# Patient Record
Sex: MALE | Race: WHITE | NOT HISPANIC OR LATINO | Employment: OTHER | URBAN - METROPOLITAN AREA
[De-identification: names, ages, dates, MRNs, and addresses within clinical notes are randomized per-mention and may not be internally consistent; named-entity substitution may affect disease eponyms.]

---

## 2017-05-05 ENCOUNTER — HOSPITAL ENCOUNTER (OUTPATIENT)
Dept: LAB | Facility: HOSPITAL | Age: 77
Discharge: HOME | End: 2017-05-05
Attending: INTERNAL MEDICINE | Admitting: INTERNAL MEDICINE

## 2017-05-05 LAB
ALBUMIN SERPL-MCNC: 3.9 G/DL (ref 3.5–4.8)
ALP SERPL-CCNC: 176 IU/L (ref 32–91)
ALT SERPL-CCNC: 26 IU/L (ref 17–63)
ANION GAP SERPL CALC-SCNC: 14.4 MMOL/L (ref 10–20)
AST SERPL-CCNC: 20 IU/L (ref 15–41)
BILIRUB DIRECT SERPL-MCNC: 0.2 MG/DL (ref 0.1–0.5)
BILIRUB SERPL-MCNC: 1.4 MG/DL (ref 0.3–1.2)
BUN SERPL-MCNC: 19 MG/DL (ref 8–20)
BUN/CREAT SERPL: 19 (ref 6.2–20.3)
CALCIUM SERPL-MCNC: 9.6 MG/DL (ref 8.9–10.3)
CHLORIDE SERPL-SCNC: 103 MMOL/L (ref 101–111)
CHOLEST SERPL-MCNC: 133 MG/DL
CHOLEST/HDLC SERPL: 4 {RATIO}
CK SERPL-CCNC: 124 IU/L (ref 49–397)
CONV CO2: 28 MMOL/L (ref 22–32)
CONV LDL CHOLESTEROL DIRECT: 58 MG/DL (ref 0–100)
CONV TOTAL PROTEIN: 6.8 G/DL (ref 6.1–7.9)
CREAT UR-MCNC: 1 MG/DL (ref 0.7–1.2)
GLUCOSE SERPL-MCNC: 233 MG/DL (ref 65–99)
HDLC SERPL-MCNC: 34 MG/DL
LDLC/HDLC SERPL: 1.7 {RATIO}
LIPID INTERPRETATION: ABNORMAL
POTASSIUM SERPL-SCNC: 4.4 MMOL/L (ref 3.6–5.1)
SODIUM SERPL-SCNC: 141 MMOL/L (ref 136–144)
TRIGL SERPL-MCNC: 245 MG/DL
VLDLC SERPL CALC-MCNC: 41.2 MG/DL

## 2017-05-12 ENCOUNTER — CONVERSION ENCOUNTER (OUTPATIENT)
Dept: CARDIOLOGY | Facility: CLINIC | Age: 77
End: 2017-05-12

## 2017-11-03 ENCOUNTER — HOSPITAL ENCOUNTER (OUTPATIENT)
Dept: LAB | Facility: HOSPITAL | Age: 77
Discharge: HOME | End: 2017-11-03
Attending: INTERNAL MEDICINE | Admitting: INTERNAL MEDICINE

## 2017-11-03 LAB
ALBUMIN SERPL-MCNC: 3.6 G/DL (ref 3.5–4.8)
ALP SERPL-CCNC: 176 IU/L (ref 32–91)
ALT SERPL-CCNC: 23 IU/L (ref 17–63)
ANION GAP SERPL CALC-SCNC: 12.3 MMOL/L (ref 10–20)
AST SERPL-CCNC: 22 IU/L (ref 15–41)
BILIRUB DIRECT SERPL-MCNC: 0.2 MG/DL (ref 0.1–0.5)
BILIRUB SERPL-MCNC: 1.1 MG/DL (ref 0.3–1.2)
BUN SERPL-MCNC: 16 MG/DL (ref 8–20)
BUN/CREAT SERPL: 16 (ref 6.2–20.3)
CALCIUM SERPL-MCNC: 9.1 MG/DL (ref 8.9–10.3)
CHLORIDE SERPL-SCNC: 100 MMOL/L (ref 101–111)
CHOLEST SERPL-MCNC: 109 MG/DL
CHOLEST/HDLC SERPL: 3.1 {RATIO}
CK SERPL-CCNC: 81 IU/L (ref 49–397)
CONV CO2: 28 MMOL/L (ref 22–32)
CONV LDL CHOLESTEROL DIRECT: 46 MG/DL (ref 0–100)
CONV TOTAL PROTEIN: 6.2 G/DL (ref 6.1–7.9)
CREAT UR-MCNC: 1 MG/DL (ref 0.7–1.2)
GLUCOSE SERPL-MCNC: 270 MG/DL (ref 65–99)
HDLC SERPL-MCNC: 35 MG/DL
LDLC/HDLC SERPL: 1.3 {RATIO}
LIPID INTERPRETATION: ABNORMAL
POTASSIUM SERPL-SCNC: 4.3 MMOL/L (ref 3.6–5.1)
SODIUM SERPL-SCNC: 136 MMOL/L (ref 136–144)
TRIGL SERPL-MCNC: 224 MG/DL
VLDLC SERPL CALC-MCNC: 28.6 MG/DL

## 2017-11-10 ENCOUNTER — CONVERSION ENCOUNTER (OUTPATIENT)
Dept: CARDIOLOGY | Facility: CLINIC | Age: 77
End: 2017-11-10

## 2017-12-01 ENCOUNTER — OFFICE (AMBULATORY)
Dept: URBAN - METROPOLITAN AREA CLINIC 64 | Facility: CLINIC | Age: 77
End: 2017-12-01

## 2017-12-01 VITALS
HEART RATE: 103 BPM | HEIGHT: 70 IN | SYSTOLIC BLOOD PRESSURE: 134 MMHG | WEIGHT: 188 LBS | DIASTOLIC BLOOD PRESSURE: 87 MMHG

## 2017-12-01 DIAGNOSIS — K59.1 FUNCTIONAL DIARRHEA: ICD-10-CM

## 2017-12-01 DIAGNOSIS — Z86.010 PERSONAL HISTORY OF COLONIC POLYPS: ICD-10-CM

## 2017-12-01 DIAGNOSIS — K22.70 BARRETT'S ESOPHAGUS WITHOUT DYSPLASIA: ICD-10-CM

## 2017-12-01 DIAGNOSIS — K57.30 DIVERTICULOSIS OF LARGE INTESTINE WITHOUT PERFORATION OR ABS: ICD-10-CM

## 2017-12-01 PROCEDURE — 99213 OFFICE O/P EST LOW 20 MIN: CPT | Performed by: NURSE PRACTITIONER

## 2017-12-01 RX ORDER — OMEPRAZOLE, SODIUM BICARBONATE 40; 1100 MG/1; MG/1
CAPSULE ORAL
Qty: 90 | Refills: 5 | Status: ACTIVE

## 2018-01-19 ENCOUNTER — ON CAMPUS - OUTPATIENT (AMBULATORY)
Dept: URBAN - METROPOLITAN AREA HOSPITAL 2 | Facility: HOSPITAL | Age: 78
End: 2018-01-19

## 2018-01-19 ENCOUNTER — OFFICE (AMBULATORY)
Dept: URBAN - METROPOLITAN AREA PATHOLOGY 4 | Facility: PATHOLOGY | Age: 78
End: 2018-01-19

## 2018-01-19 ENCOUNTER — HOSPITAL ENCOUNTER (OUTPATIENT)
Dept: OTHER | Facility: HOSPITAL | Age: 78
Setting detail: SPECIMEN
Discharge: HOME | End: 2018-01-19
Attending: INTERNAL MEDICINE | Admitting: INTERNAL MEDICINE

## 2018-01-19 VITALS
OXYGEN SATURATION: 97 % | DIASTOLIC BLOOD PRESSURE: 82 MMHG | SYSTOLIC BLOOD PRESSURE: 137 MMHG | DIASTOLIC BLOOD PRESSURE: 66 MMHG | HEIGHT: 70 IN | OXYGEN SATURATION: 96 % | HEART RATE: 112 BPM | SYSTOLIC BLOOD PRESSURE: 102 MMHG | SYSTOLIC BLOOD PRESSURE: 90 MMHG | RESPIRATION RATE: 14 BRPM | RESPIRATION RATE: 21 BRPM | SYSTOLIC BLOOD PRESSURE: 156 MMHG | DIASTOLIC BLOOD PRESSURE: 71 MMHG | SYSTOLIC BLOOD PRESSURE: 91 MMHG | RESPIRATION RATE: 16 BRPM | HEART RATE: 113 BPM | HEART RATE: 122 BPM | DIASTOLIC BLOOD PRESSURE: 75 MMHG | OXYGEN SATURATION: 99 % | DIASTOLIC BLOOD PRESSURE: 65 MMHG | RESPIRATION RATE: 18 BRPM | OXYGEN SATURATION: 93 % | HEART RATE: 121 BPM | SYSTOLIC BLOOD PRESSURE: 120 MMHG | DIASTOLIC BLOOD PRESSURE: 101 MMHG | HEART RATE: 110 BPM | SYSTOLIC BLOOD PRESSURE: 92 MMHG | HEART RATE: 123 BPM | DIASTOLIC BLOOD PRESSURE: 61 MMHG | OXYGEN SATURATION: 95 % | RESPIRATION RATE: 20 BRPM | DIASTOLIC BLOOD PRESSURE: 76 MMHG | TEMPERATURE: 97.1 F | SYSTOLIC BLOOD PRESSURE: 96 MMHG | WEIGHT: 179 LBS | OXYGEN SATURATION: 98 % | OXYGEN SATURATION: 94 % | SYSTOLIC BLOOD PRESSURE: 122 MMHG

## 2018-01-19 DIAGNOSIS — R19.4 CHANGE IN BOWEL HABIT: ICD-10-CM

## 2018-01-19 DIAGNOSIS — K64.1 SECOND DEGREE HEMORRHOIDS: ICD-10-CM

## 2018-01-19 DIAGNOSIS — K59.1 FUNCTIONAL DIARRHEA: ICD-10-CM

## 2018-01-19 DIAGNOSIS — K22.70 BARRETT'S ESOPHAGUS WITHOUT DYSPLASIA: ICD-10-CM

## 2018-01-19 DIAGNOSIS — K21.9 GASTRO-ESOPHAGEAL REFLUX DISEASE WITHOUT ESOPHAGITIS: ICD-10-CM

## 2018-01-19 LAB
GI HISTOLOGY: A. SELECT: (no result)
GI HISTOLOGY: B. SELECT: (no result)
GI HISTOLOGY: PDF REPORT: (no result)

## 2018-01-19 PROCEDURE — 43239 EGD BIOPSY SINGLE/MULTIPLE: CPT | Performed by: INTERNAL MEDICINE

## 2018-01-19 PROCEDURE — 88305 TISSUE EXAM BY PATHOLOGIST: CPT | Mod: 26 | Performed by: INTERNAL MEDICINE

## 2018-01-19 PROCEDURE — 45380 COLONOSCOPY AND BIOPSY: CPT | Performed by: INTERNAL MEDICINE

## 2018-01-19 RX ORDER — COLESTIPOL HYDROCHLORIDE 1 G/1
TABLET, FILM COATED ORAL
Qty: 180 | Refills: 6 | Status: ACTIVE

## 2018-01-19 RX ADMIN — PROPOFOL: 10 INJECTION, EMULSION INTRAVENOUS at 07:33

## 2018-03-06 ENCOUNTER — OFFICE (AMBULATORY)
Dept: URBAN - METROPOLITAN AREA CLINIC 64 | Facility: CLINIC | Age: 78
End: 2018-03-06

## 2018-03-06 VITALS
WEIGHT: 190 LBS | SYSTOLIC BLOOD PRESSURE: 138 MMHG | DIASTOLIC BLOOD PRESSURE: 82 MMHG | HEIGHT: 70 IN | HEART RATE: 75 BPM

## 2018-03-06 DIAGNOSIS — K22.70 BARRETT'S ESOPHAGUS WITHOUT DYSPLASIA: ICD-10-CM

## 2018-03-06 DIAGNOSIS — K59.1 FUNCTIONAL DIARRHEA: ICD-10-CM

## 2018-03-06 PROCEDURE — 99213 OFFICE O/P EST LOW 20 MIN: CPT | Performed by: INTERNAL MEDICINE

## 2018-03-06 RX ORDER — OMEPRAZOLE, SODIUM BICARBONATE 40; 1100 MG/1; MG/1
CAPSULE ORAL
Qty: 90 | Refills: 5 | Status: ACTIVE

## 2018-03-06 RX ORDER — COLESTIPOL HYDROCHLORIDE 1 G/1
TABLET, FILM COATED ORAL
Qty: 180 | Refills: 6 | Status: ACTIVE

## 2018-05-04 ENCOUNTER — HOSPITAL ENCOUNTER (OUTPATIENT)
Dept: LAB | Facility: HOSPITAL | Age: 78
Discharge: HOME | End: 2018-05-04
Attending: INTERNAL MEDICINE | Admitting: INTERNAL MEDICINE

## 2018-05-04 LAB
ALBUMIN SERPL-MCNC: 3.7 G/DL (ref 3.5–4.8)
ALP SERPL-CCNC: 140 IU/L (ref 32–91)
ALT SERPL-CCNC: 23 IU/L (ref 17–63)
ANION GAP SERPL CALC-SCNC: 12.6 MMOL/L (ref 10–20)
AST SERPL-CCNC: 22 IU/L (ref 15–41)
BILIRUB DIRECT SERPL-MCNC: 0.2 MG/DL (ref 0.1–0.5)
BILIRUB SERPL-MCNC: 1 MG/DL (ref 0.3–1.2)
BUN SERPL-MCNC: 18 MG/DL (ref 8–20)
BUN/CREAT SERPL: 16.4 (ref 6.2–20.3)
CALCIUM SERPL-MCNC: 9.2 MG/DL (ref 8.9–10.3)
CHLORIDE SERPL-SCNC: 103 MMOL/L (ref 101–111)
CHOLEST SERPL-MCNC: 104 MG/DL
CHOLEST/HDLC SERPL: 3.6 {RATIO}
CK SERPL-CCNC: 84 IU/L (ref 49–397)
CONV CO2: 28 MMOL/L (ref 22–32)
CONV LDL CHOLESTEROL DIRECT: 44 MG/DL (ref 0–100)
CONV TOTAL PROTEIN: 6.6 G/DL (ref 6.1–7.9)
CREAT UR-MCNC: 1.1 MG/DL (ref 0.7–1.2)
GLUCOSE SERPL-MCNC: 217 MG/DL (ref 65–99)
HDLC SERPL-MCNC: 29 MG/DL
LDLC/HDLC SERPL: 1.5 {RATIO}
LIPID INTERPRETATION: ABNORMAL
POTASSIUM SERPL-SCNC: 4.6 MMOL/L (ref 3.6–5.1)
SODIUM SERPL-SCNC: 139 MMOL/L (ref 136–144)
TRIGL SERPL-MCNC: 241 MG/DL
VLDLC SERPL CALC-MCNC: 31.3 MG/DL

## 2018-05-11 ENCOUNTER — CONVERSION ENCOUNTER (OUTPATIENT)
Dept: CARDIOLOGY | Facility: CLINIC | Age: 78
End: 2018-05-11

## 2018-10-26 ENCOUNTER — OFFICE (AMBULATORY)
Dept: URBAN - METROPOLITAN AREA CLINIC 64 | Facility: CLINIC | Age: 78
End: 2018-10-26

## 2018-10-26 VITALS
DIASTOLIC BLOOD PRESSURE: 62 MMHG | SYSTOLIC BLOOD PRESSURE: 109 MMHG | WEIGHT: 192 LBS | HEIGHT: 70 IN | HEART RATE: 85 BPM

## 2018-10-26 DIAGNOSIS — K22.70 BARRETT'S ESOPHAGUS WITHOUT DYSPLASIA: ICD-10-CM

## 2018-10-26 DIAGNOSIS — R05 COUGH: ICD-10-CM

## 2018-10-26 DIAGNOSIS — J40 BRONCHITIS, NOT SPECIFIED AS ACUTE OR CHRONIC: ICD-10-CM

## 2018-10-26 DIAGNOSIS — K59.1 FUNCTIONAL DIARRHEA: ICD-10-CM

## 2018-10-26 PROCEDURE — 99213 OFFICE O/P EST LOW 20 MIN: CPT | Performed by: INTERNAL MEDICINE

## 2018-10-26 RX ORDER — COLESTIPOL HYDROCHLORIDE 1 G/1
TABLET, FILM COATED ORAL
Qty: 180 | Refills: 6 | Status: ACTIVE

## 2018-10-26 RX ORDER — LEVOFLOXACIN 500 MG/1
500 TABLET, FILM COATED ORAL
Qty: 7 | Refills: 0 | Status: COMPLETED
Start: 2018-10-26 | End: 2019-04-23

## 2018-10-26 RX ORDER — OMEPRAZOLE, SODIUM BICARBONATE 40; 1100 MG/1; MG/1
CAPSULE ORAL
Qty: 90 | Refills: 5 | Status: ACTIVE

## 2018-11-02 ENCOUNTER — HOSPITAL ENCOUNTER (OUTPATIENT)
Dept: LAB | Facility: HOSPITAL | Age: 78
Discharge: HOME | End: 2018-11-02
Attending: INTERNAL MEDICINE | Admitting: INTERNAL MEDICINE

## 2018-11-02 LAB
ALBUMIN SERPL-MCNC: 3.3 G/DL (ref 3.5–4.8)
ALP SERPL-CCNC: 179 IU/L (ref 32–91)
ALT SERPL-CCNC: 20 IU/L (ref 17–63)
ANION GAP SERPL CALC-SCNC: 15 MMOL/L (ref 10–20)
AST SERPL-CCNC: 18 IU/L (ref 15–41)
BILIRUB DIRECT SERPL-MCNC: 0.1 MG/DL (ref 0.1–0.5)
BILIRUB SERPL-MCNC: 0.6 MG/DL (ref 0.3–1.2)
BUN SERPL-MCNC: 16 MG/DL (ref 8–20)
BUN/CREAT SERPL: 16 (ref 6.2–20.3)
CALCIUM SERPL-MCNC: 9.3 MG/DL (ref 8.9–10.3)
CHLORIDE SERPL-SCNC: 96 MMOL/L (ref 101–111)
CHOLEST SERPL-MCNC: 90 MG/DL
CHOLEST/HDLC SERPL: 2.9 {RATIO}
CK SERPL-CCNC: 72 IU/L (ref 49–397)
CONV CO2: 30 MMOL/L (ref 22–32)
CONV LDL CHOLESTEROL DIRECT: 36 MG/DL (ref 0–100)
CONV TOTAL PROTEIN: 6.5 G/DL (ref 6.1–7.9)
CREAT UR-MCNC: 1 MG/DL (ref 0.7–1.2)
GLUCOSE SERPL-MCNC: 257 MG/DL (ref 65–99)
HDLC SERPL-MCNC: 32 MG/DL
LDLC/HDLC SERPL: 1.2 {RATIO}
LIPID INTERPRETATION: ABNORMAL
POTASSIUM SERPL-SCNC: 5 MMOL/L (ref 3.6–5.1)
SODIUM SERPL-SCNC: 136 MMOL/L (ref 136–144)
TRIGL SERPL-MCNC: 179 MG/DL
VLDLC SERPL CALC-MCNC: 22.1 MG/DL

## 2018-11-09 ENCOUNTER — CONVERSION ENCOUNTER (OUTPATIENT)
Dept: CARDIOLOGY | Facility: CLINIC | Age: 78
End: 2018-11-09

## 2019-04-23 ENCOUNTER — OFFICE (AMBULATORY)
Dept: URBAN - METROPOLITAN AREA CLINIC 64 | Facility: CLINIC | Age: 79
End: 2019-04-23

## 2019-04-23 VITALS
HEART RATE: 70 BPM | WEIGHT: 195 LBS | DIASTOLIC BLOOD PRESSURE: 73 MMHG | SYSTOLIC BLOOD PRESSURE: 122 MMHG | HEIGHT: 70 IN

## 2019-04-23 DIAGNOSIS — K59.1 FUNCTIONAL DIARRHEA: ICD-10-CM

## 2019-04-23 DIAGNOSIS — K21.9 GASTRO-ESOPHAGEAL REFLUX DISEASE WITHOUT ESOPHAGITIS: ICD-10-CM

## 2019-04-23 DIAGNOSIS — K22.70 BARRETT'S ESOPHAGUS WITHOUT DYSPLASIA: ICD-10-CM

## 2019-04-23 PROCEDURE — 99212 OFFICE O/P EST SF 10 MIN: CPT | Performed by: INTERNAL MEDICINE

## 2019-04-23 RX ORDER — OMEPRAZOLE, SODIUM BICARBONATE 40; 1100 MG/1; MG/1
CAPSULE ORAL
Qty: 90 | Refills: 5 | Status: ACTIVE

## 2019-04-23 RX ORDER — COLESTIPOL HYDROCHLORIDE 1 G/1
TABLET, FILM COATED ORAL
Qty: 180 | Refills: 6 | Status: ACTIVE

## 2019-05-17 ENCOUNTER — HOSPITAL ENCOUNTER (OUTPATIENT)
Dept: LAB | Facility: HOSPITAL | Age: 79
Discharge: HOME | End: 2019-05-17
Attending: INTERNAL MEDICINE | Admitting: INTERNAL MEDICINE

## 2019-05-17 LAB
ALBUMIN SERPL-MCNC: 3.6 G/DL (ref 3.5–4.8)
ALP SERPL-CCNC: 125 IU/L (ref 32–91)
ALT SERPL-CCNC: 18 IU/L (ref 17–63)
ANION GAP SERPL CALC-SCNC: 14 MMOL/L (ref 10–20)
AST SERPL-CCNC: 18 IU/L (ref 15–41)
BILIRUB DIRECT SERPL-MCNC: 0.3 MG/DL (ref 0.1–0.5)
BILIRUB SERPL-MCNC: 1.3 MG/DL (ref 0.3–1.2)
BUN SERPL-MCNC: 19 MG/DL (ref 8–20)
BUN/CREAT SERPL: 19 (ref 6.2–20.3)
CALCIUM SERPL-MCNC: 9 MG/DL (ref 8.9–10.3)
CHLORIDE SERPL-SCNC: 102 MMOL/L (ref 101–111)
CHOLEST SERPL-MCNC: 115 MG/DL
CHOLEST/HDLC SERPL: 3.9 {RATIO}
CK SERPL-CCNC: 92 IU/L (ref 49–397)
CONV CO2: 26 MMOL/L (ref 22–32)
CONV LDL CHOLESTEROL DIRECT: 53 MG/DL (ref 0–100)
CONV TOTAL PROTEIN: 6.2 G/DL (ref 6.1–7.9)
CREAT UR-MCNC: 1 MG/DL (ref 0.7–1.2)
GLUCOSE SERPL-MCNC: 192 MG/DL (ref 65–99)
HDLC SERPL-MCNC: 29 MG/DL
LDLC/HDLC SERPL: 1.8 {RATIO}
LIPID INTERPRETATION: ABNORMAL
POTASSIUM SERPL-SCNC: 4 MMOL/L (ref 3.6–5.1)
SODIUM SERPL-SCNC: 138 MMOL/L (ref 136–144)
TRIGL SERPL-MCNC: 257 MG/DL
VLDLC SERPL CALC-MCNC: 32.7 MG/DL

## 2019-05-24 ENCOUNTER — CONVERSION ENCOUNTER (OUTPATIENT)
Dept: CARDIOLOGY | Facility: CLINIC | Age: 79
End: 2019-05-24

## 2019-06-04 VITALS
DIASTOLIC BLOOD PRESSURE: 76 MMHG | SYSTOLIC BLOOD PRESSURE: 120 MMHG | DIASTOLIC BLOOD PRESSURE: 76 MMHG | HEART RATE: 86 BPM | OXYGEN SATURATION: 97 % | HEART RATE: 70 BPM | HEART RATE: 75 BPM | WEIGHT: 192.75 LBS | HEART RATE: 77 BPM | WEIGHT: 195 LBS | SYSTOLIC BLOOD PRESSURE: 131 MMHG | OXYGEN SATURATION: 98 % | DIASTOLIC BLOOD PRESSURE: 79 MMHG | WEIGHT: 198.5 LBS | SYSTOLIC BLOOD PRESSURE: 117 MMHG | WEIGHT: 189.4 LBS | SYSTOLIC BLOOD PRESSURE: 118 MMHG | OXYGEN SATURATION: 96 % | DIASTOLIC BLOOD PRESSURE: 79 MMHG | OXYGEN SATURATION: 96 %

## 2019-06-04 VITALS
HEART RATE: 67 BPM | OXYGEN SATURATION: 97 % | SYSTOLIC BLOOD PRESSURE: 117 MMHG | WEIGHT: 192.5 LBS | DIASTOLIC BLOOD PRESSURE: 77 MMHG

## 2019-06-06 NOTE — PROGRESS NOTES
Visit Type:  Follow-up Visit  Primary Care Provider:  Donna MERRILL MD    Chief Complaint:  Follow-Up for Coronary Artery Disease & Hyperlipidemia.    History of Present Illness:  2019  78 -year-old white male patient with a known history of coronary artery disease catheterization done  showed no obstructive CAD previously placed RCA stent was open,  now comes back for follow up. patient denies any active symptoms     echocardiogram  in May 2016    Normal LV systolic function EF is 55%  Left atrium is enlarged  Mild pulmonary hypertension noted  Borderline evidence of prolapse of the posterior leaflet of the mitral valve  noted without any significant mitral insufficiency   Myoview  May 2016  1. Myocardial perfusion imaging is borderline. No ischemia noted.  2. Overall left ventricular systolic function was normal without regional      wall motion abnormalities (as noted above).  3. Inferolateral reverse redistribution noted.    Reason labs showed lipids well-controlled triglycerides 257   patient was advised to increase the fish oil,  aggressive control of the  diabetes to help with elevated triglyceride   regular exercise     follow-up 6 months with labs and EKG                Vital Signs:    Patient Profile:    78 Years Old Male  Height:     70 inches  Weight:     192.50 pounds  (Measured Weight:  192.50 lbs.  oz.)  BMI:        27.62  Pulse rate: 67 / minute  O2 Sat:     97 %  Room Air:   room air without exertion    BP sittin / 77  (left arm)  Cuff size:  regular   Vitals Entered By: Marie Bernard CMA (May 24, 2019 11:27 AM)    Medications:  Medications were reviewed with the patient during this visit.    Allergies:   No Known Allergies  Allergies were reviewed with the patient during this visit.    Current Allergies (reviewed today):  No known allergies    Current Medications (including medications started today):   COLESTIPOL HCL 1 GM ORAL TABLET (COLESTIPOL HCL) Take one (1) tablet by  mouth twice a day  GLIMEPIRIDE 2 MG TABLET (GLIMEPIRIDE) TAKE 1 TABLET EVERY DAY  ATORVASTATIN CALCIUM 40 MG ORAL TABLET (ATORVASTATIN CALCIUM) TAKE 1 TABLET EVERY OTHER NIGHT  ATENOLOL 25 MG ORAL TABLET (ATENOLOL) take 1 tablet by mouth once a day  OMEPRAZOLE-SODIUM BICARBONATE  MG ORAL CAPSULE (OMEPRAZOLE-SODIUM BICARBONATE) take 1 capsule by mouth once a day  FISH OIL 1200 MG ORAL CAPSULE (OMEGA-3 FATTY ACIDS) take 1 capsule by mouth once a day  SLO-NIACIN 500 MG ORAL TABLET EXTENDED RELEASE (NIACIN) take 1 tablet by mouth once a day  ECOTRIN LOW STRENGTH 81 MG ORAL TABLET DELAYED RELEASE (ASPIRIN) take 1 tablet by mouth once a day  FREESTYLE LITE TEST IN VITRO STRIP (GLUCOSE BLOOD) use as directed daily      Past Medical History:     Reviewed history from 11/09/2016 and no changes required:        Esophagitis        Colonoscopy last in 2008        Colon polyps        Diabetes        GERD        Hypertension        Hyperlipidemia        Myocardial infarction        Prostate cancer s/p radiation treatments        Renal Cell Carcinoma        Atrial Fibrillation        No Drug Allergies?  T    Past Surgical History:     Reviewed history from 05/11/2018 and no changes required:        Cholecystectomy        PTCA with stent:        Heart Catherization        Removal of tumors from bladder (3/7/2016)        Removable of tecticles (3/7/2016)        EGD & Colonoscopy (01/19/2018)    Family History Summary:      Reviewed history Last on 11/09/2018 and no changes required:05/25/2019  Brother - Has Family History of Heart Disease - MI & COPD passed 2019 - Entered On: 5/24/2019  Mother - Has Family History of Heart Disease - MI in her 60's - Entered On: 5/12/2017  Father - Has Family History of Heart Disease - MI in his 60's - Entered On: 5/12/2017    General Comments - FH:  FH Heart Disease  FH Hypertension  FH High Cholesterol  FH Breast Cancer      Social History:     Reviewed history from 05/11/2018 and no changes  required:        Patient is currently                 Old cars was a hobby        Passive smoke exposure - no        Alcohol Use - no        Smoking History:        Patient is a former smoker.        Risk Factors:     Smoked Tobacco Use:  Former smoker     Cigarettes:  Yes        Year quit:  1980        Years Since Last Quit:  39  Smokeless Tobacco Use:  Never  Passive smoke exposure:  no  Drug use:  no  Caffeine use:  2 drinks per day  Alcohol use:  no  Exercise:  yes     Times per week:  4     Type of Exercise:  Walks  Seatbelt use:  100 %  Sun Exposure:  remote    Family History Risk Factors:     Family History of MI in females < 65 years old:  yes     Family History of MI in males < 55 years old:  no        Review of Systems   General: No fatigue or tiredness  Eyes: No redness  Ear/Nose/Throat: No discharge  Cardiovascular: No chest pain  Respiratory: No shortness of breath  Gastrointestinal: No nausea or vomiting  Genitourinary: No Bleeding  Musculoskeletal: No arthralgia or myalgia  Skin: No rash  Neurologic: No numbness or tingling  Psychiatric: No anxiety or depression  Hematologic/Lymphatic: No abnormal bleeding      Physical Exam    General:      well developed, well nourished, in no acute distress.    Head:      normocephalic and atraumatic.    Eyes:      conjunctiva and sclera clear with out nystagmus.    Mouth:      Oropharynx moist  Neck:      no bruit and no JVD.    Lungs:      Clear to auscultation  Heart:      Regular rate and rhythm no murmurs, no rubs and no gallops.    Abdomen:      non-tender.    Msk:       within normal  Pulses:       bilateral pulses present  Extremities:      no pedal edema.    Neurologic:       non focal  Skin:       within normal  Psych:      alert and cooperative; normal mood and affect; normal attention span and concentration.      Diabetes Management Exam:      Foot Exam (with socks and/or shoes not present):        Pulses:            bilateral  pulses present      Blood Pressure:  Today's BP: 117/77 mm Hg    Labwork:   Most Recent Lab Results:   LDL: 53 mg/dL 05/17/2019  HbA1c: : 8.8 % OF TOTAL HGB % 12/23/2014  Microalbumin: <30 normal mg/L 09/12/2013        Impression & Recommendations:    Problem # 1:  HYPERTENSION (ICD-401.9) (TEZ05-A34)   continue aggressive blood pressure control  His updated medication list for this problem includes:     Atenolol 25 Mg Oral Tablet (Atenolol) ..... Take 1 tablet by mouth once a day    Orders:  86440-Rfc Vst-Est Level III (CPT-92059)      Problem # 2:  CAD (ICD-414.00) (SEI53-J19.10)   continue to modify cardiac risk factors aggressively  His updated medication list for this problem includes:     Atenolol 25 Mg Oral Tablet (Atenolol) ..... Take 1 tablet by mouth once a day     Ecotrin Low Strength 81 Mg Oral Tablet Delayed Release (Aspirin) ..... Take 1 tablet by mouth once a day    Orders:  62946-Mkd Vst-Est Level III (CPT-60300)      Problem # 3:  HYPERLIPIDEMIA (ICD-272.4) (KIK12-K74.5)   advised to increase the fish oil  His updated medication list for this problem includes:     Colestipol Hcl 1 Gm Oral Tablet (Colestipol hcl) ..... Take one (1) tablet by mouth twice a day     Atorvastatin Calcium 40 Mg Oral Tablet (Atorvastatin calcium) ..... Take 1 tablet every other night     Slo-niacin 500 Mg Oral Tablet Extended Release (Niacin) ..... Take 1 tablet by mouth once a day    Orders:  28615-Wsm Vst-Est Level III (CPT-82815)      Medications Added to Medication List This Visit:  1)  Atorvastatin Calcium 40 Mg Oral Tablet (Atorvastatin calcium) .... Take 1 tablet every other night                  Medication Administration    Orders Added:  1)  46165-Eeg Vst-Est Level III [CPT-08615]  ]      Electronically signed by Hudson Smith MD on 05/25/2019 at 2:38 PM  ________________________________________________________________________       Disclaimer: Converted Note message may not contain all data elements that  existed in the legacy source system. Please see [facility] for the original note details.

## 2019-08-22 RX ORDER — ATENOLOL 25 MG/1
TABLET ORAL
Qty: 90 TABLET | Refills: 3 | Status: SHIPPED | OUTPATIENT
Start: 2019-08-22 | End: 2020-06-12 | Stop reason: SDUPTHER

## 2019-10-16 ENCOUNTER — OFFICE (AMBULATORY)
Dept: URBAN - METROPOLITAN AREA CLINIC 64 | Facility: CLINIC | Age: 79
End: 2019-10-16

## 2019-10-16 VITALS
WEIGHT: 191 LBS | HEART RATE: 86 BPM | DIASTOLIC BLOOD PRESSURE: 82 MMHG | HEIGHT: 70 IN | SYSTOLIC BLOOD PRESSURE: 152 MMHG

## 2019-10-16 DIAGNOSIS — K59.1 FUNCTIONAL DIARRHEA: ICD-10-CM

## 2019-10-16 DIAGNOSIS — K21.9 GASTRO-ESOPHAGEAL REFLUX DISEASE WITHOUT ESOPHAGITIS: ICD-10-CM

## 2019-10-16 DIAGNOSIS — I10 ESSENTIAL (PRIMARY) HYPERTENSION: ICD-10-CM

## 2019-10-16 DIAGNOSIS — K22.70 BARRETT'S ESOPHAGUS WITHOUT DYSPLASIA: ICD-10-CM

## 2019-10-16 DIAGNOSIS — Z90.49 ACQUIRED ABSENCE OF OTHER SPECIFIED PARTS OF DIGESTIVE TRACT: ICD-10-CM

## 2019-10-16 PROCEDURE — 99212 OFFICE O/P EST SF 10 MIN: CPT | Performed by: INTERNAL MEDICINE

## 2019-10-16 RX ORDER — OMEPRAZOLE, SODIUM BICARBONATE 40; 1100 MG/1; MG/1
CAPSULE ORAL
Qty: 90 | Refills: 5 | Status: ACTIVE

## 2019-10-25 ENCOUNTER — HOSPITAL ENCOUNTER (OUTPATIENT)
Dept: CARDIOLOGY | Facility: HOSPITAL | Age: 79
Discharge: HOME | End: 2019-10-25
Admitting: UROLOGY

## 2019-10-25 ENCOUNTER — LAB (OUTPATIENT)
Dept: LAB | Facility: HOSPITAL | Age: 79
End: 2019-10-25

## 2019-10-25 ENCOUNTER — TRANSCRIBE ORDERS (OUTPATIENT)
Dept: ADMINISTRATIVE | Facility: HOSPITAL | Age: 79
End: 2019-10-25

## 2019-10-25 DIAGNOSIS — C67.9 MALIGNANT NEOPLASM OF URINARY BLADDER, UNSPECIFIED SITE (HCC): ICD-10-CM

## 2019-10-25 DIAGNOSIS — C67.9 MALIGNANT NEOPLASM OF URINARY BLADDER, UNSPECIFIED SITE (HCC): Primary | ICD-10-CM

## 2019-10-25 LAB
ANION GAP SERPL CALCULATED.3IONS-SCNC: 9 MMOL/L (ref 5–15)
BASOPHILS # BLD AUTO: 0 10*3/MM3 (ref 0–0.2)
BASOPHILS NFR BLD AUTO: 0.4 % (ref 0–1.5)
BUN BLD-MCNC: 21 MG/DL (ref 8–23)
BUN/CREAT SERPL: 19.6 (ref 7–25)
CALCIUM SPEC-SCNC: 9.2 MG/DL (ref 8.6–10.5)
CHLORIDE SERPL-SCNC: 97 MMOL/L (ref 98–107)
CO2 SERPL-SCNC: 30 MMOL/L (ref 22–29)
CREAT BLD-MCNC: 1.07 MG/DL (ref 0.76–1.27)
DEPRECATED RDW RBC AUTO: 43.8 FL (ref 37–54)
EOSINOPHIL # BLD AUTO: 0.2 10*3/MM3 (ref 0–0.4)
EOSINOPHIL NFR BLD AUTO: 2 % (ref 0.3–6.2)
ERYTHROCYTE [DISTWIDTH] IN BLOOD BY AUTOMATED COUNT: 13.7 % (ref 12.3–15.4)
GFR SERPL CREATININE-BSD FRML MDRD: 67 ML/MIN/1.73
GLUCOSE BLD-MCNC: 260 MG/DL (ref 65–99)
HCT VFR BLD AUTO: 42.4 % (ref 37.5–51)
HGB BLD-MCNC: 14.8 G/DL (ref 13–17.7)
LYMPHOCYTES # BLD AUTO: 2 10*3/MM3 (ref 0.7–3.1)
LYMPHOCYTES NFR BLD AUTO: 22.2 % (ref 19.6–45.3)
MCH RBC QN AUTO: 31.4 PG (ref 26.6–33)
MCHC RBC AUTO-ENTMCNC: 34.9 G/DL (ref 31.5–35.7)
MCV RBC AUTO: 90 FL (ref 79–97)
MONOCYTES # BLD AUTO: 0.8 10*3/MM3 (ref 0.1–0.9)
MONOCYTES NFR BLD AUTO: 8.3 % (ref 5–12)
NEUTROPHILS # BLD AUTO: 6.1 10*3/MM3 (ref 1.7–7)
NEUTROPHILS NFR BLD AUTO: 67.1 % (ref 42.7–76)
NRBC BLD AUTO-RTO: 0 /100 WBC (ref 0–0.2)
PLATELET # BLD AUTO: 175 10*3/MM3 (ref 140–450)
PMV BLD AUTO: 7.4 FL (ref 6–12)
POTASSIUM BLD-SCNC: 4.5 MMOL/L (ref 3.5–5.2)
RBC # BLD AUTO: 4.71 10*6/MM3 (ref 4.14–5.8)
SODIUM BLD-SCNC: 136 MMOL/L (ref 136–145)
WBC NRBC COR # BLD: 9.1 10*3/MM3 (ref 3.4–10.8)

## 2019-10-25 PROCEDURE — 36415 COLL VENOUS BLD VENIPUNCTURE: CPT

## 2019-10-25 PROCEDURE — 85025 COMPLETE CBC W/AUTO DIFF WBC: CPT

## 2019-10-25 PROCEDURE — 80048 BASIC METABOLIC PNL TOTAL CA: CPT

## 2019-10-25 PROCEDURE — 93005 ELECTROCARDIOGRAM TRACING: CPT | Performed by: UROLOGY

## 2019-10-28 ENCOUNTER — LAB REQUISITION (OUTPATIENT)
Dept: LAB | Facility: HOSPITAL | Age: 79
End: 2019-10-28

## 2019-10-28 DIAGNOSIS — Z00.00 ENCOUNTER FOR GENERAL ADULT MEDICAL EXAMINATION WITHOUT ABNORMAL FINDINGS: ICD-10-CM

## 2019-10-28 PROCEDURE — 93010 ELECTROCARDIOGRAM REPORT: CPT | Performed by: INTERNAL MEDICINE

## 2019-10-28 PROCEDURE — 88307 TISSUE EXAM BY PATHOLOGIST: CPT | Performed by: UROLOGY

## 2019-10-29 LAB
LAB AP CASE REPORT: NORMAL
LAB AP SYNOPTIC CHECKLIST: NORMAL
PATH REPORT.FINAL DX SPEC: NORMAL
PATH REPORT.GROSS SPEC: NORMAL

## 2019-12-05 RX ORDER — ATORVASTATIN CALCIUM 40 MG/1
1 TABLET, FILM COATED ORAL
COMMUNITY
Start: 2017-08-28 | End: 2020-06-12 | Stop reason: SDUPTHER

## 2019-12-05 RX ORDER — LANOLIN ALCOHOL/MO/W.PET/CERES
1 CREAM (GRAM) TOPICAL DAILY
COMMUNITY
Start: 2013-09-12 | End: 2021-10-08

## 2019-12-05 RX ORDER — AMOXICILLIN 500 MG
1 CAPSULE ORAL EVERY 24 HOURS
Status: ON HOLD | COMMUNITY
Start: 2013-09-12 | End: 2022-07-01

## 2019-12-05 RX ORDER — ASPIRIN 81 MG/1
1 TABLET ORAL EVERY 24 HOURS
COMMUNITY
Start: 2013-09-12 | End: 2022-06-08

## 2019-12-05 RX ORDER — GLIMEPIRIDE 2 MG/1
1 TABLET ORAL EVERY 24 HOURS
COMMUNITY
Start: 2018-01-10 | End: 2020-03-20 | Stop reason: SDUPTHER

## 2019-12-05 RX ORDER — OMEPRAZOLE AND SODIUM BICARBONATE 40; 1100 MG/1; MG/1
1 CAPSULE ORAL DAILY
COMMUNITY
Start: 2019-10-16

## 2019-12-05 NOTE — PROGRESS NOTES
"    Subjective:     Encounter Date:12/06/2019      Patient ID: Cisco Frank is a 79 y.o. male.    Chief Complaint: Follow- up HTN  History of Present Illness  79 -year-old white male patient with a known history of coronary artery disease catheterization done 2014 showed no obstructive CAD previously placed RCA stent was open,  now comes back for follow up. patient denies any active symptoms      echocardiogram  in May 2016     Normal LV systolic function EF is 55%  Left atrium is enlarged  Mild pulmonary hypertension noted  Borderline evidence of prolapse of the posterior leaflet of the mitral valve  noted without any significant mitral insufficiency   Myoview  May 2016  1. Myocardial perfusion imaging is borderline. No ischemia noted.  2. Overall left ventricular systolic function was normal without regional      wall motion abnormalities (as noted above).  3. Inferolateral reverse redistribution noted.       Patient had urological surgery recently without any cardiac complications      follow-up 6 months with labs and EKG    Past Medical History:   Diagnosis Date   • Coronary artery disease    • H/O prostate cancer    • History of bladder cancer    • Hyperlipidemia    • Hypertension      Past Surgical History:   Procedure Laterality Date   • BLADDER SURGERY  10/26/2019    [facility] Dr. [facility]   • CARDIAC CATHETERIZATION       /80 (BP Location: Left arm, Patient Position: Sitting, Cuff Size: Adult)   Pulse 71   Ht 177.8 cm (70\")   Wt 87.9 kg (193 lb 12.8 oz)   SpO2 98%   BMI 27.81 kg/m²   Family History   Problem Relation Age of Onset   • Heart disease Mother    • Heart attack Father    • No Known Problems Sister    • No Known Problems Brother    • No Known Problems Maternal Aunt    • No Known Problems Maternal Uncle    • No Known Problems Paternal Aunt    • No Known Problems Paternal Uncle    • No Known Problems Maternal Grandmother    • No Known Problems Maternal Grandfather    • No Known Problems Paternal " Grandmother    • No Known Problems Paternal Grandfather    • No Known Problems Other    • Anemia Neg Hx    • Arrhythmia Neg Hx    • Asthma Neg Hx    • Clotting disorder Neg Hx    • Fainting Neg Hx    • Heart failure Neg Hx    • Hyperlipidemia Neg Hx    • Hypertension Neg Hx        Current Outpatient Medications:   •  aspirin (ECOTRIN LOW STRENGTH) 81 MG EC tablet, Take 1 tablet by mouth Daily., Disp: , Rfl:   •  atenolol (TENORMIN) 25 MG tablet, TAKE 1 TABLET EVERY DAY, Disp: 90 tablet, Rfl: 3  •  atorvastatin (LIPITOR) 40 MG tablet, Take 1 tablet by mouth every night at bedtime., Disp: , Rfl:   •  glimepiride (AMARYL) 2 MG tablet, Take 1 tablet by mouth Daily., Disp: , Rfl:   •  glucose blood (FREESTYLE LITE) test strip, FREESTYLE LITE TEST STRP, Disp: , Rfl:   •  niacin (SLO-NIACIN) 500 MG CR tablet, Take 1 tablet by mouth Daily., Disp: , Rfl:   •  Omega-3 Fatty Acids (FISH OIL) 1200 MG capsule capsule, Take 1 capsule by mouth Daily., Disp: , Rfl:   •  omeprazole-sodium bicarbonate (ZEGERID)  MG per capsule, Take 1 capsule by mouth Daily., Disp: , Rfl:   Social History     Socioeconomic History   • Marital status:      Spouse name: Not on file   • Number of children: Not on file   • Years of education: Not on file   • Highest education level: Not on file   Tobacco Use   • Smoking status: Former Smoker     Last attempt to quit: 1980     Years since quittin.9   • Smokeless tobacco: Never Used   Substance and Sexual Activity   • Alcohol use: No     Frequency: Never   • Drug use: No   • Sexual activity: Defer     No Known Allergies  Review of Systems   Constitution: Negative for fever and malaise/fatigue.   HENT: Negative for congestion and hearing loss.    Eyes: Negative for double vision and visual disturbance.   Cardiovascular: Negative for chest pain, claudication, dyspnea on exertion, leg swelling and syncope.   Respiratory: Negative for cough and shortness of breath.    Endocrine: Negative  for cold intolerance.   Skin: Negative for color change and rash.   Musculoskeletal: Negative for arthritis and joint pain.   Gastrointestinal: Negative for abdominal pain and heartburn.   Genitourinary: Negative for hematuria.   Neurological: Negative for excessive daytime sleepiness and dizziness.   Psychiatric/Behavioral: Negative for depression. The patient is not nervous/anxious.    All other systems reviewed and are negative.             Objective:     Physical Exam   Constitutional: He is oriented to person, place, and time. He appears well-developed and well-nourished. He is cooperative.   HENT:   Head: Normocephalic and atraumatic.   Mouth/Throat: Uvula is midline and oropharynx is clear and moist. No oral lesions.   Eyes: Conjunctivae are normal. No scleral icterus.   Neck: Trachea normal. Neck supple. No JVD present. Carotid bruit is not present. No thyromegaly present.   Cardiovascular: Normal rate, regular rhythm, S1 normal, S2 normal, normal heart sounds, intact distal pulses and normal pulses. PMI is not displaced. Exam reveals no gallop and no friction rub.   No murmur heard.  Pulmonary/Chest: Effort normal and breath sounds normal.   Abdominal: Soft. Bowel sounds are normal.   Musculoskeletal: Normal range of motion.   Neurological: He is alert and oriented to person, place, and time. He has normal strength.   No focal deficits   Skin: Skin is warm. No cyanosis.   Psychiatric: He has a normal mood and affect.       Procedures    Lab Review:       Assessment:          Diagnosis Plan   1. Mixed hyperlipidemia  CK    Comprehensive Metabolic Panel    Lipid Panel   2. Essential hypertension  CK    Comprehensive Metabolic Panel    Lipid Panel   3. Type 2 diabetes mellitus without complication, without long-term current use of insulin (CMS/[facility])  CK    Comprehensive Metabolic Panel    Lipid Panel          Plan:       Continue aggressive control of hypertension dyslipidemia  Advised aggressive control of  diabetes follow-up in 6 months

## 2019-12-06 ENCOUNTER — OFFICE VISIT (OUTPATIENT)
Dept: CARDIOLOGY | Facility: CLINIC | Age: 79
End: 2019-12-06

## 2019-12-06 VITALS
HEIGHT: 70 IN | DIASTOLIC BLOOD PRESSURE: 80 MMHG | HEART RATE: 71 BPM | WEIGHT: 193.8 LBS | OXYGEN SATURATION: 98 % | SYSTOLIC BLOOD PRESSURE: 116 MMHG | BODY MASS INDEX: 27.75 KG/M2

## 2019-12-06 DIAGNOSIS — E11.9 TYPE 2 DIABETES MELLITUS WITHOUT COMPLICATION, WITHOUT LONG-TERM CURRENT USE OF INSULIN (HCC): ICD-10-CM

## 2019-12-06 DIAGNOSIS — I10 ESSENTIAL HYPERTENSION: ICD-10-CM

## 2019-12-06 DIAGNOSIS — E78.2 MIXED HYPERLIPIDEMIA: Primary | ICD-10-CM

## 2019-12-06 PROCEDURE — 99213 OFFICE O/P EST LOW 20 MIN: CPT | Performed by: INTERNAL MEDICINE

## 2020-03-19 ENCOUNTER — TRANSCRIBE ORDERS (OUTPATIENT)
Dept: ADMINISTRATIVE | Facility: HOSPITAL | Age: 80
End: 2020-03-19

## 2020-03-19 ENCOUNTER — LAB (OUTPATIENT)
Dept: LAB | Facility: HOSPITAL | Age: 80
End: 2020-03-19

## 2020-03-19 ENCOUNTER — HOSPITAL ENCOUNTER (OUTPATIENT)
Dept: CARDIOLOGY | Facility: HOSPITAL | Age: 80
Discharge: HOME | End: 2020-03-19
Admitting: UROLOGY

## 2020-03-19 ENCOUNTER — OFFICE VISIT (OUTPATIENT)
Dept: FAMILY MEDICINE CLINIC | Facility: CLINIC | Age: 80
End: 2020-03-19

## 2020-03-19 VITALS
HEIGHT: 70 IN | WEIGHT: 196 LBS | TEMPERATURE: 98.8 F | RESPIRATION RATE: 8 BRPM | BODY MASS INDEX: 28.06 KG/M2 | HEART RATE: 77 BPM | DIASTOLIC BLOOD PRESSURE: 70 MMHG | SYSTOLIC BLOOD PRESSURE: 120 MMHG | OXYGEN SATURATION: 97 %

## 2020-03-19 DIAGNOSIS — E11.9 TYPE 2 DIABETES MELLITUS WITHOUT COMPLICATION, WITHOUT LONG-TERM CURRENT USE OF INSULIN (HCC): Primary | ICD-10-CM

## 2020-03-19 DIAGNOSIS — Z01.818 PREOP TESTING: Primary | ICD-10-CM

## 2020-03-19 DIAGNOSIS — Z01.818 PREOP TESTING: ICD-10-CM

## 2020-03-19 DIAGNOSIS — Z85.51 HISTORY OF BLADDER CANCER: ICD-10-CM

## 2020-03-19 PROBLEM — K21.9 GASTROESOPHAGEAL REFLUX DISEASE: Status: ACTIVE | Noted: 2020-03-19

## 2020-03-19 PROBLEM — E78.5 HYPERLIPIDEMIA: Status: ACTIVE | Noted: 2020-03-19

## 2020-03-19 PROBLEM — C61 PROSTATE CANCER: Status: ACTIVE | Noted: 2020-03-19

## 2020-03-19 PROBLEM — I21.9 MYOCARDIAL INFARCTION: Status: ACTIVE | Noted: 2020-03-19

## 2020-03-19 PROBLEM — Z85.46 H/O PROSTATE CANCER: Status: ACTIVE | Noted: 2020-03-19

## 2020-03-19 PROBLEM — K63.5 COLON POLYPS: Status: ACTIVE | Noted: 2020-03-19

## 2020-03-19 PROBLEM — I25.10 CORONARY ARTERY DISEASE: Status: ACTIVE | Noted: 2020-03-19

## 2020-03-19 LAB
ANION GAP SERPL CALCULATED.3IONS-SCNC: 9.8 MMOL/L (ref 5–15)
BASOPHILS # BLD AUTO: 0.04 10*3/MM3 (ref 0–0.2)
BASOPHILS NFR BLD AUTO: 0.7 % (ref 0–1.5)
BUN BLD-MCNC: 20 MG/DL (ref 8–23)
BUN/CREAT SERPL: 17.2 (ref 7–25)
CALCIUM SPEC-SCNC: 8.9 MG/DL (ref 8.6–10.5)
CHLORIDE SERPL-SCNC: 93 MMOL/L (ref 98–107)
CHOLEST SERPL-MCNC: 116 MG/DL (ref 0–200)
CO2 SERPL-SCNC: 28.2 MMOL/L (ref 22–29)
CREAT BLD-MCNC: 1.16 MG/DL (ref 0.76–1.27)
DEPRECATED RDW RBC AUTO: 43.3 FL (ref 37–54)
EOSINOPHIL # BLD AUTO: 0.06 10*3/MM3 (ref 0–0.4)
EOSINOPHIL NFR BLD AUTO: 1 % (ref 0.3–6.2)
ERYTHROCYTE [DISTWIDTH] IN BLOOD BY AUTOMATED COUNT: 13.1 % (ref 12.3–15.4)
GFR SERPL CREATININE-BSD FRML MDRD: 61 ML/MIN/1.73
GLUCOSE BLD-MCNC: 458 MG/DL (ref 65–99)
HBA1C MFR BLD: 9.2 % (ref 3.5–5.6)
HCT VFR BLD AUTO: 42.5 % (ref 37.5–51)
HDLC SERPL-MCNC: 36 MG/DL (ref 40–60)
HGB BLD-MCNC: 14.5 G/DL (ref 13–17.7)
IMM GRANULOCYTES # BLD AUTO: 0.03 10*3/MM3 (ref 0–0.05)
IMM GRANULOCYTES NFR BLD AUTO: 0.5 % (ref 0–0.5)
LDLC SERPL CALC-MCNC: 7 MG/DL (ref 0–100)
LDLC/HDLC SERPL: 0.2 {RATIO}
LYMPHOCYTES # BLD AUTO: 2.11 10*3/MM3 (ref 0.7–3.1)
LYMPHOCYTES NFR BLD AUTO: 34.4 % (ref 19.6–45.3)
MCH RBC QN AUTO: 30.9 PG (ref 26.6–33)
MCHC RBC AUTO-ENTMCNC: 34.1 G/DL (ref 31.5–35.7)
MCV RBC AUTO: 90.6 FL (ref 79–97)
MONOCYTES # BLD AUTO: 0.43 10*3/MM3 (ref 0.1–0.9)
MONOCYTES NFR BLD AUTO: 7 % (ref 5–12)
NEUTROPHILS # BLD AUTO: 3.47 10*3/MM3 (ref 1.7–7)
NEUTROPHILS NFR BLD AUTO: 56.4 % (ref 42.7–76)
NRBC BLD AUTO-RTO: 0 /100 WBC (ref 0–0.2)
PLATELET # BLD AUTO: 156 10*3/MM3 (ref 140–450)
PMV BLD AUTO: 10.1 FL (ref 6–12)
POTASSIUM BLD-SCNC: 4.8 MMOL/L (ref 3.5–5.2)
RBC # BLD AUTO: 4.69 10*6/MM3 (ref 4.14–5.8)
SODIUM BLD-SCNC: 131 MMOL/L (ref 136–145)
TRIGL SERPL-MCNC: 364 MG/DL (ref 0–150)
VLDLC SERPL-MCNC: 72.8 MG/DL (ref 5–40)
WBC NRBC COR # BLD: 6.14 10*3/MM3 (ref 3.4–10.8)

## 2020-03-19 PROCEDURE — 83036 HEMOGLOBIN GLYCOSYLATED A1C: CPT | Performed by: FAMILY MEDICINE

## 2020-03-19 PROCEDURE — 80061 LIPID PANEL: CPT | Performed by: FAMILY MEDICINE

## 2020-03-19 PROCEDURE — 99213 OFFICE O/P EST LOW 20 MIN: CPT | Performed by: FAMILY MEDICINE

## 2020-03-19 PROCEDURE — 36415 COLL VENOUS BLD VENIPUNCTURE: CPT

## 2020-03-19 PROCEDURE — 85025 COMPLETE CBC W/AUTO DIFF WBC: CPT

## 2020-03-19 PROCEDURE — 80048 BASIC METABOLIC PNL TOTAL CA: CPT

## 2020-03-19 PROCEDURE — 93005 ELECTROCARDIOGRAM TRACING: CPT | Performed by: UROLOGY

## 2020-03-19 NOTE — PATIENT INSTRUCTIONS
"Carbohydrate Counting for Diabetes Mellitus, Adult    Carbohydrate counting is a method of keeping track of how many carbohydrates you eat. Eating carbohydrates naturally increases the amount of sugar (glucose) in the blood. Counting how many carbohydrates you eat helps keep your blood glucose within normal limits, which helps you manage your diabetes (diabetes mellitus).  It is important to know how many carbohydrates you can safely have in each meal. This is different for every person. A diet and nutrition specialist (registered dietitian) can help you make a meal plan and calculate how many carbohydrates you should have at each meal and snack.  Carbohydrates are found in the following foods:  · Grains, such as breads and cereals.  · Dried beans and soy products.  · Starchy vegetables, such as potatoes, peas, and corn.  · Fruit and fruit juices.  · Milk and yogurt.  · Sweets and snack foods, such as cake, cookies, candy, chips, and soft drinks.  How do I count carbohydrates?  There are two ways to count carbohydrates in food. You can use either of the methods or a combination of both.  Reading \"Nutrition Facts\" on packaged food  The \"Nutrition Facts\" list is included on the labels of almost all packaged foods and beverages in the [place] It includes:  · The serving size.  · Information about nutrients in each serving, including the grams (g) of carbohydrate per serving.  To use the “Nutrition Facts\":  · Decide how many servings you will have.  · Multiply the number of servings by the number of carbohydrates per serving.  · The resulting number is the total amount of carbohydrates that you will be having.  Learning standard serving sizes of other foods  When you eat carbohydrate foods that are not packaged or do not include \"Nutrition Facts\" on the label, you need to measure the servings in order to count the amount of carbohydrates:  · Measure the foods that you will eat with a food scale or measuring cup, if " needed.  · Decide how many standard-size servings you will eat.  · Multiply the number of servings by 15. Most carbohydrate-rich foods have about 15 g of carbohydrates per serving.  ? For example, if you eat 8 oz (170 g) of strawberries, you will have eaten 2 servings and 30 g of carbohydrates (2 servings x 15 g = 30 g).  · For foods that have more than one food mixed, such as soups and casseroles, you must count the carbohydrates in each food that is included.  The following list contains standard serving sizes of common carbohydrate-rich foods. Each of these servings has about 15 g of carbohydrates:  · ½ hamburger bun or ½ English muffin.  · ½ oz (15 mL) syrup.  · ½ oz (14 g) jelly.  · 1 slice of bread.  · 1 six-inch tortilla.  · 3 oz (85 g) cooked rice or pasta.  · 4 oz (113 g) cooked dried beans.  · 4 oz (113 g) starchy vegetable, such as peas, corn, or potatoes.  · 4 oz (113 g) hot cereal.  · 4 oz (113 g) mashed potatoes or ¼ of a large baked potato.  · 4 oz (113 g) canned or frozen fruit.  · 4 oz (120 mL) fruit juice.  · 4-6 crackers.  · 6 chicken nuggets.  · 6 oz (170 g) unsweetened dry cereal.  · 6 oz (170 g) plain fat-free yogurt or yogurt sweetened with artificial sweeteners.  · 8 oz (240 mL) milk.  · 8 oz (170 g) fresh fruit or one small piece of fruit.  · 24 oz (680 g) popped popcorn.  Example of carbohydrate counting  Sample meal  · 3 oz (85 g) chicken breast.  · 6 oz (170 g) brown rice.  · 4 oz (113 g) corn.  · 8 oz (240 mL) milk.  · 8 oz (170 g) strawberries with sugar-free whipped topping.  Carbohydrate calculation  1. Identify the foods that contain carbohydrates:  ? Rice.  ? Corn.  ? Milk.  ? Strawberries.  2. Calculate how many servings you have of each food:  ? 2 servings rice.  ? 1 serving corn.  ? 1 serving milk.  ? 1 serving strawberries.  3. Multiply each number of servings by 15 g:  ? 2 servings rice x 15 g = 30 g.  ? 1 serving corn x 15 g = 15 g.  ? 1 serving milk x 15 g = 15 g.  ? 1  serving strawberries x 15 g = 15 g.  4. Add together all of the amounts to find the total grams of carbohydrates eaten:  ? 30 g + 15 g + 15 g + 15 g = 75 g of carbohydrates total.  Summary  · Carbohydrate counting is a method of keeping track of how many carbohydrates you eat.  · Eating carbohydrates naturally increases the amount of sugar (glucose) in the blood.  · Counting how many carbohydrates you eat helps keep your blood glucose within normal limits, which helps you manage your diabetes.  · A diet and nutrition specialist (registered dietitian) can help you make a meal plan and calculate how many carbohydrates you should have at each meal and snack.  This information is not intended to replace advice given to you by your health care provider. Make sure you discuss any questions you have with your health care provider.  Document Released: 12/18/2006 Document Revised: 01/14/2020 Document Reviewed: 05/31/2017  [employer] Interactive Patient Education © 2020 [employer]

## 2020-03-19 NOTE — PROGRESS NOTES
Rooming Tab(CC,VS,Pt Hx,Fall Screen)  Chief Complaint   Patient presents with   • Diabetes       Subjective    Patient is here to followup on a elevated BS.   He is scheduled for cystiscope and bladder cancer resection.  They did a preop BMP and he had eaten a meal and drank some OJ and the BS was 425.    He has never been that high.  He made this appointment then to come in to follow-up for that blood sugar.  He has not been feeling ill and he has never had blood sugars this high.  He feels almost certain that it was due to what he ate and drank right before that blood test was done.    I have reviewed and updated his medications, medical history and problem list during today's office visit.     Patient Care Team:  Nuno Patton MD as PCP - General (Family Medicine)    Problem List Tab  Medications Tab  Synopsis Tab  Chart Review Tab  Care Everywhere Tab  Immunizations Tab  Patient History Tab    Social History     Tobacco Use   • Smoking status: Former Smoker     Last attempt to quit: 1980     Years since quittin.2   • Smokeless tobacco: Never Used   Substance Use Topics   • Alcohol use: No     Frequency: Never       Review of Systems   Constitutional: Negative for activity change, appetite change, fatigue, fever and unexpected weight loss.   HENT: Negative for congestion, ear pain, hearing loss, postnasal drip, rhinorrhea, sinus pressure, sneezing, sore throat and swollen glands.    Eyes: Negative for blurred vision.   Respiratory: Negative for cough, shortness of breath and wheezing.    Cardiovascular: Negative for chest pain.   Gastrointestinal: Negative for abdominal pain, nausea and indigestion.   Genitourinary: Negative for dysuria, urgency and urinary incontinence.   Musculoskeletal: Negative for arthralgias, back pain, joint swelling and myalgias.   Skin: Negative for dry skin and skin lesions.   Allergic/Immunologic: Negative for environmental allergies.   Neurological: Negative for  "dizziness, headache, memory problem and confusion.   Hematological: Negative for adenopathy.   Psychiatric/Behavioral: Negative for agitation, depressed mood and stress. The patient is not nervous/anxious.    All other systems reviewed and are negative.      Objective     Rooming Tab(CC,VS,Pt Hx,Fall Screen)  /70 (BP Location: Left arm, Patient Position: Sitting, Cuff Size: Large Adult)   Pulse 77   Temp 98.8 °F (37.1 °C) (Oral)   Resp 8   Ht 177.8 cm (70\")   Wt 88.9 kg (196 lb)   SpO2 97%   BMI 28.12 kg/m²     Body mass index is 28.12 kg/m².    Physical Exam   Constitutional: He is oriented to person, place, and time. He appears well-developed and well-nourished.   HENT:   Head: Normocephalic and atraumatic.   Right Ear: External ear normal.   Left Ear: External ear normal.   Nose: Nose normal.   Mouth/Throat: Oropharynx is clear and moist. No oropharyngeal exudate.   Eyes: Pupils are equal, round, and reactive to light. Conjunctivae and EOM are normal. Right eye exhibits no discharge. Left eye exhibits no discharge. No scleral icterus.   Neck: Normal range of motion. Neck supple. No JVD present. No thyromegaly present.   Cardiovascular: Normal rate, regular rhythm, normal heart sounds and intact distal pulses.   No murmur heard.  Pulmonary/Chest: Effort normal and breath sounds normal. No respiratory distress. He has no wheezes. He has no rales. He exhibits no tenderness.   Abdominal: Soft. Bowel sounds are normal. He exhibits no distension. There is no tenderness.   Genitourinary: Rectal exam shows guaiac negative stool.   Musculoskeletal: Normal range of motion. He exhibits no edema, tenderness or deformity.   Lymphadenopathy:     He has no cervical adenopathy.   Neurological: He is alert and oriented to person, place, and time.   Skin: Skin is warm and dry.   Psychiatric: He has a normal mood and affect. His behavior is normal. Judgment and thought content normal.   Vitals reviewed.       Statin " Choice Calculator  Data Reviewed:               Lab Results   Component Value Date    BUN 20 03/19/2020    CREATININE 1.16 03/19/2020    EGFRIFNONA 61 03/19/2020     (L) 03/19/2020    K 4.8 03/19/2020    CL 93 (L) 03/19/2020    CALCIUM 8.9 03/19/2020    WBC 6.14 03/19/2020    RBC 4.69 03/19/2020    HCT 42.5 03/19/2020    MCV 90.6 03/19/2020    MCH 30.9 03/19/2020      Assessment/Plan   Order Review Tab  Health Maintenance Tab  Patient Plan/Order Tab  Diagnoses and all orders for this visit:    1. Type 2 diabetes mellitus without complication, without long-term current use of insulin (CMS/[facility]) (Primary)  Assessment & Plan:  Diabetes is improving with treatment.   Continue current treatment regimen.  Reminded to bring in blood sugar diary at next visit.  Dietary recommendations for ADA diet.  Regular aerobic exercise.  Diabetes will be reassessed in 6 months.    We will check his A1c today.  I agree with him I think it was just the orange juice that he drank caused his fasting blood sugar to be so high this morning.  Over the past year or so it looks like most of his fastings ran in the 182- 210 range.  I am going to go ahead and do his A1c today and as long as it is reasonable we will go ahead with his surgery as planned on the 30th.  If his A1c is elevated a little too high then we may repeat his fasting in the morning.  He is taking glimepiride 2 mg so we have plenty of wiggle room as far as going up on medicine if we need to.      2. History of bladder cancer  Assessment & Plan:  Patient scheduled for cystoscopy and surgical removal of a early bladder cancer on 330 by Dr. Cope.  I see no reason why the patient cannot go ahead with this procedure at this time.  Like I said the above we will check his A1c today and make adjustments if we need to in his glimepiride treatment.  Right now though I think we will just continue on as is.        Wrapup Tab  Return in about 2 months (around 5/19/2020).

## 2020-03-19 NOTE — ASSESSMENT & PLAN NOTE
Patient scheduled for cystoscopy and surgical removal of a early bladder cancer on 330 by Dr. Cope.  I see no reason why the patient cannot go ahead with this procedure at this time.  Like I said the above we will check his A1c today and make adjustments if we need to in his glimepiride treatment.  Right now though I think we will just continue on as is.

## 2020-03-19 NOTE — ASSESSMENT & PLAN NOTE
Diabetes is improving with treatment.   Continue current treatment regimen.  Reminded to bring in blood sugar diary at next visit.  Dietary recommendations for ADA diet.  Regular aerobic exercise.  Diabetes will be reassessed in 6 months.    We will check his A1c today.  I agree with him I think it was just the orange juice that he drank caused his fasting blood sugar to be so high this morning.  Over the past year or so it looks like most of his fastings ran in the 182- 210 range.  I am going to go ahead and do his A1c today and as long as it is reasonable we will go ahead with his surgery as planned on the 30th.  If his A1c is elevated a little too high then we may repeat his fasting in the morning.  He is taking glimepiride 2 mg so we have plenty of wiggle room as far as going up on medicine if we need to.

## 2020-03-20 ENCOUNTER — TELEPHONE (OUTPATIENT)
Dept: FAMILY MEDICINE CLINIC | Facility: CLINIC | Age: 80
End: 2020-03-20

## 2020-03-20 RX ORDER — GLIMEPIRIDE 2 MG/1
6 TABLET ORAL
Qty: 90 TABLET | Refills: 3 | Status: SHIPPED | OUTPATIENT
Start: 2020-03-20 | End: 2020-04-19

## 2020-03-20 NOTE — TELEPHONE ENCOUNTER
When you do finally get a hold of Cisco tell him he can certainly stay off of the fish oil and the aspirin and the niacin until he is completely recovered from the procedure.  I only want him back on these medicines at this higher dose after the surgeons have completely released him.

## 2020-03-20 NOTE — TELEPHONE ENCOUNTER
Patient states he takes 3 tablets po tid. He states Dr. Patton told him to do this until his surgery on 3/30/20 to get his a1c down. I told him his RX would be sent in [facility] so he can p/u tomorrow morning.Thank you.

## 2020-03-20 NOTE — PROGRESS NOTES
Valerie call Cisco and tell him he needs to take glimipiride 2mg tabs and start taking 6mg a day (3 tabs a day).   Take all three po at once q  Morning.     Also increase the Fish Oil Cap to bid qd  Also increase the Niacin CR 500mg to one bid  Cholesterol is great.  Trigs are high.   Sugars are high.  Rechek levels again in 8 weeks.

## 2020-03-20 NOTE — TELEPHONE ENCOUNTER
PT IS REQUESTING TO GET A 90 DAY SUPPLY OF THE MEDICINE glimepiride (AMARYL) 2 MG tablet. PT STATED THAT HE HAS TO TAKE 2 TABLETS 3X A DAY.  PT STATED HE ONLY HAS A FEW PILLS LEFT    PLEASE ADVISE [phone]

## 2020-03-20 NOTE — TELEPHONE ENCOUNTER
Spoke with patient and attempted to give him instructions below. His phone kept cutting out.     He did want me to tell you he bought a new testing kit yesterday and his BS this am was 160.    When I tried to give him instructions on Fish Oil, Niacin, he stated TONY wants him off of both of those, as well as Vit. C and ASA for his bladder surgery Monday, 3/23/20. Then his phone kept cutting out. I tried to call back at both numbers in his profile and could not get thru on either. Will ask Valerie to try later today to reach patient.      Result Notes for Lipid Panel     Notes recorded by Nuno Patton MD on 3/20/2020 at 8:47 AM EDT  Valerie call Cisco and tell him he needs to take glimipiride 2mg tabs and start taking 6mg a day (3 tabs a day).   Take all three po at once q  Morning.     Also increase the Fish Oil Cap to bid qd  Also increase the Niacin CR 500mg to one bid  Cholesterol is great.  Trigs are high.   Sugars are high.  Rechek levels again in 8 weeks.

## 2020-03-21 NOTE — TELEPHONE ENCOUNTER
I spoke with patient tonight to make sure he understood to take 6 mg once a day.  I called in a refill for him to [facility]

## 2020-03-30 ENCOUNTER — LAB REQUISITION (OUTPATIENT)
Dept: LAB | Facility: HOSPITAL | Age: 80
End: 2020-03-30

## 2020-03-30 DIAGNOSIS — C67.1 MALIGNANT NEOPLASM OF DOME OF BLADDER (HCC): ICD-10-CM

## 2020-03-30 PROCEDURE — 88307 TISSUE EXAM BY PATHOLOGIST: CPT | Performed by: UROLOGY

## 2020-04-09 PROCEDURE — 93010 ELECTROCARDIOGRAM REPORT: CPT | Performed by: INTERNAL MEDICINE

## 2020-06-02 ENCOUNTER — LAB (OUTPATIENT)
Dept: LAB | Facility: HOSPITAL | Age: 80
End: 2020-06-02

## 2020-06-02 DIAGNOSIS — E11.9 TYPE 2 DIABETES MELLITUS WITHOUT COMPLICATION, WITHOUT LONG-TERM CURRENT USE OF INSULIN (HCC): ICD-10-CM

## 2020-06-02 DIAGNOSIS — I10 ESSENTIAL HYPERTENSION: ICD-10-CM

## 2020-06-02 DIAGNOSIS — E78.2 MIXED HYPERLIPIDEMIA: ICD-10-CM

## 2020-06-02 LAB
ALBUMIN SERPL-MCNC: 3.8 G/DL (ref 3.5–5.2)
ALBUMIN/GLOB SERPL: 1.3 G/DL
ALP SERPL-CCNC: 134 U/L (ref 39–117)
ALT SERPL W P-5'-P-CCNC: 14 U/L (ref 1–41)
ANION GAP SERPL CALCULATED.3IONS-SCNC: 7.3 MMOL/L (ref 5–15)
AST SERPL-CCNC: 16 U/L (ref 1–40)
BILIRUB SERPL-MCNC: 0.8 MG/DL (ref 0.2–1.2)
BUN BLD-MCNC: 23 MG/DL (ref 8–23)
BUN/CREAT SERPL: 19 (ref 7–25)
CALCIUM SPEC-SCNC: 9.1 MG/DL (ref 8.6–10.5)
CHLORIDE SERPL-SCNC: 96 MMOL/L (ref 98–107)
CHOLEST SERPL-MCNC: 110 MG/DL (ref 0–200)
CK SERPL-CCNC: 114 U/L (ref 20–200)
CO2 SERPL-SCNC: 28.7 MMOL/L (ref 22–29)
CREAT BLD-MCNC: 1.21 MG/DL (ref 0.76–1.27)
GFR SERPL CREATININE-BSD FRML MDRD: 58 ML/MIN/1.73
GLOBULIN UR ELPH-MCNC: 3 GM/DL
GLUCOSE BLD-MCNC: 193 MG/DL (ref 65–99)
HDLC SERPL-MCNC: 34 MG/DL (ref 40–60)
LDLC SERPL CALC-MCNC: 23 MG/DL (ref 0–100)
LDLC/HDLC SERPL: 0.66 {RATIO}
POTASSIUM BLD-SCNC: 4.6 MMOL/L (ref 3.5–5.2)
PROT SERPL-MCNC: 6.8 G/DL (ref 6–8.5)
SODIUM BLD-SCNC: 132 MMOL/L (ref 136–145)
TRIGL SERPL-MCNC: 267 MG/DL (ref 0–150)
VLDLC SERPL-MCNC: 53.4 MG/DL (ref 5–40)

## 2020-06-02 PROCEDURE — 80061 LIPID PANEL: CPT

## 2020-06-02 PROCEDURE — 80053 COMPREHEN METABOLIC PANEL: CPT

## 2020-06-02 PROCEDURE — 82550 ASSAY OF CK (CPK): CPT

## 2020-06-02 PROCEDURE — 36415 COLL VENOUS BLD VENIPUNCTURE: CPT

## 2020-06-12 ENCOUNTER — OFFICE VISIT (OUTPATIENT)
Dept: CARDIOLOGY | Facility: CLINIC | Age: 80
End: 2020-06-12

## 2020-06-12 VITALS
SYSTOLIC BLOOD PRESSURE: 135 MMHG | DIASTOLIC BLOOD PRESSURE: 85 MMHG | OXYGEN SATURATION: 97 % | BODY MASS INDEX: 27.2 KG/M2 | WEIGHT: 190 LBS | HEART RATE: 68 BPM | HEIGHT: 70 IN

## 2020-06-12 DIAGNOSIS — E78.2 MIXED HYPERLIPIDEMIA: ICD-10-CM

## 2020-06-12 DIAGNOSIS — I10 ESSENTIAL HYPERTENSION: Primary | ICD-10-CM

## 2020-06-12 DIAGNOSIS — N18.9 CHRONIC RENAL IMPAIRMENT, UNSPECIFIED CKD STAGE: ICD-10-CM

## 2020-06-12 DIAGNOSIS — I25.10 CORONARY ARTERY DISEASE INVOLVING NATIVE CORONARY ARTERY OF NATIVE HEART WITHOUT ANGINA PECTORIS: ICD-10-CM

## 2020-06-12 DIAGNOSIS — E11.9 TYPE 2 DIABETES MELLITUS WITHOUT COMPLICATION, WITHOUT LONG-TERM CURRENT USE OF INSULIN (HCC): ICD-10-CM

## 2020-06-12 PROCEDURE — 99214 OFFICE O/P EST MOD 30 MIN: CPT | Performed by: INTERNAL MEDICINE

## 2020-06-12 RX ORDER — ATORVASTATIN CALCIUM 40 MG/1
40 TABLET, FILM COATED ORAL
Qty: 90 TABLET | Refills: 3 | Status: SHIPPED | OUTPATIENT
Start: 2020-06-12 | End: 2020-11-04 | Stop reason: SDUPTHER

## 2020-06-12 RX ORDER — ATENOLOL 25 MG/1
25 TABLET ORAL DAILY
Qty: 90 TABLET | Refills: 3 | Status: SHIPPED | OUTPATIENT
Start: 2020-06-12 | End: 2020-11-04 | Stop reason: SDUPTHER

## 2020-06-12 NOTE — PROGRESS NOTES
"    Subjective:     Encounter Date:06/12/2020      Patient ID: Cisco Frank is a 80 y.o. male.    Chief Complaint: Hypertension & Hyperlipidemia  History of Present Illness     79 -year-old white male patient with a known history of coronary artery disease catheterization done 2014 showed no obstructive CAD previously placed RCA stent was open,  now comes back for follow up. patient denies any active symptoms      echocardiogram  in May 2016     Normal LV systolic function EF is 55%  Left atrium is enlarged  Mild pulmonary hypertension noted  Borderline evidence of prolapse of the posterior leaflet of the mitral valve  noted without any significant mitral insufficiency   Myoview  May 2016  1. Myocardial perfusion imaging is borderline. No ischemia noted.  2. Overall left ventricular systolic function was normal without regional      wall motion abnormalities (as noted above).  3. Inferolateral reverse redistribution noted.     Recent labs showed elevated blood sugar and triglycerides LDL is only 23 so advised him to take the Lipitor half tablet every other day and follow-up in 6 months with labs    The following portions of the patient's history were reviewed and updated as appropriate: Allergies current medications past family history past medical history past social history past surgical history problem list and review of systems  Past Medical History:   Diagnosis Date   • Coronary artery disease    • History of bladder cancer    • Hyperlipidemia      Past Surgical History:   Procedure Laterality Date   • BLADDER SURGERY  10/26/2019    [facility] Dr. Cope   • CARDIAC CATHETERIZATION       /85 (BP Location: Left arm, Patient Position: Sitting, Cuff Size: Adult)   Pulse 68   Ht 177.8 cm (70\")   Wt 86.2 kg (190 lb)   SpO2 97%   BMI 27.26 kg/m²   Family History   Problem Relation Age of Onset   • Heart disease Mother    • Heart attack Father    • No Known Problems Sister    • No Known Problems Brother    • No " Known Problems Maternal Aunt    • No Known Problems Maternal Uncle    • No Known Problems Paternal Aunt    • No Known Problems Paternal Uncle    • No Known Problems Maternal Grandmother    • No Known Problems Maternal Grandfather    • No Known Problems Paternal Grandmother    • No Known Problems Paternal Grandfather    • No Known Problems Other    • Anemia Neg Hx    • Arrhythmia Neg Hx    • Asthma Neg Hx    • Clotting disorder Neg Hx    • Fainting Neg Hx    • Heart failure Neg Hx    • Hyperlipidemia Neg Hx    • Hypertension Neg Hx        Current Outpatient Medications:   •  aspirin (ECOTRIN LOW STRENGTH) 81 MG EC tablet, Take 1 tablet by mouth Daily., Disp: , Rfl:   •  atenolol (TENORMIN) 25 MG tablet, Take 1 tablet by mouth Daily., Disp: 90 tablet, Rfl: 3  •  atorvastatin (LIPITOR) 40 MG tablet, Take 1 tablet by mouth every night at bedtime., Disp: 90 tablet, Rfl: 3  •  glucose blood (FREESTYLE LITE) test strip, FREESTYLE LITE TEST STRP, Disp: , Rfl:   •  niacin (SLO-NIACIN) 500 MG CR tablet, Take 1 tablet by mouth Daily., Disp: , Rfl:   •  Omega-3 Fatty Acids (FISH OIL) 1200 MG capsule capsule, Take 1 capsule by mouth Daily., Disp: , Rfl:   •  omeprazole-sodium bicarbonate (ZEGERID)  MG per capsule, Take 1 capsule by mouth Daily., Disp: , Rfl:   Social History     Socioeconomic History   • Marital status:      Spouse name: Not on file   • Number of children: Not on file   • Years of education: Not on file   • Highest education level: Not on file   Tobacco Use   • Smoking status: Former Smoker     Last attempt to quit: 1980     Years since quittin.4   • Smokeless tobacco: Never Used   Substance and Sexual Activity   • Alcohol use: No     Frequency: Never   • Drug use: No   • Sexual activity: Defer     No Known Allergies  Review of Systems   Constitution: Negative for fever and malaise/fatigue.   HENT: Negative for congestion and hearing loss.    Eyes: Negative for double vision and visual  disturbance.   Cardiovascular: Negative for chest pain, claudication, dyspnea on exertion, leg swelling and syncope.   Respiratory: Negative for cough and shortness of breath.    Endocrine: Negative for cold intolerance.   Skin: Negative for color change and rash.   Musculoskeletal: Negative for arthritis and joint pain.   Gastrointestinal: Negative for abdominal pain and heartburn.   Genitourinary: Negative for hematuria.   Neurological: Negative for excessive daytime sleepiness and dizziness.   Psychiatric/Behavioral: Negative for depression. The patient is not nervous/anxious.    All other systems reviewed and are negative.             Objective:     Physical Exam   Constitutional: He is oriented to person, place, and time. He appears well-developed and well-nourished. He is cooperative.   HENT:   Head: Normocephalic and atraumatic.   Mouth/Throat: Uvula is midline and oropharynx is clear and moist. No oral lesions.   Eyes: Conjunctivae are normal. No scleral icterus.   Neck: Trachea normal. Neck supple. No JVD present. Carotid bruit is not present. No thyromegaly present.   Cardiovascular: Normal rate, regular rhythm, S1 normal, S2 normal, normal heart sounds, intact distal pulses and normal pulses. PMI is not displaced. Exam reveals no gallop and no friction rub.   No murmur heard.  Pulmonary/Chest: Effort normal and breath sounds normal.   Abdominal: Soft. Bowel sounds are normal.   Musculoskeletal: Normal range of motion.   Neurological: He is alert and oriented to person, place, and time. He has normal strength.   No focal deficits   Skin: Skin is warm. No cyanosis.   Psychiatric: He has a normal mood and affect.       Procedures    Lab Review:       Assessment:          Diagnosis Plan   1. Essential hypertension     2. Coronary artery disease involving native coronary artery of native heart without angina pectoris     3. Mixed hyperlipidemia     4. Type 2 diabetes mellitus without complication, without  long-term current use of insulin (CMS/[facility])     5. Chronic renal impairment, unspecified CKD stage            Plan:       History of CAD on medical treatment stable  Diabetes mellitus patient needs aggressive blood sugar control  Dyslipidemia we will decrease the Lipitor  Needs aggressive control of hypertension monitor renal insufficiency

## 2020-07-13 ENCOUNTER — TRANSCRIBE ORDERS (OUTPATIENT)
Dept: ADMINISTRATIVE | Facility: HOSPITAL | Age: 80
End: 2020-07-13

## 2020-07-13 ENCOUNTER — LAB (OUTPATIENT)
Dept: LAB | Facility: HOSPITAL | Age: 80
End: 2020-07-13

## 2020-07-13 ENCOUNTER — HOSPITAL ENCOUNTER (OUTPATIENT)
Dept: CARDIOLOGY | Facility: HOSPITAL | Age: 80
Discharge: HOME | End: 2020-07-13
Admitting: UROLOGY

## 2020-07-13 DIAGNOSIS — Z01.818 PRE-OP TESTING: ICD-10-CM

## 2020-07-13 DIAGNOSIS — Z01.818 PRE-OP TESTING: Primary | ICD-10-CM

## 2020-07-13 LAB
ANION GAP SERPL CALCULATED.3IONS-SCNC: 9.3 MMOL/L (ref 5–15)
BASOPHILS # BLD AUTO: 0.03 10*3/MM3 (ref 0–0.2)
BASOPHILS NFR BLD AUTO: 0.5 % (ref 0–1.5)
BUN SERPL-MCNC: 19 MG/DL (ref 8–23)
BUN/CREAT SERPL: 15.3 (ref 7–25)
CALCIUM SPEC-SCNC: 9.6 MG/DL (ref 8.6–10.5)
CHLORIDE SERPL-SCNC: 96 MMOL/L (ref 98–107)
CO2 SERPL-SCNC: 29.7 MMOL/L (ref 22–29)
CREAT SERPL-MCNC: 1.24 MG/DL (ref 0.76–1.27)
DEPRECATED RDW RBC AUTO: 43 FL (ref 37–54)
EOSINOPHIL # BLD AUTO: 0.08 10*3/MM3 (ref 0–0.4)
EOSINOPHIL NFR BLD AUTO: 1.4 % (ref 0.3–6.2)
ERYTHROCYTE [DISTWIDTH] IN BLOOD BY AUTOMATED COUNT: 13.1 % (ref 12.3–15.4)
GFR SERPL CREATININE-BSD FRML MDRD: 56 ML/MIN/1.73
GLUCOSE SERPL-MCNC: 264 MG/DL (ref 65–99)
HCT VFR BLD AUTO: 40.8 % (ref 37.5–51)
HGB BLD-MCNC: 14.2 G/DL (ref 13–17.7)
IMM GRANULOCYTES # BLD AUTO: 0.02 10*3/MM3 (ref 0–0.05)
IMM GRANULOCYTES NFR BLD AUTO: 0.4 % (ref 0–0.5)
LYMPHOCYTES # BLD AUTO: 1.78 10*3/MM3 (ref 0.7–3.1)
LYMPHOCYTES NFR BLD AUTO: 31.3 % (ref 19.6–45.3)
MCH RBC QN AUTO: 31.5 PG (ref 26.6–33)
MCHC RBC AUTO-ENTMCNC: 34.8 G/DL (ref 31.5–35.7)
MCV RBC AUTO: 90.5 FL (ref 79–97)
MONOCYTES # BLD AUTO: 0.52 10*3/MM3 (ref 0.1–0.9)
MONOCYTES NFR BLD AUTO: 9.2 % (ref 5–12)
NEUTROPHILS NFR BLD AUTO: 3.25 10*3/MM3 (ref 1.7–7)
NEUTROPHILS NFR BLD AUTO: 57.2 % (ref 42.7–76)
NRBC BLD AUTO-RTO: 0 /100 WBC (ref 0–0.2)
PLATELET # BLD AUTO: 161 10*3/MM3 (ref 140–450)
PMV BLD AUTO: 10.2 FL (ref 6–12)
POTASSIUM SERPL-SCNC: 5 MMOL/L (ref 3.5–5.2)
RBC # BLD AUTO: 4.51 10*6/MM3 (ref 4.14–5.8)
SODIUM SERPL-SCNC: 135 MMOL/L (ref 136–145)
WBC # BLD AUTO: 5.68 10*3/MM3 (ref 3.4–10.8)

## 2020-07-13 PROCEDURE — 80048 BASIC METABOLIC PNL TOTAL CA: CPT

## 2020-07-13 PROCEDURE — 93005 ELECTROCARDIOGRAM TRACING: CPT | Performed by: UROLOGY

## 2020-07-13 PROCEDURE — 36415 COLL VENOUS BLD VENIPUNCTURE: CPT

## 2020-07-13 PROCEDURE — 85025 COMPLETE CBC W/AUTO DIFF WBC: CPT

## 2020-07-15 PROCEDURE — 88307 TISSUE EXAM BY PATHOLOGIST: CPT | Performed by: UROLOGY

## 2020-07-16 ENCOUNTER — LAB REQUISITION (OUTPATIENT)
Dept: LAB | Facility: HOSPITAL | Age: 80
End: 2020-07-16

## 2020-07-16 DIAGNOSIS — C67.9 MALIGNANT NEOPLASM OF BLADDER, UNSPECIFIED (HCC): ICD-10-CM

## 2020-07-16 DIAGNOSIS — C67.2 MALIGNANT NEOPLASM OF LATERAL WALL OF BLADDER (HCC): ICD-10-CM

## 2020-07-17 LAB
LAB AP CASE REPORT: NORMAL
PATH REPORT.FINAL DX SPEC: NORMAL
PATH REPORT.GROSS SPEC: NORMAL

## 2020-08-10 PROCEDURE — 93010 ELECTROCARDIOGRAM REPORT: CPT | Performed by: INTERNAL MEDICINE

## 2020-08-13 ENCOUNTER — TELEPHONE (OUTPATIENT)
Dept: FAMILY MEDICINE CLINIC | Facility: CLINIC | Age: 80
End: 2020-08-13

## 2020-08-13 ENCOUNTER — LAB (OUTPATIENT)
Dept: FAMILY MEDICINE CLINIC | Facility: CLINIC | Age: 80
End: 2020-08-13

## 2020-08-13 ENCOUNTER — OFFICE VISIT (OUTPATIENT)
Dept: FAMILY MEDICINE CLINIC | Facility: CLINIC | Age: 80
End: 2020-08-13

## 2020-08-13 VITALS
WEIGHT: 188 LBS | TEMPERATURE: 96.8 F | OXYGEN SATURATION: 97 % | BODY MASS INDEX: 26.92 KG/M2 | SYSTOLIC BLOOD PRESSURE: 120 MMHG | HEART RATE: 81 BPM | DIASTOLIC BLOOD PRESSURE: 80 MMHG | HEIGHT: 70 IN | RESPIRATION RATE: 16 BRPM

## 2020-08-13 DIAGNOSIS — Z85.51 HISTORY OF BLADDER CANCER: ICD-10-CM

## 2020-08-13 DIAGNOSIS — G93.31 POSTVIRAL FATIGUE SYNDROME: ICD-10-CM

## 2020-08-13 DIAGNOSIS — E11.9 TYPE 2 DIABETES MELLITUS WITHOUT COMPLICATION, WITHOUT LONG-TERM CURRENT USE OF INSULIN (HCC): Primary | ICD-10-CM

## 2020-08-13 LAB — HBA1C MFR BLD: 9.4 % (ref 3.5–5.6)

## 2020-08-13 PROCEDURE — 85025 COMPLETE CBC W/AUTO DIFF WBC: CPT | Performed by: FAMILY MEDICINE

## 2020-08-13 PROCEDURE — 84443 ASSAY THYROID STIM HORMONE: CPT | Performed by: FAMILY MEDICINE

## 2020-08-13 PROCEDURE — 80053 COMPREHEN METABOLIC PANEL: CPT | Performed by: FAMILY MEDICINE

## 2020-08-13 PROCEDURE — 85652 RBC SED RATE AUTOMATED: CPT | Performed by: FAMILY MEDICINE

## 2020-08-13 PROCEDURE — 99213 OFFICE O/P EST LOW 20 MIN: CPT | Performed by: FAMILY MEDICINE

## 2020-08-13 PROCEDURE — 83036 HEMOGLOBIN GLYCOSYLATED A1C: CPT | Performed by: FAMILY MEDICINE

## 2020-08-13 RX ORDER — MONTELUKAST SODIUM 4 MG/1
2 TABLET, CHEWABLE ORAL DAILY
COMMUNITY
Start: 2020-06-16 | End: 2021-10-08

## 2020-08-13 RX ORDER — GLIMEPIRIDE 2 MG/1
2 TABLET ORAL
COMMUNITY
End: 2020-08-14

## 2020-08-13 NOTE — ASSESSMENT & PLAN NOTE
Diabetes is improving with treatment.   Continue current treatment regimen.  Reminded to bring in blood sugar diary at next visit.  Dietary recommendations for ADA diet.  Regular aerobic exercise.  Diabetes will be reassessed in 3 months.    His fasting blood sugar this morning was about 140.  He was check in at the urology office about an hour half to 2 hours after his meal and it was 240.  We will ask to have an A1c done today and I do not think were going to change much right now.  I want him to drink a lot of fluids and we will see what the A1c results come back as.  If it is elevated compared to the last one we talked about adjusting his medicines.  
I am not sure why he is tired.  We will check some other work today besides the blood sugar to see if the reason for it.  May just be that he has had a viral infection the last week or 10 days and that is hanging on a little bit.  As we find something on physical exam to explain it I think we will assume it is a viral syndrome.  
Recent check on the bladder was entirely normal.  His scope was done today and no biopsies apparently taken.  
normal...

## 2020-08-13 NOTE — PATIENT INSTRUCTIONS
Viral Illness, Adult  Viruses are tiny germs that can get into a person's body and cause illness. There are many different types of viruses, and they cause many types of illness. Viral illnesses can range from mild to severe. They can affect various parts of the body.  Common illnesses that are caused by a virus include colds and the flu. Viral illnesses also include serious conditions such as HIV/AIDS (human immunodeficiency virus/acquired immunodeficiency syndrome). A few viruses have been linked to certain cancers.  What are the causes?  Many types of viruses can cause illness. Viruses invade cells in your body, multiply, and cause the infected cells to malfunction or die. When the cell dies, it releases more of the virus. When this happens, you develop symptoms of the illness, and the virus continues to spread to other cells. If the virus takes over the function of the cell, it can cause the cell to divide and grow out of control, as is the case when a virus causes cancer.  Different viruses get into the body in different ways. You can get a virus by:  · Swallowing food or water that is contaminated with the virus.  · Breathing in droplets that have been coughed or sneezed into the air by an infected person.  · Touching a surface that has been contaminated with the virus and then touching your eyes, nose, or mouth.  · Being bitten by an insect or animal that carries the virus.  · Having sexual contact with a person who is infected with the virus.  · Being exposed to blood or fluids that contain the virus, either through an open cut or during a transfusion.  If a virus enters your body, your body's defense system (immune system) will try to fight the virus. You may be at higher risk for a viral illness if your immune system is weak.  What are the signs or symptoms?  Symptoms vary depending on the type of virus and the location of the cells that it invades. Common symptoms of the main types of viral illnesses  include:  Cold and flu viruses  · Fever.  · Headache.  · Sore throat.  · Muscle aches.  · Nasal congestion.  · Cough.  Digestive system (gastrointestinal) viruses  · Fever.  · Abdominal pain.  · Nausea.  · Diarrhea.  Liver viruses (hepatitis)  · Loss of appetite.  · Tiredness.  · Yellowing of the skin (jaundice).  Brain and spinal cord viruses  · Fever.  · Headache.  · Stiff neck.  · Nausea and vomiting.  · Confusion or sleepiness.  Skin viruses  · Warts.  · Itching.  · Rash.  Sexually transmitted viruses  · Discharge.  · Swelling.  · Redness.  · Rash.  How is this treated?  Viruses can be difficult to treat because they live within cells. Antibiotic medicines do not treat viruses because these drugs do not get inside cells. Treatment for a viral illness may include:  · Resting and drinking plenty of fluids.  · Medicines to relieve symptoms. These can include over-the-counter medicine for pain and fever, medicines for cough or congestion, and medicines to relieve diarrhea.  · Antiviral medicines. These drugs are available only for certain types of viruses. They may help reduce flu symptoms if taken early. There are also many antiviral medicines for hepatitis and HIV/AIDS.  Some viral illnesses can be prevented with vaccinations. A common example is the flu shot.  Follow these instructions at home:  Medicines    · Take over-the-counter and prescription medicines only as told by your health care provider.  · If you were prescribed an antiviral medicine, take it as told by your health care provider. Do not stop taking the medicine even if you start to feel better.  · Be aware of when antibiotics are needed and when they are not needed. Antibiotics do not treat viruses. If your health care provider thinks that you may have a bacterial infection as well as a viral infection, you may get an antibiotic.  ? Do not ask for an antibiotic prescription if you have been diagnosed with a viral illness. That will not make your  illness go away faster.  ? Frequently taking antibiotics when they are not needed can lead to antibiotic resistance. When this develops, the medicine no longer works against the bacteria that it normally fights.  General instructions  · Drink enough fluids to keep your urine clear or pale yellow.  · Rest as much as possible.  · Return to your normal activities as told by your health care provider. Ask your health care provider what activities are safe for you.  · Keep all follow-up visits as told by your health care provider. This is important.  How is this prevented?  Take these actions to reduce your risk of viral infection:  · Eat a healthy diet and get enough rest.  · Wash your hands often with soap and water. This is especially important when you are in public places. If soap and water are not available, use hand .  · Avoid close contact with friends and family who have a viral illness.  · If you travel to areas where viral gastrointestinal infection is common, avoid drinking water or eating raw food.  · Keep your immunizations up to date. Get a flu shot every year as told by your health care provider.  · Do not share toothbrushes, nail clippers, razors, or needles with other people.  · Always practice safe sex.    Contact a health care provider if:  · You have symptoms of a viral illness that do not go away.  · Your symptoms come back after going away.  · Your symptoms get worse.  Get help right away if:  · You have trouble breathing.  · You have a severe headache or a stiff neck.  · You have severe vomiting or abdominal pain.  This information is not intended to replace advice given to you by your health care provider. Make sure you discuss any questions you have with your health care provider.  Document Released: 04/28/2017 Document Revised: 11/30/2018 Document Reviewed: 04/28/2017  Elsevier Patient Ed  Carbohydrate Counting for Diabetes Mellitus, Adult    Carbohydrate counting is a method of  "keeping track of how many carbohydrates you eat. Eating carbohydrates naturally increases the amount of sugar (glucose) in the blood. Counting how many carbohydrates you eat helps keep your blood glucose within normal limits, which helps you manage your diabetes (diabetes mellitus).  It is important to know how many carbohydrates you can safely have in each meal. This is different for every person. A diet and nutrition specialist (registered dietitian) can help you make a meal plan and calculate how many carbohydrates you should have at each meal and snack.  Carbohydrates are found in the following foods:  · Grains, such as breads and cereals.  · Dried beans and soy products.  · Starchy vegetables, such as potatoes, peas, and corn.  · Fruit and fruit juices.  · Milk and yogurt.  · Sweets and snack foods, such as cake, cookies, candy, chips, and soft drinks.  How do I count carbohydrates?  There are two ways to count carbohydrates in food. You can use either of the methods or a combination of both.  Reading \"Nutrition Facts\" on packaged food  The \"Nutrition Facts\" list is included on the labels of almost all packaged foods and beverages in the [place] It includes:  · The serving size.  · Information about nutrients in each serving, including the grams (g) of carbohydrate per serving.  To use the “Nutrition Facts\":  · Decide how many servings you will have.  · Multiply the number of servings by the number of carbohydrates per serving.  · The resulting number is the total amount of carbohydrates that you will be having.  Learning standard serving sizes of other foods  When you eat carbohydrate foods that are not packaged or do not include \"Nutrition Facts\" on the label, you need to measure the servings in order to count the amount of carbohydrates:  · Measure the foods that you will eat with a food scale or measuring cup, if needed.  · Decide how many standard-size servings you will eat.  · Multiply the number of servings " by 15. Most carbohydrate-rich foods have about 15 g of carbohydrates per serving.  ? For example, if you eat 8 oz (170 g) of strawberries, you will have eaten 2 servings and 30 g of carbohydrates (2 servings x 15 g = 30 g).  · For foods that have more than one food mixed, such as soups and casseroles, you must count the carbohydrates in each food that is included.  The following list contains standard serving sizes of common carbohydrate-rich foods. Each of these servings has about 15 g of carbohydrates:  · ½ hamburger bun or ½ English muffin.  · ½ oz (15 mL) syrup.  · ½ oz (14 g) jelly.  · 1 slice of bread.  · 1 six-inch tortilla.  · 3 oz (85 g) cooked rice or pasta.  · 4 oz (113 g) cooked dried beans.  · 4 oz (113 g) starchy vegetable, such as peas, corn, or potatoes.  · 4 oz (113 g) hot cereal.  · 4 oz (113 g) mashed potatoes or ¼ of a large baked potato.  · 4 oz (113 g) canned or frozen fruit.  · 4 oz (120 mL) fruit juice.  · 4-6 crackers.  · 6 chicken nuggets.  · 6 oz (170 g) unsweetened dry cereal.  · 6 oz (170 g) plain fat-free yogurt or yogurt sweetened with artificial sweeteners.  · 8 oz (240 mL) milk.  · 8 oz (170 g) fresh fruit or one small piece of fruit.  · 24 oz (680 g) popped popcorn.  Example of carbohydrate counting  Sample meal  · 3 oz (85 g) chicken breast.  · 6 oz (170 g) brown rice.  · 4 oz (113 g) corn.  · 8 oz (240 mL) milk.  · 8 oz (170 g) strawberries with sugar-free whipped topping.  Carbohydrate calculation  1. Identify the foods that contain carbohydrates:  ? Rice.  ? Corn.  ? Milk.  ? Strawberries.  2. Calculate how many servings you have of each food:  ? 2 servings rice.  ? 1 serving corn.  ? 1 serving milk.  ? 1 serving strawberries.  3. Multiply each number of servings by 15 g:  ? 2 servings rice x 15 g = 30 g.  ? 1 serving corn x 15 g = 15 g.  ? 1 serving milk x 15 g = 15 g.  ? 1 serving strawberries x 15 g = 15 g.  4. Add together all of the amounts to find the total grams of  carbohydrates eaten:  ? 30 g + 15 g + 15 g + 15 g = 75 g of carbohydrates total.  Summary  · Carbohydrate counting is a method of keeping track of how many carbohydrates you eat.  · Eating carbohydrates naturally increases the amount of sugar (glucose) in the blood.  · Counting how many carbohydrates you eat helps keep your blood glucose within normal limits, which helps you manage your diabetes.  · A diet and nutrition specialist (registered dietitian) can help you make a meal plan and calculate how many carbohydrates you should have at each meal and snack.  This information is not intended to replace advice given to you by your health care provider. Make sure you discuss any questions you have with your health care provider.  Document Released: 12/18/2006 Document Revised: 07/12/2018 Document Reviewed: 05/31/2017  [employer] Patient Education © 2020 Elsevier [employer].  ucation © 2020 [employer]vier Inc.

## 2020-08-13 NOTE — PROGRESS NOTES
Rooming Tab(CC,VS,Pt Hx,Fall Screen)  Chief Complaint   Patient presents with   • Fatigue     x2weeks       Subjective    Cisco is a 80-year-old white male with non-insulin-dependent diabetes who was being seen by his urologist today and they decided to check a blood sugar.  Cisco had told him that he had had some weakness for about the last week to 10 days and apparently that is why they decided to check it.  What they did not understand was that he had eaten about an hour and a half for they checked that sugar and his reading came back at 240.  Because he had told them that he had not been feeling well and they had that high reading on his sugar they became concerned and called me asking what I wanted to do.  I thought it would be best if he come to the office like to take a look at them but I really think most of the concern was about the blood sugar and that is very explained by his recent meal.  The fatigue he has been experiencing the last few weeks is probably viral but we will check a mallet today to see if there is something else going on.   His bladder cancer checkup today apparently were normal.  He had a cystoscopy done in no new lesions were found.    I have reviewed and updated his medications, medical history and problem list during today's office visit.     Patient Care Team:  Nuno Patton MD as PCP - General (Family Medicine)  Hudson Smith MD as Consulting Physician (Cardiology)    Problem List Tab  Medications Tab  Synopsis Tab  Chart Review Tab  Care Everywhere Tab  Immunizations Tab  Patient History Tab    Social History     Tobacco Use   • Smoking status: Former Smoker     Last attempt to quit: 1980     Years since quittin.6   • Smokeless tobacco: Never Used   Substance Use Topics   • Alcohol use: No     Frequency: Never       Review of Systems   Constitutional: Negative for activity change, appetite change, fatigue, fever and unexpected weight loss.   HENT:  "Negative for congestion, ear pain, hearing loss, postnasal drip, rhinorrhea, sinus pressure, sneezing, sore throat and swollen glands.    Eyes: Negative.  Negative for blurred vision.   Respiratory: Negative for cough, choking, chest tightness, shortness of breath and wheezing.    Cardiovascular: Negative for chest pain.   Gastrointestinal: Negative.  Negative for abdominal distention, abdominal pain, nausea, GERD and indigestion.   Endocrine: Negative.    Genitourinary: Negative for decreased urine volume, dysuria, frequency, urgency and urinary incontinence.   Musculoskeletal: Negative for arthralgias, back pain, joint swelling and myalgias.   Skin: Negative for dry skin and skin lesions.   Allergic/Immunologic: Negative.  Negative for environmental allergies.   Neurological: Negative for dizziness, headache, memory problem and confusion.   Hematological: Negative.  Negative for adenopathy.   Psychiatric/Behavioral: Negative for agitation, dysphoric mood, depressed mood and stress. The patient is not nervous/anxious.    All other systems reviewed and are negative.      Objective     Rooming Tab(CC,VS,Pt Hx,Fall Screen)  /80 (BP Location: Right arm)   Pulse 81   Temp 96.8 °F (36 °C)   Resp 16   Ht 177.8 cm (70\")   Wt 85.3 kg (188 lb)   SpO2 97%   BMI 26.98 kg/m²     Body mass index is 26.98 kg/m².    Physical Exam   Constitutional: He is oriented to person, place, and time. He appears well-developed and well-nourished.   HENT:   Head: Normocephalic and atraumatic.   Right Ear: External ear normal.   Left Ear: External ear normal.   Nose: Nose normal.   Mouth/Throat: Oropharynx is clear and moist. No oropharyngeal exudate.   Eyes: Pupils are equal, round, and reactive to light. Conjunctivae and EOM are normal. Right eye exhibits no discharge. Left eye exhibits no discharge. No scleral icterus.   Neck: Normal range of motion. Neck supple. No JVD present. No thyromegaly present.   Cardiovascular: Normal " rate, regular rhythm, normal heart sounds and intact distal pulses.   No murmur heard.  Pulmonary/Chest: Effort normal and breath sounds normal. No respiratory distress. He has no wheezes. He has no rales. He exhibits no tenderness.   Abdominal: Soft. Bowel sounds are normal. He exhibits no distension. There is no tenderness.   Genitourinary: Rectal exam shows guaiac negative stool.   Musculoskeletal: Normal range of motion. He exhibits no edema, tenderness or deformity.   Lymphadenopathy:     He has no cervical adenopathy.   Neurological: He is alert and oriented to person, place, and time.   Skin: Skin is warm and dry.   Psychiatric: He has a normal mood and affect. His behavior is normal. Judgment and thought content normal.   Vitals reviewed.       Statin Choice Calculator  Data Reviewed:               Lab Results   Component Value Date    BUN 19 07/13/2020    CREATININE 1.24 07/13/2020    EGFRIFNONA 56 (L) 07/13/2020     (L) 07/13/2020    K 5.0 07/13/2020    CL 96 (L) 07/13/2020    CALCIUM 9.6 07/13/2020    ALBUMIN 3.80 06/02/2020    BILITOT 0.8 06/02/2020    ALKPHOS 134 (H) 06/02/2020    AST 16 06/02/2020    ALT 14 06/02/2020    TRIG 267 (H) 06/02/2020    HDL 34 (L) 06/02/2020    VLDL 53.4 (H) 06/02/2020    LDL 23 06/02/2020    LDLHDL 0.66 06/02/2020    CKTOTAL 114 06/02/2020    WBC 5.68 07/13/2020    RBC 4.51 07/13/2020    HCT 40.8 07/13/2020    MCV 90.5 07/13/2020    MCH 31.5 07/13/2020      Assessment/Plan   Order Review Tab  Health Maintenance Tab  Patient Plan/Order Tab  Diagnoses and all orders for this visit:    1. Type 2 diabetes mellitus without complication, without long-term current use of insulin (CMS/[facility]) (Primary)  Assessment & Plan:  Diabetes is improving with treatment.   Continue current treatment regimen.  Reminded to bring in blood sugar diary at next visit.  Dietary recommendations for ADA diet.  Regular aerobic exercise.  Diabetes will be reassessed in 3 months.    His fasting blood  sugar this morning was about 140.  He was check in at the urology office about an hour half to 2 hours after his meal and it was 240.  We will ask to have an A1c done today and I do not think were going to change much right now.  I want him to drink a lot of fluids and we will see what the A1c results come back as.  If it is elevated compared to the last one we talked about adjusting his medicines.      2. History of bladder cancer  Assessment & Plan:  Recent check on the bladder was entirely normal.  His scope was done today and no biopsies apparently taken.      3. Postviral fatigue syndrome  Assessment & Plan:  I am not sure why he is tired.  We will check some other work today besides the blood sugar to see if the reason for it.  May just be that he has had a viral infection the last week or 10 days and that is hanging on a little bit.  As we find something on physical exam to explain it I think we will assume it is a viral syndrome.        Wrapup Tab  Return in about 3 weeks (around 9/3/2020) for Medicare Wellness.

## 2020-08-14 ENCOUNTER — TELEPHONE (OUTPATIENT)
Dept: FAMILY MEDICINE CLINIC | Facility: CLINIC | Age: 80
End: 2020-08-14

## 2020-08-14 LAB
ALBUMIN SERPL-MCNC: 3.4 G/DL (ref 3.5–5.2)
ALBUMIN/GLOB SERPL: 1 G/DL
ALP SERPL-CCNC: 154 U/L (ref 39–117)
ALT SERPL W P-5'-P-CCNC: 15 U/L (ref 1–41)
ANION GAP SERPL CALCULATED.3IONS-SCNC: 10.2 MMOL/L (ref 5–15)
AST SERPL-CCNC: 10 U/L (ref 1–40)
BASOPHILS # BLD AUTO: 0.05 10*3/MM3 (ref 0–0.2)
BASOPHILS NFR BLD AUTO: 0.7 % (ref 0–1.5)
BILIRUB SERPL-MCNC: 0.5 MG/DL (ref 0–1.2)
BUN SERPL-MCNC: 25 MG/DL (ref 8–23)
BUN/CREAT SERPL: 19.2 (ref 7–25)
CALCIUM SPEC-SCNC: 9.4 MG/DL (ref 8.6–10.5)
CHLORIDE SERPL-SCNC: 96 MMOL/L (ref 98–107)
CO2 SERPL-SCNC: 27.8 MMOL/L (ref 22–29)
CREAT SERPL-MCNC: 1.3 MG/DL (ref 0.76–1.27)
DEPRECATED RDW RBC AUTO: 42.4 FL (ref 37–54)
EOSINOPHIL # BLD AUTO: 0.21 10*3/MM3 (ref 0–0.4)
EOSINOPHIL NFR BLD AUTO: 2.8 % (ref 0.3–6.2)
ERYTHROCYTE [DISTWIDTH] IN BLOOD BY AUTOMATED COUNT: 12.4 % (ref 12.3–15.4)
ERYTHROCYTE [SEDIMENTATION RATE] IN BLOOD: 18 MM/HR (ref 0–20)
GFR SERPL CREATININE-BSD FRML MDRD: 53 ML/MIN/1.73
GLOBULIN UR ELPH-MCNC: 3.4 GM/DL
GLUCOSE SERPL-MCNC: 358 MG/DL (ref 65–99)
HCT VFR BLD AUTO: 41.6 % (ref 37.5–51)
HGB BLD-MCNC: 13.7 G/DL (ref 13–17.7)
IMM GRANULOCYTES # BLD AUTO: 0.03 10*3/MM3 (ref 0–0.05)
IMM GRANULOCYTES NFR BLD AUTO: 0.4 % (ref 0–0.5)
LYMPHOCYTES # BLD AUTO: 2.1 10*3/MM3 (ref 0.7–3.1)
LYMPHOCYTES NFR BLD AUTO: 27.7 % (ref 19.6–45.3)
MCH RBC QN AUTO: 30.6 PG (ref 26.6–33)
MCHC RBC AUTO-ENTMCNC: 32.9 G/DL (ref 31.5–35.7)
MCV RBC AUTO: 92.9 FL (ref 79–97)
MONOCYTES # BLD AUTO: 0.67 10*3/MM3 (ref 0.1–0.9)
MONOCYTES NFR BLD AUTO: 8.8 % (ref 5–12)
NEUTROPHILS NFR BLD AUTO: 4.53 10*3/MM3 (ref 1.7–7)
NEUTROPHILS NFR BLD AUTO: 59.6 % (ref 42.7–76)
NRBC BLD AUTO-RTO: 0 /100 WBC (ref 0–0.2)
PLATELET # BLD AUTO: 152 10*3/MM3 (ref 140–450)
PMV BLD AUTO: 10.1 FL (ref 6–12)
POTASSIUM SERPL-SCNC: 4.8 MMOL/L (ref 3.5–5.2)
PROT SERPL-MCNC: 6.8 G/DL (ref 6–8.5)
RBC # BLD AUTO: 4.48 10*6/MM3 (ref 4.14–5.8)
SODIUM SERPL-SCNC: 134 MMOL/L (ref 136–145)
TSH SERPL DL<=0.05 MIU/L-ACNC: 2.18 UIU/ML (ref 0.27–4.2)
WBC # BLD AUTO: 7.59 10*3/MM3 (ref 3.4–10.8)

## 2020-08-14 RX ORDER — GLIMEPIRIDE 2 MG/1
6 TABLET ORAL
Qty: 270 TABLET | Refills: 3
Start: 2020-08-14 | End: 2020-09-02 | Stop reason: SDUPTHER

## 2020-08-14 NOTE — PROGRESS NOTES
Tell Cisco to increase his Amaryl which is glimepiride from 2 mg every morning 6 mg every morning  Check blood sugars at least every other day if not daily fasting and right before the evening meal.

## 2020-08-14 NOTE — TELEPHONE ENCOUNTER
----- Message from Nuno Patton MD sent at 8/13/2020  8:37 PM EDT -----  Tell Cisco to increase his Amaryl which is glimepiride from 2 mg every morning 6 mg every morning  Check blood sugars at least every other day if not daily fasting and right before the evening meal.

## 2020-08-14 NOTE — TELEPHONE ENCOUNTER
Spoke to pt he will increase dose. He just got 2 bottles so when he is close he will call in so we can send in new dose.    He will bring sugar reading to next OV

## 2020-08-15 NOTE — PROGRESS NOTES
I called the patient and he is going to increase his Amaryl to 6 mg a day.  After about 4 to 6 weeks that dose will be maxed out.  If his sugars are not back to normal we will increase him to 8 mg.  I am going to call in new test strips for him and he is going to check twice a day while were doing this regulation.  I am also going to call in some Metformin 250 mg twice a day for him.  He should tolerate this low dose just fine even though he has chronic kidney disease stage III.

## 2020-08-17 ENCOUNTER — LAB REQUISITION (OUTPATIENT)
Dept: LAB | Facility: HOSPITAL | Age: 80
End: 2020-08-17

## 2020-08-17 DIAGNOSIS — Z00.00 ENCOUNTER FOR GENERAL ADULT MEDICAL EXAMINATION WITHOUT ABNORMAL FINDINGS: ICD-10-CM

## 2020-08-17 PROCEDURE — U0003 INFECTIOUS AGENT DETECTION BY NUCLEIC ACID (DNA OR RNA); SEVERE ACUTE RESPIRATORY SYNDROME CORONAVIRUS 2 (SARS-COV-2) (CORONAVIRUS DISEASE [COVID-19]), AMPLIFIED PROBE TECHNIQUE, MAKING USE OF HIGH THROUGHPUT TECHNOLOGIES AS DESCRIBED BY CMS-2020-01-R: HCPCS | Performed by: EMERGENCY MEDICINE

## 2020-08-18 LAB
COVID LABCORP PRIORITY: NORMAL
SARS-COV-2 RNA RESP QL NAA+PROBE: NOT DETECTED

## 2020-09-02 ENCOUNTER — LAB (OUTPATIENT)
Dept: FAMILY MEDICINE CLINIC | Facility: CLINIC | Age: 80
End: 2020-09-02

## 2020-09-02 ENCOUNTER — TELEPHONE (OUTPATIENT)
Dept: FAMILY MEDICINE CLINIC | Facility: CLINIC | Age: 80
End: 2020-09-02

## 2020-09-02 ENCOUNTER — OFFICE VISIT (OUTPATIENT)
Dept: FAMILY MEDICINE CLINIC | Facility: CLINIC | Age: 80
End: 2020-09-02

## 2020-09-02 VITALS
HEART RATE: 91 BPM | WEIGHT: 188 LBS | TEMPERATURE: 96.8 F | BODY MASS INDEX: 26.92 KG/M2 | DIASTOLIC BLOOD PRESSURE: 88 MMHG | SYSTOLIC BLOOD PRESSURE: 122 MMHG | OXYGEN SATURATION: 97 % | RESPIRATION RATE: 16 BRPM | HEIGHT: 70 IN

## 2020-09-02 DIAGNOSIS — Z00.00 MEDICARE ANNUAL WELLNESS VISIT, SUBSEQUENT: Primary | ICD-10-CM

## 2020-09-02 DIAGNOSIS — I10 ESSENTIAL HYPERTENSION: ICD-10-CM

## 2020-09-02 DIAGNOSIS — Z85.51 HISTORY OF BLADDER CANCER: ICD-10-CM

## 2020-09-02 DIAGNOSIS — I25.10 CORONARY ARTERY DISEASE INVOLVING NATIVE CORONARY ARTERY OF NATIVE HEART WITHOUT ANGINA PECTORIS: ICD-10-CM

## 2020-09-02 DIAGNOSIS — D12.5 ADENOMATOUS POLYP OF SIGMOID COLON: ICD-10-CM

## 2020-09-02 DIAGNOSIS — E78.2 MIXED HYPERLIPIDEMIA: ICD-10-CM

## 2020-09-02 DIAGNOSIS — K21.9 GASTROESOPHAGEAL REFLUX DISEASE WITHOUT ESOPHAGITIS: ICD-10-CM

## 2020-09-02 DIAGNOSIS — E11.9 TYPE 2 DIABETES MELLITUS WITHOUT COMPLICATION, WITHOUT LONG-TERM CURRENT USE OF INSULIN (HCC): ICD-10-CM

## 2020-09-02 DIAGNOSIS — N18.9 CHRONIC RENAL IMPAIRMENT, UNSPECIFIED CKD STAGE: ICD-10-CM

## 2020-09-02 DIAGNOSIS — C61 PROSTATE CANCER (HCC): ICD-10-CM

## 2020-09-02 DIAGNOSIS — E11.9 TYPE 2 DIABETES MELLITUS WITHOUT COMPLICATION, WITHOUT LONG-TERM CURRENT USE OF INSULIN (HCC): Primary | ICD-10-CM

## 2020-09-02 PROCEDURE — G0439 PPPS, SUBSEQ VISIT: HCPCS | Performed by: FAMILY MEDICINE

## 2020-09-02 PROCEDURE — 96160 PT-FOCUSED HLTH RISK ASSMT: CPT | Performed by: FAMILY MEDICINE

## 2020-09-02 PROCEDURE — 99214 OFFICE O/P EST MOD 30 MIN: CPT | Performed by: FAMILY MEDICINE

## 2020-09-02 RX ORDER — GLIMEPIRIDE 2 MG/1
6 TABLET ORAL
Qty: 270 TABLET | Refills: 3 | Status: SHIPPED | OUTPATIENT
Start: 2020-09-02 | End: 2020-10-26

## 2020-09-02 NOTE — PATIENT INSTRUCTIONS
Medicare Wellness  Personal Prevention Plan of Service     Date of Office Visit:  2020  Encounter Provider:  Nuno Patton MD  Place of Service:  [facility]  Patient Name: Cisco Frank  :  1940    As part of the Medicare Wellness portion of your visit today, we are providing you with this personalized preventive plan of services (PPPS). This plan is based upon recommendations of the [place] Preventive Services Task Force (USPSTF) and the Advisory Committee on Immunization Practices (ACIP).    This lists the preventive care services that should be considered, and provides dates of when you are due. Items listed as completed are up-to-date and do not require any further intervention.    Health Maintenance   Topic Date Due   • URINE MICROALBUMIN  1940   • TDAP/TD VACCINES (1 - Tdap) 1953   • ZOSTER VACCINE (1 of 2) 1992   • Pneumococcal Vaccine Once at 65 Years Old  2007   • HEPATITIS C SCREENING  2019   • MEDICARE ANNUAL WELLNESS  2019   • INFLUENZA VACCINE  2020   • HEMOGLOBIN A1C  2021   • DIABETIC EYE EXAM  2021   • LIPID PANEL  2021       No orders of the defined types were placed in this encounter.      No follow-ups on file.

## 2020-09-02 NOTE — PROGRESS NOTES
The ABCs of the Annual Wellness Visit  Subsequent Medicare Wellness Visit    Chief Complaint   Patient presents with   • Medicare Wellness-subsequent       Subjective   History of Present Illness:  Cisco Frank is a 80 y.o. male who presents for a Subsequent Medicare Wellness Visit.  Patient underwent the health risk assessment this morning and no questions prompted any concern on his part on the part of the nursing staff.  Screening tests recommended:    Colonoscopy -he is scheduled to have this done soon  Diabetic eye exam--normal  Diabetic foot exam--normal  PSA-normal      Immunization:  Influenza--yearly  Prevnar-up-to-date  Pneumovax-up-to-date  Tetanus-he will get that at the pharmacy  Shingles vaccine-he will get this at the pharmacy.                         Patient is also here today to get a complete physical.  He is followed in the office coronary artery disease, hypertension, hyperlipidemia, gastroesophageal reflux, type 2 diabetes, chronic renal insufficiency, previous prostate cancer, previous bladder cancer,.  Dr. Cope is following him for the urologic cancers and he has no evidence of disease other than a few bladder lesions.  We will review these problems with him today do an exam and order laboratory tests that have not been done over the past 6 months.            HEALTH RISK ASSESSMENT    Recent Hospitalizations:  Recently treated at the following:  Other: [facility]    Current Medical Providers:  Patient Care Team:  Nuno Patton MD as PCP - General (Family Medicine)  Hudson Smith MD as Consulting Physician (Cardiology)    Smoking Status:  Social History     Tobacco Use   Smoking Status Former Smoker   • Last attempt to quit: 1980   • Years since quittin.6   Smokeless Tobacco Never Used       Alcohol Consumption:  Social History     Substance and Sexual Activity   Alcohol Use No   • Frequency: Never       Depression Screen:   PHQ-2/PHQ-9 Depression Screening 2020    Little interest or pleasure in doing things 0   Feeling down, depressed, or hopeless 0   Total Score 0       Fall Risk Screen:  WILFRED Fall Risk Assessment was completed, and patient is at LOW risk for falls.Assessment completed on:9/2/2020    Health Habits and Functional and Cognitive Screening:  Functional & Cognitive Status 9/2/2020   Do you have difficulty preparing food and eating? No   Do you have difficulty bathing yourself, getting dressed or grooming yourself? No   Do you have difficulty using the toilet? No   Do you have difficulty moving around from place to place? No   Do you have trouble with steps or getting out of a bed or a chair? No   Current Diet Well Balanced Diet   Dental Exam Up to date   Eye Exam Up to date   Exercise (times per week) 7 times per week   Current Exercise Activities Include Walking   Do you need help using the phone?  No   Are you deaf or do you have serious difficulty hearing?  No   Do you need help with transportation? No   Do you need help shopping? No   Do you need help preparing meals?  No   Do you need help with housework?  No   Do you need help with laundry? No   Do you need help taking your medications? No   Do you need help managing money? No   Do you ever drive or ride in a car without wearing a seat belt? No   Have you felt unusual stress, anger or loneliness in the last month? No   Who do you live with? Spouse   If you need help, do you have trouble finding someone available to you? No   Do you have difficulty concentrating, remembering or making decisions? No         Does the patient have evidence of cognitive impairment? No    Asprin use counseling:Does not need ASA (and currently is not on it)    Age-appropriate Screening Schedule:  Refer to the list below for future screening recommendations based on patient's age, sex and/or medical conditions. Orders for these recommended tests are listed in the plan section. The patient has been provided with a written  plan.    Health Maintenance   Topic Date Due   • URINE MICROALBUMIN  1940   • TDAP/TD VACCINES (1 - Tdap) 06/14/1953   • ZOSTER VACCINE (1 of 2) 06/14/1992   • INFLUENZA VACCINE  08/01/2020   • HEMOGLOBIN A1C  02/13/2021   • DIABETIC EYE EXAM  05/28/2021   • LIPID PANEL  06/02/2021          The following portions of the patient's history were reviewed and updated as appropriate: allergies, current medications, past family history, past medical history, past social history, past surgical history and problem list.    Outpatient Medications Prior to Visit   Medication Sig Dispense Refill   • atenolol (TENORMIN) 25 MG tablet Take 1 tablet by mouth Daily. 90 tablet 3   • atorvastatin (LIPITOR) 40 MG tablet Take 1 tablet by mouth every night at bedtime. 90 tablet 3   • colestipol (COLESTID) 1 g tablet      • glimepiride (AMARYL) 2 MG tablet Take 3 tablets by mouth Every Morning Before Breakfast. 270 tablet 3   • glucose blood (FREESTYLE LITE) test strip 1 each by Other route 2 (two) times a day for 90 days. Use as instructed 180 each 3   • metFORMIN (GLUCOPHAGE) 500 MG tablet Take 0.5 tablets by mouth 2 (Two) Times a Day With Meals for 90 days. 90 tablet 2   • omeprazole-sodium bicarbonate (ZEGERID)  MG per capsule Take 1 capsule by mouth Daily.     • aspirin (ECOTRIN LOW STRENGTH) 81 MG EC tablet Take 1 tablet by mouth Daily.     • niacin (SLO-NIACIN) 500 MG CR tablet Take 1 tablet by mouth Daily.     • Omega-3 Fatty Acids (FISH OIL) 1200 MG capsule capsule Take 1 capsule by mouth Daily.       No facility-administered medications prior to visit.        Patient Active Problem List   Diagnosis   • Essential hypertension   • Type 2 diabetes mellitus without complication, without long-term current use of insulin (CMS/[facility])   • Coronary artery disease   • Hyperlipidemia   • History of bladder cancer   • Chronic renal insufficiency   • Colon polyps   • Gastroesophageal reflux disease   • Myocardial infarction  (CMS/[facility])   • Prostate cancer (CMS/[facility])   • Postviral fatigue syndrome   • Medicare annual wellness visit, subsequent       Advanced Care Planning:  ACP discussion was held with the patient during this visit. Patient has an advance directive in EMR which is still valid.     Review of Systems   Constitutional: Negative.  Negative for diaphoresis, fatigue and fever.   HENT: Negative.  Negative for congestion, ear pain, hearing loss, postnasal drip, sinus pressure, sore throat, trouble swallowing and voice change.    Eyes: Negative.  Negative for pain, redness and visual disturbance.   Respiratory: Negative.  Negative for cough, chest tightness and shortness of breath.    Cardiovascular: Negative.  Negative for chest pain, palpitations and leg swelling.   Gastrointestinal: Negative.  Negative for abdominal pain, blood in stool, constipation and diarrhea.   Endocrine: Negative.  Negative for cold intolerance and heat intolerance.   Genitourinary: Negative.  Negative for difficulty urinating, enuresis, flank pain, frequency and urgency.   Musculoskeletal: Negative.  Negative for arthralgias, back pain, myalgias, neck pain and neck stiffness.   Skin: Negative.  Negative for color change and rash.   Allergic/Immunologic: Negative.  Negative for environmental allergies.   Neurological: Negative.  Negative for syncope, weakness and headaches.   Hematological: Negative.  Does not bruise/bleed easily.   Psychiatric/Behavioral: Negative.  Negative for behavioral problems, decreased concentration, dysphoric mood and suicidal ideas. The patient is not nervous/anxious.        Compared to one year ago, the patient feels his physical health is the same.  Compared to one year ago, the patient feels his mental health is the same.    Reviewed chart for potential of high risk medication in the elderly: yes  Reviewed chart for potential of harmful drug interactions in the elderly:yes    Objective         Vitals:    09/02/20 0932   BP:  "122/88   BP Location: Left arm   Pulse: 91   Resp: 16   Temp: 96.8 °F (36 °C)   SpO2: 97%   Weight: 85.3 kg (188 lb)   Height: 177.8 cm (70\")       Body mass index is 26.98 kg/m².  Discussed the patient's BMI with him. The BMI is in the acceptable range.    Physical Exam   Constitutional: He is oriented to person, place, and time. He appears well-developed and well-nourished.   HENT:   Head: Normocephalic.   Right Ear: External ear normal.   Left Ear: External ear normal.   Nose: Nose normal.   Mouth/Throat: Oropharynx is clear and moist. No oropharyngeal exudate.   Eyes: Pupils are equal, round, and reactive to light. Conjunctivae and EOM are normal.   Neck: Normal range of motion. Neck supple.   Cardiovascular: Normal rate, regular rhythm, normal heart sounds and intact distal pulses.   Pulmonary/Chest: Effort normal and breath sounds normal.   Abdominal: Soft. Bowel sounds are normal.   Musculoskeletal: Normal range of motion.   Neurological: He is alert and oriented to person, place, and time.   Skin: Skin is warm and dry.   Psychiatric: He has a normal mood and affect. His behavior is normal. Judgment and thought content normal.       Lab Results   Component Value Date    HGBA1C 9.4 (H) 08/13/2020        Assessment/Plan   Medicare Risks and Personalized Health Plan  CMS Preventative Services Quick Reference  Advance Directive Discussion  Cardiovascular risk  Colon Cancer Screening  Diabetic Lab Screening   Glaucoma Risk  Immunizations Discussed/Encouraged (specific immunizations; Td, Influenza and Shingrix )  Inactivity/Sedentary  Prostate Cancer Screening     The above risks/problems have been discussed with the patient.  Pertinent information has been shared with the patient in the After Visit Summary.  Follow up plans and orders are seen below in the Assessment/Plan Section.    Diagnoses and all orders for this visit:    1. Medicare annual wellness visit, subsequent (Primary)    2. Coronary artery disease " involving native coronary artery of native heart without angina pectoris    3. Essential hypertension    4. Mixed hyperlipidemia    5. Adenomatous polyp of sigmoid colon    6. Gastroesophageal reflux disease without esophagitis    7. Type 2 diabetes mellitus without complication, without long-term current use of insulin (CMS/[facility])    8. Chronic renal impairment, unspecified CKD stage    9. Prostate cancer (CMS/[facility])    10. History of bladder cancer      Follow Up:  Return in about 6 months (around 3/2/2021).     An After Visit Summary and PPPS were given to the patient.

## 2020-10-26 ENCOUNTER — READMISSION MANAGEMENT (OUTPATIENT)
Dept: CALL CENTER | Facility: HOSPITAL | Age: 80
End: 2020-10-26

## 2020-10-26 ENCOUNTER — HOSPITAL ENCOUNTER (OUTPATIENT)
Facility: HOSPITAL | Age: 80
Setting detail: OBSERVATION
Discharge: HOME | End: 2020-10-26
Attending: EMERGENCY MEDICINE | Admitting: INTERNAL MEDICINE

## 2020-10-26 ENCOUNTER — APPOINTMENT (OUTPATIENT)
Dept: NUCLEAR MEDICINE | Facility: HOSPITAL | Age: 80
End: 2020-10-26

## 2020-10-26 ENCOUNTER — APPOINTMENT (OUTPATIENT)
Dept: GENERAL RADIOLOGY | Facility: HOSPITAL | Age: 80
End: 2020-10-26

## 2020-10-26 VITALS
BODY MASS INDEX: 27.2 KG/M2 | SYSTOLIC BLOOD PRESSURE: 144 MMHG | OXYGEN SATURATION: 96 % | TEMPERATURE: 97.8 F | HEIGHT: 70 IN | WEIGHT: 190 LBS | HEART RATE: 66 BPM | DIASTOLIC BLOOD PRESSURE: 81 MMHG | RESPIRATION RATE: 16 BRPM

## 2020-10-26 DIAGNOSIS — R07.9 CHEST PAIN, UNSPECIFIED TYPE: Primary | ICD-10-CM

## 2020-10-26 PROBLEM — E11.65 TYPE 2 DIABETES MELLITUS WITH HYPERGLYCEMIA, WITHOUT LONG-TERM CURRENT USE OF INSULIN (HCC): Chronic | Status: ACTIVE | Noted: 2019-12-06

## 2020-10-26 PROBLEM — E11.9 TYPE 2 DIABETES MELLITUS WITHOUT COMPLICATION, WITHOUT LONG-TERM CURRENT USE OF INSULIN: Chronic | Status: ACTIVE | Noted: 2019-12-06

## 2020-10-26 PROBLEM — E78.5 HYPERLIPIDEMIA: Chronic | Status: ACTIVE | Noted: 2020-03-19

## 2020-10-26 PROBLEM — E66.3 OVERWEIGHT: Status: ACTIVE | Noted: 2020-10-26

## 2020-10-26 PROBLEM — Z85.51 HISTORY OF BLADDER CANCER: Chronic | Status: ACTIVE | Noted: 2020-03-19

## 2020-10-26 PROBLEM — I25.10 CORONARY ARTERY DISEASE: Chronic | Status: ACTIVE | Noted: 2020-03-19

## 2020-10-26 PROBLEM — K21.9 GASTROESOPHAGEAL REFLUX DISEASE: Chronic | Status: ACTIVE | Noted: 2020-03-19

## 2020-10-26 PROBLEM — I10 ESSENTIAL HYPERTENSION: Chronic | Status: ACTIVE | Noted: 2019-12-06

## 2020-10-26 LAB
ALBUMIN SERPL-MCNC: 3.9 G/DL (ref 3.5–5.2)
ALBUMIN/GLOB SERPL: 1.3 G/DL
ALP SERPL-CCNC: 154 U/L (ref 39–117)
ALT SERPL W P-5'-P-CCNC: 12 U/L (ref 1–41)
ANION GAP SERPL CALCULATED.3IONS-SCNC: 10 MMOL/L (ref 5–15)
APTT PPP: 23.6 SECONDS (ref 24–31)
AST SERPL-CCNC: 14 U/L (ref 1–40)
BASOPHILS # BLD AUTO: 0 10*3/MM3 (ref 0–0.2)
BASOPHILS NFR BLD AUTO: 0.4 % (ref 0–1.5)
BH CV STRESS BP STAGE 1: NORMAL
BH CV STRESS COMMENTS STAGE 1: NORMAL
BH CV STRESS DOSE REGADENOSON STAGE 1: 0.4
BH CV STRESS DURATION MIN STAGE 1: 0
BH CV STRESS DURATION SEC STAGE 1: 10
BH CV STRESS HR STAGE 1: 99
BH CV STRESS PROTOCOL 1: NORMAL
BH CV STRESS RECOVERY BP: NORMAL MMHG
BH CV STRESS RECOVERY HR: 97 BPM
BH CV STRESS STAGE 1: 1
BILIRUB SERPL-MCNC: 0.6 MG/DL (ref 0–1.2)
BUN SERPL-MCNC: 20 MG/DL (ref 8–23)
BUN/CREAT SERPL: 21.5 (ref 7–25)
CALCIUM SPEC-SCNC: 8.8 MG/DL (ref 8.6–10.5)
CHLORIDE SERPL-SCNC: 102 MMOL/L (ref 98–107)
CO2 SERPL-SCNC: 27 MMOL/L (ref 22–29)
CREAT SERPL-MCNC: 0.93 MG/DL (ref 0.76–1.27)
DEPRECATED RDW RBC AUTO: 45.9 FL (ref 37–54)
EOSINOPHIL # BLD AUTO: 0.1 10*3/MM3 (ref 0–0.4)
EOSINOPHIL NFR BLD AUTO: 1.5 % (ref 0.3–6.2)
ERYTHROCYTE [DISTWIDTH] IN BLOOD BY AUTOMATED COUNT: 14.6 % (ref 12.3–15.4)
GFR SERPL CREATININE-BSD FRML MDRD: 79 ML/MIN/1.73
GLOBULIN UR ELPH-MCNC: 2.9 GM/DL
GLUCOSE BLDC GLUCOMTR-MCNC: 180 MG/DL (ref 70–105)
GLUCOSE BLDC GLUCOMTR-MCNC: 220 MG/DL (ref 70–105)
GLUCOSE SERPL-MCNC: 232 MG/DL (ref 65–99)
HBA1C MFR BLD: 8 % (ref 3.5–5.6)
HCT VFR BLD AUTO: 40.7 % (ref 37.5–51)
HGB BLD-MCNC: 14.2 G/DL (ref 13–17.7)
INR PPP: 0.99 (ref 0.93–1.1)
LV EF NUC BP: 70 %
LYMPHOCYTES # BLD AUTO: 1.8 10*3/MM3 (ref 0.7–3.1)
LYMPHOCYTES NFR BLD AUTO: 29 % (ref 19.6–45.3)
MAXIMAL PREDICTED HEART RATE: 142 BPM
MCH RBC QN AUTO: 30.9 PG (ref 26.6–33)
MCHC RBC AUTO-ENTMCNC: 35 G/DL (ref 31.5–35.7)
MCV RBC AUTO: 88.3 FL (ref 79–97)
MONOCYTES # BLD AUTO: 0.5 10*3/MM3 (ref 0.1–0.9)
MONOCYTES NFR BLD AUTO: 7.5 % (ref 5–12)
NEUTROPHILS NFR BLD AUTO: 3.8 10*3/MM3 (ref 1.7–7)
NEUTROPHILS NFR BLD AUTO: 61.6 % (ref 42.7–76)
NRBC BLD AUTO-RTO: 0.1 /100 WBC (ref 0–0.2)
PERCENT MAX PREDICTED HR: 69.72 %
PLATELET # BLD AUTO: 158 10*3/MM3 (ref 140–450)
PMV BLD AUTO: 7.4 FL (ref 6–12)
POTASSIUM SERPL-SCNC: 4.4 MMOL/L (ref 3.5–5.2)
PROT SERPL-MCNC: 6.8 G/DL (ref 6–8.5)
PROTHROMBIN TIME: 10.9 SECONDS (ref 9.6–11.7)
RBC # BLD AUTO: 4.61 10*6/MM3 (ref 4.14–5.8)
SODIUM SERPL-SCNC: 139 MMOL/L (ref 136–145)
STRESS BASELINE BP: NORMAL MMHG
STRESS BASELINE HR: 68 BPM
STRESS PERCENT HR: 82 %
STRESS POST PEAK BP: NORMAL MMHG
STRESS POST PEAK HR: 99 BPM
STRESS TARGET HR: 121 BPM
TROPONIN T SERPL-MCNC: <0.01 NG/ML (ref 0–0.03)
TROPONIN T SERPL-MCNC: <0.01 NG/ML (ref 0–0.03)
WBC # BLD AUTO: 6.1 10*3/MM3 (ref 3.4–10.8)

## 2020-10-26 PROCEDURE — 99284 EMERGENCY DEPT VISIT MOD MDM: CPT

## 2020-10-26 PROCEDURE — 84484 ASSAY OF TROPONIN QUANT: CPT | Performed by: EMERGENCY MEDICINE

## 2020-10-26 PROCEDURE — G0378 HOSPITAL OBSERVATION PER HR: HCPCS

## 2020-10-26 PROCEDURE — 99235 HOSP IP/OBS SAME DATE MOD 70: CPT | Performed by: INTERNAL MEDICINE

## 2020-10-26 PROCEDURE — 83036 HEMOGLOBIN GLYCOSYLATED A1C: CPT | Performed by: NURSE PRACTITIONER

## 2020-10-26 PROCEDURE — 84484 ASSAY OF TROPONIN QUANT: CPT | Performed by: INTERNAL MEDICINE

## 2020-10-26 PROCEDURE — 93016 CV STRESS TEST SUPVJ ONLY: CPT | Performed by: INTERNAL MEDICINE

## 2020-10-26 PROCEDURE — 85610 PROTHROMBIN TIME: CPT | Performed by: EMERGENCY MEDICINE

## 2020-10-26 PROCEDURE — 78452 HT MUSCLE IMAGE SPECT MULT: CPT

## 2020-10-26 PROCEDURE — 93018 CV STRESS TEST I&R ONLY: CPT | Performed by: INTERNAL MEDICINE

## 2020-10-26 PROCEDURE — 82962 GLUCOSE BLOOD TEST: CPT

## 2020-10-26 PROCEDURE — 0 TECHNETIUM SESTAMIBI: Performed by: INTERNAL MEDICINE

## 2020-10-26 PROCEDURE — A9500 TC99M SESTAMIBI: HCPCS | Performed by: INTERNAL MEDICINE

## 2020-10-26 PROCEDURE — 85025 COMPLETE CBC W/AUTO DIFF WBC: CPT | Performed by: EMERGENCY MEDICINE

## 2020-10-26 PROCEDURE — 93017 CV STRESS TEST TRACING ONLY: CPT

## 2020-10-26 PROCEDURE — 71045 X-RAY EXAM CHEST 1 VIEW: CPT

## 2020-10-26 PROCEDURE — 78452 HT MUSCLE IMAGE SPECT MULT: CPT | Performed by: INTERNAL MEDICINE

## 2020-10-26 PROCEDURE — 80053 COMPREHEN METABOLIC PANEL: CPT | Performed by: EMERGENCY MEDICINE

## 2020-10-26 PROCEDURE — 93005 ELECTROCARDIOGRAM TRACING: CPT | Performed by: EMERGENCY MEDICINE

## 2020-10-26 PROCEDURE — 25010000002 REGADENOSON 0.4 MG/5ML SOLUTION: Performed by: INTERNAL MEDICINE

## 2020-10-26 PROCEDURE — 85730 THROMBOPLASTIN TIME PARTIAL: CPT | Performed by: EMERGENCY MEDICINE

## 2020-10-26 RX ORDER — DEXTROSE MONOHYDRATE 25 G/50ML
25 INJECTION, SOLUTION INTRAVENOUS
Status: DISCONTINUED | OUTPATIENT
Start: 2020-10-26 | End: 2020-10-26 | Stop reason: HOSPADM

## 2020-10-26 RX ORDER — GLIMEPIRIDE 2 MG/1
3 TABLET ORAL
COMMUNITY
End: 2020-11-05 | Stop reason: SDUPTHER

## 2020-10-26 RX ORDER — INSULIN LISPRO 100 [IU]/ML
0-9 INJECTION, SOLUTION INTRAVENOUS; SUBCUTANEOUS AS NEEDED
Status: DISCONTINUED | OUTPATIENT
Start: 2020-10-26 | End: 2020-10-26 | Stop reason: HOSPADM

## 2020-10-26 RX ORDER — ONDANSETRON 2 MG/ML
4 INJECTION INTRAMUSCULAR; INTRAVENOUS EVERY 6 HOURS PRN
Status: DISCONTINUED | OUTPATIENT
Start: 2020-10-26 | End: 2020-10-26 | Stop reason: HOSPADM

## 2020-10-26 RX ORDER — CHOLECALCIFEROL (VITAMIN D3) 125 MCG
5 CAPSULE ORAL NIGHTLY PRN
Status: DISCONTINUED | OUTPATIENT
Start: 2020-10-26 | End: 2020-10-26 | Stop reason: HOSPADM

## 2020-10-26 RX ORDER — ATORVASTATIN CALCIUM 40 MG/1
40 TABLET, FILM COATED ORAL NIGHTLY
Status: DISCONTINUED | OUTPATIENT
Start: 2020-10-26 | End: 2020-10-26 | Stop reason: HOSPADM

## 2020-10-26 RX ORDER — ACETAMINOPHEN 160 MG/5ML
650 SOLUTION ORAL EVERY 4 HOURS PRN
Status: DISCONTINUED | OUTPATIENT
Start: 2020-10-26 | End: 2020-10-26 | Stop reason: HOSPADM

## 2020-10-26 RX ORDER — SODIUM CHLORIDE 0.9 % (FLUSH) 0.9 %
10 SYRINGE (ML) INJECTION AS NEEDED
Status: DISCONTINUED | OUTPATIENT
Start: 2020-10-26 | End: 2020-10-26 | Stop reason: HOSPADM

## 2020-10-26 RX ORDER — ACETAMINOPHEN 650 MG/1
650 SUPPOSITORY RECTAL EVERY 4 HOURS PRN
Status: DISCONTINUED | OUTPATIENT
Start: 2020-10-26 | End: 2020-10-26 | Stop reason: HOSPADM

## 2020-10-26 RX ORDER — NICOTINE POLACRILEX 4 MG
15 LOZENGE BUCCAL
Status: DISCONTINUED | OUTPATIENT
Start: 2020-10-26 | End: 2020-10-26 | Stop reason: HOSPADM

## 2020-10-26 RX ORDER — SODIUM CHLORIDE 0.9 % (FLUSH) 0.9 %
10 SYRINGE (ML) INJECTION EVERY 12 HOURS SCHEDULED
Status: DISCONTINUED | OUTPATIENT
Start: 2020-10-26 | End: 2020-10-26 | Stop reason: HOSPADM

## 2020-10-26 RX ORDER — ASPIRIN 81 MG/1
324 TABLET, CHEWABLE ORAL ONCE
Status: COMPLETED | OUTPATIENT
Start: 2020-10-26 | End: 2020-10-26

## 2020-10-26 RX ORDER — ALUMINA, MAGNESIA, AND SIMETHICONE 2400; 2400; 240 MG/30ML; MG/30ML; MG/30ML
15 SUSPENSION ORAL EVERY 6 HOURS PRN
Status: DISCONTINUED | OUTPATIENT
Start: 2020-10-26 | End: 2020-10-26 | Stop reason: HOSPADM

## 2020-10-26 RX ORDER — BISACODYL 10 MG
10 SUPPOSITORY, RECTAL RECTAL DAILY PRN
Status: DISCONTINUED | OUTPATIENT
Start: 2020-10-26 | End: 2020-10-26 | Stop reason: HOSPADM

## 2020-10-26 RX ORDER — ASPIRIN 81 MG/1
81 TABLET ORAL DAILY
Status: DISCONTINUED | OUTPATIENT
Start: 2020-10-27 | End: 2020-10-26 | Stop reason: HOSPADM

## 2020-10-26 RX ORDER — ASCORBIC ACID 500 MG
500 TABLET ORAL DAILY
Status: DISCONTINUED | OUTPATIENT
Start: 2020-10-26 | End: 2020-10-26 | Stop reason: HOSPADM

## 2020-10-26 RX ORDER — ONDANSETRON 4 MG/1
4 TABLET, FILM COATED ORAL EVERY 6 HOURS PRN
Status: DISCONTINUED | OUTPATIENT
Start: 2020-10-26 | End: 2020-10-26 | Stop reason: HOSPADM

## 2020-10-26 RX ORDER — ACETAMINOPHEN 325 MG/1
650 TABLET ORAL EVERY 4 HOURS PRN
Status: DISCONTINUED | OUTPATIENT
Start: 2020-10-26 | End: 2020-10-26 | Stop reason: HOSPADM

## 2020-10-26 RX ORDER — INSULIN LISPRO 100 [IU]/ML
0-9 INJECTION, SOLUTION INTRAVENOUS; SUBCUTANEOUS EVERY 6 HOURS SCHEDULED
Status: DISCONTINUED | OUTPATIENT
Start: 2020-10-26 | End: 2020-10-26 | Stop reason: HOSPADM

## 2020-10-26 RX ORDER — KETOROLAC TROMETHAMINE 15 MG/ML
15 INJECTION, SOLUTION INTRAMUSCULAR; INTRAVENOUS EVERY 6 HOURS PRN
Status: DISCONTINUED | OUTPATIENT
Start: 2020-10-26 | End: 2020-10-26 | Stop reason: HOSPADM

## 2020-10-26 RX ORDER — ASCORBIC ACID 500 MG
500 TABLET ORAL DAILY
COMMUNITY

## 2020-10-26 RX ORDER — PANTOPRAZOLE SODIUM 40 MG/1
40 TABLET, DELAYED RELEASE ORAL EVERY MORNING
Status: DISCONTINUED | OUTPATIENT
Start: 2020-10-26 | End: 2020-10-26 | Stop reason: HOSPADM

## 2020-10-26 RX ORDER — NITROGLYCERIN 0.4 MG/1
0.4 TABLET SUBLINGUAL
Status: DISCONTINUED | OUTPATIENT
Start: 2020-10-26 | End: 2020-10-26 | Stop reason: HOSPADM

## 2020-10-26 RX ADMIN — ASPIRIN 81 MG 324 MG: 81 TABLET ORAL at 06:03

## 2020-10-26 RX ADMIN — REGADENOSON 0.4 MG: 0.08 INJECTION, SOLUTION INTRAVENOUS at 13:10

## 2020-10-26 RX ADMIN — TECHNETIUM TC 99M SESTAMIBI 1 DOSE: 1 INJECTION INTRAVENOUS at 11:05

## 2020-10-26 RX ADMIN — ACETAMINOPHEN 650 MG: 325 TABLET, FILM COATED ORAL at 08:05

## 2020-10-26 RX ADMIN — TECHNETIUM TC 99M SESTAMIBI 1 DOSE: 1 INJECTION INTRAVENOUS at 13:10

## 2020-10-26 NOTE — ED PROVIDER NOTES
Subjective   80-year-old male with mild to moderate substernal chest pain, worse with exertion, present intermittently for 4 days, mild at present.  No other symptoms.  Patient tells me he had an MI with cardiac stent placing approximately 15 years ago.  No history of DVT or PE any recent trips or travel or any calf pain or swelling or recent surgeries or procedures or hospitalizations.          Review of Systems   Cardiovascular: Positive for chest pain.   All other systems reviewed and are negative.      Past Medical History:   Diagnosis Date   • Coronary artery disease    • History of bladder cancer    • Hyperlipidemia        No Known Allergies    Past Surgical History:   Procedure Laterality Date   • BLADDER SURGERY  10/26/2019    [facility] Dr. Cope   • CARDIAC CATHETERIZATION         Family History   Problem Relation Age of Onset   • Heart disease Mother    • Heart attack Father    • No Known Problems Sister    • No Known Problems Brother    • No Known Problems Maternal Aunt    • No Known Problems Maternal Uncle    • No Known Problems Paternal Aunt    • No Known Problems Paternal Uncle    • No Known Problems Maternal Grandmother    • No Known Problems Maternal Grandfather    • No Known Problems Paternal Grandmother    • No Known Problems Paternal Grandfather    • No Known Problems Other    • Anemia Neg Hx    • Arrhythmia Neg Hx    • Asthma Neg Hx    • Clotting disorder Neg Hx    • Fainting Neg Hx    • Heart failure Neg Hx    • Hyperlipidemia Neg Hx    • Hypertension Neg Hx        Social History     Socioeconomic History   • Marital status:      Spouse name: Not on file   • Number of children: Not on file   • Years of education: Not on file   • Highest education level: Not on file   Tobacco Use   • Smoking status: Former Smoker     Quit date: 1980     Years since quittin.8   • Smokeless tobacco: Never Used   Substance and Sexual Activity   • Alcohol use: No     Frequency: Never   • Drug use: No   •  Sexual activity: Defer           Objective   Physical Exam  Constitutional:       Appearance: Normal appearance. He is well-developed.   HENT:      Head: Normocephalic and atraumatic.      Mouth/Throat:      Mouth: Mucous membranes are moist.      Pharynx: Oropharynx is clear.   Eyes:      Conjunctiva/sclera: Conjunctivae normal.      Pupils: Pupils are equal, round, and reactive to light.   Neck:      Musculoskeletal: Normal range of motion and neck supple.   Cardiovascular:      Rate and Rhythm: Normal rate and regular rhythm.   Pulmonary:      Effort: Pulmonary effort is normal.      Breath sounds: Normal breath sounds.   Abdominal:      General: Bowel sounds are normal. There is no distension.      Palpations: Abdomen is soft.      Tenderness: There is no abdominal tenderness.   Musculoskeletal: Normal range of motion.         General: No swelling or tenderness.   Skin:     General: Skin is warm and dry.      Capillary Refill: Capillary refill takes less than 2 seconds.   Neurological:      General: No focal deficit present.      Mental Status: He is alert and oriented to person, place, and time.   Psychiatric:         Mood and Affect: Mood normal.         Behavior: Behavior normal.         Procedures           ED Course  ED Course as of Oct 26 0700   Mon Oct 26, 2020   0548 EKG interpretation: Normal sinus rhythm with occasional PAC, no acute ST change    [JR]      ED Course User Index  [JR] Alexandru Mcclelland MD                                           MDM  Number of Diagnoses or Management Options  Chest pain, unspecified type:   Diagnosis management comments: Results for orders placed or performed during the hospital encounter of 10/26/20  -Comprehensive Metabolic Panel  Specimen: Blood       Result                      Value             Ref Range           Glucose                     232 (H)           65 - 99 mg/dL       BUN                         20                8 - 23 mg/dL        Creatinine                   0.93              0.76 - 1.27 *       Sodium                      139               136 - 145 mm*       Potassium                   4.4               3.5 - 5.2 mm*       Chloride                    102               98 - 107 mmo*       CO2                         27.0              22.0 - 29.0 *       Calcium                     8.8               8.6 - 10.5 m*       Total Protein               6.8               6.0 - 8.5 g/*       Albumin                     3.90              3.50 - 5.20 *       ALT (SGPT)                  12                1 - 41 U/L          AST (SGOT)                  14                1 - 40 U/L          Alkaline Phosphatase        154 (H)           39 - 117 U/L        Total Bilirubin             0.6               0.0 - 1.2 mg*       eGFR Non African Amer       79                >60 mL/min/1*       Globulin                    2.9               gm/dL               A/G Ratio                   1.3               g/dL                BUN/Creatinine Ratio        21.5              7.0 - 25.0          Anion Gap                   10.0              5.0 - 15.0 m*  -Troponin  Specimen: Blood       Result                      Value             Ref Range           Troponin T                  <0.010            0.000 - 0.03*  -Protime-INR  Specimen: Blood       Result                      Value             Ref Range           Protime                     10.9              9.6 - 11.7 S*       INR                         0.99              0.93 - 1.10    -aPTT  Specimen: Blood       Result                      Value             Ref Range           PTT                         23.6 (L)          24.0 - 31.0 *  -CBC Auto Differential  Specimen: Blood       Result                      Value             Ref Range           WBC                         6.10              3.40 - 10.80*       RBC                         4.61              4.14 - 5.80 *       Hemoglobin                  14.2              13.0 - 17.7 *        Hematocrit                  40.7              37.5 - 51.0 %       MCV                         88.3              79.0 - 97.0 *       MCH                         30.9              26.6 - 33.0 *       MCHC                        35.0              31.5 - 35.7 *       RDW                         14.6              12.3 - 15.4 %       RDW-SD                      45.9              37.0 - 54.0 *       MPV                         7.4               6.0 - 12.0 fL       Platelets                   158               140 - 450 10*       Neutrophil %                61.6              42.7 - 76.0 %       Lymphocyte %                29.0              19.6 - 45.3 %       Monocyte %                  7.5               5.0 - 12.0 %        Eosinophil %                1.5               0.3 - 6.2 %         Basophil %                  0.4               0.0 - 1.5 %         Neutrophils, Absolute       3.80              1.70 - 7.00 *       Lymphocytes, Absolute       1.80              0.70 - 3.10 *       Monocytes, Absolute         0.50              0.10 - 0.90 *       Eosinophils, Absolute       0.10              0.00 - 0.40 *       Basophils, Absolute         0.00              0.00 - 0.20 *       nRBC                        0.1               0.0 - 0.2 /1*    Exertional cp in a 79 yo with cad hx and ongoing risk factors, will observe.       Amount and/or Complexity of Data Reviewed  Clinical lab tests: reviewed  Tests in the radiology section of CPT®: reviewed  Independent visualization of images, tracings, or specimens: yes        Final diagnoses:   Chest pain, unspecified type            Alexandru Mcclelland MD  10/26/20 0733

## 2020-10-26 NOTE — H&P
[facility] Medicine Services      Patient Name: Cisco Frank  : 1940  MRN: [id number]  Primary Care Physician: Nuno Patton MD  Date of admission: 10/26/2020    Patient Care Team:  Nuno Patton MD as PCP - General (Family Medicine)  Hudson Smith MD as Consulting Physician (Cardiology)          Subjective   History Present Illness     Chief Complaint:   Chief Complaint   Patient presents with   • Chest Pain         Mr. Frank is a 80 y.o. male with a history of CAD, MI s/p stent placement, HTN, HLD, Type 2 DM, and bladder cancer s/p TURBT who presents to [facility] ED on 10/26/2020 complaining of chest pain. The patient states his pain feels different than when he had his heart attack approximately 15 years ago. He states the pain is located in the middle of his chest and radiates into his right chest. He states the pain is worse with palpation and intermittent. He denies any recent injury or heavy lifting. He reports associated shortness of breath. He denies any other exacerbating or alleviating factors. He is a former smoker and states he quit in . He states his last cardiac stress test was done 5-7 years ago. He is followed as outpatient by cardiology, Dr. Escalona.     The patient is also complaining of elevated blood sugars. He states his PCP recently increased his medication, but his sugars are still elevated.     Upon arrival to the ER the patient was given aspirin 324 mg PO. His troponin was normal and his EKG showed sinus rhythm with PACs. His CBC and CMP were unremarkable except glucose 232 and alk phos 154. His CXR showed no acute findings. He was admitted for observation and further evaluation.         Review of Systems   Cardiovascular: Positive for chest pain.   Respiratory: Positive for shortness of breath.    All other systems reviewed and are negative.        Personal History     Past Medical History:   Past Medical History:      Diagnosis Date   • Coronary artery disease    • Diabetes mellitus (CMS/HCC)    • History of bladder cancer    • Hyperlipidemia    • Hypertension        Surgical History:      Past Surgical History:   Procedure Laterality Date   • BLADDER SURGERY  10/26/2019    [facility] Dr. Cope   • CARDIAC CATHETERIZATION       Family History: family history includes Heart attack in his father; Heart disease in his mother; No Known Problems in his brother, maternal aunt, maternal grandfather, maternal grandmother, maternal uncle, paternal aunt, paternal grandfather, paternal grandmother, paternal uncle, sister, and another family member. Otherwise pertinent FHx was reviewed and unremarkable.     Social History:  reports that he quit smoking about 40 years ago. He has never used smokeless tobacco. He reports previous alcohol use. He reports that he does not use drugs.      Medications:  Prior to Admission medications    Medication Sig Start Date End Date Taking? Authorizing Provider   aspirin (ECOTRIN LOW STRENGTH) 81 MG EC tablet Take 1 tablet by mouth Daily. 9/12/13  Yes Serenity Hinds MD   atenolol (TENORMIN) 25 MG tablet Take 1 tablet by mouth Daily. 6/12/20  Yes Hudson Smith MD   atorvastatin (LIPITOR) 40 MG tablet Take 1 tablet by mouth every night at bedtime. 6/12/20  Yes Hudson Smith MD   colestipol (COLESTID) 1 g tablet Take 2 g by mouth Daily. 6/16/20  Yes Serenity Hinds MD   glimepiride (AMARYL) 2 MG tablet Take 3 mg by mouth Every Morning Before Breakfast.   Yes Serenity Hinds MD   metFORMIN (GLUCOPHAGE) 500 MG tablet Take 0.5 tablets by mouth 2 (Two) Times a Day With Meals for 90 days. 8/15/20 11/13/20 Yes Nuno Patton MD   niacin (SLO-NIACIN) 500 MG CR tablet Take 1 tablet by mouth Daily. 9/12/13  Yes Serenity Hinds MD   Omega-3 Fatty Acids (FISH OIL) 1200 MG capsule capsule Take 1 capsule by mouth Daily. 9/12/13  Yes Serenity Hinds MD    omeprazole-sodium bicarbonate (ZEGERID)  MG per capsule Take 1 capsule by mouth Daily. 10/16/19  Yes Provider, MD Serenity   vitamin C (ASCORBIC ACID) 500 MG tablet Take 500 mg by mouth Daily.   Yes Provider, MD Sereinty   glimepiride (AMARYL) 2 MG tablet Take 3 tablets by mouth Every Morning Before Breakfast.  Patient taking differently: Take 3 mg by mouth Every Morning Before Breakfast. 9/2/20 10/26/20  Nuno Patton MD   glucose blood (FREESTYLE LITE) test strip 1 each by Other route 2 (two) times a day for 90 days. Use as instructed 8/15/20 10/26/20  Nuno Patton MD       Allergies:  No Known Allergies      Objective   Objective     Vital Signs  Temp:  [97.5 °F (36.4 °C)-97.6 °F (36.4 °C)] 97.5 °F (36.4 °C)  Heart Rate:  [75-83] 76  Resp:  [16] 16  BP: (113-146)/(75-92) 146/92  SpO2:  [94 %-99 %] 94 %  on   ;   Device (Oxygen Therapy): room air  Body mass index is 27.26 kg/m².    Physical Exam  Vitals signs reviewed.   Constitutional:       Appearance: He is overweight.   HENT:      Head: Normocephalic and atraumatic.      Nose: Nose normal.      Mouth/Throat:      Mouth: Mucous membranes are moist.   Eyes:      Extraocular Movements: Extraocular movements intact.      Conjunctiva/sclera: Conjunctivae normal.      Pupils: Pupils are equal, round, and reactive to light.   Neck:      Musculoskeletal: Normal range of motion and neck supple.   Cardiovascular:      Rate and Rhythm: Normal rate and regular rhythm.      Pulses: Normal pulses.      Heart sounds: Normal heart sounds.   Pulmonary:      Effort: Pulmonary effort is normal.      Breath sounds: Normal breath sounds.   Abdominal:      General: Bowel sounds are normal. There is no distension.      Palpations: Abdomen is soft.      Tenderness: There is no abdominal tenderness.   Musculoskeletal: Normal range of motion.      Right lower leg: No edema.      Left lower leg: No edema.   Skin:     General: Skin is warm and dry.       Capillary Refill: Capillary refill takes less than 2 seconds.   Neurological:      General: No focal deficit present.      Mental Status: He is alert and oriented to person, place, and time.   Psychiatric:         Mood and Affect: Mood normal.         Behavior: Behavior normal.           Results Review:  I have personally reviewed most recent cardiac tracings, lab results and radiology images and interpretations and agree with findings.    Results from last 7 days   Lab Units 10/26/20  0554   WBC 10*3/mm3 6.10   HEMOGLOBIN g/dL 14.2   HEMATOCRIT % 40.7   PLATELETS 10*3/mm3 158   INR  0.99     Results from last 7 days   Lab Units 10/26/20  0554   SODIUM mmol/L 139   POTASSIUM mmol/L 4.4   CHLORIDE mmol/L 102   CO2 mmol/L 27.0   BUN mg/dL 20   CREATININE mg/dL 0.93   GLUCOSE mg/dL 232*   CALCIUM mg/dL 8.8   ALT (SGPT) U/L 12   AST (SGOT) U/L 14   TROPONIN T ng/mL <0.010     Estimated Creatinine Clearance: 79.8 mL/min (by C-G formula based on SCr of 0.93 mg/dL).  Brief Urine Lab Results     None          Microbiology Results (last 10 days)     ** No results found for the last 240 hours. **          ECG/EMG Results (most recent)     Procedure Component Value Units Date/Time    ECG 12 Lead [[id number]] Collected: 10/26/20 0542     Updated: 10/26/20 0543    Narrative:      HEART RATE= 79  bpm  RR Interval= 755  ms  NH Interval= 160  ms  P Horizontal Axis= 16  deg  P Front Axis= 25  deg  QRSD Interval= 87  ms  QT Interval= 414  ms  QRS Axis= 14  deg  T Wave Axis= 18  deg  - ABNORMAL ECG -  Sinus rhythm  Atrial premature complexes  Electronically Signed By:   Date and Time of Study: 2020-10-26 05:42:29          Xr Chest 1 View    Result Date: 10/26/2020  No acute process.  Electronically Signed By-Mynor Ravi On:10/26/2020 7:26 AM This report was finalized on [id number] by  Mynor Ravi, .      Estimated Creatinine Clearance: 79.8 mL/min (by C-G formula based on SCr of 0.93 mg/dL).      Assessment/Plan   Assessment/Plan          Active Hospital Problems    Diagnosis  POA   • **Chest pain [R07.9]  Yes   • Overweight [E66.3]  Yes   • Hyperlipidemia [E78.5]  Yes   • Coronary artery disease [I25.10]  Yes   • History of bladder cancer [Z85.51]  Not Applicable   • Gastroesophageal reflux disease [K21.9]  Yes   • Essential hypertension [I10]  Yes   • Type 2 diabetes mellitus with hyperglycemia, without long-term current use of insulin (CMS/[facility]) [E11.65]  Yes      Resolved Hospital Problems   No resolved problems to display.       Chest pain  -r/o ACS  -check serial troponin  -EKG:  SR, PACs  -stress Myoview  -continuous cardiac monitoring  -SL NTG and IV Toradol PRN pain  -given aspirin 324 mg PO in ER    Coronary artery disease, h/o MI and stents / Hyperlipidemia / Essential hypertension, chronic  -continue aspirin and statin  -also on niacin and fish oil at home (non-formulary here)  -hold BB for Myoview  -monitor BP  -followed as outpatient by cardiology, Dr. Escalona    Type 2 diabetes mellitus with hyperglycemia, without long-term current use of insulin  -check A1c  -accu checks q6h with SSI  -hold glimepiride and metformin while NPO  -consult diabetes educator    History of bladder cancer, s/p TURBT  -followed as outpatient by urology     Gastroesophageal reflux disease  -continue PPI    Overweight  -BMI 27.26  -encourage lifestyle modifications           VTE Prophylaxis -   Mechanical Order History:     None      Pharmalogical Order History:      Ordered     Dose Route Frequency Stop    10/26/20 0738  enoxaparin (LOVENOX) syringe 40 mg      40 mg SC Every 24 Hours --                CODE STATUS:    Code Status and Medical Interventions:   Ordered at: 10/26/20 0738     Code Status:    CPR     Medical Interventions (Level of Support Prior to Arrest):    Full       This patient has been examined wearing appropriate Personal Protective Equipment. 10/26/20      I discussed the patient's findings and my recommendations with patient and nursing  staff.      Signature: Electronically signed by RIAN Pat, 10/26/20, 10:27 AM EDT.    [facility] Hospitalist Team       80 y.o. male with a history of CAD, MI s/p stent placement, HTN, HLD, Type 2 DM, and bladder cancer s/p TURBT who presents to [facility] ED on 10/26/2020 complaining of chest pain. The patient states his pain feels different than when he had his heart attack approximately 15 years ago. He states the pain is located in the middle of his chest and radiates into his right chest. He states the pain is worse with palpation and intermittent. He denies any recent injury or heavy lifting. He reports associated shortness of breath. He denies any other exacerbating or alleviating factors. He is a former smoker and states he quit in 1980. He states his last cardiac stress test was done 5-7 years ago. He is followed as outpatient by cardiology, Dr. Escalona.  States he had a stress test sometime this year which was negative.  Chest pain likely atypical and more musculoskeletal rather than cardiac.    PE:  General: Alert, awake, NAD  HEENT: NC, AT, EOMI, PERRL, mucous membranes moist, no cervical lymphadenopathy  CV: RRR, no murmurs, normal S1 and S2  Lungs: CTA bilaterally, no wheezing or rales, unlabored breathing  Abdomen: Soft, nontender, positive bowel sounds, no guarding or rebound  Skin: Warm, dry, no edema  Neuro: Alert and oriented, CN grossly intact, moves all extremities spontaneously  MSK: Right chest wall tenderness on palpation, no deformities.    Assessment and plan:  Atypical chest pain  -ACS ruled out, troponins negative  -Stress test, normal according to cardiology read per nursing  -Okay to discharge home with follow-up outpatient with cardiology  -Continue home meds    CAD, hypertension, hyperlipidemia, DM2  -Stable, continue home meds    Patient is stable to discharge home today with follow-up with PCP and cardiologist outpatient.  This is a short stay summary with same-day  discharge.

## 2020-10-26 NOTE — PLAN OF CARE
Goal Outcome Evaluation:      Patient had myoview today.  Had complaints of left sided chest pain for a couple days.  Will continue to monitor.

## 2020-10-26 NOTE — OUTREACH NOTE
Prep Survey      Responses   [facility] facility patient discharged from?  [facility]   Is LACE score < 7 ?  No   Eligibility  [facility]   Hospital  [facility]   Date of Admission  10/26/20   Date of Discharge  10/26/20   Discharge Disposition  Home or Self Care   Discharge diagnosis  chest pain, CAD, T2DM   Does the patient have one of the following disease processes/diagnoses(primary or secondary)?  Other   Does the patient have Home health ordered?  No   Is there a DME ordered?  No   Prep survey completed?  Yes          Kari Boyce RN

## 2020-10-27 ENCOUNTER — TRANSITIONAL CARE MANAGEMENT TELEPHONE ENCOUNTER (OUTPATIENT)
Dept: CALL CENTER | Facility: HOSPITAL | Age: 80
End: 2020-10-27

## 2020-10-27 ENCOUNTER — TELEPHONE (OUTPATIENT)
Dept: FAMILY MEDICINE CLINIC | Facility: CLINIC | Age: 80
End: 2020-10-27

## 2020-10-27 NOTE — TELEPHONE ENCOUNTER
PATIENT'S WIFE IS CALLING BECAUSE HE WAS AT THE [facility] ON 10/26/2020. HE HAD SYMPTOMS OF CHEST PAINS. PLEASE CALL TO GET PATIENT SCHEDULED AS SOON AS AVAILABLE.    WIFE: ALBIN ELIAS  KAYCE PH#: [phone]

## 2020-10-27 NOTE — OUTREACH NOTE
Call Center TCM Note      Responses   [facility] patient discharged from?  [facility]   Does the patient have one of the following disease processes/diagnoses(primary or secondary)?  Other   TCM attempt successful?  No   Unsuccessful attempts  Attempt 1          Leigha Diana MA    10/27/2020, 16:15 EDT

## 2020-10-27 NOTE — TELEPHONE ENCOUNTER
Spoke with patient's wife and patient will be here 11/5 at 1:30pm.    Mandy - Please put on PMJ's schedule on 11/5/2020 at 1:30pm.

## 2020-10-27 NOTE — PROGRESS NOTES
Discharge Planning Assessment   [facility]     Patient Name: Cisco Frank  MRN: [id number]  Today's Date: 10/27/2020    Admit Date: 10/26/2020          Plan    Plan Comments  unable to case manage due to patient being discharged    Final Discharge Disposition Code  01 - home or self-care    Final Note  return home       Carol naegele rn  Case management  Office number [phone]  Cell phone [phone]

## 2020-10-28 ENCOUNTER — TRANSITIONAL CARE MANAGEMENT TELEPHONE ENCOUNTER (OUTPATIENT)
Dept: CALL CENTER | Facility: HOSPITAL | Age: 80
End: 2020-10-28

## 2020-10-28 LAB — QT INTERVAL: 414 MS

## 2020-10-28 NOTE — OUTREACH NOTE
Call Center TCM Note      Responses   [facility] [place] patient discharged from?  [facility]   Does the patient have one of the following disease processes/diagnoses(primary or secondary)?  Other   TCM attempt successful?  Yes   Discharge diagnosis  chest pain, CAD, T2DM   Meds reviewed with patient/caregiver?  Yes   Does the patient have all medications ordered at discharge?  N/A   Is the patient taking all medications as directed (includes completed medication regime)?  N/A   Medication comments  No med changes during hospital admit   Does the patient have a primary care provider?   Yes   Does the patient have an appointment with their PCP within 7 days of discharge?  Greater than 7 days [TCM FWP 11/05/2020 (10 days)]   Comments regarding PCP  Nuno Patton MD   Has the patient kept scheduled appointments due by today?  Yes   Has home health visited the patient within 72 hours of discharge?  N/A   Did the patient receive a copy of their discharge instructions?  Yes   Nursing interventions  Reviewed instructions with patient   What is the patient's perception of their health status since discharge?  Improving   Is the patient/caregiver able to teach back signs and symptoms related to disease process for when to call PCP?  Yes   Is the patient/caregiver able to teach back signs and symptoms related to disease process for when to call 911?  Yes   Is the patient/caregiver able to teach back the hierarchy of who to call/visit for symptoms/problems? PCP, Specialist, Home health nurse, Urgent Care, ED, 911  Yes   TCM call completed?  Yes   Wrap up additional comments  Pt doing better, states he still has intermittent mild chest discomfort, but much better. No SOB, nausea. Pt will see cardio on 11/04 and TCM FWP with PCP on 11/05/2020          Leigha Diana MA    10/28/2020, 15:03 EDT

## 2020-11-04 ENCOUNTER — READMISSION MANAGEMENT (OUTPATIENT)
Dept: CALL CENTER | Facility: HOSPITAL | Age: 80
End: 2020-11-04

## 2020-11-04 ENCOUNTER — OFFICE VISIT (OUTPATIENT)
Dept: CARDIOLOGY | Facility: CLINIC | Age: 80
End: 2020-11-04

## 2020-11-04 VITALS
HEIGHT: 70 IN | SYSTOLIC BLOOD PRESSURE: 110 MMHG | BODY MASS INDEX: 27.2 KG/M2 | TEMPERATURE: 97.4 F | DIASTOLIC BLOOD PRESSURE: 73 MMHG | HEART RATE: 72 BPM | WEIGHT: 190 LBS | OXYGEN SATURATION: 98 %

## 2020-11-04 DIAGNOSIS — E11.65 TYPE 2 DIABETES MELLITUS WITH HYPERGLYCEMIA, WITHOUT LONG-TERM CURRENT USE OF INSULIN (HCC): Chronic | ICD-10-CM

## 2020-11-04 DIAGNOSIS — I25.10 CORONARY ARTERY DISEASE INVOLVING NATIVE CORONARY ARTERY OF NATIVE HEART WITHOUT ANGINA PECTORIS: Chronic | ICD-10-CM

## 2020-11-04 DIAGNOSIS — I10 ESSENTIAL HYPERTENSION: Primary | Chronic | ICD-10-CM

## 2020-11-04 DIAGNOSIS — E78.2 MIXED HYPERLIPIDEMIA: Chronic | ICD-10-CM

## 2020-11-04 PROCEDURE — 99213 OFFICE O/P EST LOW 20 MIN: CPT | Performed by: INTERNAL MEDICINE

## 2020-11-04 RX ORDER — ATENOLOL 25 MG/1
25 TABLET ORAL DAILY
Qty: 90 TABLET | Refills: 3 | Status: SHIPPED | OUTPATIENT
Start: 2020-11-04 | End: 2021-06-18 | Stop reason: SDUPTHER

## 2020-11-04 RX ORDER — ATORVASTATIN CALCIUM 40 MG/1
40 TABLET, FILM COATED ORAL
Qty: 90 TABLET | Refills: 3 | Status: SHIPPED | OUTPATIENT
Start: 2020-11-04 | End: 2021-06-18 | Stop reason: SDUPTHER

## 2020-11-04 RX ORDER — INFLUENZA A VIRUS A/MICHIGAN/45/2015 X-275 (H1N1) ANTIGEN (FORMALDEHYDE INACTIVATED), INFLUENZA A VIRUS A/SINGAPORE/INFIMH-16-0019/2016 IVR-186 (H3N2) ANTIGEN (FORMALDEHYDE INACTIVATED), INFLUENZA B VIRUS B/PHUKET/3073/2013 ANTIGEN (FORMALDEHYDE INACTIVATED), AND INFLUENZA B VIRUS B/MARYLAND/15/2016 BX-69A ANTIGEN (FORMALDEHYDE INACTIVATED) 60; 60; 60; 60 UG/.7ML; UG/.7ML; UG/.7ML; UG/.7ML
INJECTION, SUSPENSION INTRAMUSCULAR
COMMUNITY
Start: 2020-10-04 | End: 2020-11-05

## 2020-11-04 NOTE — OUTREACH NOTE
Medical Week 2 Survey      Responses   [facility] [place] patient discharged from?  [facility]   Does the patient have one of the following disease processes/diagnoses(primary or secondary)?  Other   Week 2 attempt successful?  Yes   Call start time  1827   Discharge diagnosis  chest pain, CAD, T2DM   Call end time  1831   Is the patient taking all medications as directed (includes completed medication regime)?  Yes   Does the patient have a primary care provider?   Yes   Does the patient have an appointment with their PCP within 7 days of discharge?  Greater than 7 days   Has the patient kept scheduled appointments due by today?  Yes   Comments  Has followed up with Dr. Escalona today   Has home health visited the patient within 72 hours of discharge?  N/A   Psychosocial issues?  No   Did the patient receive a copy of their discharge instructions?  Yes   Nursing interventions  Reviewed instructions with patient   What is the patient's perception of their health status since discharge?  Improving   Is the patient/caregiver able to teach back signs and symptoms related to disease process for when to call PCP?  Yes   Is the patient/caregiver able to teach back signs and symptoms related to disease process for when to call 911?  Yes   Is the patient/caregiver able to teach back the hierarchy of who to call/visit for symptoms/problems? PCP, Specialist, Home health nurse, Urgent Care, ED, 911  Yes   Additional teach back comments  States he is doing well and Dr. Escalona told him he didn't need to be seen again til Dec 19.    Week 2 Call Completed?  Yes   Graduated  Yes   Did the patient feel the follow up calls were helpful during their recovery period?  Yes   Was the number of calls appropriate?  Yes   Graduated/Revoked comments  Denies questions or needs at this time          Celia Galdamez LPN

## 2020-11-04 NOTE — PROGRESS NOTES
"    Subjective:     Encounter Date:11/04/2020      Patient ID: Cisco Frank is a 80 y.o. male.    Chief Complaint: hosp f/u     History of Present Illness     80 -year-old white male patient with a known history of coronary artery disease catheterization done 2014 showed no obstructive CAD previously placed RCA stent was open,  now comes back for follow up.       echocardiogram  in May 2016     Normal LV systolic function EF is 55%  Left atrium is enlarged  Mild pulmonary hypertension noted  Borderline evidence of prolapse of the posterior leaflet of the mitral valve  noted without any significant mitral insufficiency            Patient was admitted to hospital in October 2020 with somewhat atypical chest pain    Stress Myoview showed inferolateral reverse redistribution without any ischemia EF 70%  Compared to stress Myoview 2016 no changes  Patient comes back for follow-up doing well without any active symptoms      Follow-up as scheduled in December        The following portions of the patient's history were reviewed and updated as appropriate: Allergies current medications past family history past medical history past social history past surgical history problem list and review of systems  Past Medical History:   Diagnosis Date   • Coronary artery disease    • Diabetes mellitus (CMS/HCC)    • History of bladder cancer    • Hyperlipidemia    • Hypertension      Past Surgical History:   Procedure Laterality Date   • BLADDER SURGERY  10/26/2019    [facility] Dr. Cope   • CARDIAC CATHETERIZATION       /73 (BP Location: Left arm, Patient Position: Sitting, Cuff Size: Adult)   Pulse 72   Temp 97.4 °F (36.3 °C) (Infrared)   Ht 177.8 cm (70\")   Wt 86.2 kg (190 lb)   SpO2 98%   BMI 27.26 kg/m²   Family History   Problem Relation Age of Onset   • Heart disease Mother    • Heart attack Father    • No Known Problems Sister    • No Known Problems Brother    • No Known Problems Maternal Aunt    • No Known Problems " Maternal Uncle    • No Known Problems Paternal Aunt    • No Known Problems Paternal Uncle    • No Known Problems Maternal Grandmother    • No Known Problems Maternal Grandfather    • No Known Problems Paternal Grandmother    • No Known Problems Paternal Grandfather    • No Known Problems Other    • Anemia Neg Hx    • Arrhythmia Neg Hx    • Asthma Neg Hx    • Clotting disorder Neg Hx    • Fainting Neg Hx    • Heart failure Neg Hx    • Hyperlipidemia Neg Hx    • Hypertension Neg Hx        Current Outpatient Medications:   •  aspirin (ECOTRIN LOW STRENGTH) 81 MG EC tablet, Take 1 tablet by mouth Daily., Disp: , Rfl:   •  atenolol (TENORMIN) 25 MG tablet, Take 1 tablet by mouth Daily., Disp: 90 tablet, Rfl: 3  •  atorvastatin (LIPITOR) 40 MG tablet, Take 1 tablet by mouth every night at bedtime., Disp: 90 tablet, Rfl: 3  •  colestipol (COLESTID) 1 g tablet, Take 2 g by mouth Daily., Disp: , Rfl:   •  Fluzone High-Dose Quadrivalent 0.7 ML suspension prefilled syringe, PHARMACY ADMINISTERED, Disp: , Rfl:   •  glimepiride (AMARYL) 2 MG tablet, Take 3 mg by mouth Every Morning Before Breakfast., Disp: , Rfl:   •  metFORMIN (GLUCOPHAGE) 500 MG tablet, Take 0.5 tablets by mouth 2 (Two) Times a Day With Meals for 90 days., Disp: 90 tablet, Rfl: 2  •  niacin (SLO-NIACIN) 500 MG CR tablet, Take 1 tablet by mouth Daily., Disp: , Rfl:   •  Omega-3 Fatty Acids (FISH OIL) 1200 MG capsule capsule, Take 1 capsule by mouth Daily., Disp: , Rfl:   •  omeprazole-sodium bicarbonate (ZEGERID)  MG per capsule, Take 1 capsule by mouth Daily., Disp: , Rfl:   •  vitamin C (ASCORBIC ACID) 500 MG tablet, Take 500 mg by mouth Daily., Disp: , Rfl:   Social History     Socioeconomic History   • Marital status:      Spouse name: Not on file   • Number of children: Not on file   • Years of education: Not on file   • Highest education level: Not on file   Tobacco Use   • Smoking status: Former Smoker     Quit date: 1/1/1980     Years  since quittin.8   • Smokeless tobacco: Never Used   Substance and Sexual Activity   • Alcohol use: Not Currently     Frequency: Never   • Drug use: Never   • Sexual activity: Defer     No Known Allergies  Review of Systems   Constitution: Negative for chills, fever and malaise/fatigue.   Cardiovascular: Negative for chest pain, dyspnea on exertion, leg swelling, palpitations and syncope.   Respiratory: Negative for shortness of breath.    Skin: Negative for rash.   Neurological: Negative for dizziness, light-headedness and numbness.              Objective:     Constitutional:       Appearance: Well-developed.   Eyes:      General: No scleral icterus.     Conjunctiva/sclera: Conjunctivae normal.   HENT:      Head: Normocephalic and atraumatic.    Mouth/Throat:      Mouth: No oral lesions.      Pharynx: Uvula midline.   Neck:      Musculoskeletal: Neck supple.      Thyroid: No thyromegaly.      Vascular: No carotid bruit or JVD.      Trachea: Trachea normal.   Pulmonary:      Effort: Pulmonary effort is normal.      Breath sounds: Normal breath sounds.   Cardiovascular:      Normal rate. Regular rhythm.      No gallop.   Pulses:     Intact distal pulses.   Abdominal:      General: Bowel sounds are normal.      Palpations: Abdomen is soft.   Musculoskeletal: Normal range of motion.   Skin:     General: Skin is warm. There is no cyanosis.   Neurological:      Mental Status: Alert and oriented to person, place, and time.      Comments: No focal deficits   Psychiatric:         Behavior: Behavior is cooperative.         Procedures    Lab Review:       Assessment:          Diagnosis Plan   1. Essential hypertension     2. Coronary artery disease involving native coronary artery of native heart without angina pectoris     3. Mixed hyperlipidemia     4. Type 2 diabetes mellitus with hyperglycemia, without long-term current use of insulin (CMS/[facility])            Plan:         Coronary artery disease previously history of PCI  recent stress test within normal limit  Advised aggressive control of diabetes hypertension dyslipidemia modify cardiac risk factors

## 2020-11-05 ENCOUNTER — OFFICE VISIT (OUTPATIENT)
Dept: FAMILY MEDICINE CLINIC | Facility: CLINIC | Age: 80
End: 2020-11-05

## 2020-11-05 VITALS
DIASTOLIC BLOOD PRESSURE: 72 MMHG | OXYGEN SATURATION: 96 % | HEART RATE: 72 BPM | BODY MASS INDEX: 27.26 KG/M2 | SYSTOLIC BLOOD PRESSURE: 120 MMHG | TEMPERATURE: 97.8 F | WEIGHT: 190 LBS

## 2020-11-05 DIAGNOSIS — R07.82 INTERCOSTAL PAIN: Primary | ICD-10-CM

## 2020-11-05 DIAGNOSIS — Z23 IMMUNIZATION DUE: ICD-10-CM

## 2020-11-05 PROCEDURE — G0009 ADMIN PNEUMOCOCCAL VACCINE: HCPCS | Performed by: FAMILY MEDICINE

## 2020-11-05 PROCEDURE — 99214 OFFICE O/P EST MOD 30 MIN: CPT | Performed by: FAMILY MEDICINE

## 2020-11-05 PROCEDURE — 90670 PCV13 VACCINE IM: CPT | Performed by: FAMILY MEDICINE

## 2020-11-05 RX ORDER — GLIMEPIRIDE 2 MG/1
6 TABLET ORAL
Qty: 270 TABLET | Refills: 2 | Status: SHIPPED | OUTPATIENT
Start: 2020-11-05 | End: 2021-03-04 | Stop reason: SDUPTHER

## 2020-11-05 RX ORDER — BLOOD-GLUCOSE METER
KIT MISCELLANEOUS
Qty: 100 EACH | Refills: 11 | Status: SHIPPED | OUTPATIENT
Start: 2020-11-05 | End: 2020-12-09 | Stop reason: ALTCHOICE

## 2020-11-05 NOTE — ASSESSMENT & PLAN NOTE
Patient's chest pain sounds very much like chest wall pain.  I am not sure why he got it in there but it was probably some activity or work activity that he did that because the muscles between his ribs and the muscles overlying his ribs to be a little sore.  In any case he is going to apply heat the next time it happens and he is going to do some stretching.  We do not anticipate any major problems.

## 2020-11-05 NOTE — PROGRESS NOTES
Transitional Care Follow Up Visit  Subjective     Cisco Frank is a 80 y.o. male who presents for a transitional care management visit.    Within 48 business hours after discharge our office contacted him via telephone to coordinate his care and needs.      I reviewed and discussed the details of that call along with the discharge summary, hospital problems, inpatient lab results, inpatient diagnostic studies, and consultation reports with Cisco.     Current outpatient and discharge medications have been reconciled for the patient.  Reviewed by: Nuno Patton MD      Date of TCM Phone Call 10/26/2020   [facility]   Date of Admission 10/26/2020   Date of Discharge 10/26/2020   Discharge Disposition Home or Self Care     Risk for Readmission (LACE) Score: 7 (10/26/2020 10:36 AM)      History of Present Illness   Course During Hospital Stay:  1day  Cisco is a 80-year-old white male who went to the emergency room on 10/26/2020 with right-sided chest pain.  Pain was onset with no precipitating event.  It was a pressure type pain underneath his right breast did not really radiate to far into his back.  It was aggravated by deep breath and coughing and he went to the ER.  In the emergency room EKGs and enzymes did not reveal any evidence of myocardial infarction.  He is admitted for observation.  He was seen by Dr. Clark.  He underwent a stress test while in the hospital and that turned back to normal.  He has since seen Dr. Escalona on an outpatient basis and no further evaluation of this chest pain is planned.  It is very likely that he was experiencing chest wall pain.  It could have been some activity that he did that he is does not remember or is just minor activity that for some reason made his chest wall sore.  I want him to be careful with lifting pushing pulling.  If he has recurrence of the pain I want him to apply heat to it and he can take some Advil or Aleve to see if he can get it out of the chest  wall.     The following portions of the patient's history were reviewed and updated as appropriate: allergies, current medications, past family history, past medical history, past social history, past surgical history and problem list.    Review of Systems   Constitutional: Negative.  Negative for diaphoresis, fatigue and fever.   HENT: Negative.  Negative for congestion, ear pain, hearing loss, postnasal drip, sinus pressure, sore throat, trouble swallowing and voice change.    Eyes: Negative.  Negative for pain, redness and visual disturbance.   Respiratory: Negative.  Negative for cough, chest tightness and shortness of breath.    Cardiovascular: Negative.  Negative for chest pain, palpitations and leg swelling.   Gastrointestinal: Negative.  Negative for abdominal pain, blood in stool, constipation and diarrhea.   Endocrine: Negative.  Negative for cold intolerance and heat intolerance.   Genitourinary: Negative.  Negative for difficulty urinating, enuresis, flank pain, frequency and urgency.   Musculoskeletal: Negative.  Negative for arthralgias, back pain, myalgias, neck pain and neck stiffness.   Skin: Negative.  Negative for color change and rash.   Allergic/Immunologic: Negative.  Negative for environmental allergies.   Neurological: Negative.  Negative for syncope, weakness and headaches.   Hematological: Negative.  Does not bruise/bleed easily.   Psychiatric/Behavioral: Negative.  Negative for behavioral problems, decreased concentration, dysphoric mood and suicidal ideas. The patient is not nervous/anxious.        Objective   Physical Exam  Constitutional:       Appearance: He is well-developed.   HENT:      Head: Normocephalic.      Right Ear: External ear normal.      Left Ear: External ear normal.      Nose: Nose normal.      Mouth/Throat:      Pharynx: No oropharyngeal exudate.   Eyes:      Conjunctiva/sclera: Conjunctivae normal.      Pupils: Pupils are equal, round, and reactive to light.   Neck:       Musculoskeletal: Normal range of motion and neck supple.   Cardiovascular:      Rate and Rhythm: Normal rate and regular rhythm.      Heart sounds: Normal heart sounds.   Pulmonary:      Effort: Pulmonary effort is normal.      Breath sounds: Normal breath sounds.   Abdominal:      General: Bowel sounds are normal.      Palpations: Abdomen is soft.   Musculoskeletal: Normal range of motion.   Skin:     General: Skin is warm and dry.   Neurological:      Mental Status: He is alert and oriented to person, place, and time.   Psychiatric:         Behavior: Behavior normal.         Thought Content: Thought content normal.         Judgment: Judgment normal.         Assessment/Plan    Problem List Items Addressed This Visit        Nervous and Auditory    Chest pain - Primary    Current Assessment & Plan     Patient's chest pain sounds very much like chest wall pain.  I am not sure why he got it in there but it was probably some activity or work activity that he did that because the muscles between his ribs and the muscles overlying his ribs to be a little sore.  In any case he is going to apply heat the next time it happens and he is going to do some stretching.  We do not anticipate any major problems.           RTO in 6 mos

## 2020-11-17 ENCOUNTER — TELEPHONE (OUTPATIENT)
Dept: FAMILY MEDICINE CLINIC | Facility: CLINIC | Age: 80
End: 2020-11-17

## 2020-11-17 NOTE — TELEPHONE ENCOUNTER
Lila with [facility] calling about metFORMIN (GLUCOPHAGE) 500 MG tablet. Said needs clarification on this as the patient is reporting a metformin allergy. Would like callback at [phone] ref# [id number]

## 2020-11-17 NOTE — TELEPHONE ENCOUNTER
Patient has no history of true allergy to Metformin.  He did have some side effects that included some GI distress when using it.  If he cannot tolerate the medication he can certainly stop it.  If he is taking a low dose to 250-500 once or twice a day and seems to be doing okay with it was just stay on that dose.

## 2020-11-24 NOTE — TELEPHONE ENCOUNTER
Caller: Cisco Frank    Relationship: Self    Best call back number: [phone]     Medication needed:   Requested Prescriptions     Pending Prescriptions Disp Refills   • metFORMIN (GLUCOPHAGE) 500 MG tablet 90 tablet 2     Sig: Take 0.5 tablets by mouth 2 (Two) Times a Day With Meals for 90 days.       When do you need the refill by: 11/24/2020    What details did the patient provide when requesting the medication: PATIENT STATES DOCTOR WAS SUPPOSED TO SEND THIS IN AND HE IS TOTALLY OUT OF THIS MEDICATION.      Does the patient have less than a 3 day supply:  [x] Yes  [] No    What is the patient's preferred pharmacy:       [facility] - [facility] [address] - [phone] [facility] [phone] FX

## 2020-12-04 DIAGNOSIS — I10 ESSENTIAL HYPERTENSION: Primary | ICD-10-CM

## 2020-12-04 DIAGNOSIS — E78.2 MIXED HYPERLIPIDEMIA: ICD-10-CM

## 2020-12-04 DIAGNOSIS — I25.10 CORONARY ARTERY DISEASE INVOLVING NATIVE CORONARY ARTERY OF NATIVE HEART WITHOUT ANGINA PECTORIS: ICD-10-CM

## 2020-12-07 ENCOUNTER — LAB (OUTPATIENT)
Dept: LAB | Facility: HOSPITAL | Age: 80
End: 2020-12-07

## 2020-12-07 DIAGNOSIS — E78.2 MIXED HYPERLIPIDEMIA: ICD-10-CM

## 2020-12-07 DIAGNOSIS — I25.10 CORONARY ARTERY DISEASE INVOLVING NATIVE CORONARY ARTERY OF NATIVE HEART WITHOUT ANGINA PECTORIS: ICD-10-CM

## 2020-12-07 DIAGNOSIS — I10 ESSENTIAL HYPERTENSION: ICD-10-CM

## 2020-12-07 LAB
ALBUMIN SERPL-MCNC: 4 G/DL (ref 3.5–5.2)
ALBUMIN/GLOB SERPL: 1.4 G/DL
ALP SERPL-CCNC: 127 U/L (ref 39–117)
ALT SERPL W P-5'-P-CCNC: 12 U/L (ref 1–41)
ANION GAP SERPL CALCULATED.3IONS-SCNC: 8.2 MMOL/L (ref 5–15)
AST SERPL-CCNC: 15 U/L (ref 1–40)
BILIRUB SERPL-MCNC: 0.8 MG/DL (ref 0–1.2)
BUN SERPL-MCNC: 19 MG/DL (ref 8–23)
BUN/CREAT SERPL: 15.8 (ref 7–25)
CALCIUM SPEC-SCNC: 8.9 MG/DL (ref 8.6–10.5)
CHLORIDE SERPL-SCNC: 102 MMOL/L (ref 98–107)
CHOLEST SERPL-MCNC: 107 MG/DL (ref 0–200)
CK SERPL-CCNC: 54 U/L (ref 20–200)
CO2 SERPL-SCNC: 30.8 MMOL/L (ref 22–29)
CREAT SERPL-MCNC: 1.2 MG/DL (ref 0.76–1.27)
GFR SERPL CREATININE-BSD FRML MDRD: 59 ML/MIN/1.73
GLOBULIN UR ELPH-MCNC: 2.8 GM/DL
GLUCOSE SERPL-MCNC: 148 MG/DL (ref 65–99)
HDLC SERPL-MCNC: 33 MG/DL (ref 40–60)
LDLC SERPL CALC-MCNC: 34 MG/DL (ref 0–100)
LDLC/HDLC SERPL: 0.65 {RATIO}
POTASSIUM SERPL-SCNC: 4.1 MMOL/L (ref 3.5–5.2)
PROT SERPL-MCNC: 6.8 G/DL (ref 6–8.5)
SODIUM SERPL-SCNC: 141 MMOL/L (ref 136–145)
TRIGL SERPL-MCNC: 263 MG/DL (ref 0–150)
VLDLC SERPL-MCNC: 40 MG/DL (ref 5–40)

## 2020-12-07 PROCEDURE — 36415 COLL VENOUS BLD VENIPUNCTURE: CPT

## 2020-12-07 PROCEDURE — 80061 LIPID PANEL: CPT

## 2020-12-07 PROCEDURE — 80053 COMPREHEN METABOLIC PANEL: CPT

## 2020-12-07 PROCEDURE — 82550 ASSAY OF CK (CPK): CPT

## 2020-12-18 ENCOUNTER — OFFICE VISIT (OUTPATIENT)
Dept: CARDIOLOGY | Facility: CLINIC | Age: 80
End: 2020-12-18

## 2020-12-18 VITALS
DIASTOLIC BLOOD PRESSURE: 74 MMHG | SYSTOLIC BLOOD PRESSURE: 116 MMHG | TEMPERATURE: 97.3 F | BODY MASS INDEX: 27.63 KG/M2 | WEIGHT: 193 LBS | HEART RATE: 70 BPM | HEIGHT: 70 IN

## 2020-12-18 DIAGNOSIS — I25.10 CORONARY ARTERY DISEASE INVOLVING NATIVE CORONARY ARTERY OF NATIVE HEART WITHOUT ANGINA PECTORIS: Chronic | ICD-10-CM

## 2020-12-18 DIAGNOSIS — I10 ESSENTIAL HYPERTENSION: Primary | Chronic | ICD-10-CM

## 2020-12-18 DIAGNOSIS — E11.65 TYPE 2 DIABETES MELLITUS WITH HYPERGLYCEMIA, WITHOUT LONG-TERM CURRENT USE OF INSULIN (HCC): Chronic | ICD-10-CM

## 2020-12-18 DIAGNOSIS — E78.2 MIXED HYPERLIPIDEMIA: Chronic | ICD-10-CM

## 2020-12-18 PROCEDURE — 99213 OFFICE O/P EST LOW 20 MIN: CPT | Performed by: INTERNAL MEDICINE

## 2020-12-18 NOTE — PROGRESS NOTES
Subjective:     Encounter Date:12/18/2020      Patient ID: Cisco Frank is a 80 y.o. male.    Chief Complaint: Follow-up CAD  History of Present Illness       80 -year-old white male patient with a known history of coronary artery disease catheterization done 2014 showed no obstructive CAD previously placed RCA stent was open,  now comes back for follow up.       echocardiogram  in May 2016     Normal LV systolic function EF is 55%  Left atrium is enlarged  Mild pulmonary hypertension noted  Borderline evidence of prolapse of the posterior leaflet of the mitral valve  noted without any significant mitral insufficiency        Patient was admitted to hospital in October 2020 with somewhat atypical chest pain    Stress Myoview showed inferolateral reverse redistribution without any ischemia EF 70%  Compared to stress Myoview 2016 no changes  Patient comes back for follow-up doing well without any active symptoms    Recent labs triglycerides elevated blood sugar is elevated so advised him to follow-up with PCP regarding aggressive control of diabetes  Cardiac wise stable recent skin cancer removed restart aspirin ASAP after the procedure  Getting a cystoscopy with urology and also Dr. Urban for colonoscopy    Follow-up 6 months with labs EKG    The following portions of the patient's history were reviewed and updated as appropriate: Allergies current medications past family history past medical history past social history past surgical history problem list and review of systems  Past Medical History:   Diagnosis Date   • Cancer (CMS/HCC)    • Coronary artery disease    • Diabetes mellitus (CMS/HCC)    • History of bladder cancer    • Hyperlipidemia    • Hypertension      Past Surgical History:   Procedure Laterality Date   • BLADDER SURGERY  10/26/2019    [facility] Dr. Cope   • CARDIAC CATHETERIZATION     • SKIN CANCER EXCISION      right temple BX     /74   Pulse 70   Temp 97.3 °F (36.3 °C)   Ht 177.8 cm  "(70\")   Wt 87.5 kg (193 lb)   BMI 27.69 kg/m²   Family History   Problem Relation Age of Onset   • Heart disease Mother    • Heart attack Father    • No Known Problems Sister    • No Known Problems Brother    • No Known Problems Maternal Aunt    • No Known Problems Maternal Uncle    • No Known Problems Paternal Aunt    • No Known Problems Paternal Uncle    • No Known Problems Maternal Grandmother    • No Known Problems Maternal Grandfather    • No Known Problems Paternal Grandmother    • No Known Problems Paternal Grandfather    • No Known Problems Other    • Anemia Neg Hx    • Arrhythmia Neg Hx    • Asthma Neg Hx    • Clotting disorder Neg Hx    • Fainting Neg Hx    • Heart failure Neg Hx    • Hyperlipidemia Neg Hx    • Hypertension Neg Hx        Current Outpatient Medications:   •  aspirin (ECOTRIN LOW STRENGTH) 81 MG EC tablet, Take 1 tablet by mouth Daily., Disp: , Rfl:   •  atenolol (TENORMIN) 25 MG tablet, Take 1 tablet by mouth Daily., Disp: 90 tablet, Rfl: 3  •  atorvastatin (LIPITOR) 40 MG tablet, Take 1 tablet by mouth every night at bedtime., Disp: 90 tablet, Rfl: 3  •  colestipol (COLESTID) 1 g tablet, Take 2 g by mouth Daily., Disp: , Rfl:   •  glimepiride (AMARYL) 2 MG tablet, Take 3 tablets by mouth Every Morning Before Breakfast for 90 days., Disp: 270 tablet, Rfl: 2  •  metFORMIN (GLUCOPHAGE) 500 MG tablet, Take 0.5 tablets by mouth 2 (Two) Times a Day With Meals for 90 days., Disp: 90 tablet, Rfl: 2  •  niacin (SLO-NIACIN) 500 MG CR tablet, Take 1 tablet by mouth Daily., Disp: , Rfl:   •  Omega-3 Fatty Acids (FISH OIL) 1200 MG capsule capsule, Take 1 capsule by mouth Daily., Disp: , Rfl:   •  omeprazole-sodium bicarbonate (ZEGERID)  MG per capsule, Take 1 capsule by mouth Daily., Disp: , Rfl:   •  vitamin C (ASCORBIC ACID) 500 MG tablet, Take 500 mg by mouth Daily., Disp: , Rfl:   Social History     Socioeconomic History   • Marital status:      Spouse name: Not on file   • Number " of children: Not on file   • Years of education: Not on file   • Highest education level: Not on file   Tobacco Use   • Smoking status: Former Smoker     Quit date: 1980     Years since quittin.9   • Smokeless tobacco: Never Used   Substance and Sexual Activity   • Alcohol use: Not Currently     Frequency: Never   • Drug use: Never   • Sexual activity: Defer     No Known Allergies  Review of Systems   Constitution: Negative for fever and malaise/fatigue.   HENT: Negative for congestion and hearing loss.    Eyes: Negative for double vision and visual disturbance.   Cardiovascular: Negative for chest pain, claudication, dyspnea on exertion, leg swelling and syncope.   Respiratory: Negative for cough and shortness of breath.    Endocrine: Negative for cold intolerance.   Skin: Negative for color change and rash.   Musculoskeletal: Negative for arthritis and joint pain.   Gastrointestinal: Negative for abdominal pain and heartburn.   Genitourinary: Negative for hematuria.   Neurological: Negative for excessive daytime sleepiness and dizziness.   Psychiatric/Behavioral: Negative for depression. The patient is not nervous/anxious.    All other systems reviewed and are negative.             Objective:     Constitutional:       Appearance: Well-developed.   Eyes:      General: No scleral icterus.     Conjunctiva/sclera: Conjunctivae normal.   HENT:      Head: Normocephalic and atraumatic.    Mouth/Throat:      Mouth: No oral lesions.      Pharynx: Uvula midline.   Neck:      Musculoskeletal: Neck supple.      Thyroid: No thyromegaly.      Vascular: No carotid bruit or JVD.      Trachea: Trachea normal.   Pulmonary:      Effort: Pulmonary effort is normal.      Breath sounds: Normal breath sounds.   Cardiovascular:      Normal rate. Regular rhythm.      No gallop.   Pulses:     Intact distal pulses.   Abdominal:      General: Bowel sounds are normal.      Palpations: Abdomen is soft.   Musculoskeletal: Normal range  of motion.   Skin:     General: Skin is warm. There is no cyanosis.   Neurological:      Mental Status: Alert and oriented to person, place, and time.      Comments: No focal deficits   Psychiatric:         Behavior: Behavior is cooperative.         Procedures    Lab Review:       Assessment:          Diagnosis Plan   1. Essential hypertension  CK    Comprehensive Metabolic Panel    Lipid Panel   2. Coronary artery disease involving native coronary artery of native heart without angina pectoris  CK    Comprehensive Metabolic Panel    Lipid Panel   3. Mixed hyperlipidemia  CK    Comprehensive Metabolic Panel    Lipid Panel   4. Type 2 diabetes mellitus with hyperglycemia, without long-term current use of insulin (CMS/[facility])  CK    Comprehensive Metabolic Panel    Lipid Panel          Plan:       CAD stable  Continue aggressive control of hypertension dyslipidemia diabetes modify cardiac risk factors aggressively

## 2021-02-23 ENCOUNTER — ON CAMPUS - OUTPATIENT (AMBULATORY)
Dept: URBAN - METROPOLITAN AREA HOSPITAL 2 | Facility: HOSPITAL | Age: 81
End: 2021-02-23

## 2021-02-23 ENCOUNTER — OFFICE (AMBULATORY)
Dept: URBAN - METROPOLITAN AREA PATHOLOGY 4 | Facility: PATHOLOGY | Age: 81
End: 2021-02-23

## 2021-02-23 ENCOUNTER — TRANSCRIBE ORDERS (OUTPATIENT)
Dept: ADMINISTRATIVE | Facility: HOSPITAL | Age: 81
End: 2021-02-23

## 2021-02-23 VITALS
HEART RATE: 83 BPM | SYSTOLIC BLOOD PRESSURE: 137 MMHG | HEART RATE: 76 BPM | HEIGHT: 70 IN | HEART RATE: 82 BPM | DIASTOLIC BLOOD PRESSURE: 70 MMHG | SYSTOLIC BLOOD PRESSURE: 117 MMHG | OXYGEN SATURATION: 97 % | HEART RATE: 81 BPM | DIASTOLIC BLOOD PRESSURE: 61 MMHG | HEART RATE: 86 BPM | DIASTOLIC BLOOD PRESSURE: 54 MMHG | OXYGEN SATURATION: 100 % | SYSTOLIC BLOOD PRESSURE: 94 MMHG | HEART RATE: 66 BPM | HEART RATE: 75 BPM | HEART RATE: 79 BPM | TEMPERATURE: 97.1 F | RESPIRATION RATE: 17 BRPM | OXYGEN SATURATION: 96 % | OXYGEN SATURATION: 99 % | SYSTOLIC BLOOD PRESSURE: 95 MMHG | DIASTOLIC BLOOD PRESSURE: 55 MMHG | WEIGHT: 182 LBS | SYSTOLIC BLOOD PRESSURE: 140 MMHG | DIASTOLIC BLOOD PRESSURE: 71 MMHG | SYSTOLIC BLOOD PRESSURE: 101 MMHG | SYSTOLIC BLOOD PRESSURE: 91 MMHG | DIASTOLIC BLOOD PRESSURE: 59 MMHG | RESPIRATION RATE: 16 BRPM | OXYGEN SATURATION: 98 %

## 2021-02-23 DIAGNOSIS — K31.7 POLYP OF STOMACH AND DUODENUM: ICD-10-CM

## 2021-02-23 DIAGNOSIS — R10.13 ABDOMINAL PAIN, EPIGASTRIC: ICD-10-CM

## 2021-02-23 DIAGNOSIS — R10.13 EPIGASTRIC PAIN: ICD-10-CM

## 2021-02-23 DIAGNOSIS — K44.9 ESOPHAGEAL HIATAL HERNIA: Primary | ICD-10-CM

## 2021-02-23 DIAGNOSIS — K22.70 BARRETT'S ESOPHAGUS WITHOUT DYSPLASIA: ICD-10-CM

## 2021-02-23 DIAGNOSIS — K44.9 PARAESOPHAGEAL HERNIA: ICD-10-CM

## 2021-02-23 DIAGNOSIS — K44.9 DIAPHRAGMATIC HERNIA WITHOUT OBSTRUCTION OR GANGRENE: ICD-10-CM

## 2021-02-23 DIAGNOSIS — K21.9 GASTRO-ESOPHAGEAL REFLUX DISEASE WITHOUT ESOPHAGITIS: ICD-10-CM

## 2021-02-23 LAB
GI HISTOLOGY: A. UNSPECIFIED: (no result)
GI HISTOLOGY: B. UNSPECIFIED: (no result)
GI HISTOLOGY: PDF REPORT: (no result)

## 2021-02-23 PROCEDURE — 43239 EGD BIOPSY SINGLE/MULTIPLE: CPT | Performed by: INTERNAL MEDICINE

## 2021-02-23 PROCEDURE — 88305 TISSUE EXAM BY PATHOLOGIST: CPT | Mod: 26 | Performed by: INTERNAL MEDICINE

## 2021-02-26 ENCOUNTER — HOSPITAL ENCOUNTER (OUTPATIENT)
Dept: CT IMAGING | Facility: HOSPITAL | Age: 81
Discharge: HOME | End: 2021-02-26
Admitting: INTERNAL MEDICINE

## 2021-02-26 DIAGNOSIS — R10.13 ABDOMINAL PAIN, EPIGASTRIC: ICD-10-CM

## 2021-02-26 DIAGNOSIS — K44.9 PARAESOPHAGEAL HERNIA: ICD-10-CM

## 2021-02-26 LAB — CREAT BLDA-MCNC: 1.2 MG/DL (ref 0.6–1.3)

## 2021-02-26 PROCEDURE — 74177 CT ABD & PELVIS W/CONTRAST: CPT

## 2021-02-26 PROCEDURE — 0 IOPAMIDOL PER 1 ML: Performed by: INTERNAL MEDICINE

## 2021-02-26 PROCEDURE — 82565 ASSAY OF CREATININE: CPT

## 2021-02-26 RX ADMIN — IOPAMIDOL 100 ML: 755 INJECTION, SOLUTION INTRAVENOUS at 10:46

## 2021-03-04 ENCOUNTER — OFFICE VISIT (OUTPATIENT)
Dept: FAMILY MEDICINE CLINIC | Facility: CLINIC | Age: 81
End: 2021-03-04

## 2021-03-04 VITALS
WEIGHT: 191 LBS | HEART RATE: 73 BPM | OXYGEN SATURATION: 93 % | BODY MASS INDEX: 27.35 KG/M2 | SYSTOLIC BLOOD PRESSURE: 96 MMHG | HEIGHT: 70 IN | DIASTOLIC BLOOD PRESSURE: 56 MMHG | TEMPERATURE: 96.9 F | RESPIRATION RATE: 16 BRPM

## 2021-03-04 DIAGNOSIS — E11.65 TYPE 2 DIABETES MELLITUS WITH HYPERGLYCEMIA, WITHOUT LONG-TERM CURRENT USE OF INSULIN (HCC): Primary | Chronic | ICD-10-CM

## 2021-03-04 DIAGNOSIS — K22.81 ESOPHAGEAL POLYP: ICD-10-CM

## 2021-03-04 DIAGNOSIS — L24.0 IRRITANT CONTACT DERMATITIS DUE TO DETERGENT: ICD-10-CM

## 2021-03-04 DIAGNOSIS — G93.31 POSTVIRAL FATIGUE SYNDROME: ICD-10-CM

## 2021-03-04 DIAGNOSIS — K22.70 BARRETT'S ESOPHAGUS WITHOUT DYSPLASIA: ICD-10-CM

## 2021-03-04 PROCEDURE — 99214 OFFICE O/P EST MOD 30 MIN: CPT | Performed by: FAMILY MEDICINE

## 2021-03-04 RX ORDER — TRIAMCINOLONE ACETONIDE 1 MG/ML
LOTION TOPICAL 3 TIMES DAILY
Qty: 60 ML | Refills: 2 | Status: SHIPPED | OUTPATIENT
Start: 2021-03-04 | End: 2021-06-18

## 2021-03-04 RX ORDER — GLIMEPIRIDE 2 MG/1
6 TABLET ORAL
Qty: 270 TABLET | Refills: 2 | Status: SHIPPED | OUTPATIENT
Start: 2021-03-04 | End: 2022-02-17

## 2021-03-04 NOTE — ASSESSMENT & PLAN NOTE
Diabetes is improving with treatment.   Continue current treatment regimen.  Reminded to bring in blood sugar diary at next visit.  Dietary recommendations for ADA diet.  Regular aerobic exercise.  Diabetes will be reassessed in 3 months.    Patient can regard his glimepiride now and he is going to hold off on Metformin until we see how his energy does.  I think his energy and fatigue loss is probably coming from the immunizations rather than Metformin but it want her to stop Metformin for a month or so to see how he does.  Once he has his OG back and he feels normal we can start Metformin and see if he tolerates it better.  His blood sugars are running distal high but he has been off glimepiride because of this EGD procedure he had so we need to restart.

## 2021-03-04 NOTE — ASSESSMENT & PLAN NOTE
Patient had an EGD that showed both a Schroeder's esophagus and esophageal polyp.  Both are normal and do not show any signs of dysplasia.  9 to stay on the Zegerid which is omeprazole with sodium bicarb and will continue treating.  He is go have another EGD done next week and polyps going to be removed.

## 2021-03-04 NOTE — PROGRESS NOTES
Is Chief Complaint  Follow-up    Subjective          Cisco Frank presents to [facility]  Cisco is a 80-year-old white male who is followed in the office for his general medical care.  He recently was seen by gastroenterology for a EGD.  The EGD did show some Schroeder's esophagus and a esophageal polyp.  These areas were biopsied and no evidence of any dysplasia was found.  He will be going back next week for a repeat EGD and this time the polyp will be removed.    Patient has the complaint of feeling somewhat weak and very tired.  He has been rather sleepy at times and he has attributed this to the start of Metformin.  He is only taking 250 mg twice a day but he feels like ever since he has been on Metformin the symptoms have occurred.  He also has had his Covid vaccination.  He had the Pfizer vaccine and he has had both doses the first dose being in December on the 29th.  My thinking on this is that probably Metformin not the culprit.  I think vaccines are the culprit behind his fatigue sleepiness.  Nonetheless we will stop the Metformin and give him time to get his energy back.  Once his full energy is back and he is no longer sleepy during the day will restart Metformin at a low dose and see if he tolerates it.    He also here today for a rash on his back that is itchy.  This started about the same time he the vaccines and the Metformin.  It looks like a contact dermatitis has been putting 10% hydrocortisone on it.  It helping but its not taking it completely out.      Review of Systems   Constitutional: Negative.  Negative for fatigue and fever.   HENT: Negative.  Negative for congestion, sinus pressure, sore throat, tinnitus and trouble swallowing.    Eyes: Negative.  Negative for redness and visual disturbance.   Respiratory: Negative for cough, chest tightness, shortness of breath and wheezing.    Cardiovascular: Negative.  Negative for chest pain and leg swelling.  "  Gastrointestinal: Negative.  Negative for abdominal distention, abdominal pain, diarrhea and nausea.   Endocrine: Negative.  Negative for cold intolerance and heat intolerance.   Genitourinary: Negative.  Negative for difficulty urinating, dysuria and urgency.   Musculoskeletal: Negative.  Negative for arthralgias, back pain, gait problem and joint swelling.   Skin: Negative.  Negative for rash.   Allergic/Immunologic: Negative.  Negative for environmental allergies and food allergies.   Neurological: Negative.  Negative for dizziness, weakness, light-headedness and confusion.   Hematological: Negative.  Negative for adenopathy.   Psychiatric/Behavioral: Negative.  Negative for agitation and dysphoric mood. The patient is not nervous/anxious.      Objective   Vital Signs:   BP 96/56   Pulse 73   Temp 96.9 °F (36.1 °C)   Resp 16   Ht 177.8 cm (70\")   Wt 86.6 kg (191 lb)   SpO2 93%   BMI 27.41 kg/m²     Physical Exam  Constitutional:       Appearance: Normal appearance. He is well-developed and normal weight.   HENT:      Head: Normocephalic and atraumatic.      Right Ear: Tympanic membrane, ear canal and external ear normal.      Left Ear: Tympanic membrane, ear canal and external ear normal.      Nose: Nose normal.      Mouth/Throat:      Mouth: Mucous membranes are moist.      Pharynx: Oropharynx is clear. No oropharyngeal exudate.   Eyes:      Extraocular Movements: Extraocular movements intact.      Conjunctiva/sclera: Conjunctivae normal.      Pupils: Pupils are equal, round, and reactive to light.   Neck:      Musculoskeletal: Normal range of motion and neck supple.   Cardiovascular:      Rate and Rhythm: Normal rate and regular rhythm.      Pulses: Normal pulses.      Heart sounds: Normal heart sounds.   Pulmonary:      Effort: Pulmonary effort is normal.      Breath sounds: Normal breath sounds.   Abdominal:      General: Bowel sounds are normal.      Palpations: Abdomen is soft.   Musculoskeletal: " Normal range of motion.   Skin:     General: Skin is warm and dry.      Findings: Rash present.      Comments: Red excoriated rash on his back that looks like contact dermatitis.   Neurological:      General: No focal deficit present.      Mental Status: He is alert and oriented to person, place, and time. Mental status is at baseline.   Psychiatric:         Mood and Affect: Mood normal.         Behavior: Behavior normal.         Thought Content: Thought content normal.         Judgment: Judgment normal.        Result Review :                 Assessment and Plan    Diagnoses and all orders for this visit:    1. Type 2 diabetes mellitus with hyperglycemia, without long-term current use of insulin (CMS/[facility]) (Primary)  Assessment & Plan:  Diabetes is improving with treatment.   Continue current treatment regimen.  Reminded to bring in blood sugar diary at next visit.  Dietary recommendations for ADA diet.  Regular aerobic exercise.  Diabetes will be reassessed in 3 months.    Patient can regard his glimepiride now and he is going to hold off on Metformin until we see how his energy does.  I think his energy and fatigue loss is probably coming from the immunizations rather than Metformin but it want her to stop Metformin for a month or so to see how he does.  Once he has his OG back and he feels normal we can start Metformin and see if he tolerates it better.  His blood sugars are running distal high but he has been off glimepiride because of this EGD procedure he had so we need to restart.      2. Irritant contact dermatitis due to detergent  Assessment & Plan:  Patient has an irritant contact dermatitis on his back.  If coming from his close and it could be from detergent or dryer softener that they use.  Triamcinolone lotion is going to be used on the rash itself and they are going to switch to a washer softener for a while.  This should clear up within a week to 10 days      3. Postviral fatigue syndrome  Assessment  & Plan:  Patient is exhibiting post viral fatigue syndrome.  It is actually not due to the virus itself but to the vaccination to virus.  He had a recent with vaccine and we are seeing patients who are unusually tired and fatigued for weeks and weeks after the vaccine.  Very much like what we see if they have the actual disease.  He should clear this in the next week or 2 and should not appearance it again.      4. Schroeder's esophagus without dysplasia  Assessment & Plan:  Patient had an EGD that showed both a Schroeder's esophagus and esophageal polyp.  Both are normal and do not show any signs of dysplasia.  9 to stay on the Zegerid which is omeprazole with sodium bicarb and will continue treating.  He is go have another EGD done next week and polyps going to be removed.      5. Esophageal polyp  Assessment & Plan:  Esophageal polyp will be removed via EGD next week      Other orders  -     triamcinolone (KENALOG) 0.1 % lotion; Apply  topically to the appropriate area as directed 3 (Three) Times a Day.  Dispense: 60 mL; Refill: 2  -     glimepiride (AMARYL) 2 MG tablet; Take 3 tablets by mouth Every Morning Before Breakfast for 90 days.  Dispense: 270 tablet; Refill: 2      Follow Up   Return in about 4 months (around 7/4/2021) for Recheck.  Patient was given instructions and counseling regarding his condition or for health maintenance advice. Please see specific information pulled into the AVS if appropriate.

## 2021-03-04 NOTE — ASSESSMENT & PLAN NOTE
Patient is exhibiting post viral fatigue syndrome.  It is actually not due to the virus itself but to the vaccination to virus.  He had a recent with vaccine and we are seeing patients who are unusually tired and fatigued for weeks and weeks after the vaccine.  Very much like what we see if they have the actual disease.  He should clear this in the next week or 2 and should not appearance it again.

## 2021-03-04 NOTE — ASSESSMENT & PLAN NOTE
Patient has an irritant contact dermatitis on his back.  If coming from his close and it could be from detergent or dryer softener that they use.  Triamcinolone lotion is going to be used on the rash itself and they are going to switch to a washer softener for a while.  This should clear up within a week to 10 days

## 2021-03-05 NOTE — PRE-PROCEDURE INSTRUCTIONS
Pt was very disgruntled from the beginning arguing about his Covid test and medications.  Unable to go over meds as pt will bring his list DOS.  Unable to go over pt history as well.  Christine wife was on the call entire time.

## 2021-03-10 ENCOUNTER — LAB (OUTPATIENT)
Dept: LAB | Facility: HOSPITAL | Age: 81
End: 2021-03-10

## 2021-03-10 PROCEDURE — U0004 COV-19 TEST NON-CDC HGH THRU: HCPCS

## 2021-03-10 PROCEDURE — C9803 HOPD COVID-19 SPEC COLLECT: HCPCS

## 2021-03-11 LAB — SARS-COV-2 ORF1AB RESP QL NAA+PROBE: NOT DETECTED

## 2021-03-12 ENCOUNTER — ON CAMPUS - OUTPATIENT (AMBULATORY)
Dept: URBAN - METROPOLITAN AREA HOSPITAL 85 | Facility: HOSPITAL | Age: 81
End: 2021-03-12

## 2021-03-12 ENCOUNTER — HOSPITAL ENCOUNTER (OUTPATIENT)
Facility: HOSPITAL | Age: 81
Setting detail: HOSPITAL OUTPATIENT SURGERY
Discharge: HOME | End: 2021-03-12
Attending: INTERNAL MEDICINE | Admitting: INTERNAL MEDICINE

## 2021-03-12 ENCOUNTER — ANESTHESIA (OUTPATIENT)
Dept: GASTROENTEROLOGY | Facility: HOSPITAL | Age: 81
End: 2021-03-12

## 2021-03-12 ENCOUNTER — ANESTHESIA EVENT (OUTPATIENT)
Dept: GASTROENTEROLOGY | Facility: HOSPITAL | Age: 81
End: 2021-03-12

## 2021-03-12 VITALS
DIASTOLIC BLOOD PRESSURE: 73 MMHG | HEIGHT: 70 IN | TEMPERATURE: 97.8 F | RESPIRATION RATE: 13 BRPM | BODY MASS INDEX: 26.92 KG/M2 | HEART RATE: 80 BPM | SYSTOLIC BLOOD PRESSURE: 99 MMHG | OXYGEN SATURATION: 94 % | WEIGHT: 188.05 LBS

## 2021-03-12 DIAGNOSIS — K31.7 POLYP OF STOMACH AND DUODENUM: ICD-10-CM

## 2021-03-12 DIAGNOSIS — K31.7 POLYP OF DUODENUM: ICD-10-CM

## 2021-03-12 DIAGNOSIS — R10.13 EPIGASTRIC PAIN: ICD-10-CM

## 2021-03-12 LAB — GLUCOSE BLDC GLUCOMTR-MCNC: 237 MG/DL (ref 70–105)

## 2021-03-12 PROCEDURE — 43237 ENDOSCOPIC US EXAM ESOPH: CPT | Mod: 59 | Performed by: INTERNAL MEDICINE

## 2021-03-12 PROCEDURE — 88305 TISSUE EXAM BY PATHOLOGIST: CPT | Performed by: INTERNAL MEDICINE

## 2021-03-12 PROCEDURE — 25010000002 PROPOFOL 10 MG/ML EMULSION: Performed by: ANESTHESIOLOGY

## 2021-03-12 PROCEDURE — 82962 GLUCOSE BLOOD TEST: CPT

## 2021-03-12 PROCEDURE — 43254 EGD ENDO MUCOSAL RESECTION: CPT | Performed by: INTERNAL MEDICINE

## 2021-03-12 RX ORDER — SODIUM CHLORIDE 0.9 % (FLUSH) 0.9 %
3 SYRINGE (ML) INJECTION EVERY 12 HOURS SCHEDULED
Status: DISCONTINUED | OUTPATIENT
Start: 2021-03-12 | End: 2021-03-12 | Stop reason: HOSPADM

## 2021-03-12 RX ORDER — ONDANSETRON 2 MG/ML
4 INJECTION INTRAMUSCULAR; INTRAVENOUS ONCE AS NEEDED
Status: DISCONTINUED | OUTPATIENT
Start: 2021-03-12 | End: 2021-03-12 | Stop reason: HOSPADM

## 2021-03-12 RX ORDER — PROPOFOL 10 MG/ML
VIAL (ML) INTRAVENOUS AS NEEDED
Status: DISCONTINUED | OUTPATIENT
Start: 2021-03-12 | End: 2021-03-12 | Stop reason: SURG

## 2021-03-12 RX ORDER — NALBUPHINE HCL 10 MG/ML
10 AMPUL (ML) INJECTION EVERY 4 HOURS PRN
Status: DISCONTINUED | OUTPATIENT
Start: 2021-03-12 | End: 2021-03-12 | Stop reason: HOSPADM

## 2021-03-12 RX ORDER — SODIUM CHLORIDE 9 MG/ML
INJECTION, SOLUTION INTRAVENOUS CONTINUOUS PRN
Status: DISCONTINUED | OUTPATIENT
Start: 2021-03-12 | End: 2021-03-12 | Stop reason: SURG

## 2021-03-12 RX ORDER — NALBUPHINE HCL 10 MG/ML
2 AMPUL (ML) INJECTION EVERY 4 HOURS PRN
Status: DISCONTINUED | OUTPATIENT
Start: 2021-03-12 | End: 2021-03-12 | Stop reason: HOSPADM

## 2021-03-12 RX ORDER — NALOXONE HCL 0.4 MG/ML
0.4 VIAL (ML) INJECTION AS NEEDED
Status: DISCONTINUED | OUTPATIENT
Start: 2021-03-12 | End: 2021-03-12 | Stop reason: HOSPADM

## 2021-03-12 RX ORDER — LEVOFLOXACIN 5 MG/ML
INJECTION, SOLUTION INTRAVENOUS
Status: DISCONTINUED
Start: 2021-03-12 | End: 2021-03-12 | Stop reason: WASHOUT

## 2021-03-12 RX ORDER — LIDOCAINE HYDROCHLORIDE 10 MG/ML
INJECTION, SOLUTION EPIDURAL; INFILTRATION; INTRACAUDAL; PERINEURAL AS NEEDED
Status: DISCONTINUED | OUTPATIENT
Start: 2021-03-12 | End: 2021-03-12 | Stop reason: SURG

## 2021-03-12 RX ORDER — HYDROMORPHONE HCL 110MG/55ML
0.5 PATIENT CONTROLLED ANALGESIA SYRINGE INTRAVENOUS
Status: DISCONTINUED | OUTPATIENT
Start: 2021-03-12 | End: 2021-03-12 | Stop reason: HOSPADM

## 2021-03-12 RX ORDER — SODIUM CHLORIDE 0.9 % (FLUSH) 0.9 %
10 SYRINGE (ML) INJECTION AS NEEDED
Status: DISCONTINUED | OUTPATIENT
Start: 2021-03-12 | End: 2021-03-12 | Stop reason: HOSPADM

## 2021-03-12 RX ORDER — LORAZEPAM 2 MG/ML
1 INJECTION INTRAMUSCULAR
Status: DISCONTINUED | OUTPATIENT
Start: 2021-03-12 | End: 2021-03-12 | Stop reason: HOSPADM

## 2021-03-12 RX ORDER — FLUMAZENIL 0.1 MG/ML
0.2 INJECTION INTRAVENOUS AS NEEDED
Status: DISCONTINUED | OUTPATIENT
Start: 2021-03-12 | End: 2021-03-12 | Stop reason: HOSPADM

## 2021-03-12 RX ORDER — MIDAZOLAM HYDROCHLORIDE 1 MG/ML
1 INJECTION INTRAMUSCULAR; INTRAVENOUS
Status: DISCONTINUED | OUTPATIENT
Start: 2021-03-12 | End: 2021-03-12 | Stop reason: HOSPADM

## 2021-03-12 RX ORDER — MEPERIDINE HYDROCHLORIDE 25 MG/ML
12.5 INJECTION INTRAMUSCULAR; INTRAVENOUS; SUBCUTANEOUS
Status: DISCONTINUED | OUTPATIENT
Start: 2021-03-12 | End: 2021-03-12 | Stop reason: HOSPADM

## 2021-03-12 RX ORDER — DIPHENHYDRAMINE HYDROCHLORIDE 50 MG/ML
12.5 INJECTION INTRAMUSCULAR; INTRAVENOUS
Status: DISCONTINUED | OUTPATIENT
Start: 2021-03-12 | End: 2021-03-12 | Stop reason: HOSPADM

## 2021-03-12 RX ORDER — ALBUTEROL SULFATE 2.5 MG/3ML
2.5 SOLUTION RESPIRATORY (INHALATION) ONCE AS NEEDED
Status: DISCONTINUED | OUTPATIENT
Start: 2021-03-12 | End: 2021-03-12 | Stop reason: HOSPADM

## 2021-03-12 RX ADMIN — PROPOFOL 750 MG: 10 INJECTION, EMULSION INTRAVENOUS at 10:21

## 2021-03-12 RX ADMIN — SODIUM CHLORIDE: 0.9 INJECTION, SOLUTION INTRAVENOUS at 07:45

## 2021-03-12 RX ADMIN — SUGAMMADEX 200 MG: 100 INJECTION, SOLUTION INTRAVENOUS at 12:08

## 2021-03-12 RX ADMIN — LIDOCAINE HYDROCHLORIDE 50 MG: 10 INJECTION, SOLUTION EPIDURAL; INFILTRATION; INTRACAUDAL; PERINEURAL at 10:21

## 2021-03-12 RX ADMIN — GLYCOPYRROLATE 0.2 MCG: 0.2 INJECTION, SOLUTION INTRAMUSCULAR; INTRAVITREAL at 10:23

## 2021-03-12 NOTE — OP NOTE
"ESOPHAGOGASTRODUODENOSCOPY WITH ULTRASOUND AND FINE NEEDLE ASPIRATION Procedure Report    Patient Name:  Cisco Frank  YOB: 1940    Date of Surgery:  3/12/2021     Pre-Op Diagnosis:  Polyp of duodenum [K31.7]       Post-Op Diagnosis Codes:     * Polyp of duodenum [K31.7]      Procedure/CPT® Codes:      Procedure(s):  ESOPHAGOGASTRODUODENOSCOPY WITH endoscopic mucosal resection, biopsy x 1 area, and endoscopic ULTRASOUND    Staff:  Surgeon(s):  Abner Infante MD      Anesthesia: Monitored Anesthesia Care    Description of Procedure:  A description of the procedure as well as risks, benefits and alternative methods were explained to the patient who voiced understanding and signed the corresponding consent form. A physical exam was performed and vital signs were monitored throughout the procedure.    An upper GI endoscope was placed into the mouth and proceeded through the esophagus, stomach and second portion of the duodenum without difficulty. The scope was then retroflexed and the fundus was visualized. The procedure was not difficult and there were no immediate complications.    A pentex Radial echoendoscope was advanced into the mouth, esophagus, and into the stomach. The celiac axis was visualized, next the scope was used to visualize the pancreatic neck, body, and tail as well as the L lobe of the liver. The scope was advanced into the duodenal bulb where the pancreatic head and ampulla were visualized as well as the biliary tree and R lobe of the liver. The scope was then advanced to the second portion of the duodenum where the uncinate was brought into view and the ampulla in the \" kissing the papilla\" view. The scope was then withdrawn back into the stomach and out of the esophagus.  The scope was then focused in the duodenal bulb on the duodenal polyp.  This was examined closely and measured as well as Dopplered. there was no blood loss.    An upper GI endoscope was placed into the mouth " and proceeded through the esophagus, stomach and second portion of the duodenum without difficulty. The scope was then retroflexed and the fundus was visualized. The procedure was not difficult and there were no immediate complications.        Impression:    EGD Findings:   1.  Blurred GE junction/Z-line, cold forcep biopsies were taken to rule out Schroeder's esophagus.  2.  A few inflammatory appearing polyps in the hiatal hernia and gastric body.  3.  A large hiatal hernia was present  4.  Large duodenal polyp in the proximal duodenal bulb measuring almost 2 cm in diameter, an Endoloop was placed around the polyp and cinched tight at the base causing the polyp to turn purple indicating adequate stasis of blood.  A 25 mm round snare using heat was used to cut the polyp off in piecemeal fashion above the Endoloop base.  Was then switched to a 13 mm round snare which was used to cut the base closer to the Endoloop.  This was done an endoscopic mucosal resection technique and it appeared to have completely removed the polyp.  5.  Normal duodenal mucosa in D2 and D3    EUS Findings:   1.  A 5.6 mm x 3.8 mm calcification in the ampulla adjacent to the pancreatic duct that may be a small opening periampullary diverticulum.  It does not appear to be in the pancreatic duct and the pancreatic duct is not dilated.  2.  Otherwise normal pancreatic EUS of the uncinate, head, neck, body, tail with normal sized pancreatic duct.  3.  Common bile duct measures 6 mm no stones or sludge  4.  Normal celiac axis  5.  Duodenal polyp measuring 1.7 cm x 1 cm in thickness that appears to be all muscularis mucosa however due to its size, it compresses the submucosa so much it is difficult to tell if it is true origin.  It was dopplered and there were some small vessels underneath but nothing of significant size.      Recommendations:  1. No NSAIDs or blood thinners for 3 days.   2. Follow up bx results   3. Follow up in clinic with   Wilmar to discuss results        Abner Infante MD     Date: 3/12/2021    Time: 11:38 EST

## 2021-03-12 NOTE — DISCHARGE INSTRUCTIONS
A responsible adult should stay with you and you should rest quietly for the rest of the day.    Do not drink alcohol, drive, operate any heavy machinery or power tools or make any legal/important decisions for the next 24 hours.     Progress your diet as tolerated.  If you begin to experience severe pain, increased shortness of breath, racing heartbeat or a fever above 101 F, seek immediate medical attention.     Follow up with MD as instructed. Call office for results in 3 to 5 days if needed. [phone]    No blood thinners (aspirin, fish oil) for 3 days  Follow up with Dr. Urban

## 2021-03-12 NOTE — ANESTHESIA POSTPROCEDURE EVALUATION
Patient: Cisco Frank    Procedure Summary     Date: 03/12/21 Room / Location: [facility] ENDOSCOPY 2 / [facility] ENDOSCOPY    Anesthesia Start: 1019 Anesthesia Stop: 1114    Procedure: ESOPHAGOGASTRODUODENOSCOPY WITH endoscopic mucosal resection, biopsy x 1 area, and endoscopic ULTRASOUND (N/A ) Diagnosis:       Polyp of duodenum      (Polyp of duodenum [K31.7])    Surgeons: Abner Infante MD Provider: Andrew Schmitt MD    Anesthesia Type: MAC ASA Status: 3          Anesthesia Type: MAC    Vitals  Vitals Value Taken Time   BP 99/73 03/12/21 1125   Temp     Pulse 73 03/12/21 1142   Resp 13 03/12/21 1125   SpO2 95 % 03/12/21 1142   Vitals shown include unvalidated device data.        Post Anesthesia Care and Evaluation    Patient location during evaluation: PACU  Patient participation: complete - patient participated  Level of consciousness: awake  Pain scale: See nurse's notes for pain score.  Pain management: adequate  Airway patency: patent  Anesthetic complications: No anesthetic complications  PONV Status: none  Cardiovascular status: acceptable  Respiratory status: acceptable  Hydration status: acceptable    Comments: Patient seen and examined postoperatively; vital signs stable; SpO2 greater than or equal to 90%; cardiopulmonary status stable; nausea/vomiting adequately controlled; pain adequately controlled; no apparent anesthesia complications; patient discharged from anesthesia care when discharge criteria were met

## 2021-03-12 NOTE — H&P
GI CONSULT  NOTE:    Referring Provider:    Nuno Patton MD  [unfilled]    Chief complaint: <principal problem not specified>    Subjective .       Pre op diagnosis  Polyp of duodenum [K31.7]      History of present illness:      Cisco Frank is a 80 y.o. male who presents today for Procedure(s):  ESOPHAGOGASTRODUODENOSCOPY WITH ULTRASOUND AND FINE NEEDLE ASPIRATION for the indications listed below.     The updated Patient Profile was reviewed prior to the procedure, in conjunction with the Physical Exam, including medical conditions, surgical procedures, medications, allergies, family history and social history.     Pre-operatively, I reviewed the indication(s) for the procedure, the risks of the procedure [including but not limited to: unexpected bleeding possibly requiring hospitalization and/or unplanned repeat procedures, perforation possibly requiring surgical treatment, missed lesions and complications of sedation/MAC (also explained by anesthesia staff)].     I have evaluated the patient for risks associated with the planned anesthesia and the procedure to be performed and find the patient an acceptable candidate for IV sedation.    Multiple opportunities were provided for any questions or concerns, and all questions were answered satisfactorily before any anesthesia was administered. We will proceed with the planned procedure.    Past Medical History:  Past Medical History:   Diagnosis Date   • Cancer (CMS/HCC)    • Coronary artery disease    • History of bladder cancer    • Hyperlipidemia        Past Surgical History:  Past Surgical History:   Procedure Laterality Date   • BLADDER SURGERY  10/26/2019    [facility] Dr. [place]   • CARDIAC CATHETERIZATION     • COLONOSCOPY     • SKIN CANCER EXCISION      right temple BX       Social History:  Social History     Tobacco Use   • Smoking status: Former Smoker     Quit date: 1980     Years since quittin.2   • Smokeless tobacco: Never Used    Vaping Use   • Vaping Use: Never used   Substance Use Topics   • Alcohol use: Not Currently   • Drug use: Never       Family History:  Family History   Problem Relation Age of Onset   • Heart disease Mother    • Heart attack Father    • No Known Problems Sister    • No Known Problems Brother    • No Known Problems Maternal Aunt    • No Known Problems Maternal Uncle    • No Known Problems Paternal Aunt    • No Known Problems Paternal Uncle    • No Known Problems Maternal Grandmother    • No Known Problems Maternal Grandfather    • No Known Problems Paternal Grandmother    • No Known Problems Paternal Grandfather    • No Known Problems Other    • Anemia Neg Hx    • Arrhythmia Neg Hx    • Asthma Neg Hx    • Clotting disorder Neg Hx    • Fainting Neg Hx    • Heart failure Neg Hx    • Hyperlipidemia Neg Hx    • Hypertension Neg Hx        Medications:  Medications Prior to Admission   Medication Sig Dispense Refill Last Dose   • atenolol (TENORMIN) 25 MG tablet Take 1 tablet by mouth Daily. 90 tablet 3 3/11/2021 at Unknown time   • atorvastatin (LIPITOR) 40 MG tablet Take 1 tablet by mouth every night at bedtime. 90 tablet 3 Past Week at Unknown time   • colestipol (COLESTID) 1 g tablet Take 2 g by mouth Daily.   3/11/2021 at Unknown time   • niacin (SLO-NIACIN) 500 MG CR tablet Take 1 tablet by mouth Daily.   Past Week at Unknown time   • Omega-3 Fatty Acids (FISH OIL) 1200 MG capsule capsule Take 1 capsule by mouth Daily.   Past Week at Unknown time   • omeprazole-sodium bicarbonate (ZEGERID)  MG per capsule Take 1 capsule by mouth Daily.   3/11/2021 at Unknown time   • triamcinolone (KENALOG) 0.1 % lotion Apply  topically to the appropriate area as directed 3 (Three) Times a Day. 60 mL 2 Past Week at Unknown time   • vitamin C (ASCORBIC ACID) 500 MG tablet Take 500 mg by mouth Daily.   Past Week at Unknown time   • aspirin (ECOTRIN LOW STRENGTH) 81 MG EC tablet Take 1 tablet by mouth Daily.   3/3/2021   •  "glimepiride (AMARYL) 2 MG tablet Take 3 tablets by mouth Every Morning Before Breakfast for 90 days. 270 tablet 2    • metFORMIN (GLUCOPHAGE) 500 MG tablet Take 0.5 tablets by mouth 2 (Two) Times a Day With Meals for 90 days. 90 tablet 2        Scheduled Meds:levoFLOXacin (LEVAQUIN) 500 mg/100 mL D5W (premix), , ,   metroNIDAZOLE, , ,       Continuous Infusions:   PRN Meds:.    ALLERGIES:  Patient has no known allergies.    ROS:  The following systems were reviewed and negative;  Constitution:  No fevers, chills, no unintentional weight loss  Skin: no rash, no jaundice  Eyes:  No blurry vision, no eye pain  HENT:  No change in hearing or smell  Resp:  No dyspnea or cough  CV:  No chest pain or palpitations  :  No dysuria, hematuria  Musculoskeletal:  No leg cramps or arthralgias  Neuro:  No tremor, no numbness  Psych:  No depression or confsuion    Objective     Vital Signs:   Vitals:    03/05/21 1459 03/12/21 0853   BP:  136/76   BP Location:  Left arm   Patient Position:  Lying   Pulse:  75   Resp:  14   Temp:  97.8 °F (36.6 °C)   TempSrc:  Oral   SpO2:  98%   Weight: 87.1 kg (192 lb) 85.3 kg (188 lb 0.8 oz)   Height: 177.8 cm (70\") 177.8 cm (70\")       Physical Exam:       General Appearance:    Awake and alert, in no acute distress   Head:    Normocephalic, without obvious abnormality, atraumatic   Throat:   No oral lesions, no thrush, oral mucosa moist   Lungs:     respirations regular, even and unlabored   Skin:   No rash, no jaundice       Results Review:  Lab Results (last 24 hours)     Procedure Component Value Units Date/Time    POC Glucose Once [[id number]]  (Abnormal) Collected: 03/12/21 0831    Specimen: Blood Updated: 03/12/21 0833     Glucose 237 mg/dL      Comment: Serial Number: [id number]:  [id number]             Imaging Results (Last 24 Hours)     ** No results found for the last 24 hours. **           I reviewed the patient's labs and imaging.    ASSESSMENT AND PLAN:      Active " Problems:    * No active hospital problems. *       Procedure(s):  ESOPHAGOGASTRODUODENOSCOPY WITH ULTRASOUND AND FINE NEEDLE ASPIRATION      I discussed the patients findings and my recommendations with the patient.    Abner Infante MD  03/12/21  10:16 EST

## 2021-03-12 NOTE — ANESTHESIA PREPROCEDURE EVALUATION
Anesthesia Evaluation     Patient summary reviewed and Nursing notes reviewed   NPO Solid Status: > 8 hours  NPO Liquid Status: > 8 hours           Airway   Mallampati: II  TM distance: >3 FB  Neck ROM: full  No difficulty expected  Dental - normal exam     Pulmonary - negative pulmonary ROS and normal exam    breath sounds clear to auscultation  Cardiovascular - normal exam  Exercise tolerance: unable to assess    ECG reviewed  Rhythm: regular  Rate: normal    (+) hypertension, past MI , CAD, hyperlipidemia,     ROS comment: Interpretation Summary    · Findings consistent with a normal ECG stress test.  · Left ventricular ejection fraction is normal. (Calculated EF = 70%).  · Impressions are consistent with a low risk study.  · Inferolateral reverse redistribution without any ischemia EF 70%.           Neuro/Psych- negative ROS  GI/Hepatic/Renal/Endo    (+)  GERD,  renal disease CRI, diabetes mellitus,     Musculoskeletal (-) negative ROS    Abdominal  - normal exam   Substance History - negative use     OB/GYN negative ob/gyn ROS         Other      history of cancer remission                    Anesthesia Plan    ASA 3     MAC     intravenous induction     Anesthetic plan, all risks, benefits, and alternatives have been provided, discussed and informed consent has been obtained with: patient.

## 2021-03-17 LAB
LAB AP CASE REPORT: NORMAL
LAB AP DIAGNOSIS COMMENT: NORMAL
PATH REPORT.FINAL DX SPEC: NORMAL
PATH REPORT.GROSS SPEC: NORMAL

## 2021-04-20 NOTE — DISCHARGE SUMMARY
[facility]  DISCHARGE SUMMARY        Prepared For PCP:  Nuno Patton MD    Patient Name: Cisco Frank  : 1940  MRN: [id number]      Date of Admission:   10/26/2020    Date of Discharge:  10/26/2020    Length of stay:  LOS: 0 days     Hospital Course     Presenting Problem:   Chest pain, unspecified type [R07.9]      Active Hospital Problems    Diagnosis  POA   • **Chest pain [R07.9]  Yes   • Overweight [E66.3]  Yes   • Hyperlipidemia [E78.5]  Yes   • Coronary artery disease [I25.10]  Yes   • History of bladder cancer [Z85.51]  Not Applicable   • Gastroesophageal reflux disease [K21.9]  Yes   • Essential hypertension [I10]  Yes   • Type 2 diabetes mellitus with hyperglycemia, without long-term current use of insulin (CMS/[facility]) [E11.65]  Yes      Resolved Hospital Problems   No resolved problems to display.           Hospital Course:  Cisco Frank is a 80 y.o. male       Patient discharged same day as admit, please see H&P done on the same day.    Recommendation for Outpatient Providers:             Reasons For Change In Medications and Indications for New Medications:        Day of Discharge     HPI:       Vital Signs:   Temp:  [97.5 °F (36.4 °C)-97.8 °F (36.6 °C)] 97.8 °F (36.6 °C)  Heart Rate:  [66-83] 66  Resp:  [16] 16  BP: (113-146)/(75-92) 144/81     Physical Exam:  Physical Exam    Pertinent  and/or Most Recent Results     Results from last 7 days   Lab Units 10/26/20  0554   WBC 10*3/mm3 6.10   HEMOGLOBIN g/dL 14.2   HEMATOCRIT % 40.7   PLATELETS 10*3/mm3 158   SODIUM mmol/L 139   POTASSIUM mmol/L 4.4   CHLORIDE mmol/L 102   CO2 mmol/L 27.0   BUN mg/dL 20   CREATININE mg/dL 0.93   GLUCOSE mg/dL 232*   CALCIUM mg/dL 8.8     Results from last 7 days   Lab Units 10/26/20  0554   BILIRUBIN mg/dL 0.6   ALK PHOS U/L 154*   ALT (SGPT) U/L 12   AST (SGOT) U/L 14   PROTIME Seconds 10.9   INR  0.99   APTT seconds 23.6*           Invalid input(s): TG, LDLCALC,  Writer called and left a message with call back number of office [phone] for patient to schedule her missed follow up appointment. LDLREALC  Results from last 7 days   Lab Units 10/26/20  1044 10/26/20  0554   HEMOGLOBIN A1C %  --  8.0*   TROPONIN T ng/mL <0.010 <0.010       Brief Urine Lab Results     None          Microbiology Results Abnormal     None          Xr Chest 1 View    Result Date: 10/26/2020  Impression: No acute process.  Electronically Signed By-Mynor Ravi On:10/26/2020 7:26 AM This report was finalized on [id number] by  Mynor Ravi, .                             Test Results Pending at Discharge  Pending Labs     Order Current Status    Extra Tubes In process    Pale Yellow Top In process            Procedures Performed           Consults:   Consults     Date and Time Order Name Status Description    10/26/2020 0640 Inpatient Hospitalist Consult Completed             Discharge Details        Discharge Medications      Continue These Medications      Instructions Start Date   atenolol 25 MG tablet  Commonly known as: TENORMIN   25 mg, Oral, Daily      atorvastatin 40 MG tablet  Commonly known as: LIPITOR   40 mg, Oral, Every Night at Bedtime      colestipol 1 g tablet  Commonly known as: COLESTID   2 g, Oral, Daily      Ecotrin Low Strength 81 MG EC tablet  Generic drug: aspirin   1 tablet, Oral, Every 24 Hours      fish oil 1200 MG capsule capsule   1 capsule, Oral, Every 24 Hours      glimepiride 2 MG tablet  Commonly known as: AMARYL   3 mg, Oral, Every Morning Before Breakfast      metFORMIN 500 MG tablet  Commonly known as: GLUCOPHAGE   250 mg, Oral, 2 Times Daily With Meals      omeprazole-sodium bicarbonate  MG per capsule  Commonly known as: ZEGERID   1 capsule, Oral, Daily      Slo-Niacin 500 MG CR tablet  Generic drug: niacin   1 tablet, Oral, Daily      vitamin C 500 MG tablet  Commonly known as: ASCORBIC ACID   500 mg, Oral, Daily             No Known Allergies      Discharge Disposition:  Home or Self Care    Diet:  Hospital:  Diet Order   Procedures   • Diet Cardiac; Healthy Heart         Discharge  Activity:         CODE STATUS:    Code Status and Medical Interventions:   Ordered at: 10/26/20 0738     Code Status:    CPR     Medical Interventions (Level of Support Prior to Arrest):    Full         Follow-up Appointments  Future Appointments   Date Time Provider Department Center   12/18/2020 11:10 AM Hudson Smith MD [facility]   3/4/2021  8:00 AM Nuno Patton MD [facility] None             Condition on Discharge:      Stable      This patient has been examined wearing appropriate Personal Protective Equipment on 10/26/20      Electronically signed by Hugo Milton DO, 10/26/20, 3:29 PM EDT.      Time: I spent  25  minutes on this discharge activity which included face-to-face encounter with the patient/reviewing the data in the system/coordination of the care with the nursing staff as well as consultants/documentation/entering orders.

## 2021-06-11 ENCOUNTER — LAB (OUTPATIENT)
Dept: LAB | Facility: HOSPITAL | Age: 81
End: 2021-06-11

## 2021-06-11 DIAGNOSIS — I10 ESSENTIAL HYPERTENSION: Chronic | ICD-10-CM

## 2021-06-11 DIAGNOSIS — I25.10 CORONARY ARTERY DISEASE INVOLVING NATIVE CORONARY ARTERY OF NATIVE HEART WITHOUT ANGINA PECTORIS: Chronic | ICD-10-CM

## 2021-06-11 DIAGNOSIS — E78.2 MIXED HYPERLIPIDEMIA: Chronic | ICD-10-CM

## 2021-06-11 DIAGNOSIS — E11.65 TYPE 2 DIABETES MELLITUS WITH HYPERGLYCEMIA, WITHOUT LONG-TERM CURRENT USE OF INSULIN (HCC): Chronic | ICD-10-CM

## 2021-06-11 LAB
ALBUMIN SERPL-MCNC: 3.6 G/DL (ref 3.5–5.2)
ALBUMIN/GLOB SERPL: 1.1 G/DL
ALP SERPL-CCNC: 144 U/L (ref 39–117)
ALT SERPL W P-5'-P-CCNC: 18 U/L (ref 1–41)
ANION GAP SERPL CALCULATED.3IONS-SCNC: 9.8 MMOL/L (ref 5–15)
AST SERPL-CCNC: 18 U/L (ref 1–40)
BILIRUB SERPL-MCNC: 0.7 MG/DL (ref 0–1.2)
BUN SERPL-MCNC: 24 MG/DL (ref 8–23)
BUN/CREAT SERPL: 20.5 (ref 7–25)
CALCIUM SPEC-SCNC: 9.2 MG/DL (ref 8.6–10.5)
CHLORIDE SERPL-SCNC: 99 MMOL/L (ref 98–107)
CHOLEST SERPL-MCNC: 109 MG/DL (ref 0–200)
CK SERPL-CCNC: 101 U/L (ref 20–200)
CO2 SERPL-SCNC: 27.2 MMOL/L (ref 22–29)
CREAT SERPL-MCNC: 1.17 MG/DL (ref 0.76–1.27)
GFR SERPL CREATININE-BSD FRML MDRD: 60 ML/MIN/1.73
GLOBULIN UR ELPH-MCNC: 3.2 GM/DL
GLUCOSE SERPL-MCNC: 236 MG/DL (ref 65–99)
HDLC SERPL-MCNC: 33 MG/DL (ref 40–60)
LDLC SERPL CALC-MCNC: 37 MG/DL (ref 0–100)
LDLC/HDLC SERPL: 0.75 {RATIO}
POTASSIUM SERPL-SCNC: 4.6 MMOL/L (ref 3.5–5.2)
PROT SERPL-MCNC: 6.8 G/DL (ref 6–8.5)
SODIUM SERPL-SCNC: 136 MMOL/L (ref 136–145)
TRIGL SERPL-MCNC: 256 MG/DL (ref 0–150)
VLDLC SERPL-MCNC: 39 MG/DL (ref 5–40)

## 2021-06-11 PROCEDURE — 36415 COLL VENOUS BLD VENIPUNCTURE: CPT

## 2021-06-11 PROCEDURE — 80053 COMPREHEN METABOLIC PANEL: CPT

## 2021-06-11 PROCEDURE — 82550 ASSAY OF CK (CPK): CPT

## 2021-06-11 PROCEDURE — 80061 LIPID PANEL: CPT

## 2021-06-18 ENCOUNTER — OFFICE VISIT (OUTPATIENT)
Dept: CARDIOLOGY | Facility: CLINIC | Age: 81
End: 2021-06-18

## 2021-06-18 VITALS
WEIGHT: 196 LBS | HEIGHT: 70 IN | OXYGEN SATURATION: 97 % | HEART RATE: 73 BPM | DIASTOLIC BLOOD PRESSURE: 87 MMHG | BODY MASS INDEX: 28.06 KG/M2 | SYSTOLIC BLOOD PRESSURE: 154 MMHG

## 2021-06-18 DIAGNOSIS — I25.10 CORONARY ARTERY DISEASE INVOLVING NATIVE CORONARY ARTERY OF NATIVE HEART WITHOUT ANGINA PECTORIS: Chronic | ICD-10-CM

## 2021-06-18 DIAGNOSIS — I10 ESSENTIAL HYPERTENSION: Primary | Chronic | ICD-10-CM

## 2021-06-18 DIAGNOSIS — E11.65 TYPE 2 DIABETES MELLITUS WITH HYPERGLYCEMIA, WITHOUT LONG-TERM CURRENT USE OF INSULIN (HCC): Chronic | ICD-10-CM

## 2021-06-18 DIAGNOSIS — E78.2 MIXED HYPERLIPIDEMIA: Chronic | ICD-10-CM

## 2021-06-18 PROCEDURE — 93000 ELECTROCARDIOGRAM COMPLETE: CPT | Performed by: INTERNAL MEDICINE

## 2021-06-18 PROCEDURE — 99214 OFFICE O/P EST MOD 30 MIN: CPT | Performed by: INTERNAL MEDICINE

## 2021-06-18 RX ORDER — ATORVASTATIN CALCIUM 40 MG/1
40 TABLET, FILM COATED ORAL
Qty: 90 TABLET | Refills: 3 | Status: SHIPPED | OUTPATIENT
Start: 2021-06-18 | End: 2022-07-05 | Stop reason: SDUPTHER

## 2021-06-18 RX ORDER — ATENOLOL 25 MG/1
25 TABLET ORAL DAILY
Qty: 90 TABLET | Refills: 3 | Status: SHIPPED | OUTPATIENT
Start: 2021-06-18 | End: 2022-08-25 | Stop reason: SDUPTHER

## 2021-06-18 NOTE — PROGRESS NOTES
Subjective:     Encounter Date:06/18/2021      Patient ID: Cisco Frank is a 81 y.o. male.    Chief Complaint: 6 mo CAD, HTN    History of Present Illness     81-year-old white male patient with a known history of coronary artery disease catheterization done 2014 showed no obstructive CAD previously placed RCA stent was open,  now comes back for follow up.       echocardiogram  in May 2016     Normal LV systolic function EF is 55%  Left atrium is enlarged  Mild pulmonary hypertension noted  Borderline evidence of prolapse of the posterior leaflet of the mitral valve  noted without any significant mitral insufficiency        Patient was admitted to hospital in October 2020 with somewhat atypical chest pain     Stress Myoview showed inferolateral reverse redistribution without any ischemia EF 70%  Compared to stress Myoview 2016 no changes  Patient comes back for follow-up doing well without any active symptoms     Recent labs triglycerides elevated blood sugar is elevated so advised him to follow-up with PCP regarding aggressive control of diabetes  Cardiac wise stable recent skin cancer removed restart aspirin ASAP after the procedure  Getting a cystoscopy with urology and also Dr. Urban for colonoscopy     Follow-up 6 months with labs EKG      Patient comes back for follow-up denies of any active symptoms  His blood pressure is mildly elevated today but usually runs very well I reviewed his labs HDL 33 triglycerides 256 blood sugar 236  Patient doing well from the cardiac standpoint but he needs aggressive blood sugar control to modify cardiac risk factors  His EKG today normal sinus rhythm normal axis nonspecific T abnormalities unchanged compared to the last EKG    The following portions of the patient's history were reviewed and updated as appropriate: Allergies current medications past family history past medical history past social history past surgical history problem list and review of systems  Past  "Medical History:   Diagnosis Date   • Cancer (CMS/HCC)    • Coronary artery disease    • History of bladder cancer    • Hyperlipidemia      Past Surgical History:   Procedure Laterality Date   • BLADDER SURGERY  10/26/2019    [facility] Dr. Cope   • CARDIAC CATHETERIZATION     • COLONOSCOPY     • COLONOSCOPY W/ POLYPECTOMY  02/23/2021   • SKIN CANCER EXCISION      right temple BX   • UPPER ENDOSCOPIC ULTRASOUND W/ FNA N/A 3/12/2021    Procedure: ESOPHAGOGASTRODUODENOSCOPY WITH endoscopic mucosal resection, biopsy x 1 area, and endoscopic ULTRASOUND;  Surgeon: Abner Infante MD;  Location: [facility] ENDOSCOPY;  Service: Gastroenterology;  Laterality: N/A;  post op: hiatal hernia, duodenal polyp     /87 (BP Location: Left arm, Patient Position: Sitting, Cuff Size: Adult)   Pulse 73   Ht 177.8 cm (70\")   Wt 88.9 kg (196 lb)   SpO2 97%   BMI 28.12 kg/m²   Family History   Problem Relation Age of Onset   • Heart disease Mother    • Heart attack Father    • No Known Problems Sister    • No Known Problems Brother    • No Known Problems Maternal Aunt    • No Known Problems Maternal Uncle    • No Known Problems Paternal Aunt    • No Known Problems Paternal Uncle    • No Known Problems Maternal Grandmother    • No Known Problems Maternal Grandfather    • No Known Problems Paternal Grandmother    • No Known Problems Paternal Grandfather    • No Known Problems Other    • Anemia Neg Hx    • Arrhythmia Neg Hx    • Asthma Neg Hx    • Clotting disorder Neg Hx    • Fainting Neg Hx    • Heart failure Neg Hx    • Hyperlipidemia Neg Hx    • Hypertension Neg Hx        Current Outpatient Medications:   •  aspirin (ECOTRIN LOW STRENGTH) 81 MG EC tablet, Take 1 tablet by mouth Daily., Disp: , Rfl:   •  atenolol (TENORMIN) 25 MG tablet, Take 1 tablet by mouth Daily., Disp: 90 tablet, Rfl: 3  •  atorvastatin (LIPITOR) 40 MG tablet, Take 1 tablet by mouth every night at bedtime., Disp: 90 tablet, Rfl: 3  •  glimepiride (AMARYL) 2 MG " tablet, Take 3 tablets by mouth Every Morning Before Breakfast for 90 days., Disp: 270 tablet, Rfl: 2  •  metFORMIN (GLUCOPHAGE) 500 MG tablet, Take 0.5 tablets by mouth 2 (Two) Times a Day With Meals for 90 days., Disp: 90 tablet, Rfl: 2  •  Omega-3 Fatty Acids (FISH OIL) 1200 MG capsule capsule, Take 1 capsule by mouth Daily., Disp: , Rfl:   •  omeprazole-sodium bicarbonate (ZEGERID)  MG per capsule, Take 1 capsule by mouth Daily., Disp: , Rfl:   •  vitamin C (ASCORBIC ACID) 500 MG tablet, Take 500 mg by mouth Daily., Disp: , Rfl:   •  colestipol (COLESTID) 1 g tablet, Take 2 g by mouth Daily., Disp: , Rfl:   •  niacin (SLO-NIACIN) 500 MG CR tablet, Take 1 tablet by mouth Daily., Disp: , Rfl:   Social History     Socioeconomic History   • Marital status:      Spouse name: Not on file   • Number of children: Not on file   • Years of education: Not on file   • Highest education level: Not on file   Tobacco Use   • Smoking status: Former Smoker     Quit date: 1980     Years since quittin.4   • Smokeless tobacco: Never Used   Vaping Use   • Vaping Use: Never used   Substance and Sexual Activity   • Alcohol use: Not Currently   • Drug use: Never   • Sexual activity: Defer     No Known Allergies  Review of Systems   Constitutional: Negative for chills, fever and malaise/fatigue.   Cardiovascular: Positive for dyspnea on exertion. Negative for chest pain, leg swelling, palpitations and syncope.   Respiratory: Negative for shortness of breath.    Skin: Negative for rash.   Neurological: Negative for dizziness, light-headedness and numbness.              Objective:     Physical Exam  Vitals reviewed neck no JVP elevation lungs but and mostly clear heart sounds S1 is regular extremities no edema bilateral pulses present equal    ECG 12 Lead    Date/Time: 2021 2:08 PM  Performed by: Hudson Smith MD  Authorized by: Hudson Smith MD   Comments: EKG today normal sinus  rhythm normal axis nonspecific T abnormalities unchanged compared to the last EKG            Lab Review:       Assessment:          Diagnosis Plan   1. Essential hypertension  CK    Comprehensive Metabolic Panel    Lipid Panel   2. Type 2 diabetes mellitus with hyperglycemia, without long-term current use of insulin (CMS/HCC)  CK    Comprehensive Metabolic Panel    Lipid Panel   3. Coronary artery disease involving native coronary artery of native heart without angina pectoris  CK    Comprehensive Metabolic Panel    Lipid Panel   4. Mixed hyperlipidemia  CK    Comprehensive Metabolic Panel    Lipid Panel          Plan:       MDM  Number of Diagnoses or Management Options  Coronary artery disease involving native coronary artery of native heart without angina pectoris: established, improving  Essential hypertension: established, improving  Mixed hyperlipidemia: established, improving  Type 2 diabetes mellitus with hyperglycemia, without long-term current use of insulin (CMS/HCC): established, improving     Amount and/or Complexity of Data Reviewed  Clinical lab tests: ordered and reviewed  Review and summarize past medical records: yes    Risk of Complications, Morbidity, and/or Mortality  Presenting problems: moderate  Management options: moderate    Patient Progress  Patient progress: stable

## 2021-10-08 ENCOUNTER — LAB (OUTPATIENT)
Dept: FAMILY MEDICINE CLINIC | Facility: CLINIC | Age: 81
End: 2021-10-08

## 2021-10-08 ENCOUNTER — DOCUMENTATION (OUTPATIENT)
Dept: FAMILY MEDICINE CLINIC | Facility: CLINIC | Age: 81
End: 2021-10-08

## 2021-10-08 ENCOUNTER — OFFICE VISIT (OUTPATIENT)
Dept: FAMILY MEDICINE CLINIC | Facility: CLINIC | Age: 81
End: 2021-10-08

## 2021-10-08 VITALS
SYSTOLIC BLOOD PRESSURE: 150 MMHG | RESPIRATION RATE: 16 BRPM | BODY MASS INDEX: 28.49 KG/M2 | TEMPERATURE: 96.6 F | WEIGHT: 199 LBS | DIASTOLIC BLOOD PRESSURE: 80 MMHG | HEART RATE: 97 BPM | HEIGHT: 70 IN | OXYGEN SATURATION: 96 %

## 2021-10-08 DIAGNOSIS — E11.65 TYPE 2 DIABETES MELLITUS WITH HYPERGLYCEMIA, WITHOUT LONG-TERM CURRENT USE OF INSULIN (HCC): ICD-10-CM

## 2021-10-08 DIAGNOSIS — Z12.5 SCREENING FOR PROSTATE CANCER: ICD-10-CM

## 2021-10-08 DIAGNOSIS — E11.65 TYPE 2 DIABETES MELLITUS WITH HYPERGLYCEMIA, WITHOUT LONG-TERM CURRENT USE OF INSULIN (HCC): Primary | ICD-10-CM

## 2021-10-08 DIAGNOSIS — R79.89 ELEVATED SERUM CREATININE: ICD-10-CM

## 2021-10-08 DIAGNOSIS — Z23 IMMUNIZATION DUE: ICD-10-CM

## 2021-10-08 LAB
ALBUMIN SERPL-MCNC: 4 G/DL (ref 3.5–5.2)
ALBUMIN UR-MCNC: 6.3 MG/DL
ALBUMIN/GLOB SERPL: 1.3 G/DL
ALP SERPL-CCNC: 159 U/L (ref 39–117)
ALT SERPL W P-5'-P-CCNC: 16 U/L (ref 1–41)
ANION GAP SERPL CALCULATED.3IONS-SCNC: 10.4 MMOL/L (ref 5–15)
AST SERPL-CCNC: 15 U/L (ref 1–40)
BASOPHILS # BLD AUTO: 0.03 10*3/MM3 (ref 0–0.2)
BASOPHILS NFR BLD AUTO: 0.5 % (ref 0–1.5)
BILIRUB SERPL-MCNC: 0.6 MG/DL (ref 0–1.2)
BILIRUB UR QL STRIP: NEGATIVE
BUN SERPL-MCNC: 21 MG/DL (ref 8–23)
BUN/CREAT SERPL: 16.2 (ref 7–25)
CALCIUM SPEC-SCNC: 9.4 MG/DL (ref 8.6–10.5)
CHLORIDE SERPL-SCNC: 97 MMOL/L (ref 98–107)
CLARITY UR: CLEAR
CO2 SERPL-SCNC: 26.6 MMOL/L (ref 22–29)
COLOR UR: YELLOW
CREAT SERPL-MCNC: 1.3 MG/DL (ref 0.76–1.27)
DEPRECATED RDW RBC AUTO: 46.2 FL (ref 37–54)
EOSINOPHIL # BLD AUTO: 0.1 10*3/MM3 (ref 0–0.4)
EOSINOPHIL NFR BLD AUTO: 1.5 % (ref 0.3–6.2)
ERYTHROCYTE [DISTWIDTH] IN BLOOD BY AUTOMATED COUNT: 13 % (ref 12.3–15.4)
GFR SERPL CREATININE-BSD FRML MDRD: 53 ML/MIN/1.73
GLOBULIN UR ELPH-MCNC: 3.1 GM/DL
GLUCOSE SERPL-MCNC: 486 MG/DL (ref 65–99)
GLUCOSE UR STRIP-MCNC: ABNORMAL MG/DL
HBA1C MFR BLD: 9.9 % (ref 3.5–5.6)
HCT VFR BLD AUTO: 44.7 % (ref 37.5–51)
HGB BLD-MCNC: 14.1 G/DL (ref 13–17.7)
HGB UR QL STRIP.AUTO: NEGATIVE
IMM GRANULOCYTES # BLD AUTO: 0.02 10*3/MM3 (ref 0–0.05)
IMM GRANULOCYTES NFR BLD AUTO: 0.3 % (ref 0–0.5)
KETONES UR QL STRIP: NEGATIVE
LEUKOCYTE ESTERASE UR QL STRIP.AUTO: NEGATIVE
LYMPHOCYTES # BLD AUTO: 1.78 10*3/MM3 (ref 0.7–3.1)
LYMPHOCYTES NFR BLD AUTO: 26.8 % (ref 19.6–45.3)
MCH RBC QN AUTO: 30.4 PG (ref 26.6–33)
MCHC RBC AUTO-ENTMCNC: 31.5 G/DL (ref 31.5–35.7)
MCV RBC AUTO: 96.3 FL (ref 79–97)
MONOCYTES # BLD AUTO: 0.48 10*3/MM3 (ref 0.1–0.9)
MONOCYTES NFR BLD AUTO: 7.2 % (ref 5–12)
NEUTROPHILS NFR BLD AUTO: 4.22 10*3/MM3 (ref 1.7–7)
NEUTROPHILS NFR BLD AUTO: 63.7 % (ref 42.7–76)
NITRITE UR QL STRIP: NEGATIVE
NRBC BLD AUTO-RTO: 0 /100 WBC (ref 0–0.2)
PH UR STRIP.AUTO: 6.5 [PH] (ref 5–8)
PLATELET # BLD AUTO: 142 10*3/MM3 (ref 140–450)
PMV BLD AUTO: 10.6 FL (ref 6–12)
POTASSIUM SERPL-SCNC: 5.4 MMOL/L (ref 3.5–5.2)
PROT SERPL-MCNC: 7.1 G/DL (ref 6–8.5)
PROT UR QL STRIP: ABNORMAL
PSA SERPL-MCNC: <0.014 NG/ML (ref 0–4)
RBC # BLD AUTO: 4.64 10*6/MM3 (ref 4.14–5.8)
SODIUM SERPL-SCNC: 134 MMOL/L (ref 136–145)
SP GR UR STRIP: 1.02 (ref 1–1.03)
TSH SERPL DL<=0.05 MIU/L-ACNC: 3.45 UIU/ML (ref 0.27–4.2)
UROBILINOGEN UR QL STRIP: ABNORMAL
WBC # BLD AUTO: 6.63 10*3/MM3 (ref 3.4–10.8)

## 2021-10-08 PROCEDURE — G0008 ADMIN INFLUENZA VIRUS VAC: HCPCS | Performed by: NURSE PRACTITIONER

## 2021-10-08 PROCEDURE — 81003 URINALYSIS AUTO W/O SCOPE: CPT | Performed by: NURSE PRACTITIONER

## 2021-10-08 PROCEDURE — 80053 COMPREHEN METABOLIC PANEL: CPT | Performed by: NURSE PRACTITIONER

## 2021-10-08 PROCEDURE — 83036 HEMOGLOBIN GLYCOSYLATED A1C: CPT | Performed by: NURSE PRACTITIONER

## 2021-10-08 PROCEDURE — G0103 PSA SCREENING: HCPCS | Performed by: NURSE PRACTITIONER

## 2021-10-08 PROCEDURE — 84443 ASSAY THYROID STIM HORMONE: CPT | Performed by: NURSE PRACTITIONER

## 2021-10-08 PROCEDURE — 90662 IIV NO PRSV INCREASED AG IM: CPT | Performed by: NURSE PRACTITIONER

## 2021-10-08 PROCEDURE — 85025 COMPLETE CBC W/AUTO DIFF WBC: CPT | Performed by: NURSE PRACTITIONER

## 2021-10-08 PROCEDURE — 99213 OFFICE O/P EST LOW 20 MIN: CPT | Performed by: NURSE PRACTITIONER

## 2021-10-08 PROCEDURE — 82043 UR ALBUMIN QUANTITATIVE: CPT | Performed by: NURSE PRACTITIONER

## 2021-10-08 PROCEDURE — 36415 COLL VENOUS BLD VENIPUNCTURE: CPT

## 2021-10-08 RX ORDER — MONTELUKAST SODIUM 4 MG/1
1 TABLET, CHEWABLE ORAL 2 TIMES DAILY
COMMUNITY

## 2021-10-08 NOTE — PROGRESS NOTES
"Chief Complaint  Diabetes and Shortness of Breath    Subjective          Cisco Frank presents to [facility]  History of Present Illness  Here today with c/o shortness of breath and sleepy since starting metformin  Spoke with Dr. Patton and was advised to stop and restart it -   He is currently taking 250mg twice daily    H/o HTN, CAD/MI, hyperlipidemia, T2DM, GERD, wheat's esophagus, CRI, bladder ca, prostate ca  Meds: atenolol, lipitor, glimiperide, metformin, zegrid, asa, niacin, omega-3, asa    Tells me that he feels short of breath all of the time  Previous smoker, quit 1980  He tells me that he does not feel short of breath, but his wife notices that he seems to breathe loudly    Checks blood sugar in the mornings before eating, today was 160 - has been running 140-230, usually around 160  Walks every day for exercise    Review of cardiology note from 7/2021 has note of mild pulmonary hypertension, borderline evidence of prolapse of posterior leaflet of there mitral valve    Wants to discontinue the metformin as he feels it is causing him side effects of fatigue, will check labs    Objective   Vital Signs:   /80 (BP Location: Right arm)   Pulse 97   Temp 96.6 °F (35.9 °C) (Infrared)   Resp 16   Ht 177.8 cm (70\")   Wt 90.3 kg (199 lb)   SpO2 96%   BMI 28.55 kg/m²     Repeat /68  Tells me that this morning at home was 125/78    Physical Exam  Constitutional:       Appearance: Normal appearance.   Neck:      Vascular: No carotid bruit.   Cardiovascular:      Rate and Rhythm: Normal rate and regular rhythm.      Pulses: Normal pulses.      Heart sounds: Normal heart sounds.   Pulmonary:      Effort: Pulmonary effort is normal.      Breath sounds: Normal breath sounds.   Musculoskeletal:      Cervical back: Neck supple.      Right lower leg: No edema.      Left lower leg: No edema.   Skin:     General: Skin is warm.   Neurological:      Mental Status: He is " alert and oriented to person, place, and time.        Result Review :     CMP    CMP 12/7/20 2/26/21 6/11/21   Glucose 148 (A)  236 (A)   BUN 19  24 (A)   Creatinine 1.20 1.20 1.17   eGFR Non African Am 59 (A)  60 (A)   Sodium 141  136   Potassium 4.1  4.6   Chloride 102  99   Calcium 8.9  9.2   Albumin 4.00  3.60   Total Bilirubin 0.8  0.7   Alkaline Phosphatase 127 (A)  144 (A)   AST (SGOT) 15  18   ALT (SGPT) 12  18   (A) Abnormal value       Comments are available for some flowsheets but are not being displayed.           CBC    CBC 10/26/20   WBC 6.10   RBC 4.61   Hemoglobin 14.2   Hematocrit 40.7   MCV 88.3   MCH 30.9   MCHC 35.0   RDW 14.6   Platelets 158           Lipid Panel    Lipid Panel 12/7/20 6/11/21   Total Cholesterol 107 109   Triglycerides 263 (A) 256 (A)   HDL Cholesterol 33 (A) 33 (A)   VLDL Cholesterol 40 39   LDL Cholesterol  34 37   LDL/HDL Ratio 0.65 0.75   (A) Abnormal value                A1C Last 3 Results    HGBA1C Last 3 Results 10/26/20   Hemoglobin A1C 8.0 (A)   (A) Abnormal value                          Assessment and Plan    Diagnoses and all orders for this visit:    1. Type 2 diabetes mellitus with hyperglycemia, without long-term current use of insulin (HCC) (Primary)  -     Urinalysis With Culture If Indicated - Urine, Clean Catch; Future  -     CBC & Differential; Future  -     Comprehensive Metabolic Panel; Future  -     Hemoglobin A1c; Future  -     MicroAlbumin, Urine, Random - Urine, Clean Catch; Future  -     TSH; Future    2. Screening for prostate cancer  -     PSA Screen; Future    3. Immunization due  -     Fluzone High-Dose 65+yrs ([id number])        Follow Up   Return in about 3 months (around 1/8/2022) for Recheck diabetes.  Patient was given instructions and counseling regarding his condition or for health maintenance advice. Please see specific information pulled into the AVS if appropriate.

## 2021-10-12 ENCOUNTER — TELEPHONE (OUTPATIENT)
Dept: FAMILY MEDICINE CLINIC | Facility: CLINIC | Age: 81
End: 2021-10-12

## 2021-10-12 NOTE — TELEPHONE ENCOUNTER
Caller: ALBIN ELIAS    Relationship: Emergency Contact    Best call back number: [phone]    What medications are you currently taking:   Current Outpatient Medications on File Prior to Visit   Medication Sig Dispense Refill   • aspirin (ECOTRIN LOW STRENGTH) 81 MG EC tablet Take 1 tablet by mouth Daily.     • atenolol (TENORMIN) 25 MG tablet Take 1 tablet by mouth Daily. 90 tablet 3   • atorvastatin (LIPITOR) 40 MG tablet Take 1 tablet by mouth every night at bedtime. 90 tablet 3   • colestipol (COLESTID) 1 g tablet Take 1 g by mouth 2 (Two) Times a Day.     • glimepiride (AMARYL) 2 MG tablet Take 3 tablets by mouth Every Morning Before Breakfast for 90 days. 270 tablet 2   • metFORMIN (GLUCOPHAGE) 500 MG tablet Take 0.5 tablets by mouth 2 (Two) Times a Day With Meals for 90 days. 90 tablet 2   • Omega-3 Fatty Acids (FISH OIL) 1200 MG capsule capsule Take 1 capsule by mouth Daily.     • omeprazole-sodium bicarbonate (ZEGERID)  MG per capsule Take 1 capsule by mouth Daily.     • vitamin C (ASCORBIC ACID) 500 MG tablet Take 500 mg by mouth Daily.       No current facility-administered medications on file prior to visit.        Which medication are you concerned about: METFORMIN, GLIMIPERIDE    What are your concerns: SHORTNESS OF BREATH ON EXERTION. WORRIED ABOUT STAYING ON MEDICATION DUE TO HEARING IT CAN CAUSE KIDNEY PROBLEMS.    PATIENT WAS SEEN ON 10/8/2021 BY SUYAPA SCHMIDT BUT WOULD LIKE TO GET DR KENNEDY OPINION

## 2021-10-14 NOTE — TELEPHONE ENCOUNTER
I told Cisco to stop the Metformin.  He is convinced it is causing side effects.  Called and tradjenta 5mg one qd.      Its a Dpp-4 inhibitor

## 2021-10-28 ENCOUNTER — HOSPITAL ENCOUNTER (EMERGENCY)
Facility: HOSPITAL | Age: 81
Discharge: HOME | End: 2021-10-28
Admitting: EMERGENCY MEDICINE

## 2021-10-28 VITALS
WEIGHT: 201.28 LBS | BODY MASS INDEX: 28.82 KG/M2 | SYSTOLIC BLOOD PRESSURE: 139 MMHG | HEIGHT: 70 IN | DIASTOLIC BLOOD PRESSURE: 76 MMHG | HEART RATE: 72 BPM | RESPIRATION RATE: 18 BRPM | TEMPERATURE: 98.3 F | OXYGEN SATURATION: 98 %

## 2021-10-28 DIAGNOSIS — S50.819A ABRASION, FOREARM W/O INFECTION: Primary | ICD-10-CM

## 2021-10-28 PROCEDURE — 99282 EMERGENCY DEPT VISIT SF MDM: CPT

## 2022-01-14 ENCOUNTER — LAB (OUTPATIENT)
Dept: FAMILY MEDICINE CLINIC | Facility: CLINIC | Age: 82
End: 2022-01-14

## 2022-01-14 ENCOUNTER — OFFICE VISIT (OUTPATIENT)
Dept: FAMILY MEDICINE CLINIC | Facility: CLINIC | Age: 82
End: 2022-01-14

## 2022-01-14 VITALS
SYSTOLIC BLOOD PRESSURE: 124 MMHG | BODY MASS INDEX: 28.06 KG/M2 | WEIGHT: 196 LBS | HEIGHT: 70 IN | TEMPERATURE: 96.6 F | HEART RATE: 78 BPM | DIASTOLIC BLOOD PRESSURE: 80 MMHG | RESPIRATION RATE: 16 BRPM | OXYGEN SATURATION: 98 %

## 2022-01-14 DIAGNOSIS — R79.89 ELEVATED SERUM CREATININE: ICD-10-CM

## 2022-01-14 DIAGNOSIS — E11.65 TYPE 2 DIABETES MELLITUS WITH HYPERGLYCEMIA, WITHOUT LONG-TERM CURRENT USE OF INSULIN: ICD-10-CM

## 2022-01-14 DIAGNOSIS — E11.65 TYPE 2 DIABETES MELLITUS WITH HYPERGLYCEMIA, WITHOUT LONG-TERM CURRENT USE OF INSULIN: Primary | ICD-10-CM

## 2022-01-14 PROBLEM — E11.3299 MILD NONPROLIFERATIVE DIABETIC RETINOPATHY ASSOCIATED WITH TYPE 2 DIABETES MELLITUS: Status: ACTIVE | Noted: 2021-05-21

## 2022-01-14 PROBLEM — Z96.1 BILATERAL PSEUDOPHAKIA: Status: ACTIVE | Noted: 2019-05-03

## 2022-01-14 PROBLEM — H43.819 POSTERIOR VITREOUS DETACHMENT: Status: ACTIVE | Noted: 2018-04-10

## 2022-01-14 LAB
ALBUMIN SERPL-MCNC: 3.8 G/DL (ref 3.5–5.2)
ALBUMIN/GLOB SERPL: 1.2 G/DL
ALP SERPL-CCNC: 147 U/L (ref 39–117)
ALT SERPL W P-5'-P-CCNC: 13 U/L (ref 1–41)
ANION GAP SERPL CALCULATED.3IONS-SCNC: 9.4 MMOL/L (ref 5–15)
AST SERPL-CCNC: 13 U/L (ref 1–40)
BASOPHILS # BLD AUTO: 0.04 10*3/MM3 (ref 0–0.2)
BASOPHILS NFR BLD AUTO: 0.5 % (ref 0–1.5)
BILIRUB SERPL-MCNC: 0.5 MG/DL (ref 0–1.2)
BUN SERPL-MCNC: 22 MG/DL (ref 8–23)
BUN/CREAT SERPL: 18 (ref 7–25)
CALCIUM SPEC-SCNC: 9.2 MG/DL (ref 8.6–10.5)
CHLORIDE SERPL-SCNC: 96 MMOL/L (ref 98–107)
CO2 SERPL-SCNC: 25.6 MMOL/L (ref 22–29)
CREAT SERPL-MCNC: 1.22 MG/DL (ref 0.76–1.27)
DEPRECATED RDW RBC AUTO: 43.8 FL (ref 37–54)
EOSINOPHIL # BLD AUTO: 0.19 10*3/MM3 (ref 0–0.4)
EOSINOPHIL NFR BLD AUTO: 2.3 % (ref 0.3–6.2)
ERYTHROCYTE [DISTWIDTH] IN BLOOD BY AUTOMATED COUNT: 12.7 % (ref 12.3–15.4)
GFR SERPL CREATININE-BSD FRML MDRD: 57 ML/MIN/1.73
GLOBULIN UR ELPH-MCNC: 3.2 GM/DL
GLUCOSE SERPL-MCNC: 450 MG/DL (ref 65–99)
HBA1C MFR BLD: 8.8 % (ref 3.5–5.6)
HCT VFR BLD AUTO: 43.9 % (ref 37.5–51)
HGB BLD-MCNC: 14.1 G/DL (ref 13–17.7)
IMM GRANULOCYTES # BLD AUTO: 0.08 10*3/MM3 (ref 0–0.05)
IMM GRANULOCYTES NFR BLD AUTO: 1 % (ref 0–0.5)
LYMPHOCYTES # BLD AUTO: 1.64 10*3/MM3 (ref 0.7–3.1)
LYMPHOCYTES NFR BLD AUTO: 20 % (ref 19.6–45.3)
MCH RBC QN AUTO: 30.2 PG (ref 26.6–33)
MCHC RBC AUTO-ENTMCNC: 32.1 G/DL (ref 31.5–35.7)
MCV RBC AUTO: 94 FL (ref 79–97)
MONOCYTES # BLD AUTO: 0.54 10*3/MM3 (ref 0.1–0.9)
MONOCYTES NFR BLD AUTO: 6.6 % (ref 5–12)
NEUTROPHILS NFR BLD AUTO: 5.73 10*3/MM3 (ref 1.7–7)
NEUTROPHILS NFR BLD AUTO: 69.6 % (ref 42.7–76)
NRBC BLD AUTO-RTO: 0.1 /100 WBC (ref 0–0.2)
PLATELET # BLD AUTO: 172 10*3/MM3 (ref 140–450)
PMV BLD AUTO: 10.3 FL (ref 6–12)
POTASSIUM SERPL-SCNC: 5 MMOL/L (ref 3.5–5.2)
PROT SERPL-MCNC: 7 G/DL (ref 6–8.5)
RBC # BLD AUTO: 4.67 10*6/MM3 (ref 4.14–5.8)
SODIUM SERPL-SCNC: 131 MMOL/L (ref 136–145)
WBC NRBC COR # BLD: 8.22 10*3/MM3 (ref 3.4–10.8)

## 2022-01-14 PROCEDURE — 99213 OFFICE O/P EST LOW 20 MIN: CPT | Performed by: FAMILY MEDICINE

## 2022-01-14 PROCEDURE — 85025 COMPLETE CBC W/AUTO DIFF WBC: CPT | Performed by: FAMILY MEDICINE

## 2022-01-14 PROCEDURE — 36415 COLL VENOUS BLD VENIPUNCTURE: CPT | Performed by: FAMILY MEDICINE

## 2022-01-14 PROCEDURE — 80053 COMPREHEN METABOLIC PANEL: CPT | Performed by: FAMILY MEDICINE

## 2022-01-14 PROCEDURE — 83036 HEMOGLOBIN GLYCOSYLATED A1C: CPT | Performed by: FAMILY MEDICINE

## 2022-01-14 RX ORDER — AMOXICILLIN 500 MG/1
1000 CAPSULE ORAL 3 TIMES DAILY
Qty: 42 CAPSULE | Refills: 2 | Status: SHIPPED | OUTPATIENT
Start: 2022-01-14 | End: 2022-01-15 | Stop reason: SDUPTHER

## 2022-01-14 NOTE — PROGRESS NOTES
"The patient has consented to being recorded using DONA.    Chief Complaint  Diabetes  The patient comes in today accompanied by his wife who contributes to his history. His wife states that she thinks he has been short of breath, and wonders if it is attributed to the glimepiride, but the patient denies being short of breath. He walked 1 mile this morning, and walked up steps with no problem.     He takes 2 glimepiride in the morning, and 1 in the evening. His sugars are well-controlled on this medication regimen averaging about 145 in the morning.     He has received all 3 COVID vaccines. He was also tested 01/10/2022 for COVID, which was negative.    He lies pretty flat at night, and denies orthopnea.     He states that last week after an episode with GERD he developed a sore throat, and took Robitussin cough syrup, which helped with the sore throat, and he was able to expectorate phlegm. He requests an antibiotic due to the phlegm. He adds that taking the Robitussin helps him sleep.    He has an appointment with Dr. Garsia on 04/25/2022.      Subjective          Cisco Frank presents to [facility]  For follow up DM. He has noticed being SOA.         Objective   Vital Signs:   /80 (BP Location: Left arm)   Pulse 78   Temp 96.6 °F (35.9 °C) (Infrared)   Resp 16   Ht 177.8 cm (70\")   Wt 88.9 kg (196 lb)   SpO2 98%   BMI 28.12 kg/m²     Physical Exam  Constitutional:       Appearance: Normal appearance. He is well-developed and normal weight.   HENT:      Head: Normocephalic and atraumatic.      Right Ear: Tympanic membrane, ear canal and external ear normal.      Left Ear: Tympanic membrane, ear canal and external ear normal.      Nose: Nose normal.      Mouth/Throat:      Mouth: Mucous membranes are moist.      Pharynx: Oropharynx is clear. No oropharyngeal exudate.   Eyes:      Extraocular Movements: Extraocular movements intact.      Conjunctiva/sclera: Conjunctivae " normal.      Pupils: Pupils are equal, round, and reactive to light.   Cardiovascular:      Rate and Rhythm: Normal rate and regular rhythm.      Pulses: Normal pulses.      Heart sounds: Normal heart sounds.   Pulmonary:      Effort: Pulmonary effort is normal.      Breath sounds: Normal breath sounds.   Abdominal:      General: Bowel sounds are normal.      Palpations: Abdomen is soft.   Musculoskeletal:         General: Normal range of motion.      Cervical back: Normal range of motion and neck supple.   Skin:     General: Skin is warm and dry.   Neurological:      General: No focal deficit present.      Mental Status: He is alert and oriented to person, place, and time. Mental status is at baseline.   Psychiatric:         Mood and Affect: Mood normal.         Behavior: Behavior normal.         Thought Content: Thought content normal.         Judgment: Judgment normal.        Result Review :                 Assessment and Plan    There are no diagnoses linked to this encounter.     1. Type 2 diabetes   The patient will have his blood work today, and we will see what his HbA1c is. Cisco has always let his HbA1c run in the 8 range, sometimes in the low 9s. He has never been one to attempt tight control despite our encouragement. He feels well. He gets around, does everything he wants to do. He is not having any worrisome signs or symptoms right now. His wife was concerned about some of his breathing patterns, but I really think he is going up, and down steps, and walking miles per day, and I do not think there is much going on.    2. Clear expectoration   I have written a prescription for amoxicillin 500 mg; 2 in the morning, 2 in the afternoon, and 2 at night, but asked that he wait a few days before taking it because I believe the phlegm is related to a virus.        Follow Up   Return in about 4 months (around 5/14/2022) for Medicare Wellness with labs the week before.  Patient was given instructions and  counseling regarding his condition or for health maintenance advice. Please see specific information pulled into the AVS if appropriate.     Transcribed from ambient dictation for Nuno Patton MD by Carolina Nielsen.  01/14/22   16:45 EST    Patient verbalized consent to the visit recording.  I have personally performed the services described in this document as transcribed by the above individual, and it is both accurate and complete.  Nuno Patton MD  1/20/2022  20:23 EST

## 2022-01-15 RX ORDER — AMOXICILLIN 500 MG/1
1000 CAPSULE ORAL 3 TIMES DAILY
Qty: 42 CAPSULE | Refills: 2 | Status: SHIPPED | OUTPATIENT
Start: 2022-01-15 | End: 2022-01-22

## 2022-01-15 RX ORDER — AMOXICILLIN 500 MG/1
1000 CAPSULE ORAL 3 TIMES DAILY
Qty: 42 CAPSULE | Refills: 2 | Status: SHIPPED | OUTPATIENT
Start: 2022-01-15 | End: 2022-01-15

## 2022-01-15 NOTE — PROGRESS NOTES
Cisco is going to take his glimepiride up to 4 mg twice daily and he is going to stay on Tradjenta 5 mg a day.  He did not tolerate metformin so he does not want to go back on that.  When he runs out of Tradjenta I am going to switch him to a GLP-1 agonist either in the form of an injectable once a week formulation or possibly the oral form.

## 2022-01-18 ENCOUNTER — TELEPHONE (OUTPATIENT)
Dept: FAMILY MEDICINE CLINIC | Facility: CLINIC | Age: 82
End: 2022-01-18

## 2022-01-18 NOTE — TELEPHONE ENCOUNTER
Caller: ALBIN ELIAS    Relationship to patient: Emergency Contact    Best call back number: [phone]    Patient is needing: PATIENT'S WIFE CALLED AND SAID THAT DR. YOUNG SPOKE TO THEM OVER THE WEEKEND ABOUT THE PATIENT'S BLOOD SUGAR BEING HIGH    SHE SAID THAT THE PATIENT HAD EATEN BREAKFAST BEFORE HE HAD HIS BLOOD WORK DONE THAT DAY AND THEY THINK THAT MAY HAVE MADE IT HIGHER THAN GALEN;    SHE SAID HE DID NOT REALLY WANT TO HAVE IT RE-CHECKED RIGHT NOW, BUT WANTED TO LET DR. YOUNG KNOW HE WAS NOT FASTING WHEN THE LAB WORK WAS DONE

## 2022-02-17 RX ORDER — GLIMEPIRIDE 2 MG/1
TABLET ORAL
Qty: 270 TABLET | Refills: 2 | Status: SHIPPED | OUTPATIENT
Start: 2022-02-17 | End: 2022-09-22

## 2022-02-24 ENCOUNTER — ON CAMPUS - OUTPATIENT (AMBULATORY)
Dept: URBAN - METROPOLITAN AREA HOSPITAL 2 | Facility: HOSPITAL | Age: 82
End: 2022-02-24

## 2022-02-24 ENCOUNTER — OFFICE (AMBULATORY)
Dept: URBAN - METROPOLITAN AREA PATHOLOGY 4 | Facility: PATHOLOGY | Age: 82
End: 2022-02-24

## 2022-02-24 ENCOUNTER — OFFICE (AMBULATORY)
Dept: URBAN - METROPOLITAN AREA PATHOLOGY 4 | Facility: PATHOLOGY | Age: 82
End: 2022-02-24
Payer: COMMERCIAL

## 2022-02-24 VITALS
HEART RATE: 81 BPM | SYSTOLIC BLOOD PRESSURE: 140 MMHG | SYSTOLIC BLOOD PRESSURE: 114 MMHG | HEIGHT: 70 IN | HEART RATE: 89 BPM | RESPIRATION RATE: 16 BRPM | SYSTOLIC BLOOD PRESSURE: 152 MMHG | DIASTOLIC BLOOD PRESSURE: 68 MMHG | DIASTOLIC BLOOD PRESSURE: 65 MMHG | OXYGEN SATURATION: 96 % | HEART RATE: 100 BPM | WEIGHT: 197 LBS | SYSTOLIC BLOOD PRESSURE: 127 MMHG | HEART RATE: 86 BPM | SYSTOLIC BLOOD PRESSURE: 133 MMHG | HEART RATE: 84 BPM | DIASTOLIC BLOOD PRESSURE: 90 MMHG | OXYGEN SATURATION: 100 % | OXYGEN SATURATION: 98 % | DIASTOLIC BLOOD PRESSURE: 77 MMHG | RESPIRATION RATE: 17 BRPM | RESPIRATION RATE: 12 BRPM | DIASTOLIC BLOOD PRESSURE: 81 MMHG | TEMPERATURE: 97.6 F | HEART RATE: 87 BPM | DIASTOLIC BLOOD PRESSURE: 55 MMHG | SYSTOLIC BLOOD PRESSURE: 119 MMHG | OXYGEN SATURATION: 97 %

## 2022-02-24 DIAGNOSIS — K44.9 DIAPHRAGMATIC HERNIA WITHOUT OBSTRUCTION OR GANGRENE: ICD-10-CM

## 2022-02-24 DIAGNOSIS — K31.89 OTHER DISEASES OF STOMACH AND DUODENUM: ICD-10-CM

## 2022-02-24 DIAGNOSIS — K22.70 BARRETT'S ESOPHAGUS WITHOUT DYSPLASIA: ICD-10-CM

## 2022-02-24 DIAGNOSIS — K31.7 POLYP OF STOMACH AND DUODENUM: ICD-10-CM

## 2022-02-24 LAB
GI HISTOLOGY: A. UNSPECIFIED: (no result)
GI HISTOLOGY: B. SELECT: (no result)
GI HISTOLOGY: PDF REPORT: (no result)

## 2022-02-24 PROCEDURE — 88305 TISSUE EXAM BY PATHOLOGIST: CPT | Mod: 26 | Performed by: INTERNAL MEDICINE

## 2022-02-24 PROCEDURE — 43239 EGD BIOPSY SINGLE/MULTIPLE: CPT | Performed by: INTERNAL MEDICINE

## 2022-04-25 ENCOUNTER — OFFICE VISIT (OUTPATIENT)
Dept: CARDIOLOGY | Facility: CLINIC | Age: 82
End: 2022-04-25

## 2022-04-25 VITALS
HEART RATE: 94 BPM | HEIGHT: 70 IN | OXYGEN SATURATION: 98 % | WEIGHT: 199 LBS | SYSTOLIC BLOOD PRESSURE: 113 MMHG | BODY MASS INDEX: 28.49 KG/M2 | DIASTOLIC BLOOD PRESSURE: 80 MMHG

## 2022-04-25 DIAGNOSIS — I25.10 CORONARY ARTERY DISEASE INVOLVING NATIVE CORONARY ARTERY OF NATIVE HEART WITHOUT ANGINA PECTORIS: ICD-10-CM

## 2022-04-25 DIAGNOSIS — E78.2 MIXED HYPERLIPIDEMIA: ICD-10-CM

## 2022-04-25 DIAGNOSIS — E11.65 TYPE 2 DIABETES MELLITUS WITH HYPERGLYCEMIA, WITHOUT LONG-TERM CURRENT USE OF INSULIN: ICD-10-CM

## 2022-04-25 DIAGNOSIS — I10 ESSENTIAL HYPERTENSION: Primary | ICD-10-CM

## 2022-04-25 PROCEDURE — 99214 OFFICE O/P EST MOD 30 MIN: CPT | Performed by: INTERNAL MEDICINE

## 2022-04-25 NOTE — PROGRESS NOTES
"    Subjective:     Encounter Date:04/25/2022      Patient ID: Cisco Frank is a 81 y.o. male.    Chief Complaint:  History of Present Illness 81-year-old white male with history of coronary disease hypertension diabetes and hyperlipidemia presents to my office for follow-up.  Patient is currently stable without any symptoms of chest pain but has some shortness of breath with exertion.  No complains any PND orthopnea.  No palpitation dizziness syncope he has some swelling of the feet but is taking his meds regular but he does not smoke but is trying to exercise regularly follows a good diet.    The following portions of the patient's history were reviewed and updated as appropriate: allergies, current medications, past family history, past medical history, past social history, past surgical history and problem list.  Past Medical History:   Diagnosis Date   • Cancer (HCC)    • Coronary artery disease    • History of bladder cancer    • Hyperlipidemia      Past Surgical History:   Procedure Laterality Date   • BLADDER SURGERY  10/26/2019    [facility] Dr. Cope   • CARDIAC CATHETERIZATION     • COLONOSCOPY     • COLONOSCOPY W/ POLYPECTOMY  02/23/2021   • SKIN CANCER EXCISION      right temple BX   • UPPER ENDOSCOPIC ULTRASOUND W/ FNA N/A 3/12/2021    Procedure: ESOPHAGOGASTRODUODENOSCOPY WITH endoscopic mucosal resection, biopsy x 1 area, and endoscopic ULTRASOUND;  Surgeon: Abner Infante MD;  Location: [facility] ENDOSCOPY;  Service: Gastroenterology;  Laterality: N/A;  post op: hiatal hernia, duodenal polyp     /80 (BP Location: Left arm, Patient Position: Sitting, Cuff Size: Adult)   Pulse 94   Ht 177.8 cm (70\")   Wt 90.3 kg (199 lb)   SpO2 98%   BMI 28.55 kg/m²   Family History   Problem Relation Age of Onset   • Heart disease Mother    • Heart attack Father    • No Known Problems Sister    • No Known Problems Brother    • No Known Problems Maternal Aunt    • No Known Problems Maternal Uncle    • No Known " Problems Paternal Aunt    • No Known Problems Paternal Uncle    • No Known Problems Maternal Grandmother    • No Known Problems Maternal Grandfather    • No Known Problems Paternal Grandmother    • No Known Problems Paternal Grandfather    • No Known Problems Other    • Anemia Neg Hx    • Arrhythmia Neg Hx    • Asthma Neg Hx    • Clotting disorder Neg Hx    • Fainting Neg Hx    • Heart failure Neg Hx    • Hyperlipidemia Neg Hx    • Hypertension Neg Hx        Current Outpatient Medications:   •  aspirin (aspirin) 81 MG EC tablet, Take 1 tablet by mouth Daily., Disp: , Rfl:   •  atenolol (TENORMIN) 25 MG tablet, Take 1 tablet by mouth Daily., Disp: 90 tablet, Rfl: 3  •  atorvastatin (LIPITOR) 40 MG tablet, Take 1 tablet by mouth every night at bedtime., Disp: 90 tablet, Rfl: 3  •  colestipol (COLESTID) 1 g tablet, Take 1 g by mouth 2 (Two) Times a Day., Disp: , Rfl:   •  glimepiride (AMARYL) 2 MG tablet, TAKE 3 TABLETS EVERY MORNING BEFORE BREAKFAST, Disp: 270 tablet, Rfl: 2  •  Omega-3 Fatty Acids (FISH OIL) 1200 MG capsule capsule, Take 1 capsule by mouth Daily., Disp: , Rfl:   •  omeprazole-sodium bicarbonate (ZEGERID)  MG per capsule, Take 1 capsule by mouth Daily. prn, Disp: , Rfl:   •  vitamin C (ASCORBIC ACID) 500 MG tablet, Take 500 mg by mouth Daily., Disp: , Rfl:   •  linagliptin (TRADJENTA) 5 MG tablet tablet, Take 1 tablet by mouth Daily for 30 days., Disp: 90 tablet, Rfl: 1  No Known Allergies  Social History     Socioeconomic History   • Marital status:    Tobacco Use   • Smoking status: Former Smoker     Quit date: 1980     Years since quittin.3   • Smokeless tobacco: Never Used   Vaping Use   • Vaping Use: Never used   Substance and Sexual Activity   • Alcohol use: Not Currently   • Drug use: Never   • Sexual activity: Defer     Review of Systems   Constitutional: Positive for malaise/fatigue. Negative for fever.   Cardiovascular: Positive for leg swelling. Negative for chest  pain, dyspnea on exertion and palpitations.   Respiratory: Positive for shortness of breath. Negative for cough.    Skin: Negative for rash.   Gastrointestinal: Negative for abdominal pain, nausea and vomiting.   Neurological: Negative for focal weakness and headaches.   All other systems reviewed and are negative.             Objective:     Constitutional:       Appearance: Well-developed.   Eyes:      General: No scleral icterus.     Conjunctiva/sclera: Conjunctivae normal.   HENT:      Head: Normocephalic and atraumatic.   Neck:      Vascular: No carotid bruit or JVD.   Pulmonary:      Effort: Pulmonary effort is normal.      Breath sounds: Normal breath sounds. No wheezing. No rales.   Cardiovascular:      Normal rate. Regular rhythm.   Pulses:     Intact distal pulses.   Abdominal:      General: Bowel sounds are normal.      Palpations: Abdomen is soft.   Musculoskeletal:      Cervical back: Normal range of motion and neck supple. Skin:     General: Skin is warm and dry.      Findings: No rash.   Neurological:      Mental Status: Alert.       Procedures    Lab Review:         MDM  1.  Coronary disease  Patient has nonobstructive disease now and is currently stable on medications with normal V function  2.  Hypertension  Patient blood pressure currently stable on atenolol  3.  Hyperlipidemia  Patient is on Lipitor  4.  Diabetes  Patient is on oral medicines and followed by the primary care doctor      Patient's previous medical records, labs, and EKG were reviewed and discussed with the patient at today's visit.

## 2022-05-13 ENCOUNTER — HOSPITAL ENCOUNTER (OUTPATIENT)
Dept: CARDIOLOGY | Facility: HOSPITAL | Age: 82
Discharge: HOME | End: 2022-05-13

## 2022-05-13 ENCOUNTER — TRANSCRIBE ORDERS (OUTPATIENT)
Dept: ADMINISTRATIVE | Facility: HOSPITAL | Age: 82
End: 2022-05-13

## 2022-05-13 ENCOUNTER — LAB (OUTPATIENT)
Dept: LAB | Facility: HOSPITAL | Age: 82
End: 2022-05-13

## 2022-05-13 DIAGNOSIS — C67.9 MALIGNANT NEOPLASM OF URINARY BLADDER, UNSPECIFIED SITE: Primary | ICD-10-CM

## 2022-05-13 DIAGNOSIS — C67.9 MALIGNANT NEOPLASM OF URINARY BLADDER, UNSPECIFIED SITE: ICD-10-CM

## 2022-05-13 DIAGNOSIS — Z01.818 PRE-OP TESTING: ICD-10-CM

## 2022-05-13 LAB
ANION GAP SERPL CALCULATED.3IONS-SCNC: 11.5 MMOL/L (ref 5–15)
BASOPHILS # BLD AUTO: 0.04 10*3/MM3 (ref 0–0.2)
BASOPHILS NFR BLD AUTO: 0.6 % (ref 0–1.5)
BUN SERPL-MCNC: 22 MG/DL (ref 8–23)
BUN/CREAT SERPL: 15.4 (ref 7–25)
CALCIUM SPEC-SCNC: 9.4 MG/DL (ref 8.6–10.5)
CHLORIDE SERPL-SCNC: 98 MMOL/L (ref 98–107)
CO2 SERPL-SCNC: 27.5 MMOL/L (ref 22–29)
CREAT SERPL-MCNC: 1.43 MG/DL (ref 0.76–1.27)
DEPRECATED RDW RBC AUTO: 43.7 FL (ref 37–54)
EGFRCR SERPLBLD CKD-EPI 2021: 49.8 ML/MIN/1.73
EOSINOPHIL # BLD AUTO: 0.22 10*3/MM3 (ref 0–0.4)
EOSINOPHIL NFR BLD AUTO: 3.4 % (ref 0.3–6.2)
ERYTHROCYTE [DISTWIDTH] IN BLOOD BY AUTOMATED COUNT: 13 % (ref 12.3–15.4)
GLUCOSE SERPL-MCNC: 190 MG/DL (ref 65–99)
HCT VFR BLD AUTO: 42.7 % (ref 37.5–51)
HGB BLD-MCNC: 13.9 G/DL (ref 13–17.7)
IMM GRANULOCYTES # BLD AUTO: 0.03 10*3/MM3 (ref 0–0.05)
IMM GRANULOCYTES NFR BLD AUTO: 0.5 % (ref 0–0.5)
LYMPHOCYTES # BLD AUTO: 1.45 10*3/MM3 (ref 0.7–3.1)
LYMPHOCYTES NFR BLD AUTO: 22.7 % (ref 19.6–45.3)
MCH RBC QN AUTO: 29.8 PG (ref 26.6–33)
MCHC RBC AUTO-ENTMCNC: 32.6 G/DL (ref 31.5–35.7)
MCV RBC AUTO: 91.6 FL (ref 79–97)
MONOCYTES # BLD AUTO: 0.54 10*3/MM3 (ref 0.1–0.9)
MONOCYTES NFR BLD AUTO: 8.5 % (ref 5–12)
NEUTROPHILS NFR BLD AUTO: 4.11 10*3/MM3 (ref 1.7–7)
NEUTROPHILS NFR BLD AUTO: 64.3 % (ref 42.7–76)
NRBC BLD AUTO-RTO: 0 /100 WBC (ref 0–0.2)
PLATELET # BLD AUTO: 151 10*3/MM3 (ref 140–450)
PMV BLD AUTO: 10.3 FL (ref 6–12)
POTASSIUM SERPL-SCNC: 4.8 MMOL/L (ref 3.5–5.2)
RBC # BLD AUTO: 4.66 10*6/MM3 (ref 4.14–5.8)
SODIUM SERPL-SCNC: 137 MMOL/L (ref 136–145)
WBC NRBC COR # BLD: 6.39 10*3/MM3 (ref 3.4–10.8)

## 2022-05-13 PROCEDURE — 93005 ELECTROCARDIOGRAM TRACING: CPT | Performed by: UROLOGY

## 2022-05-13 PROCEDURE — 36415 COLL VENOUS BLD VENIPUNCTURE: CPT

## 2022-05-13 PROCEDURE — 93010 ELECTROCARDIOGRAM REPORT: CPT | Performed by: INTERNAL MEDICINE

## 2022-05-13 PROCEDURE — 80048 BASIC METABOLIC PNL TOTAL CA: CPT

## 2022-05-13 PROCEDURE — 85025 COMPLETE CBC W/AUTO DIFF WBC: CPT

## 2022-05-14 LAB — QT INTERVAL: 354 MS

## 2022-05-15 RX ORDER — ORAL SEMAGLUTIDE 3 MG/1
3 TABLET ORAL DAILY
Qty: 30 TABLET | Refills: 0 | Status: SHIPPED | OUTPATIENT
Start: 2022-05-15 | End: 2022-06-13 | Stop reason: SINTOL

## 2022-05-15 NOTE — PROGRESS NOTES
Tell Cisco to finish the Tradjenta is currently taking and then switch to Rybelsus which is 3 mg once a day.  When he is about done with his first month call me and we will increase the dose of Rybelsus up to 7 mg if he is tolerating it well.  Everything else stays the same except he stops the Tradjenta.

## 2022-05-16 NOTE — PROGRESS NOTES
Spoke to wife. He just picked up the tradjenta, I told her to have him finish that so they are not wasting any money. also mailed out copy of instructions

## 2022-05-20 ENCOUNTER — TELEPHONE (OUTPATIENT)
Dept: CARDIOLOGY | Facility: CLINIC | Age: 82
End: 2022-05-20

## 2022-05-20 NOTE — TELEPHONE ENCOUNTER
Called patient, new on set Atrial Fibrillation shown on EKG, Dr. Garsia did not clear patient. Left voicemail for patient asking him to call back to schedule an appointment soon to see him.

## 2022-05-20 NOTE — TELEPHONE ENCOUNTER
DR. HARRISON OLIVA  PH: [phone]  FAX: [phone]  CYSTOCOPY TRANS URETHRA RESECTION CIRCUMCISION  SCHEDULED: 5/26/22

## 2022-05-31 ENCOUNTER — TELEPHONE (OUTPATIENT)
Dept: FAMILY MEDICINE CLINIC | Facility: CLINIC | Age: 82
End: 2022-05-31

## 2022-05-31 NOTE — TELEPHONE ENCOUNTER
Caller: ALBIN ELIAS    Relationship: Emergency Contact    Best call back number: [phone]    Which medication are you concerned about: Rybelsus AND AMFELISA    Who prescribed you this medication: DR YOUNG     What are your concerns:WOULD LIKE TO KNOW IF HE NEEDS TO DISCONTINUE TAKING HIS glimepiride (AMARYL) 2 MG tablet AFTER HE STARTS THE Rybelsus

## 2022-06-08 ENCOUNTER — APPOINTMENT (OUTPATIENT)
Dept: GENERAL RADIOLOGY | Facility: HOSPITAL | Age: 82
End: 2022-06-08

## 2022-06-08 ENCOUNTER — TELEPHONE (OUTPATIENT)
Dept: FAMILY MEDICINE CLINIC | Facility: CLINIC | Age: 82
End: 2022-06-08

## 2022-06-08 ENCOUNTER — HOSPITAL ENCOUNTER (OUTPATIENT)
Facility: HOSPITAL | Age: 82
Setting detail: OBSERVATION
Discharge: HOME | End: 2022-06-10
Attending: EMERGENCY MEDICINE | Admitting: EMERGENCY MEDICINE

## 2022-06-08 DIAGNOSIS — R53.1 WEAKNESS: ICD-10-CM

## 2022-06-08 DIAGNOSIS — I48.91 NEW ONSET ATRIAL FIBRILLATION: ICD-10-CM

## 2022-06-08 DIAGNOSIS — R11.2 NAUSEA AND VOMITING, UNSPECIFIED VOMITING TYPE: Primary | ICD-10-CM

## 2022-06-08 PROBLEM — R11.15 PERSISTENT VOMITING: Status: ACTIVE | Noted: 2022-06-08

## 2022-06-08 LAB
ALBUMIN SERPL-MCNC: 3.4 G/DL (ref 3.5–5.2)
ALBUMIN/GLOB SERPL: 1 G/DL
ALP SERPL-CCNC: 117 U/L (ref 39–117)
ALT SERPL W P-5'-P-CCNC: 10 U/L (ref 1–41)
ANION GAP SERPL CALCULATED.3IONS-SCNC: 12 MMOL/L (ref 5–15)
AST SERPL-CCNC: 13 U/L (ref 1–40)
BACTERIA UR QL AUTO: ABNORMAL /HPF
BASOPHILS # BLD AUTO: 0 10*3/MM3 (ref 0–0.2)
BASOPHILS NFR BLD AUTO: 0.6 % (ref 0–1.5)
BILIRUB SERPL-MCNC: 0.8 MG/DL (ref 0–1.2)
BILIRUB UR QL STRIP: NEGATIVE
BUN SERPL-MCNC: 21 MG/DL (ref 8–23)
BUN/CREAT SERPL: 16.4 (ref 7–25)
CALCIUM SPEC-SCNC: 9.1 MG/DL (ref 8.6–10.5)
CHLORIDE SERPL-SCNC: 100 MMOL/L (ref 98–107)
CLARITY UR: CLEAR
CO2 SERPL-SCNC: 23 MMOL/L (ref 22–29)
COLOR UR: YELLOW
CREAT SERPL-MCNC: 1.28 MG/DL (ref 0.76–1.27)
DEPRECATED RDW RBC AUTO: 43.3 FL (ref 37–54)
EGFRCR SERPLBLD CKD-EPI 2021: 56.9 ML/MIN/1.73
EOSINOPHIL # BLD AUTO: 0.3 10*3/MM3 (ref 0–0.4)
EOSINOPHIL NFR BLD AUTO: 4.1 % (ref 0.3–6.2)
ERYTHROCYTE [DISTWIDTH] IN BLOOD BY AUTOMATED COUNT: 14.2 % (ref 12.3–15.4)
GLOBULIN UR ELPH-MCNC: 3.4 GM/DL
GLUCOSE BLDC GLUCOMTR-MCNC: 186 MG/DL (ref 70–105)
GLUCOSE SERPL-MCNC: 214 MG/DL (ref 65–99)
GLUCOSE UR STRIP-MCNC: ABNORMAL MG/DL
HCT VFR BLD AUTO: 42.8 % (ref 37.5–51)
HGB BLD-MCNC: 14.3 G/DL (ref 13–17.7)
HGB UR QL STRIP.AUTO: ABNORMAL
HYALINE CASTS UR QL AUTO: ABNORMAL /LPF
KETONES UR QL STRIP: ABNORMAL
LEUKOCYTE ESTERASE UR QL STRIP.AUTO: ABNORMAL
LYMPHOCYTES # BLD AUTO: 1 10*3/MM3 (ref 0.7–3.1)
LYMPHOCYTES NFR BLD AUTO: 13.5 % (ref 19.6–45.3)
MAGNESIUM SERPL-MCNC: 1.5 MG/DL (ref 1.6–2.4)
MCH RBC QN AUTO: 29.2 PG (ref 26.6–33)
MCHC RBC AUTO-ENTMCNC: 33.4 G/DL (ref 31.5–35.7)
MCV RBC AUTO: 87.3 FL (ref 79–97)
MONOCYTES # BLD AUTO: 0.5 10*3/MM3 (ref 0.1–0.9)
MONOCYTES NFR BLD AUTO: 7.2 % (ref 5–12)
NEUTROPHILS NFR BLD AUTO: 5.3 10*3/MM3 (ref 1.7–7)
NEUTROPHILS NFR BLD AUTO: 74.6 % (ref 42.7–76)
NITRITE UR QL STRIP: NEGATIVE
NRBC BLD AUTO-RTO: 0 /100 WBC (ref 0–0.2)
PH UR STRIP.AUTO: 6.5 [PH] (ref 5–8)
PLATELET # BLD AUTO: 154 10*3/MM3 (ref 140–450)
PMV BLD AUTO: 7.8 FL (ref 6–12)
POTASSIUM SERPL-SCNC: 4.3 MMOL/L (ref 3.5–5.2)
PROT SERPL-MCNC: 6.8 G/DL (ref 6–8.5)
PROT UR QL STRIP: ABNORMAL
RBC # BLD AUTO: 4.9 10*6/MM3 (ref 4.14–5.8)
RBC # UR STRIP: ABNORMAL /HPF
REF LAB TEST METHOD: ABNORMAL
SARS-COV-2 RNA RESP QL NAA+PROBE: NOT DETECTED
SODIUM SERPL-SCNC: 135 MMOL/L (ref 136–145)
SP GR UR STRIP: 1.02 (ref 1–1.03)
SQUAMOUS #/AREA URNS HPF: ABNORMAL /HPF
TROPONIN T SERPL-MCNC: <0.01 NG/ML (ref 0–0.03)
TROPONIN T SERPL-MCNC: <0.01 NG/ML (ref 0–0.03)
TSH SERPL DL<=0.05 MIU/L-ACNC: 4.04 UIU/ML (ref 0.27–4.2)
UROBILINOGEN UR QL STRIP: ABNORMAL
WBC # UR STRIP: ABNORMAL /HPF
WBC NRBC COR # BLD: 7.2 10*3/MM3 (ref 3.4–10.8)

## 2022-06-08 PROCEDURE — 25010000002 ONDANSETRON PER 1 MG: Performed by: EMERGENCY MEDICINE

## 2022-06-08 PROCEDURE — 25010000002 MAGNESIUM SULFATE 2 GM/50ML SOLUTION: Performed by: EMERGENCY MEDICINE

## 2022-06-08 PROCEDURE — 71045 X-RAY EXAM CHEST 1 VIEW: CPT

## 2022-06-08 PROCEDURE — 81001 URINALYSIS AUTO W/SCOPE: CPT | Performed by: EMERGENCY MEDICINE

## 2022-06-08 PROCEDURE — 93005 ELECTROCARDIOGRAM TRACING: CPT | Performed by: EMERGENCY MEDICINE

## 2022-06-08 PROCEDURE — 82962 GLUCOSE BLOOD TEST: CPT

## 2022-06-08 PROCEDURE — 96375 TX/PRO/DX INJ NEW DRUG ADDON: CPT

## 2022-06-08 PROCEDURE — G0378 HOSPITAL OBSERVATION PER HR: HCPCS

## 2022-06-08 PROCEDURE — 25010000002 ENOXAPARIN PER 10 MG: Performed by: EMERGENCY MEDICINE

## 2022-06-08 PROCEDURE — C9803 HOPD COVID-19 SPEC COLLECT: HCPCS

## 2022-06-08 PROCEDURE — 80053 COMPREHEN METABOLIC PANEL: CPT | Performed by: EMERGENCY MEDICINE

## 2022-06-08 PROCEDURE — 84443 ASSAY THYROID STIM HORMONE: CPT | Performed by: EMERGENCY MEDICINE

## 2022-06-08 PROCEDURE — 85025 COMPLETE CBC W/AUTO DIFF WBC: CPT | Performed by: EMERGENCY MEDICINE

## 2022-06-08 PROCEDURE — 96365 THER/PROPH/DIAG IV INF INIT: CPT

## 2022-06-08 PROCEDURE — U0003 INFECTIOUS AGENT DETECTION BY NUCLEIC ACID (DNA OR RNA); SEVERE ACUTE RESPIRATORY SYNDROME CORONAVIRUS 2 (SARS-COV-2) (CORONAVIRUS DISEASE [COVID-19]), AMPLIFIED PROBE TECHNIQUE, MAKING USE OF HIGH THROUGHPUT TECHNOLOGIES AS DESCRIBED BY CMS-2020-01-R: HCPCS | Performed by: EMERGENCY MEDICINE

## 2022-06-08 PROCEDURE — 96372 THER/PROPH/DIAG INJ SC/IM: CPT

## 2022-06-08 PROCEDURE — 96376 TX/PRO/DX INJ SAME DRUG ADON: CPT

## 2022-06-08 PROCEDURE — 83735 ASSAY OF MAGNESIUM: CPT | Performed by: EMERGENCY MEDICINE

## 2022-06-08 PROCEDURE — 99284 EMERGENCY DEPT VISIT MOD MDM: CPT

## 2022-06-08 PROCEDURE — 84484 ASSAY OF TROPONIN QUANT: CPT | Performed by: EMERGENCY MEDICINE

## 2022-06-08 RX ORDER — ASPIRIN 81 MG/1
81 TABLET, CHEWABLE ORAL DAILY
COMMUNITY
End: 2022-06-10 | Stop reason: HOSPADM

## 2022-06-08 RX ORDER — CHOLECALCIFEROL (VITAMIN D3) 125 MCG
5 CAPSULE ORAL NIGHTLY PRN
Status: DISCONTINUED | OUTPATIENT
Start: 2022-06-08 | End: 2022-06-10 | Stop reason: HOSPADM

## 2022-06-08 RX ORDER — MAGNESIUM SULFATE HEPTAHYDRATE 40 MG/ML
2 INJECTION, SOLUTION INTRAVENOUS ONCE
Status: COMPLETED | OUTPATIENT
Start: 2022-06-08 | End: 2022-06-08

## 2022-06-08 RX ORDER — SODIUM CHLORIDE 0.9 % (FLUSH) 0.9 %
10 SYRINGE (ML) INJECTION AS NEEDED
Status: DISCONTINUED | OUTPATIENT
Start: 2022-06-08 | End: 2022-06-10 | Stop reason: HOSPADM

## 2022-06-08 RX ORDER — SODIUM CHLORIDE 9 MG/ML
100 INJECTION, SOLUTION INTRAVENOUS CONTINUOUS
Status: DISCONTINUED | OUTPATIENT
Start: 2022-06-08 | End: 2022-06-10 | Stop reason: HOSPADM

## 2022-06-08 RX ORDER — ONDANSETRON 2 MG/ML
4 INJECTION INTRAMUSCULAR; INTRAVENOUS ONCE
Status: COMPLETED | OUTPATIENT
Start: 2022-06-08 | End: 2022-06-08

## 2022-06-08 RX ORDER — METOPROLOL TARTRATE 5 MG/5ML
5 INJECTION INTRAVENOUS ONCE
Status: COMPLETED | OUTPATIENT
Start: 2022-06-08 | End: 2022-06-08

## 2022-06-08 RX ORDER — ONDANSETRON 2 MG/ML
4 INJECTION INTRAMUSCULAR; INTRAVENOUS EVERY 6 HOURS PRN
Status: DISCONTINUED | OUTPATIENT
Start: 2022-06-08 | End: 2022-06-10 | Stop reason: HOSPADM

## 2022-06-08 RX ORDER — SODIUM CHLORIDE 0.9 % (FLUSH) 0.9 %
10 SYRINGE (ML) INJECTION EVERY 12 HOURS SCHEDULED
Status: DISCONTINUED | OUTPATIENT
Start: 2022-06-08 | End: 2022-06-10 | Stop reason: HOSPADM

## 2022-06-08 RX ORDER — ACETAMINOPHEN 325 MG/1
650 TABLET ORAL EVERY 4 HOURS PRN
Status: DISCONTINUED | OUTPATIENT
Start: 2022-06-08 | End: 2022-06-10 | Stop reason: HOSPADM

## 2022-06-08 RX ORDER — ENOXAPARIN SODIUM 100 MG/ML
1 INJECTION SUBCUTANEOUS ONCE
Status: COMPLETED | OUTPATIENT
Start: 2022-06-08 | End: 2022-06-08

## 2022-06-08 RX ADMIN — MAGNESIUM SULFATE HEPTAHYDRATE 2 G: 2 INJECTION, SOLUTION INTRAVENOUS at 20:01

## 2022-06-08 RX ADMIN — SODIUM CHLORIDE 100 ML/HR: 9 INJECTION, SOLUTION INTRAVENOUS at 20:06

## 2022-06-08 RX ADMIN — SODIUM CHLORIDE 500 ML: 9 INJECTION, SOLUTION INTRAVENOUS at 20:01

## 2022-06-08 RX ADMIN — ENOXAPARIN SODIUM 90 MG: 100 INJECTION SUBCUTANEOUS at 20:06

## 2022-06-08 RX ADMIN — SODIUM CHLORIDE 500 ML: 9 INJECTION, SOLUTION INTRAVENOUS at 16:57

## 2022-06-08 RX ADMIN — Medication 5 MG: at 20:57

## 2022-06-08 RX ADMIN — ONDANSETRON 4 MG: 2 INJECTION INTRAMUSCULAR; INTRAVENOUS at 20:02

## 2022-06-08 RX ADMIN — METOPROLOL TARTRATE 5 MG: 1 INJECTION, SOLUTION INTRAVENOUS at 20:10

## 2022-06-08 RX ADMIN — Medication 10 ML: at 20:01

## 2022-06-08 RX ADMIN — ONDANSETRON 4 MG: 2 INJECTION INTRAMUSCULAR; INTRAVENOUS at 16:57

## 2022-06-08 RX ADMIN — ONDANSETRON 4 MG: 2 INJECTION INTRAMUSCULAR; INTRAVENOUS at 23:43

## 2022-06-08 NOTE — TELEPHONE ENCOUNTER
Marylu tell Cisco to stop the medication.  Its not something he is going to be able to take obviously.  It works by slowing the stomach emptying down and he is just not tolerate that.  Tell him that the symptoms will take a few days to wear off.  We need to talk about other options if he still interested in trying to lose weight.  Unfortunately there is not a whole lot of options left.

## 2022-06-08 NOTE — TELEPHONE ENCOUNTER
Caller: ALBIN ELIAS    Relationship: Emergency Contact    Best call back number: [phone]    What medications are you currently taking:   Current Outpatient Medications on File Prior to Visit   Medication Sig Dispense Refill   • aspirin (aspirin) 81 MG EC tablet Take 1 tablet by mouth Daily.     • atenolol (TENORMIN) 25 MG tablet Take 1 tablet by mouth Daily. 90 tablet 3   • atorvastatin (LIPITOR) 40 MG tablet Take 1 tablet by mouth every night at bedtime. 90 tablet 3   • colestipol (COLESTID) 1 g tablet Take 1 g by mouth 2 (Two) Times a Day.     • glimepiride (AMARYL) 2 MG tablet TAKE 3 TABLETS EVERY MORNING BEFORE BREAKFAST 270 tablet 2   • Omega-3 Fatty Acids (FISH OIL) 1200 MG capsule capsule Take 1 capsule by mouth Daily.     • omeprazole-sodium bicarbonate (ZEGERID)  MG per capsule Take 1 capsule by mouth Daily. prn     • Semaglutide (Rybelsus) 3 MG tablet Take 1 tablet by mouth Daily for 30 days. Call when on last 4-5 days and we may increase dose. 30 tablet 0   • vitamin C (ASCORBIC ACID) 500 MG tablet Take 500 mg by mouth Daily.       No current facility-administered medications on file prior to visit.        Which medication are you concerned about:    Semaglutide (Rybelsus) 3 MG tablet     Who prescribed you this medication: DR YOUNG    What are your concerns: VOMITING FOR 1 DAY AND HAS BEEN VERY TIRED    How long have you had these concerns: STARTED NEW MEDICATION ON 6/1/22 AND HAS BEEN VERY FATIGUED

## 2022-06-08 NOTE — ED PROVIDER NOTES
Subjective   Chief complaint vomiting    History of present illness 81-year-old male who presents with a 2-day history of intermittent vomiting really not able to eat or drink anything.  No diarrhea no black or bloody stool no fever chills.  No chest pain neck arm jaw pain has had some generalized weakness and fatigue and mild shortness of breath.  No one in the home with similar illness no leg pain or swelling no urinary problems.  Patient reports he was supposed to be put to sleep for procedure that his urologist was going to do recently but he had some abnormality on his EKG and his been referred to cardiology who he sees Monday.  He denies any antibiotic use.  No dysuria or frequency.  No black or bloody stool no other complaints or associated symptoms 2 days continuous moderate severe she has been at home associated the above.  No significant abdominal pain patient states he just recently had his glimepiride discontinued and placed on rebellious          Review of Systems   Constitutional: Positive for fatigue. Negative for chills and fever.   HENT: Negative for congestion and sinus pressure.    Eyes: Negative for photophobia and visual disturbance.   Respiratory: Positive for shortness of breath. Negative for chest tightness.    Cardiovascular: Negative for chest pain and leg swelling.   Gastrointestinal: Positive for vomiting. Negative for abdominal pain and blood in stool.   Endocrine: Negative for cold intolerance and heat intolerance.   Genitourinary: Negative for difficulty urinating and dysuria.   Musculoskeletal: Negative for back pain and neck pain.   Skin: Negative for color change and rash.   Neurological: Positive for weakness. Negative for dizziness and light-headedness.   Psychiatric/Behavioral: Negative for agitation and behavioral problems.       Past Medical History:   Diagnosis Date   • Cancer (HCC)    • Coronary artery disease    • History of bladder cancer    • Hyperlipidemia        No Known  Allergies    Past Surgical History:   Procedure Laterality Date   • BLADDER SURGERY  10/26/2019    [facility] Dr. Cope   • CARDIAC CATHETERIZATION     • COLONOSCOPY     • COLONOSCOPY W/ POLYPECTOMY  2021   • SKIN CANCER EXCISION      right temple BX   • UPPER ENDOSCOPIC ULTRASOUND W/ FNA N/A 3/12/2021    Procedure: ESOPHAGOGASTRODUODENOSCOPY WITH endoscopic mucosal resection, biopsy x 1 area, and endoscopic ULTRASOUND;  Surgeon: Abner Infante MD;  Location: [facility] ENDOSCOPY;  Service: Gastroenterology;  Laterality: N/A;  post op: hiatal hernia, duodenal polyp       Family History   Problem Relation Age of Onset   • Heart disease Mother    • Heart attack Father    • No Known Problems Sister    • No Known Problems Brother    • No Known Problems Maternal Aunt    • No Known Problems Maternal Uncle    • No Known Problems Paternal Aunt    • No Known Problems Paternal Uncle    • No Known Problems Maternal Grandmother    • No Known Problems Maternal Grandfather    • No Known Problems Paternal Grandmother    • No Known Problems Paternal Grandfather    • No Known Problems Other    • Anemia Neg Hx    • Arrhythmia Neg Hx    • Asthma Neg Hx    • Clotting disorder Neg Hx    • Fainting Neg Hx    • Heart failure Neg Hx    • Hyperlipidemia Neg Hx    • Hypertension Neg Hx        Social History     Socioeconomic History   • Marital status:    Tobacco Use   • Smoking status: Former Smoker     Quit date: 1980     Years since quittin.4   • Smokeless tobacco: Never Used   Vaping Use   • Vaping Use: Never used   Substance and Sexual Activity   • Alcohol use: Not Currently   • Drug use: Never   • Sexual activity: Defer     Prior to Admission medications    Medication Sig Start Date End Date Taking? Authorizing Provider   aspirin 81 MG chewable tablet Chew 81 mg Daily. On hold for procedure   Yes Provider, MD Serenity   atenolol (TENORMIN) 25 MG tablet Take 1 tablet by mouth Daily. 21  Yes Hudson Smith  MD Tyron   atorvastatin (LIPITOR) 40 MG tablet Take 1 tablet by mouth every night at bedtime. 6/18/21  Yes Hudson Smith MD   colestipol (COLESTID) 1 g tablet Take 1 g by mouth 2 (Two) Times a Day.   Yes Serenity Hinds MD   glimepiride (AMARYL) 2 MG tablet TAKE 3 TABLETS EVERY MORNING BEFORE BREAKFAST 2/17/22  Yes Nuno Patton MD   Omega-3 Fatty Acids (FISH OIL) 1200 MG capsule capsule Take 1 capsule by mouth Daily. On hold for procedure 9/12/13  Yes Serenity Hinds MD   omeprazole-sodium bicarbonate (ZEGERID)  MG per capsule Take 1 capsule by mouth Daily. prn 10/16/19  Yes Serenity Hinds MD   Semaglutide (Rybelsus) 3 MG tablet Take 1 tablet by mouth Daily for 30 days. Call when on last 4-5 days and we may increase dose. 5/15/22 6/14/22 Yes Nuno Patton MD   vitamin C (ASCORBIC ACID) 500 MG tablet Take 500 mg by mouth Daily.   Yes Serenity Hinds MD   aspirin (aspirin) 81 MG EC tablet Take 1 tablet by mouth Daily. 9/12/13 6/8/22 Yes Serenity Hinds MD           Objective   Physical Exam  Constitutional 81-year-old awake alert no acute distress patient is in atrial fibrillation on the monitor.  Blood pressure 110/76 temperature 98 heart rate 115 sats at 98%.  HEENT extraocular muscles intact pupils equal react sclera clear mouth clear.  Neck supple no adenopathy no meningeal signs no JVD no bruits lungs clear no retraction.  Heart irregular tachycardic without murmur.  Abdomen soft nontender good bowel sounds no peritoneal findings or pulsatile masses.  Extremities pulses are equal throughout upper lower extremities no edema cords or Homans' sign no evidence of DVT.  Skin is warm and dry without rashes or cellulitic changes.  Neurologic he is awake alert and follows commands no facial asymmetry normal speech no drift the arms legs without focal weakness.  Procedures           ED Course      Results for orders placed or performed during the hospital  encounter of 06/08/22   COVID-19,CEPHEID/SERA,COR/LUIS ALBERTO/PAD/JOSE RAMON IN-HOUSE(OR EMERGENT/ADD-ON),NP SWAB IN TRANSPORT MEDIA 3-4 HR TAT, RT-PCR - Swab, Nasopharynx    Specimen: Nasopharynx; Swab   Result Value Ref Range    COVID19 Not Detected Not Detected - Ref. Range   Comprehensive Metabolic Panel    Specimen: Blood   Result Value Ref Range    Glucose 214 (H) 65 - 99 mg/dL    BUN 21 8 - 23 mg/dL    Creatinine 1.28 (H) 0.76 - 1.27 mg/dL    Sodium 135 (L) 136 - 145 mmol/L    Potassium 4.3 3.5 - 5.2 mmol/L    Chloride 100 98 - 107 mmol/L    CO2 23.0 22.0 - 29.0 mmol/L    Calcium 9.1 8.6 - 10.5 mg/dL    Total Protein 6.8 6.0 - 8.5 g/dL    Albumin 3.40 (L) 3.50 - 5.20 g/dL    ALT (SGPT) 10 1 - 41 U/L    AST (SGOT) 13 1 - 40 U/L    Alkaline Phosphatase 117 39 - 117 U/L    Total Bilirubin 0.8 0.0 - 1.2 mg/dL    Globulin 3.4 gm/dL    A/G Ratio 1.0 g/dL    BUN/Creatinine Ratio 16.4 7.0 - 25.0    Anion Gap 12.0 5.0 - 15.0 mmol/L    eGFR 56.9 (L) >60.0 mL/min/1.73   Urinalysis With Microscopic If Indicated (No Culture) - Urine, Clean Catch    Specimen: Urine, Clean Catch   Result Value Ref Range    Color, UA Yellow Yellow, Straw    Appearance, UA Clear Clear    pH, UA 6.5 5.0 - 8.0    Specific Gravity, UA 1.016 1.005 - 1.030    Glucose,  mg/dL (Trace) (A) Negative    Ketones, UA Trace (A) Negative    Bilirubin, UA Negative Negative    Blood, UA Small (1+) (A) Negative    Protein,  mg/dL (2+) (A) Negative    Leuk Esterase, UA Small (1+) (A) Negative    Nitrite, UA Negative Negative    Urobilinogen, UA 1.0 E.U./dL 0.2 - 1.0 E.U./dL   Troponin    Specimen: Blood   Result Value Ref Range    Troponin T <0.010 0.000 - 0.030 ng/mL   Magnesium    Specimen: Blood   Result Value Ref Range    Magnesium 1.5 (L) 1.6 - 2.4 mg/dL   TSH    Specimen: Blood   Result Value Ref Range    TSH 4.040 0.270 - 4.200 uIU/mL   CBC Auto Differential    Specimen: Blood   Result Value Ref Range    WBC 7.20 3.40 - 10.80 10*3/mm3    RBC 4.90 4.14 -  5.80 10*6/mm3    Hemoglobin 14.3 13.0 - 17.7 g/dL    Hematocrit 42.8 37.5 - 51.0 %    MCV 87.3 79.0 - 97.0 fL    MCH 29.2 26.6 - 33.0 pg    MCHC 33.4 31.5 - 35.7 g/dL    RDW 14.2 12.3 - 15.4 %    RDW-SD 43.3 37.0 - 54.0 fl    MPV 7.8 6.0 - 12.0 fL    Platelets 154 140 - 450 10*3/mm3    Neutrophil % 74.6 42.7 - 76.0 %    Lymphocyte % 13.5 (L) 19.6 - 45.3 %    Monocyte % 7.2 5.0 - 12.0 %    Eosinophil % 4.1 0.3 - 6.2 %    Basophil % 0.6 0.0 - 1.5 %    Neutrophils, Absolute 5.30 1.70 - 7.00 10*3/mm3    Lymphocytes, Absolute 1.00 0.70 - 3.10 10*3/mm3    Monocytes, Absolute 0.50 0.10 - 0.90 10*3/mm3    Eosinophils, Absolute 0.30 0.00 - 0.40 10*3/mm3    Basophils, Absolute 0.00 0.00 - 0.20 10*3/mm3    nRBC 0.0 0.0 - 0.2 /100 WBC   Urinalysis, Microscopic Only - Urine, Clean Catch    Specimen: Urine, Clean Catch   Result Value Ref Range    RBC, UA 6-12 (A) None Seen /HPF    WBC, UA 3-5 (A) None Seen /HPF    Bacteria, UA None Seen None Seen /HPF    Squamous Epithelial Cells, UA 0-2 None Seen, 0-2 /HPF    Hyaline Casts, UA None Seen None Seen /LPF    Methodology Automated Microscopy    Troponin    Specimen: Blood   Result Value Ref Range    Troponin T <0.010 0.000 - 0.030 ng/mL   POC Glucose Once    Specimen: Blood   Result Value Ref Range    Glucose 186 (H) 70 - 105 mg/dL   ECG 12 Lead   Result Value Ref Range    QT Interval 350 ms     XR Chest 1 View    Result Date: 6/8/2022   1. Cardiomegaly. 2. Large hiatal hernia. 3. No active pulmonary disease.  Electronically Signed By-Eagle Guzmán MD On:6/8/2022 5:35 PM This report was finalized on [id number] by  Eagle Guzmán MD.    Medications   sodium chloride 0.9 % flush 10 mL (has no administration in time range)   sodium chloride 0.9 % flush 10 mL (10 mL Intravenous Given 6/8/22 2001)   sodium chloride 0.9 % flush 10 mL (has no administration in time range)   acetaminophen (TYLENOL) tablet 650 mg (has no administration in time range)   ondansetron (ZOFRAN) injection 4 mg (4 mg  Intravenous Given 6/8/22 2343)   melatonin tablet 5 mg (5 mg Oral Given 6/8/22 2057)   sodium chloride 0.9 % infusion (100 mL/hr Intravenous Currently Infusing 6/9/22 0246)   sodium chloride 0.9 % bolus 500 mL (0 mL Intravenous Stopped 6/8/22 1740)   ondansetron (ZOFRAN) injection 4 mg (4 mg Intravenous Given 6/8/22 1657)   magnesium sulfate 2g/50 mL (PREMIX) infusion (0 g Intravenous Stopped 6/8/22 2205)   ondansetron (ZOFRAN) injection 4 mg (4 mg Intravenous Given 6/8/22 2002)   Enoxaparin Sodium (LOVENOX) syringe 90 mg (90 mg Subcutaneous Given 6/8/22 2006)   metoprolol tartrate (LOPRESSOR) injection 5 mg (5 mg Intravenous Given 6/8/22 2010)   sodium chloride 0.9 % bolus 500 mL (0 mL Intravenous Stopped 6/8/22 2117)          EKG my interpretation atrial fibrillation rate of 100 normal axis no hypertrophy nonspecific diffuse flattening ST segments and this is present on EKG back in May but prior to this he has had no atrial fibrillation.  Abnormal                                        MDM  Number of Diagnoses or Management Options  Nausea and vomiting, unspecified vomiting type: new and requires workup  New onset atrial fibrillation (HCC): new and requires workup  Weakness: new and requires workup  Diagnosis management comments: Medical decision making.  Patient IV established placed on monitor given 500 cc normal saline bolus given for Zofran IV and had the above exam evaluation.  EKG obtained reviewed by me showed atrial fibrillation rate of 100.  This is new onset as he is never had a history of this he states that he had an EKG done last month before he is going to have a urological procedure done but they cannot clear him sleep because of this atrial fibrillation he has not had it worked up yet he has an appoint with cardiology on Monday.  Is not on anticoagulation..  Prior to that his EKGs have been sinus rhythm when compared.  Labs obtained reviewed by me COVID-19 negative glucose 219.  Sodium 135  creatinine 1.28 liver lipase unremarkable urine had small leukocyte next 12 red cells.  Chemistries otherwise unremarkable.  Urine otherwise unremarkable magnesium 1.5.  TSH normal the CBC was normal troponin negative.  Chest x-ray reviewed by me as well as cardiology cardiomegaly and large hiatal hernia no active disease patient was still having some nausea but no vomiting conditional for Zofran IV magnesium 2 g IV.  He was given Lopressor 5 mg IV to control his rate into the 90s.  Still atrial fibrillation.  Patient covered Lovenox 1 mg/kg subcutaneous.  We talked about the findings.  He has new-onset atrial fibrillation which is new from starting last month.  He has been vomiting for the last couple days really cannot eat or drink anything.  His abdomen was soft without tenderness or cellulitis or acute intra-abdominal process.  No evidence of acute myocardial infarction or ischemia DVT or pulmonary embolism.  These are based on my physical findings and clinical exam.  He will be placed in observation is made aware of the findings stable otherwise unremarkable ER course.  All labs EKG chest x-ray reviewed by me.       Amount and/or Complexity of Data Reviewed  Clinical lab tests: reviewed  Tests in the radiology section of CPT®: reviewed    Risk of Complications, Morbidity, and/or Mortality  Presenting problems: high  Diagnostic procedures: high  Management options: high    Patient Progress  Patient progress: stable      Final diagnoses:   Nausea and vomiting, unspecified vomiting type   Weakness   New onset atrial fibrillation (HCC)       ED Disposition  ED Disposition     ED Disposition   Decision to Admit    Condition   --    Comment   --             No follow-up provider specified.       Medication List      ASK your doctor about these medications    aspirin 81 MG chewable tablet  Ask about: Which instructions should I use?             Alexandru Reilly MD  06/09/22 0253

## 2022-06-08 NOTE — ED NOTES
Pt states that he has been vomiting a lot the last couple days. Pt states that he has been drinking plenty of water. Pt also states that they recent changed his diabetic medications from Trajenta to Rybelus on June 1st. Pt c/o of abdominal tenderness on the LUQ and RUQ. Patient is alert and oriented.

## 2022-06-09 LAB
ANION GAP SERPL CALCULATED.3IONS-SCNC: 11 MMOL/L (ref 5–15)
BASOPHILS # BLD AUTO: 0 10*3/MM3 (ref 0–0.2)
BASOPHILS NFR BLD AUTO: 0.1 % (ref 0–1.5)
BUN SERPL-MCNC: 19 MG/DL (ref 8–23)
BUN/CREAT SERPL: 17 (ref 7–25)
CALCIUM SPEC-SCNC: 8.3 MG/DL (ref 8.6–10.5)
CHLORIDE SERPL-SCNC: 100 MMOL/L (ref 98–107)
CO2 SERPL-SCNC: 24 MMOL/L (ref 22–29)
CREAT SERPL-MCNC: 1.12 MG/DL (ref 0.76–1.27)
DEPRECATED RDW RBC AUTO: 45.1 FL (ref 37–54)
EGFRCR SERPLBLD CKD-EPI 2021: 66.8 ML/MIN/1.73
EOSINOPHIL # BLD AUTO: 0.3 10*3/MM3 (ref 0–0.4)
EOSINOPHIL NFR BLD AUTO: 4.1 % (ref 0.3–6.2)
ERYTHROCYTE [DISTWIDTH] IN BLOOD BY AUTOMATED COUNT: 14.4 % (ref 12.3–15.4)
GLUCOSE BLDC GLUCOMTR-MCNC: 112 MG/DL (ref 70–105)
GLUCOSE BLDC GLUCOMTR-MCNC: 131 MG/DL (ref 70–105)
GLUCOSE BLDC GLUCOMTR-MCNC: 186 MG/DL (ref 70–105)
GLUCOSE BLDC GLUCOMTR-MCNC: 187 MG/DL (ref 70–105)
GLUCOSE SERPL-MCNC: 168 MG/DL (ref 65–99)
HCT VFR BLD AUTO: 38.4 % (ref 37.5–51)
HGB BLD-MCNC: 12.7 G/DL (ref 13–17.7)
LYMPHOCYTES # BLD AUTO: 1.2 10*3/MM3 (ref 0.7–3.1)
LYMPHOCYTES NFR BLD AUTO: 17.2 % (ref 19.6–45.3)
MAGNESIUM SERPL-MCNC: 1.8 MG/DL (ref 1.6–2.4)
MCH RBC QN AUTO: 29.4 PG (ref 26.6–33)
MCHC RBC AUTO-ENTMCNC: 33.1 G/DL (ref 31.5–35.7)
MCV RBC AUTO: 88.6 FL (ref 79–97)
MONOCYTES # BLD AUTO: 0.5 10*3/MM3 (ref 0.1–0.9)
MONOCYTES NFR BLD AUTO: 7.7 % (ref 5–12)
NEUTROPHILS NFR BLD AUTO: 4.9 10*3/MM3 (ref 1.7–7)
NEUTROPHILS NFR BLD AUTO: 70.9 % (ref 42.7–76)
NRBC BLD AUTO-RTO: 0 /100 WBC (ref 0–0.2)
PLATELET # BLD AUTO: 130 10*3/MM3 (ref 140–450)
PMV BLD AUTO: 8 FL (ref 6–12)
POTASSIUM SERPL-SCNC: 4 MMOL/L (ref 3.5–5.2)
RBC # BLD AUTO: 4.34 10*6/MM3 (ref 4.14–5.8)
SODIUM SERPL-SCNC: 135 MMOL/L (ref 136–145)
TROPONIN T SERPL-MCNC: <0.01 NG/ML (ref 0–0.03)
WBC NRBC COR # BLD: 6.9 10*3/MM3 (ref 3.4–10.8)

## 2022-06-09 PROCEDURE — 99214 OFFICE O/P EST MOD 30 MIN: CPT | Performed by: INTERNAL MEDICINE

## 2022-06-09 PROCEDURE — 63710000001 INSULIN LISPRO (HUMAN) PER 5 UNITS: Performed by: PHYSICIAN ASSISTANT

## 2022-06-09 PROCEDURE — 25010000002 ENOXAPARIN PER 10 MG: Performed by: PHYSICIAN ASSISTANT

## 2022-06-09 PROCEDURE — 96372 THER/PROPH/DIAG INJ SC/IM: CPT

## 2022-06-09 PROCEDURE — 96376 TX/PRO/DX INJ SAME DRUG ADON: CPT

## 2022-06-09 PROCEDURE — G0378 HOSPITAL OBSERVATION PER HR: HCPCS

## 2022-06-09 PROCEDURE — 85025 COMPLETE CBC W/AUTO DIFF WBC: CPT | Performed by: EMERGENCY MEDICINE

## 2022-06-09 PROCEDURE — 25010000002 ONDANSETRON PER 1 MG: Performed by: EMERGENCY MEDICINE

## 2022-06-09 PROCEDURE — 82962 GLUCOSE BLOOD TEST: CPT

## 2022-06-09 PROCEDURE — 83735 ASSAY OF MAGNESIUM: CPT | Performed by: PHYSICIAN ASSISTANT

## 2022-06-09 PROCEDURE — 84484 ASSAY OF TROPONIN QUANT: CPT | Performed by: EMERGENCY MEDICINE

## 2022-06-09 PROCEDURE — 80048 BASIC METABOLIC PNL TOTAL CA: CPT | Performed by: EMERGENCY MEDICINE

## 2022-06-09 RX ORDER — INSULIN LISPRO 100 [IU]/ML
0-9 INJECTION, SOLUTION INTRAVENOUS; SUBCUTANEOUS AS NEEDED
Status: DISCONTINUED | OUTPATIENT
Start: 2022-06-09 | End: 2022-06-10 | Stop reason: HOSPADM

## 2022-06-09 RX ORDER — INSULIN LISPRO 100 [IU]/ML
0-9 INJECTION, SOLUTION INTRAVENOUS; SUBCUTANEOUS
Status: DISCONTINUED | OUTPATIENT
Start: 2022-06-09 | End: 2022-06-10 | Stop reason: HOSPADM

## 2022-06-09 RX ORDER — ATENOLOL 25 MG/1
25 TABLET ORAL DAILY
Status: DISCONTINUED | OUTPATIENT
Start: 2022-06-09 | End: 2022-06-10 | Stop reason: HOSPADM

## 2022-06-09 RX ORDER — ATORVASTATIN CALCIUM 40 MG/1
40 TABLET, FILM COATED ORAL NIGHTLY
Status: DISCONTINUED | OUTPATIENT
Start: 2022-06-09 | End: 2022-06-10 | Stop reason: HOSPADM

## 2022-06-09 RX ORDER — DEXTROSE MONOHYDRATE 25 G/50ML
25 INJECTION, SOLUTION INTRAVENOUS
Status: DISCONTINUED | OUTPATIENT
Start: 2022-06-09 | End: 2022-06-10 | Stop reason: HOSPADM

## 2022-06-09 RX ORDER — ASPIRIN 81 MG/1
81 TABLET, CHEWABLE ORAL DAILY
Status: DISCONTINUED | OUTPATIENT
Start: 2022-06-09 | End: 2022-06-10

## 2022-06-09 RX ORDER — NICOTINE POLACRILEX 4 MG
15 LOZENGE BUCCAL
Status: DISCONTINUED | OUTPATIENT
Start: 2022-06-09 | End: 2022-06-10 | Stop reason: HOSPADM

## 2022-06-09 RX ORDER — PANTOPRAZOLE SODIUM 40 MG/1
40 TABLET, DELAYED RELEASE ORAL EVERY MORNING
Status: DISCONTINUED | OUTPATIENT
Start: 2022-06-09 | End: 2022-06-10

## 2022-06-09 RX ORDER — OLANZAPINE 10 MG/2ML
1 INJECTION, POWDER, LYOPHILIZED, FOR SOLUTION INTRAMUSCULAR
Status: DISCONTINUED | OUTPATIENT
Start: 2022-06-09 | End: 2022-06-10 | Stop reason: HOSPADM

## 2022-06-09 RX ORDER — ENOXAPARIN SODIUM 100 MG/ML
1 INJECTION SUBCUTANEOUS EVERY 12 HOURS
Status: DISCONTINUED | OUTPATIENT
Start: 2022-06-09 | End: 2022-06-10

## 2022-06-09 RX ORDER — ASCORBIC ACID 500 MG
500 TABLET ORAL DAILY
Status: DISCONTINUED | OUTPATIENT
Start: 2022-06-09 | End: 2022-06-10 | Stop reason: HOSPADM

## 2022-06-09 RX ADMIN — SODIUM CHLORIDE 100 ML/HR: 9 INJECTION, SOLUTION INTRAVENOUS at 20:25

## 2022-06-09 RX ADMIN — ONDANSETRON 4 MG: 2 INJECTION INTRAMUSCULAR; INTRAVENOUS at 09:06

## 2022-06-09 RX ADMIN — Medication 5 MG: at 20:24

## 2022-06-09 RX ADMIN — ENOXAPARIN SODIUM 90 MG: 100 INJECTION SUBCUTANEOUS at 08:15

## 2022-06-09 RX ADMIN — INSULIN LISPRO 2 UNITS: 100 INJECTION, SOLUTION INTRAVENOUS; SUBCUTANEOUS at 08:15

## 2022-06-09 RX ADMIN — Medication 10 ML: at 08:16

## 2022-06-09 RX ADMIN — INSULIN LISPRO 2 UNITS: 100 INJECTION, SOLUTION INTRAVENOUS; SUBCUTANEOUS at 12:50

## 2022-06-09 RX ADMIN — ASPIRIN 81 MG: 81 TABLET, CHEWABLE ORAL at 08:15

## 2022-06-09 RX ADMIN — OXYCODONE HYDROCHLORIDE AND ACETAMINOPHEN 500 MG: 500 TABLET ORAL at 08:15

## 2022-06-09 RX ADMIN — ATORVASTATIN CALCIUM 40 MG: 40 TABLET, FILM COATED ORAL at 20:24

## 2022-06-09 RX ADMIN — PANTOPRAZOLE SODIUM 40 MG: 40 TABLET, DELAYED RELEASE ORAL at 08:15

## 2022-06-09 RX ADMIN — ATENOLOL 25 MG: 25 TABLET ORAL at 08:15

## 2022-06-09 RX ADMIN — ENOXAPARIN SODIUM 90 MG: 100 INJECTION SUBCUTANEOUS at 20:24

## 2022-06-09 NOTE — PLAN OF CARE
Goal Outcome Evaluation:  Plan of Care Reviewed With: patient        Progress: improving  Outcome Evaluation: Patient states he is feeling much better today. IV fluids infusing. Vitals stable.

## 2022-06-09 NOTE — TELEPHONE ENCOUNTER
Caller: ALBIN ELIAS    Relationship: Emergency Contact    Best call back number:[phone]    Who are you requesting to speak with (clinical staff, provider,  specific staff member): CLINICAL    ALBIN CALLED TO INFORM DR YOUNG THAT SHE TOOK JAYNE INTO THE EMERGENCY ROOM BECAUSE SHE WAS WORRIED ABOUT DEHYDRATION AND THE HOSPITAL KEPT HIM OVERNIGHT.     Do you require a callback: YES

## 2022-06-09 NOTE — CASE MANAGEMENT/SOCIAL WORK
Discharge Planning Assessment   Andrew     Patient Name: Cisco Frank  MRN: [id number]  Today's Date: 6/9/2022    Admit Date: 6/8/2022     Discharge Needs Assessment     Row Name 06/09/22 1605       Living Environment    People in Home spouse    Current Living Arrangements home    Primary Care Provided by self    Provides Primary Care For spouse    Family Caregiver if Needed none    Quality of Family Relationships involved;helpful;supportive    Able to Return to Prior Arrangements yes       Resource/Environmental Concerns    Resource/Environmental Concerns none       Transition Planning    Patient/Family Anticipates Transition to home with family    Patient/Family Anticipated Services at Transition none    Transportation Anticipated family or friend will provide       Discharge Needs Assessment    Readmission Within the Last 30 Days no previous admission in last 30 days    Equipment Currently Used at Home none    Equipment Needed After Discharge none               Discharge Plan     Row Name 06/09/22 1606       Plan    Plan D/C Plan: Anticipate routine home.    Patient/Family in Agreement with Plan yes    Plan Comments Spoke with patient at bedside.  Confirmed PMD and pharmacy.  Denies discharge needs at this time.  Barriers to D/C: cardiology consult, vomiting.               Demographic Summary     Row Name 06/09/22 1604       General Information    Admission Type observation    Arrived From emergency department    Referral Source admission list    Preferred Language English               Functional Status     Row Name 06/09/22 1604       Functional Status    Usual Activity Tolerance good    Current Activity Tolerance good       Functional Status, IADL    Medications independent    Meal Preparation independent    Housekeeping independent    Laundry independent    Shopping independent       Mental Status    General Appearance WDL WDL       Mental Status Summary    Recent Changes in Mental Status/Cognitive  Functioning no changes            Met with patient in room wearing PPE: mask.    Maintained distance greater than six feet and spent less than 15 minutes in the room.  Luba Kaplan RN      Office Phone [phone]  Office Cell [phone]

## 2022-06-09 NOTE — TELEPHONE ENCOUNTER
[employer]    Tried to call phone just rang. Please give message below        tell Cisco to stop the Rybelsus medication.  Its not something he is going to be able to take obviously.  It works by slowing the stomach emptyng down and he is just not tolerate that.  Tell him that the symptoms will take a few days to wear off.  We need to talk about other options if he still interested in trying to lose weight.  Unfortunately there is not a whole lot of options left.

## 2022-06-09 NOTE — PLAN OF CARE
Goal Outcome Evaluation:  Plan of Care Reviewed With: patient        Progress: no change  Outcome Evaluation: New admission from ED with persistent vomiting, afib. Patient remains pain free however does have intermittent nausea and dizziness. VSS. Awaiting cardiology consult today. Will monitor.

## 2022-06-09 NOTE — H&P
Formerly [facility], later [facility] H&P    Patient Name: Cisco Frank  : 1940  MRN: [id number]  Primary Care Physician: Nuno Patton MD  Date of admission: 2022     Patient Care Team:  Nuno Patton MD as PCP - General (Family Medicine)  Hudson Smith MD as Consulting Physician (Cardiology)  Kailash Garsia MD as Consulting Physician (Cardiology)          Subjective   History Present Illness     Chief Complaint:   Chief Complaint   Patient presents with   • Vomiting        Mr. Frank is a 81 y.o.  presents to [facility] complaining of  Abdominal pain with nausea, vomiting and irregular heartbeat         History of Present Illness     Obtained from admitting physician HPI:  History of present illness 81-year-old male who presents with a 2-day history of intermittent vomiting really not able to eat or drink anything.  No diarrhea no black or bloody stool no fever chills.  No chest pain neck arm jaw pain has had some generalized weakness and fatigue and mild shortness of breath.  No one in the home with similar illness no leg pain or swelling no urinary problems.  Patient reports he was supposed to be put to sleep for procedure that his urologist was going to do recently but he had some abnormality on his EKG and his been referred to cardiology who he sees Monday.  He denies any antibiotic use.  No dysuria or frequency.  No black or bloody stool no other complaints or associated symptoms 2 days continuous moderate severe she has been at home associated the above.  No significant abdominal pain patient states he just recently had his glimepiride discontinued and placed on rebellious     2022: Patient confirms the HPI noted above including several day history of of burning epigastric pain with associated nausea, nonbloody vomiting and diarrhea.  Some occasional intermittent dyspnea along with an irregular heartbeat as noted and patient reports that he was recently evaluated  outpatient for planned elective procedure and found to be in atrial fibrillation at that time.  He had a cardiology appointment scheduled but has not had any follow-up, further evaluation or treatment and is not currently on any anticoagulation.  No other changes to his medications, unusual p.o. intake or sick contacts are noted.  He denies any fever, peripheral edema, syncope or near syncope.    ROS   Constitutional: Positive for decreased appetite.   HENT: Negative.    Eyes: Negative.    Cardiovascular: Positive for irregular heartbeat. Negative for chest pain.   Respiratory: Positive for shortness of breath.    Endocrine: Negative.    Skin: Negative.    Musculoskeletal: Negative.    Gastrointestinal: Positive for abdominal pain, diarrhea, nausea and vomiting.   Genitourinary: Negative.    Neurological: Negative.    Psychiatric/Behavioral: Negative.          Personal History     Past Medical History:   Past Medical History:   Diagnosis Date   • Cancer (HCC)    • Coronary artery disease    • History of bladder cancer    • Hyperlipidemia        Surgical History:      Past Surgical History:   Procedure Laterality Date   • BLADDER SURGERY  10/26/2019    [facility] Dr. Cope   • CARDIAC CATHETERIZATION     • COLONOSCOPY     • COLONOSCOPY W/ POLYPECTOMY  02/23/2021   • SKIN CANCER EXCISION      right temple BX   • UPPER ENDOSCOPIC ULTRASOUND W/ FNA N/A 3/12/2021    Procedure: ESOPHAGOGASTRODUODENOSCOPY WITH endoscopic mucosal resection, biopsy x 1 area, and endoscopic ULTRASOUND;  Surgeon: Abner Infante MD;  Location: [facility] ENDOSCOPY;  Service: Gastroenterology;  Laterality: N/A;  post op: hiatal hernia, duodenal polyp           Family History: family history includes Heart attack in his father; Heart disease in his mother; No Known Problems in his brother, maternal aunt, maternal grandfather, maternal grandmother, maternal uncle, paternal aunt, paternal grandfather, paternal grandmother, paternal uncle, sister, and  another family member. Otherwise pertinent FHx was reviewed and unremarkable.     Social History:  reports that he quit smoking about 42 years ago. He has never used smokeless tobacco. He reports previous alcohol use. He reports that he does not use drugs.      Medications:  Prior to Admission medications    Medication Sig Start Date End Date Taking? Authorizing Provider   aspirin 81 MG chewable tablet Chew 81 mg Daily. On hold for procedure   Yes Serenity Hinds MD   atenolol (TENORMIN) 25 MG tablet Take 1 tablet by mouth Daily. 6/18/21  Yes Hudson Smith MD   atorvastatin (LIPITOR) 40 MG tablet Take 1 tablet by mouth every night at bedtime. 6/18/21  Yes Hudson Smith MD   colestipol (COLESTID) 1 g tablet Take 1 g by mouth 2 (Two) Times a Day.   Yes Serenity Hinds MD   glimepiride (AMARYL) 2 MG tablet TAKE 3 TABLETS EVERY MORNING BEFORE BREAKFAST 2/17/22  Yes Nuno Patton MD   Omega-3 Fatty Acids (FISH OIL) 1200 MG capsule capsule Take 1 capsule by mouth Daily. On hold for procedure 9/12/13  Yes Serenity Hinds MD   omeprazole-sodium bicarbonate (ZEGERID)  MG per capsule Take 1 capsule by mouth Daily. prn 10/16/19  Yes Serenity Hinds MD   Semaglutide (Rybelsus) 3 MG tablet Take 1 tablet by mouth Daily for 30 days. Call when on last 4-5 days and we may increase dose. 5/15/22 6/14/22 Yes Nuno Patton MD   vitamin C (ASCORBIC ACID) 500 MG tablet Take 500 mg by mouth Daily.   Yes Serenity Hinds MD       Allergies:  No Known Allergies    Objective   Objective     Vital Signs  Temp:  [97.7 °F (36.5 °C)-98.2 °F (36.8 °C)] 97.7 °F (36.5 °C)  Heart Rate:  [] 100  Resp:  [16] 16  BP: ()/(62-92) 109/71  SpO2:  [93 %-97 %] 96 %  on   ;   Device (Oxygen Therapy): room air  Body mass index is 28.34 kg/m².    Physical Exam  Physical Exam  Vitals reviewed.   Constitutional:       General: He is not in acute distress.     Appearance:  Normal appearance. He is normal weight. He is not ill-appearing, toxic-appearing or diaphoretic.   HENT:      Head: Normocephalic and atraumatic.      Right Ear: External ear normal.      Left Ear: External ear normal.      Nose: Nose normal.      Mouth/Throat:      Mouth: Mucous membranes are moist.   Eyes:      Extraocular Movements: Extraocular movements intact.   Cardiovascular:      Rate and Rhythm: Normal rate. Rhythm irregular.      Pulses: Normal pulses.      Heart sounds: Normal heart sounds.   Pulmonary:      Effort: Pulmonary effort is normal.   Abdominal:      General: There is no distension.      Palpations: Abdomen is soft.      Tenderness: There is abdominal tenderness.   Musculoskeletal:         General: Normal range of motion.      Cervical back: Normal range of motion.      Right lower leg: No edema.      Left lower leg: No edema.   Skin:     General: Skin is warm and dry.      Capillary Refill: Capillary refill takes less than 2 seconds.   Neurological:      General: No focal deficit present.      Mental Status: He is alert and oriented to person, place, and time.   Psychiatric:         Mood and Affect: Mood normal.         Behavior: Behavior normal.         Thought Content: Thought content normal.         Judgment: Judgment normal.     Results Review:  I have personally reviewed most recent cardiac tracings, lab results and radiology images and interpretations and agree with findings,     Results from last 7 days   Lab Units 06/09/22  0300   WBC 10*3/mm3 6.90   HEMOGLOBIN g/dL 12.7*   HEMATOCRIT % 38.4   PLATELETS 10*3/mm3 130*     Results from last 7 days   Lab Units 06/09/22  0300 06/08/22  1957 06/08/22  1647   SODIUM mmol/L 135*  --  135*   POTASSIUM mmol/L 4.0  --  4.3   CHLORIDE mmol/L 100  --  100   CO2 mmol/L 24.0  --  23.0   BUN mg/dL 19  --  21   CREATININE mg/dL 1.12  --  1.28*   GLUCOSE mg/dL 168*  --  214*   CALCIUM mg/dL 8.3*  --  9.1   ALT (SGPT) U/L  --   --  10   AST (SGOT) U/L   --   --  13   TROPONIN T ng/mL <0.010 <0.010 <0.010     Estimated Creatinine Clearance: 58.4 mL/min (by C-G formula based on SCr of 1.12 mg/dL).  Brief Urine Lab Results  (Last result in the past 365 days)      Color   Clarity   Blood   Leuk Est   Nitrite   Protein   CREAT   Urine HCG        06/08/22 1838 Yellow   Clear   Small (1+)   Small (1+)   Negative   100 mg/dL (2+)                 Microbiology Results (last 10 days)     Procedure Component Value - Date/Time    COVID-19,CEPHEID/SERA,COR/LUIS ALBERTO/PAD/JOSE RAMON IN-HOUSE(OR EMERGENT/ADD-ON),NP SWAB IN TRANSPORT MEDIA 3-4 HR TAT, RT-PCR - Swab, Nasopharynx [[id number]]  (Normal) Collected: 06/08/22 1647    Lab Status: Final result Specimen: Swab from Nasopharynx Updated: 06/08/22 1740     COVID19 Not Detected    Narrative:      Fact sheet for providers: https://www.fda.gov/media/359546/download     Fact sheet for patients: https://www.fda.gov/media/542126/download  Fact sheet for providers: https://www.fda.gov/media/908959/download     Fact sheet for patients: https://www.fda.gov/media/899280/download          ECG/EMG Results (most recent)     Procedure Component Value Units Date/Time    ECG 12 Lead [[id number]] Collected: 06/08/22 1645     Updated: 06/08/22 1646     QT Interval 350 ms     Narrative:      HEART RATE= 102  bpm  RR Interval= 587  ms  AK Interval=   ms  P Horizontal Axis=   deg  P Front Axis=   deg  QRSD Interval= 92  ms  QT Interval= 350  ms  QRS Axis= -6  deg  T Wave Axis=   deg  - ABNORMAL ECG -  Atrial fibrillation  Low voltage, precordial leads  Nonspecific T abnormalities, lateral leads  Electronically Signed By:   Date and Time of Study: 2022-06-08 16:45:03    SCANNED - TELEMETRY   [[id number]] Resulted: 06/08/22     Updated: 06/09/22 1428                  XR Chest 1 View    Result Date: 6/8/2022   1. Cardiomegaly. 2. Large hiatal hernia. 3. No active pulmonary disease.  Electronically Signed By-Eagle Guzmán MD On:6/8/2022 5:35 PM This report was finalized  on [id number] by  Eagle Guzmán MD.        Estimated Creatinine Clearance: 58.4 mL/min (by C-G formula based on SCr of 1.12 mg/dL).    Assessment & Plan   Assessment/Plan       Active Hospital Problems    Diagnosis  POA   • Persistent vomiting [R11.15]  Yes      Resolved Hospital Problems   No resolved problems to display.           Persistent vomiting        Nausea and vomiting  -CMP and CBC generally unremarkable  -Patient given fluid bolus followed by infusion as well as Zofran in the ED  -GI panel ordered  -Diet advanced     New onset A. Fib   -EKG showed A. fib at 102 without obvious acute ST changes or ectopy with a QTC of 457 ms  -Magnesium: 1.5, replaced in the ED  -TSH: 4.040  -Therapeutic Lovenox started in the ED, continue  -Continue patient's atenolol  -Cardiology consulted: planned monitoring overnight and anticoagulation initiation     Diabetes mellitus  -Moderately controlled with a most recent glucose of 168  -Hold glimepiride and semaglutide  -SSI  -Diabetic diet  -Monitor AC and HS     Hypertension  -Well controlled with a most recent blood pressure of 114/77  - Continue atenolol  - Monitor while admitted     GERD  -PPI     Hyperlipidemia  -Statin        VTE Prophylaxis -   Mechanical Order History:      Ordered        06/08/22 1945  Place Sequential Compression Device  Once            06/08/22 1945  Maintain Sequential Compression Device  Continuous                    Pharmalogical Order History:      Ordered     Dose Route Frequency Stop    06/09/22 0707  Enoxaparin Sodium (LOVENOX) syringe 90 mg         1 mg/kg SC Every 12 Hours --    06/08/22 1836  Enoxaparin Sodium (LOVENOX) syringe 90 mg         1 mg/kg SC Once 06/08/22 2006                CODE STATUS:    Code Status and Medical Interventions:   Ordered at: 06/09/22 0707     Code Status (Patient has no pulse and is not breathing):    CPR (Attempt to Resuscitate)     Medical Interventions (Patient has pulse or is breathing):    Full Support        This patient has been examined wearing personal protective equipment.     I discussed the patient's findings and my recommendations with patient and nursing staff.      Signature:Electronically signed by Pipe Pacheco PA-C, 06/09/22, 8:23 PM EDT.

## 2022-06-10 ENCOUNTER — READMISSION MANAGEMENT (OUTPATIENT)
Dept: CALL CENTER | Facility: HOSPITAL | Age: 82
End: 2022-06-10

## 2022-06-10 VITALS
HEIGHT: 69 IN | BODY MASS INDEX: 27.76 KG/M2 | TEMPERATURE: 98.2 F | OXYGEN SATURATION: 95 % | WEIGHT: 187.39 LBS | HEART RATE: 81 BPM | DIASTOLIC BLOOD PRESSURE: 68 MMHG | RESPIRATION RATE: 18 BRPM | SYSTOLIC BLOOD PRESSURE: 120 MMHG

## 2022-06-10 LAB
GLUCOSE BLDC GLUCOMTR-MCNC: 170 MG/DL (ref 70–105)
GLUCOSE BLDC GLUCOMTR-MCNC: 254 MG/DL (ref 70–105)
QT INTERVAL: 350 MS

## 2022-06-10 PROCEDURE — 25010000002 ENOXAPARIN PER 10 MG: Performed by: PHYSICIAN ASSISTANT

## 2022-06-10 PROCEDURE — G0378 HOSPITAL OBSERVATION PER HR: HCPCS

## 2022-06-10 PROCEDURE — 96372 THER/PROPH/DIAG INJ SC/IM: CPT

## 2022-06-10 PROCEDURE — 82962 GLUCOSE BLOOD TEST: CPT

## 2022-06-10 PROCEDURE — 99225 PR SBSQ OBSERVATION CARE/DAY 25 MINUTES: CPT | Performed by: INTERNAL MEDICINE

## 2022-06-10 PROCEDURE — 63710000001 INSULIN LISPRO (HUMAN) PER 5 UNITS: Performed by: PHYSICIAN ASSISTANT

## 2022-06-10 RX ORDER — POLYETHYLENE GLYCOL 3350 17 G/17G
17 POWDER, FOR SOLUTION ORAL ONCE
Status: DISCONTINUED | OUTPATIENT
Start: 2022-06-10 | End: 2022-06-10

## 2022-06-10 RX ORDER — PANTOPRAZOLE SODIUM 40 MG/1
40 TABLET, DELAYED RELEASE ORAL
Status: DISCONTINUED | OUTPATIENT
Start: 2022-06-10 | End: 2022-06-10 | Stop reason: HOSPADM

## 2022-06-10 RX ADMIN — ATENOLOL 25 MG: 25 TABLET ORAL at 08:54

## 2022-06-10 RX ADMIN — ASPIRIN 81 MG: 81 TABLET, CHEWABLE ORAL at 08:55

## 2022-06-10 RX ADMIN — INSULIN LISPRO 6 UNITS: 100 INJECTION, SOLUTION INTRAVENOUS; SUBCUTANEOUS at 12:54

## 2022-06-10 RX ADMIN — PANTOPRAZOLE SODIUM 40 MG: 40 TABLET, DELAYED RELEASE ORAL at 08:55

## 2022-06-10 RX ADMIN — OXYCODONE HYDROCHLORIDE AND ACETAMINOPHEN 500 MG: 500 TABLET ORAL at 08:54

## 2022-06-10 RX ADMIN — ENOXAPARIN SODIUM 90 MG: 100 INJECTION SUBCUTANEOUS at 08:55

## 2022-06-10 NOTE — DISCHARGE SUMMARY
[facility]    uNno Patton MD    CHIEF COMPLAINT:     Vomiting     HISTORY OF PRESENT ILLNESS:    HPI    Obtained from admitting physician HPI:  History of present illness 81-year-old male who presents with a 2-day history of intermittent vomiting really not able to eat or drink anything.  No diarrhea no black or bloody stool no fever chills.  No chest pain neck arm jaw pain has had some generalized weakness and fatigue and mild shortness of breath.  No one in the home with similar illness no leg pain or swelling no urinary problems.  Patient reports he was supposed to be put to sleep for procedure that his urologist was going to do recently but he had some abnormality on his EKG and his been referred to cardiology who he sees Monday.  He denies any antibiotic use.  No dysuria or frequency.  No black or bloody stool no other complaints or associated symptoms 2 days continuous moderate severe she has been at home associated the above.  No significant abdominal pain patient states he just recently had his glimepiride discontinued and placed on rebellious     6/9/2022: Patient confirms the HPI noted above including several day history of of burning epigastric pain with associated nausea, nonbloody vomiting and diarrhea.  Some occasional intermittent dyspnea along with an irregular heartbeat as noted and patient reports that he was recently evaluated outpatient for planned elective procedure and found to be in atrial fibrillation at that time.  He had a cardiology appointment scheduled but has not had any follow-up, further evaluation or treatment and is not currently on any anticoagulation.  No other changes to his medications, unusual p.o. intake or sick contacts are noted.  He denies any fever, peripheral edema, syncope or near syncope.    Past Medical History:   Diagnosis Date   • Cancer (HCC)    • Coronary artery disease    • History of bladder cancer    • Hyperlipidemia      Past  Surgical History:   Procedure Laterality Date   • BLADDER SURGERY  10/26/2019    [facility] Dr. Cope   • CARDIAC CATHETERIZATION     • COLONOSCOPY     • COLONOSCOPY W/ POLYPECTOMY  2021   • SKIN CANCER EXCISION      right temple BX   • UPPER ENDOSCOPIC ULTRASOUND W/ FNA N/A 3/12/2021    Procedure: ESOPHAGOGASTRODUODENOSCOPY WITH endoscopic mucosal resection, biopsy x 1 area, and endoscopic ULTRASOUND;  Surgeon: Abner Infante MD;  Location: [facility] ENDOSCOPY;  Service: Gastroenterology;  Laterality: N/A;  post op: hiatal hernia, duodenal polyp     Family History   Problem Relation Age of Onset   • Heart disease Mother    • Heart attack Father    • No Known Problems Sister    • No Known Problems Brother    • No Known Problems Maternal Aunt    • No Known Problems Maternal Uncle    • No Known Problems Paternal Aunt    • No Known Problems Paternal Uncle    • No Known Problems Maternal Grandmother    • No Known Problems Maternal Grandfather    • No Known Problems Paternal Grandmother    • No Known Problems Paternal Grandfather    • No Known Problems Other    • Anemia Neg Hx    • Arrhythmia Neg Hx    • Asthma Neg Hx    • Clotting disorder Neg Hx    • Fainting Neg Hx    • Heart failure Neg Hx    • Hyperlipidemia Neg Hx    • Hypertension Neg Hx      Social History     Tobacco Use   • Smoking status: Former Smoker     Quit date: 1980     Years since quittin.4   • Smokeless tobacco: Never Used   Vaping Use   • Vaping Use: Never used   Substance Use Topics   • Alcohol use: Not Currently   • Drug use: Never     Medications Prior to Admission   Medication Sig Dispense Refill Last Dose   • aspirin 81 MG chewable tablet Chew 81 mg Daily. On hold for procedure   Past Week at Unknown time   • atenolol (TENORMIN) 25 MG tablet Take 1 tablet by mouth Daily. 90 tablet 3 2022 at Unknown time   • atorvastatin (LIPITOR) 40 MG tablet Take 1 tablet by mouth every night at bedtime. 90 tablet 3 2022 at Unknown time   •  colestipol (COLESTID) 1 g tablet Take 1 g by mouth 2 (Two) Times a Day.   6/8/2022 at Unknown time   • glimepiride (AMARYL) 2 MG tablet TAKE 3 TABLETS EVERY MORNING BEFORE BREAKFAST 270 tablet 2 6/8/2022 at Unknown time   • Omega-3 Fatty Acids (FISH OIL) 1200 MG capsule capsule Take 1 capsule by mouth Daily. On hold for procedure   Past Week at Unknown time   • omeprazole-sodium bicarbonate (ZEGERID)  MG per capsule Take 1 capsule by mouth Daily. prn   6/8/2022 at Unknown time   • Semaglutide (Rybelsus) 3 MG tablet Take 1 tablet by mouth Daily for 30 days. Call when on last 4-5 days and we may increase dose. 30 tablet 0 6/7/2022 at Unknown time   • vitamin C (ASCORBIC ACID) 500 MG tablet Take 500 mg by mouth Daily.   6/8/2022 at Unknown time     Allergies:  Patient has no known allergies.    Immunization History   Administered Date(s) Administered   • COVID-19 (PFIZER) PURPLE CAP 12/29/2020, 01/19/2021, 09/09/2021   • Fluzone High Dose =>65 Years ([employer] ONLY) 10/10/2015, 09/16/2016, 10/14/2017, 09/22/2018, 11/05/2019, 10/05/2020   • Fluzone High-Dose 65+yrs 10/08/2021   • Hepatitis A 04/23/2018, 12/20/2018   • Pneumococcal Conjugate 13-Valent (PCV13) 11/05/2020   • Pneumococcal Polysaccharide (PPSV23) 01/10/2013, 02/01/2021           REVIEW OF SYSTEMS:    Review of Systems   Constitutional: Negative.   HENT: Negative.    Eyes: Negative.    Cardiovascular: Negative.    Respiratory: Negative.    Endocrine: Negative.    Hematologic/Lymphatic: Negative.    Skin: Negative.    Musculoskeletal: Negative.    Gastrointestinal: Negative.    Genitourinary: Negative.    Neurological: Negative.    Psychiatric/Behavioral: Negative.    Allergic/Immunologic: Negative.        Vital Signs  Temp:  [97.6 °F (36.4 °C)-98.7 °F (37.1 °C)] 97.6 °F (36.4 °C)  Heart Rate:  [77-97] 94  Resp:  [16-18] 18  BP: (104-118)/(55-71) 108/71          Physical Exam:  Physical Exam  Vitals and nursing note reviewed.   Constitutional:        Appearance: Normal appearance.   HENT:      Head: Normocephalic and atraumatic.      Right Ear: External ear normal.      Left Ear: External ear normal.      Mouth/Throat:      Mouth: Mucous membranes are moist.      Pharynx: Oropharynx is clear.   Eyes:      Extraocular Movements: Extraocular movements intact.      Conjunctiva/sclera: Conjunctivae normal.      Pupils: Pupils are equal, round, and reactive to light.   Cardiovascular:      Rate and Rhythm: Normal rate. Rhythm irregular.      Pulses: Normal pulses.      Heart sounds: Normal heart sounds.   Pulmonary:      Effort: Pulmonary effort is normal.      Breath sounds: Normal breath sounds.   Abdominal:      General: Bowel sounds are normal.      Palpations: Abdomen is soft.   Musculoskeletal:         General: Normal range of motion.      Cervical back: Normal range of motion and neck supple.   Skin:     General: Skin is warm.      Capillary Refill: Capillary refill takes less than 2 seconds.   Neurological:      General: No focal deficit present.      Mental Status: He is alert and oriented to person, place, and time. Mental status is at baseline.   Psychiatric:         Mood and Affect: Mood normal.         Behavior: Behavior normal.         Thought Content: Thought content normal.         Judgment: Judgment normal.         Emotional Behavior:    WNL   Debilities:   none  Results Review:    I reviewed the patient's new clinical results.  Lab Results (most recent)     Procedure Component Value Units Date/Time    POC Glucose Once [[id number]]  (Abnormal) Collected: 06/10/22 1219    Specimen: Blood Updated: 06/10/22 1220     Glucose 254 mg/dL      Comment: Serial Number: [id number]:  [id number]       POC Glucose Once [[id number]]  (Abnormal) Collected: 06/10/22 0842    Specimen: Blood Updated: 06/10/22 0843     Glucose 170 mg/dL      Comment: Serial Number: [id number]:  [id number]       Magnesium [[id number]]  (Normal) Collected: 06/09/22 0300     Specimen: Blood Updated: 06/09/22 0909     Magnesium 1.8 mg/dL     Troponin [[id number]]  (Normal) Collected: 06/09/22 0300    Specimen: Blood Updated: 06/09/22 0551     Troponin T <0.010 ng/mL     Narrative:      Troponin T Reference Range:  <= 0.03 ng/mL-   Negative for AMI  >0.03 ng/mL-     Abnormal for myocardial necrosis.  Clinicians would have to utilize clinical acumen, EKG, Troponin and serial changes to determine if it is an Acute Myocardial Infarction or myocardial injury due to an underlying chronic condition.       Results may be falsely decreased if patient taking Biotin.      Basic Metabolic Panel [[id number]]  (Abnormal) Collected: 06/09/22 0300    Specimen: Blood Updated: 06/09/22 0546     Glucose 168 mg/dL      BUN 19 mg/dL      Creatinine 1.12 mg/dL      Sodium 135 mmol/L      Potassium 4.0 mmol/L      Chloride 100 mmol/L      CO2 24.0 mmol/L      Calcium 8.3 mg/dL      BUN/Creatinine Ratio 17.0     Anion Gap 11.0 mmol/L      eGFR 66.8 mL/min/1.73      Comment: National Kidney Foundation and American Society of Nephrology (ASN) Task Force recommended calculation based on the Chronic Kidney Disease Epidemiology Collaboration (CKD-EPI) equation refit without adjustment for race.       Narrative:      GFR Normal >60  Chronic Kidney Disease <60  Kidney Failure <15      CBC Auto Differential [[id number]]  (Abnormal) Collected: 06/09/22 0300    Specimen: Blood Updated: 06/09/22 0520     WBC 6.90 10*3/mm3      RBC 4.34 10*6/mm3      Hemoglobin 12.7 g/dL      Hematocrit 38.4 %      MCV 88.6 fL      MCH 29.4 pg      MCHC 33.1 g/dL      RDW 14.4 %      RDW-SD 45.1 fl      MPV 8.0 fL      Platelets 130 10*3/mm3      Neutrophil % 70.9 %      Lymphocyte % 17.2 %      Monocyte % 7.7 %      Eosinophil % 4.1 %      Basophil % 0.1 %      Neutrophils, Absolute 4.90 10*3/mm3      Lymphocytes, Absolute 1.20 10*3/mm3      Monocytes, Absolute 0.50 10*3/mm3      Eosinophils, Absolute 0.30 10*3/mm3      Basophils, Absolute  0.00 10*3/mm3      nRBC 0.0 /100 WBC     Troponin [[id number]]  (Normal) Collected: 06/08/22 1957    Specimen: Blood Updated: 06/08/22 2121     Troponin T <0.010 ng/mL     Narrative:      Troponin T Reference Range:  <= 0.03 ng/mL-   Negative for AMI  >0.03 ng/mL-     Abnormal for myocardial necrosis.  Clinicians would have to utilize clinical acumen, EKG, Troponin and serial changes to determine if it is an Acute Myocardial Infarction or myocardial injury due to an underlying chronic condition.       Results may be falsely decreased if patient taking Biotin.      Urinalysis With Microscopic If Indicated (No Culture) - Urine, Clean Catch [[id number]]  (Abnormal) Collected: 06/08/22 1838    Specimen: Urine, Clean Catch Updated: 06/08/22 1850     Color, UA Yellow     Appearance, UA Clear     pH, UA 6.5     Specific Gravity, UA 1.016     Glucose,  mg/dL (Trace)     Ketones, UA Trace     Bilirubin, UA Negative     Blood, UA Small (1+)     Protein,  mg/dL (2+)     Leuk Esterase, UA Small (1+)     Nitrite, UA Negative     Urobilinogen, UA 1.0 E.U./dL    Urinalysis, Microscopic Only - Urine, Clean Catch [[id number]]  (Abnormal) Collected: 06/08/22 1838    Specimen: Urine, Clean Catch Updated: 06/08/22 1850     RBC, UA 6-12 /HPF      WBC, UA 3-5 /HPF      Bacteria, UA None Seen /HPF      Squamous Epithelial Cells, UA 0-2 /HPF      Hyaline Casts, UA None Seen /LPF      Methodology Automated Microscopy    TSH [[id number]]  (Normal) Collected: 06/08/22 1647    Specimen: Blood Updated: 06/08/22 1741     TSH 4.040 uIU/mL     Comprehensive Metabolic Panel [[id number]]  (Abnormal) Collected: 06/08/22 1647    Specimen: Blood Updated: 06/08/22 1740     Glucose 214 mg/dL      BUN 21 mg/dL      Creatinine 1.28 mg/dL      Sodium 135 mmol/L      Potassium 4.3 mmol/L      Chloride 100 mmol/L      CO2 23.0 mmol/L      Calcium 9.1 mg/dL      Total Protein 6.8 g/dL      Albumin 3.40 g/dL      ALT (SGPT) 10 U/L      AST (SGOT) 13 U/L       Alkaline Phosphatase 117 U/L      Total Bilirubin 0.8 mg/dL      Globulin 3.4 gm/dL      A/G Ratio 1.0 g/dL      BUN/Creatinine Ratio 16.4     Anion Gap 12.0 mmol/L      eGFR 56.9 mL/min/1.73      Comment: National Kidney Foundation and American Society of Nephrology (ASN) Task Force recommended calculation based on the Chronic Kidney Disease Epidemiology Collaboration (CKD-EPI) equation refit without adjustment for race.       Narrative:      GFR Normal >60  Chronic Kidney Disease <60  Kidney Failure <15      Magnesium [[id number]]  (Abnormal) Collected: 06/08/22 1647    Specimen: Blood Updated: 06/08/22 1740     Magnesium 1.5 mg/dL     COVID-19,CEPHEID/SERA,COR/[facility]/PAD/[facility]SE [facility]ON IN-HOUSE(OR EMERGENT/ADD-ON),NP SWAB IN TRANSPORT MEDIA 3-4 HR TAT, RT-PCR - Swab, Nasopharynx [[id number]]  (Normal) Collected: 06/08/22 1647    Specimen: Swab from Nasopharynx Updated: 06/08/22 1740     COVID19 Not Detected    Narrative:      Fact sheet for providers: https://www.fda.gov/media/216750/download     Fact sheet for patients: https://www.fda.gov/media/975691/download  Fact sheet for providers: https://www.fda.gov/media/558306/download     Fact sheet for patients: https://www.fda.gov/media/350740/download    CBC & Differential [[id number]]  (Abnormal) Collected: 06/08/22 1647    Specimen: Blood Updated: 06/08/22 1705    Narrative:      The following orders were created for panel order CBC & Differential.  Procedure                               Abnormality         Status                     ---------                               -----------         ------                     CBC Auto Differential[[id number]]        Abnormal            Final result                 Please view results for these tests on the individual orders.    CBC Auto Differential [[id number]]  (Abnormal) Collected: 06/08/22 1647    Specimen: Blood Updated: 06/08/22 1705     WBC 7.20 10*3/mm3      RBC 4.90 10*6/mm3      Hemoglobin 14.3 g/dL      Hematocrit 42.8 %       MCV 87.3 fL      MCH 29.2 pg      MCHC 33.4 g/dL      RDW 14.2 %      RDW-SD 43.3 fl      MPV 7.8 fL      Platelets 154 10*3/mm3      Neutrophil % 74.6 %      Lymphocyte % 13.5 %      Monocyte % 7.2 %      Eosinophil % 4.1 %      Basophil % 0.6 %      Neutrophils, Absolute 5.30 10*3/mm3      Lymphocytes, Absolute 1.00 10*3/mm3      Monocytes, Absolute 0.50 10*3/mm3      Eosinophils, Absolute 0.30 10*3/mm3      Basophils, Absolute 0.00 10*3/mm3      nRBC 0.0 /100 WBC           Imaging Results (Most Recent)     Procedure Component Value Units Date/Time    XR Chest 1 View [[id number]] Collected: 06/08/22 1733     Updated: 06/08/22 1737    Narrative:      DATE OF EXAM:  6/8/2022 5:17 PM     PROCEDURE:  XR CHEST 1 VW-     INDICATIONS:  Weakness and vomiting.      COMPARISON:  10/26/2020.     TECHNIQUE:   Single radiographic view of the chest was obtained.     FINDINGS:  The heart is enlarged. The pulmonary vascular markings are normal. The  patient has a large hiatal hernia. The lungs and pleural spaces are  clear of active disease.  There are mild chronic age-related changes  involving the bony thorax and thoracic aorta.       Impression:         1. Cardiomegaly.  2. Large hiatal hernia.  3. No active pulmonary disease.     Electronically Signed By-Eagle Guzmán MD On:6/8/2022 5:35 PM  This report was finalized on [id number] by  Eagle Guzmán MD.        reviewed    ECG/EMG Results (most recent)     Procedure Component Value Units Date/Time    SCANNED - TELEMETRY   [[id number]] Resulted: 06/08/22     Updated: 06/09/22 1428    SCANNED - TELEMETRY   [[id number]] Resulted: 06/08/22     Updated: 06/10/22 1145    SCANNED - TELEMETRY   [[id number]] Resulted: 06/08/22     Updated: 06/10/22 1148    SCANNED - TELEMETRY   [[id number]] Resulted: 06/08/22     Updated: 06/10/22 1148    SCANNED - TELEMETRY   [[id number]] Resulted: 06/08/22     Updated: 06/10/22 1149    SCANNED - TELEMETRY   [[id number]] Resulted: 06/08/22     Updated:  06/10/22 1153    ECG 12 Lead [[id number]] Collected: 06/08/22 1645     Updated: 06/10/22 1208     QT Interval 350 ms     Narrative:      HEART RATE= 102  bpm  RR Interval= 587  ms  RI Interval=   ms  P Horizontal Axis=   deg  P Front Axis=   deg  QRSD Interval= 92  ms  QT Interval= 350  ms  QRS Axis= -6  deg  T Wave Axis=   deg  - ABNORMAL ECG -  Atrial fibrillation  Low voltage, precordial leads  Nonspecific T abnormalities, lateral leads  When compared with ECG of 13-May-2022 6:49:12,  No significant change  Electronically Signed By: Alexandru Reilly ([facility]) 10-Mario-2022 12:08:06  Date and Time of Study: 2022-06-08 16:45:03        reviewed            Microbiology Results (last 10 days)     Procedure Component Value - Date/Time    COVID-19,CEPHEID/SERA,COR/LUIS ALBERTO/PAD/JOSE RAMON IN-HOUSE(OR EMERGENT/ADD-ON),NP SWAB IN TRANSPORT MEDIA 3-4 HR TAT, RT-PCR - Swab, Nasopharynx [[id number]]  (Normal) Collected: 06/08/22 1647    Lab Status: Final result Specimen: Swab from Nasopharynx Updated: 06/08/22 1740     COVID19 Not Detected    Narrative:      Fact sheet for providers: https://www.fda.gov/media/849044/download     Fact sheet for patients: https://www.fda.gov/media/661805/download  Fact sheet for providers: https://www.fda.gov/media/758621/download     Fact sheet for patients: https://www.fda.gov/media/391038/download          Assessment & Plan     Persistent vomiting       Nausea and vomiting  -CMP and CBC generally unremarkable  -Patient given fluid bolus followed by infusion as well as Zofran in the ED  -GI panel ordered  -Diet advanced     New onset A. Fib   -EKG showed A. fib at 102 without obvious acute ST changes or ectopy with a QTC of 457 ms  -Magnesium: 1.5, replaced in the ED  -TSH: 4.040  -Therapeutic Lovenox started in the ED, continue  -Continue patient's atenolol  -Cardiology consulted: planned monitoring overnight and anticoagulation initiation     Diabetes mellitus  -Moderately controlled with a most recent glucose of  168  -Hold glimepiride and semaglutide  -SSI  -Diabetic diet  -Monitor AC and HS     Hypertension  -Well controlled with a most recent blood pressure of 114/77  - Continue atenolol  - Monitor while admitted     GERD  -PPI     Hyperlipidemia  -Statin    I discussed the patients findings and my recommendations with patient and wife.     Discharge Diagnosis:      Persistent vomiting      Hospital Course  Patient is a 81 y.o. male presented with vomiting for two days. Denies melena, hematuria, chest pain, dyspnea, edema, or syncope. Reports increased weakness and fatigue with no recent changes, falls, injuries, or trauma. Also reports diarrhea but denies nausea, vomiting, or diarrhea upon exam today. Patient to follow-up with Dr. Garsia in one month for possible cardiovert. Patient to start taking Xarelto daily. Patient to monitor for any signs of bleeding and report to nearest emergency department if symptoms worsen. I have discussed the findings and my recommendations with the patient. They are agreeable with the plan.    Past Medical History:     Past Medical History:   Diagnosis Date   • Cancer (HCC)    • Coronary artery disease    • History of bladder cancer    • Hyperlipidemia        Past Surgical History:     Past Surgical History:   Procedure Laterality Date   • BLADDER SURGERY  10/26/2019    [facility] Dr. Cope   • CARDIAC CATHETERIZATION     • COLONOSCOPY     • COLONOSCOPY W/ POLYPECTOMY  02/23/2021   • SKIN CANCER EXCISION      right temple BX   • UPPER ENDOSCOPIC ULTRASOUND W/ FNA N/A 3/12/2021    Procedure: ESOPHAGOGASTRODUODENOSCOPY WITH endoscopic mucosal resection, biopsy x 1 area, and endoscopic ULTRASOUND;  Surgeon: Abner Infante MD;  Location: [facility] ENDOSCOPY;  Service: Gastroenterology;  Laterality: N/A;  post op: hiatal hernia, duodenal polyp       Social History:   Social History     Socioeconomic History   • Marital status:    Tobacco Use   • Smoking status: Former Smoker     Quit date:  1980     Years since quittin.4   • Smokeless tobacco: Never Used   Vaping Use   • Vaping Use: Never used   Substance and Sexual Activity   • Alcohol use: Not Currently   • Drug use: Never   • Sexual activity: Defer       Procedures Performed         Consults:   Consults     Date and Time Order Name Status Description    2022  7:46 PM Inpatient Cardiology Consult Completed           Condition on Discharge:     Stable    Discharge Disposition      Discharge Medications     Discharge Medications      New Medications      Instructions Start Date   rivaroxaban 20 MG tablet  Commonly known as: XARELTO   20 mg, Oral, Daily With Dinner         Continue These Medications      Instructions Start Date   atenolol 25 MG tablet  Commonly known as: TENORMIN   25 mg, Oral, Daily      atorvastatin 40 MG tablet  Commonly known as: LIPITOR   40 mg, Oral, Every Night at Bedtime      colestipol 1 g tablet  Commonly known as: COLESTID   1 g, Oral, 2 Times Daily      fish oil 1200 MG capsule capsule   1 capsule, Oral, Every 24 Hours, On hold for procedure      glimepiride 2 MG tablet  Commonly known as: AMARYL   TAKE 3 TABLETS EVERY MORNING BEFORE BREAKFAST      omeprazole-sodium bicarbonate  MG per capsule  Commonly known as: ZEGERID   1 capsule, Oral, Daily, prn      Rybelsus 3 MG tablet  Generic drug: Semaglutide   3 mg, Oral, Daily, Call when on last 4-5 days and we may increase dose.      vitamin C 500 MG tablet  Commonly known as: ASCORBIC ACID   500 mg, Oral, Daily         Stop These Medications    aspirin 81 MG chewable tablet            Discharge Diet:   Diet Instructions     Diet: Consistent Carbohydrate, Cardiac      Discharge Diet:  Consistent Carbohydrate  Cardiac             Activity at Discharge:   Activity Instructions     Activity as Tolerated      Measure Blood Pressure            Follow-up Appointments  Future Appointments   Date Time Provider Department Center   2022  3:10 PM Kailash Garsia MD  [facility]   7/1/2022 10:30 AM Nuno Patton MD [facility]   12/21/2022 12:50 PM Kailash Garsia MD [facility]     Additional Instructions for the Follow-ups that You Need to Schedule     Discharge Follow-up with PCP   As directed       Currently Documented PCP:    Nuno Patton MD    PCP Phone Number:    [phone]     Follow Up Details: 7-10 days         Discharge Follow-up with Specified Provider: Dr. Garsia; 1 Month   As directed      To: Dr. Garsia    Follow Up: 1 Month               Test Results Pending at Discharge       Risk for Readmission (LACE) Score: 8 (6/10/2022  6:02 AM)          RIAN Painting  06/10/22  13:56 EDT

## 2022-06-10 NOTE — PLAN OF CARE
Goal Outcome Evaluation:   Pt continues on cardiac monitor in Af[facility]. Per Dr. Garsia, will add an anticoagulant. Awaiting further orders.

## 2022-06-10 NOTE — OUTREACH NOTE
Prep Survey    Flowsheet Row Responses   [facility] facility [facility] discharged [facility]? [facility]   Is LACE score < 7 ? No   Emergency Room discharge w/ pulse ox? No   Eligibility TCM   [facility]   Date of Admission 06/08/22   Date of Discharge 06/10/22   Discharge Disposition Home or Self Care   Discharge diagnosis persistent vomiting   Does the patient have one of the following disease processes/diagnoses(primary or secondary)? Other   Does the patient have Home health ordered? No   Is there a DME ordered? No   Prep survey completed? Yes          PEG LEYVA - Registered Nurse

## 2022-06-10 NOTE — CASE MANAGEMENT/SOCIAL WORK
Case Management Discharge Note        Transportation Services  Private: Car    Final Discharge Disposition Code: 01 - home or self-care    Continued Stay Note  [facility]     Patient Name: Cisco Frank  MRN: [id number]  Today's Date: 6/10/2022    Admit Date: 6/8/2022     Discharge Plan     Row Name 06/10/22 1546       Plan    Plan Comments Notified by RN of need to check cost of Eliquis or Xarelto for home.  Spoke with [facility] pharmacy.  Note entered by [facility] pharmacy.  Discussed cost of $20/mo after 1st month free with patient and spouse at bedside.  Patietn agreeable to cost.  Discussed one month free through M2B.  Patient agreeable.  Added to pharmacy list.  Notified NP and RN.  DC orders noted.    Final Discharge Disposition Code 01 - home or self-care            Met with patient in room wearing PPE: mask.      Maintained distance greater than six feet and spent less than 15 minutes in the room.    Luba Kaplan RN      Office Phone [phone]  Office Cell [phone]

## 2022-06-10 NOTE — CONSULTS
Referring Provider: Hospitalist  Reason for Consultation: Vomiting nausea and diarrhea    Patient Care Team:  Nuno Patton MD as PCP - General (Family Medicine)  Hudson Smith MD as Consulting Physician (Cardiology)  Kailash Garsia MD as Consulting Physician (Cardiology)    Chief complaint nausea vomiting and diarrhea    Subjective .     History of present illness:  Cisco Frank is a 81 y.o. male with a history of coronary disease and hyperlipidemia presented to hospital with complaints of nausea vomiting and diarrhea for the last 2 days.  No complains of any chest pain or shortness of breath at rest but has some mild shortness of breath exertion.  No complains any PND orthopnea.  No palpitation dizziness syncope or swelling of the feet.  Patient had atrial fibrillation on the EKG and hence she was referred for cardiologist.  Has been taking all the meds regularly..     Review of Systems   Constitutional: Negative for fever and malaise/fatigue.   HENT: Negative for ear pain and nosebleeds.    Eyes: Negative for blurred vision and double vision.   Cardiovascular: Negative for chest pain, dyspnea on exertion and palpitations.   Respiratory: Negative for cough and shortness of breath.    Skin: Negative for rash.   Musculoskeletal: Negative for joint pain.   Gastrointestinal: Positive for diarrhea, nausea and vomiting. Negative for abdominal pain.   Neurological: Negative for focal weakness and headaches.   Psychiatric/Behavioral: Negative for depression. The patient is not nervous/anxious.    All other systems reviewed and are negative.      History  Past Medical History:   Diagnosis Date   • Cancer (HCC)    • Coronary artery disease    • History of bladder cancer    • Hyperlipidemia        Past Surgical History:   Procedure Laterality Date   • BLADDER SURGERY  10/26/2019    [facility] Dr. Cope   • CARDIAC CATHETERIZATION     • COLONOSCOPY     • COLONOSCOPY W/ POLYPECTOMY  02/23/2021   • SKIN CANCER  EXCISION      right temple BX   • UPPER ENDOSCOPIC ULTRASOUND W/ FNA N/A 3/12/2021    Procedure: ESOPHAGOGASTRODUODENOSCOPY WITH endoscopic mucosal resection, biopsy x 1 area, and endoscopic ULTRASOUND;  Surgeon: Abner Infante MD;  Location: [facility] ENDOSCOPY;  Service: Gastroenterology;  Laterality: N/A;  post op: hiatal hernia, duodenal polyp       Family History   Problem Relation Age of Onset   • Heart disease Mother    • Heart attack Father    • No Known Problems Sister    • No Known Problems Brother    • No Known Problems Maternal Aunt    • No Known Problems Maternal Uncle    • No Known Problems Paternal Aunt    • No Known Problems Paternal Uncle    • No Known Problems Maternal Grandmother    • No Known Problems Maternal Grandfather    • No Known Problems Paternal Grandmother    • No Known Problems Paternal Grandfather    • No Known Problems Other    • Anemia Neg Hx    • Arrhythmia Neg Hx    • Asthma Neg Hx    • Clotting disorder Neg Hx    • Fainting Neg Hx    • Heart failure Neg Hx    • Hyperlipidemia Neg Hx    • Hypertension Neg Hx        Social History     Tobacco Use   • Smoking status: Former Smoker     Quit date: 1980     Years since quittin.4   • Smokeless tobacco: Never Used   Vaping Use   • Vaping Use: Never used   Substance Use Topics   • Alcohol use: Not Currently   • Drug use: Never        Medications Prior to Admission   Medication Sig Dispense Refill Last Dose   • aspirin 81 MG chewable tablet Chew 81 mg Daily. On hold for procedure   Past Week at Unknown time   • atenolol (TENORMIN) 25 MG tablet Take 1 tablet by mouth Daily. 90 tablet 3 2022 at Unknown time   • atorvastatin (LIPITOR) 40 MG tablet Take 1 tablet by mouth every night at bedtime. 90 tablet 3 2022 at Unknown time   • colestipol (COLESTID) 1 g tablet Take 1 g by mouth 2 (Two) Times a Day.   2022 at Unknown time   • glimepiride (AMARYL) 2 MG tablet TAKE 3 TABLETS EVERY MORNING BEFORE BREAKFAST 270 tablet 2  "6/8/2022 at Unknown time   • Omega-3 Fatty Acids (FISH OIL) 1200 MG capsule capsule Take 1 capsule by mouth Daily. On hold for procedure   Past Week at Unknown time   • omeprazole-sodium bicarbonate (ZEGERID)  MG per capsule Take 1 capsule by mouth Daily. prn   6/8/2022 at Unknown time   • Semaglutide (Rybelsus) 3 MG tablet Take 1 tablet by mouth Daily for 30 days. Call when on last 4-5 days and we may increase dose. 30 tablet 0 6/7/2022 at Unknown time   • vitamin C (ASCORBIC ACID) 500 MG tablet Take 500 mg by mouth Daily.   6/8/2022 at Unknown time         Patient has no known allergies.    Scheduled Meds:vitamin C, 500 mg, Oral, Daily  aspirin, 81 mg, Oral, Daily  atenolol, 25 mg, Oral, Daily  atorvastatin, 40 mg, Oral, Nightly  enoxaparin, 1 mg/kg, Subcutaneous, Q12H  insulin lispro, 0-9 Units, Subcutaneous, TID AC  pantoprazole, 40 mg, Oral, QAM  sodium chloride, 10 mL, Intravenous, Q12H      Continuous Infusions:sodium chloride, 100 mL/hr, Last Rate: 100 mL/hr (06/09/22 2056)      PRN Meds:.•  acetaminophen  •  dextrose  •  dextrose  •  glucagon (human recombinant)  •  insulin lispro **AND** insulin lispro  •  melatonin  •  ondansetron  •  [COMPLETED] Insert peripheral IV **AND** sodium chloride  •  sodium chloride    Objective     VITAL SIGNS  Vitals:    06/09/22 0600 06/09/22 0926 06/09/22 1458 06/09/22 1910   BP: 113/68 114/77 109/71 118/64   BP Location: Left arm Right arm Right arm Right arm   Patient Position: Lying Lying Lying Lying   Pulse: 105 100  84   Resp: 16 16 16 16   Temp: 98.2 °F (36.8 °C) 97.8 °F (36.6 °C) 97.7 °F (36.5 °C) 98 °F (36.7 °C)   TempSrc: Oral Oral Oral Oral   SpO2: 95% 95% 96% 97%   Weight:       Height:           Flowsheet Rows    Flowsheet Row First Filed Value   Admission Height 175.3 cm (69\") Documented at 06/08/2022 1548   Admission Weight 86.7 kg (191 lb 2.2 oz) Documented at 06/08/2022 1548           TELEMETRY: Atrial fibrillation with controlled response    Physical " Exam:  Constitutional:       Appearance: Well-developed.   Eyes:      General: No scleral icterus.     Conjunctiva/sclera: Conjunctivae normal.   HENT:      Head: Normocephalic and atraumatic.   Neck:      Vascular: No carotid bruit or JVD.   Pulmonary:      Effort: Pulmonary effort is normal.      Breath sounds: Normal breath sounds. No wheezing. No rales.   Cardiovascular:      Normal rate. Irregularly irregular rhythm.   Pulses:     Intact distal pulses.   Abdominal:      General: Bowel sounds are normal.      Palpations: Abdomen is soft.   Musculoskeletal:      Cervical back: Normal range of motion and neck supple. Skin:     General: Skin is warm and dry.      Findings: No rash.   Neurological:      Mental Status: Alert.          Results Review:   I reviewed the patient's new clinical results.  Lab Results (last 24 hours)     Procedure Component Value Units Date/Time    POC Glucose Once [[id number]]  (Abnormal) Collected: 06/09/22 2136    Specimen: Blood Updated: 06/09/22 2137     Glucose 131 mg/dL      Comment: Serial Number: [id number]:  [id number]       POC Glucose Once [[id number]]  (Abnormal) Collected: 06/09/22 1624    Specimen: Blood Updated: 06/09/22 1625     Glucose 112 mg/dL      Comment: Serial Number: [id number]:  [id number]       POC Glucose Once [[id number]]  (Abnormal) Collected: 06/09/22 1142    Specimen: Blood Updated: 06/09/22 1143     Glucose 187 mg/dL      Comment: Serial Number: [id number]:  [id number]       Magnesium [[id number]]  (Normal) Collected: 06/09/22 0300    Specimen: Blood Updated: 06/09/22 0909     Magnesium 1.8 mg/dL     POC Glucose Once [[id number]]  (Abnormal) Collected: 06/09/22 0726    Specimen: Blood Updated: 06/09/22 0728     Glucose 186 mg/dL      Comment: Serial Number: [id number]:  [id number]       Troponin [[id number]]  (Normal) Collected: 06/09/22 0300    Specimen: Blood Updated: 06/09/22 0551     Troponin T <0.010 ng/mL     Narrative:      Troponin  T Reference Range:  <= 0.03 ng/mL-   Negative for AMI  >0.03 ng/mL-     Abnormal for myocardial necrosis.  Clinicians would have to utilize clinical acumen, EKG, Troponin and serial changes to determine if it is an Acute Myocardial Infarction or myocardial injury due to an underlying chronic condition.       Results may be falsely decreased if patient taking Biotin.      Basic Metabolic Panel [[id number]]  (Abnormal) Collected: 06/09/22 0300    Specimen: Blood Updated: 06/09/22 0546     Glucose 168 mg/dL      BUN 19 mg/dL      Creatinine 1.12 mg/dL      Sodium 135 mmol/L      Potassium 4.0 mmol/L      Chloride 100 mmol/L      CO2 24.0 mmol/L      Calcium 8.3 mg/dL      BUN/Creatinine Ratio 17.0     Anion Gap 11.0 mmol/L      eGFR 66.8 mL/min/1.73      Comment: National Kidney Foundation and American Society of Nephrology (ASN) Task Force recommended calculation based on the Chronic Kidney Disease Epidemiology Collaboration (CKD-EPI) equation refit without adjustment for race.       Narrative:      GFR Normal >60  Chronic Kidney Disease <60  Kidney Failure <15      CBC Auto Differential [[id number]]  (Abnormal) Collected: 06/09/22 0300    Specimen: Blood Updated: 06/09/22 0520     WBC 6.90 10*3/mm3      RBC 4.34 10*6/mm3      Hemoglobin 12.7 g/dL      Hematocrit 38.4 %      MCV 88.6 fL      MCH 29.4 pg      MCHC 33.1 g/dL      RDW 14.4 %      RDW-SD 45.1 fl      MPV 8.0 fL      Platelets 130 10*3/mm3      Neutrophil % 70.9 %      Lymphocyte % 17.2 %      Monocyte % 7.7 %      Eosinophil % 4.1 %      Basophil % 0.1 %      Neutrophils, Absolute 4.90 10*3/mm3      Lymphocytes, Absolute 1.20 10*3/mm3      Monocytes, Absolute 0.50 10*3/mm3      Eosinophils, Absolute 0.30 10*3/mm3      Basophils, Absolute 0.00 10*3/mm3      nRBC 0.0 /100 WBC           Imaging Results (Last 24 Hours)     ** No results found for the last 24 hours. **          EKG      I personally viewed and interpreted the patient's EKG/Telemetry  data:    ECHOCARDIOGRAM:      STRESS MYOVIEW:    CARDIAC CATHETERIZATION:    OTHER:         Assessment & Plan     Active Problems:    Persistent vomiting  New onset atrial fibrillation  Diabetes and hypertension  Hyperlipidemia    Patient presented with nausea vomiting and diarrhea and had new onset atrial fibrillation  Patient is having work-up done for his symptoms and has been on IV fluids and Zofran  Patient is atrial fibrillation with controlled ventricular response  Patient started on the Lovenox but will need oral anticoagulation  Patient is on atenolol and will try to convert to sinus rhythm otherwise we will perform cardioversion in 1 month  Blood pressure heart rate stable  Blood sugar levels and lipids are followed by the primary care doctor    I discussed the patients findings and my recommendations with patient and nurse    Kailash Garsia MD  06/09/22  22:10 EDT

## 2022-06-10 NOTE — PHARMACY RECOMMENDATION
"Transitions-of-Care ([facility]) Pharmacy Future COST Assessment:     /Nurse requesting test claim: [facility]    Pharmacy ran test claim for the following:     Drug Sig Covered/PA required Patient Copay per month   Eliquis (apixiban)     Xarelto BID    QD Covered without PA     Covered without PA $ 20.00     $ 20.00     Patient Insurance Type: Medicare - this means they are NOT eligible for a monthly discount card in the future (see \"free month\" note below)    Deductible/Medicare Gap Issue? Yes - this means the price may decrease in the future of this calendar year    Is patient signed up for M2B service? no    Is above drug(s) eligible for 1 month free through M2B service? Yes  If eligible,  or [facility] pharmacy can provide coupon upon request.       For billing questions, reach out to [facility] at [phone]  For M2B questions, reach out to Retail Pharmacy at [phone]    Pipe Leach PharmD   6/10/2022 12:10 EDT  "

## 2022-06-10 NOTE — PROGRESS NOTES
Referring Provider: Hospitalist    Reason for follow-up: Vomiting and atrial fibrillation     Patient Care Team:  Nuno Patton MD as PCP - General (Family Medicine)  Hudson Smith MD as Consulting Physician (Cardiology)  Kailash Garsia MD as Consulting Physician (Cardiology)    Subjective .  Patient doing well without any symptoms    Objective  Pain in bed comfortably     Review of Systems   Constitutional: Negative for fever and malaise/fatigue.   Cardiovascular: Negative for chest pain, dyspnea on exertion and palpitations.   Respiratory: Negative for cough and shortness of breath.    Skin: Negative for rash.   Gastrointestinal: Negative for abdominal pain, nausea and vomiting.   Neurological: Negative for focal weakness and headaches.   All other systems reviewed and are negative.      Patient has no known allergies.    Scheduled Meds:vitamin C, 500 mg, Oral, Daily  aspirin, 81 mg, Oral, Daily  atenolol, 25 mg, Oral, Daily  atorvastatin, 40 mg, Oral, Nightly  enoxaparin, 1 mg/kg, Subcutaneous, Q12H  insulin lispro, 0-9 Units, Subcutaneous, TID AC  pantoprazole, 40 mg, Oral, QAM AC  sodium chloride, 10 mL, Intravenous, Q12H      Continuous Infusions:sodium chloride, 100 mL/hr, Last Rate: 100 mL/hr (06/10/22 0636)      PRN Meds:.•  acetaminophen  •  dextrose  •  dextrose  •  glucagon (human recombinant)  •  insulin lispro **AND** insulin lispro  •  melatonin  •  ondansetron  •  [COMPLETED] Insert peripheral IV **AND** sodium chloride  •  sodium chloride        VITAL SIGNS  Vitals:    06/09/22 1910 06/09/22 2224 06/10/22 0630 06/10/22 1000   BP: 118/64 104/59 113/55 108/71   BP Location: Right arm Right arm Right arm Left arm   Patient Position: Lying Lying Lying Lying   Pulse: 84 97 77 94   Resp: 16 16 18 18   Temp: 98 °F (36.7 °C) 98.2 °F (36.8 °C) 98.7 °F (37.1 °C) 97.6 °F (36.4 °C)   TempSrc: Oral Oral Oral Oral   SpO2: 97% 98% 97% 97%   Weight:   85 kg (187 lb 6.3 oz)    Height:      "      Flowsheet Rows    Flowsheet Row First Filed Value   Admission Height 175.3 cm (69\") Documented at 06/08/2022 1548   Admission Weight 86.7 kg (191 lb 2.2 oz) Documented at 06/08/2022 1548           TELEMETRY: Atrial fibrillation with controlled ventricular response    Physical Exam:  Constitutional:       Appearance: Well-developed.   Eyes:      General: No scleral icterus.     Conjunctiva/sclera: Conjunctivae normal.   HENT:      Head: Normocephalic and atraumatic.   Neck:      Vascular: No carotid bruit or JVD.   Pulmonary:      Effort: Pulmonary effort is normal.      Breath sounds: Normal breath sounds. No wheezing. No rales.   Cardiovascular:      Normal rate. Irregularly irregular rhythm.      Murmurs: There is a systolic murmur.   Pulses:     Intact distal pulses.   Abdominal:      General: Bowel sounds are normal.      Palpations: Abdomen is soft.   Musculoskeletal:      Cervical back: Normal range of motion and neck supple. Skin:     General: Skin is warm and dry.      Findings: No rash.   Neurological:      Mental Status: Alert.          Results Review:   I reviewed the patient's new clinical results.  Lab Results (last 24 hours)     Procedure Component Value Units Date/Time    POC Glucose Once [[id number]]  (Abnormal) Collected: 06/10/22 0842    Specimen: Blood Updated: 06/10/22 0843     Glucose 170 mg/dL      Comment: Serial Number: [id number]:  [id number]       POC Glucose Once [[id number]]  (Abnormal) Collected: 06/09/22 2136    Specimen: Blood Updated: 06/09/22 2137     Glucose 131 mg/dL      Comment: Serial Number: [id number]:  [id number]       POC Glucose Once [[id number]]  (Abnormal) Collected: 06/09/22 1624    Specimen: Blood Updated: 06/09/22 1625     Glucose 112 mg/dL      Comment: Serial Number: [id number]:  [id number]       POC Glucose Once [[id number]]  (Abnormal) Collected: 06/09/22 1142    Specimen: Blood Updated: 06/09/22 1143     Glucose 187 mg/dL      Comment: Serial " Number: [id number]:  [id number]             Imaging Results (Last 24 Hours)     ** No results found for the last 24 hours. **          EKG      I personally viewed and interpreted the patient's EKG/Telemetry data:    ECHOCARDIOGRAM:    STRESS MYOVIEW:    CARDIAC CATHETERIZATION:    OTHER:         Assessment & Plan     Active Problems:    Persistent vomiting  New onset atrial fibrillation  Diabetes  Hypertension  Hyperlipidemia    Patient presented with nausea vomiting and diarrhea and had new onset atrial fibrillation  Patient is ruled out for MI by EKG enzymes  Patient's rate is well controlled with atenolol  Patient was on Lovenox but I have discussed with him about long-term anticoagulation including either Eliquis, Xarelto or warfarin based on his insurance  We will follow him in the office in a month and then cardiovert him if necessary  Blood pressure and heart rate are stable    I discussed the patients findings and my recommendations with patient and nurse    Kailash Garsia MD  06/10/22  11:42 EDT

## 2022-06-13 ENCOUNTER — TRANSITIONAL CARE MANAGEMENT TELEPHONE ENCOUNTER (OUTPATIENT)
Dept: CALL CENTER | Facility: HOSPITAL | Age: 82
End: 2022-06-13

## 2022-06-13 ENCOUNTER — TELEPHONE (OUTPATIENT)
Dept: FAMILY MEDICINE CLINIC | Facility: CLINIC | Age: 82
End: 2022-06-13

## 2022-06-13 NOTE — OUTREACH NOTE
Call Center TCM Note    Flowsheet Row Responses   [facility] patient discharged from? [facility]   Does the patient have one of the following disease processes/diagnoses(primary or secondary)? Other   TCM attempt successful? Yes   Call start time 1128   Call end time 1134   Discharge diagnosis persistent vomiting   Person spoke with today (if not patient) and relationship Christine   Cathy reviewed with patient/caregiver? Yes   Is the patient having any side effects they believe may be caused by any medication additions or changes? No   Does the patient have all medications ordered at discharge? Yes   Is the patient taking all medications as directed (includes completed medication regime)? No   What is preventing the patient from taking all medications as directed? Side effects   Nursing Interventions Notified provider, Advised patient to call provider   Medication comments Wife reports that Pt stopped taking RYBELSUS for DM as he thinks that med is what caused him to vomit. She has call to PCP office to report.    Comments regarding appointments Cardio appt 7/13/22   Does the patient have a primary care provider?  Yes   Does the patient have an appointment with their PCP within 7 days of discharge? Yes   Comments regarding PCP HOSP DC FU appt 6/16/22 @ 10:30 am   Has the patient kept scheduled appointments due by today? N/A   Has home health visited the patient within 72 hours of discharge? N/A   Psychosocial issues? No   Did the patient receive a copy of their discharge instructions? Yes   Nursing interventions Reviewed instructions with patient   What is the patient's perception of their health status since discharge? Improving   Is the patient/caregiver able to teach back signs and symptoms related to disease process for when to call PCP? Yes   Is the patient/caregiver able to teach back signs and symptoms related to disease process for when to call 911? Yes   Is the patient/caregiver able to teach back the  hierarchy of who to call/visit for symptoms/problems? PCP, Specialist, Home health nurse, Urgent Care, ED, 911 Yes   TCM call completed? Yes   Wrap up additional comments Wife reports pt is doing better and has returned to work. She is aware for Pt to monitor BP and BG levels and write it down to take with him to appt.           Cristy Norton RN    6/13/2022, 11:37 EDT

## 2022-06-13 NOTE — TELEPHONE ENCOUNTER
Gave message to patient's wife Christnie at 11:43am.  She said she will call Dr. Cope's office and have them fax the results to our office so [facility] will have them for patient's appt on 6/16/2022.

## 2022-06-13 NOTE — TELEPHONE ENCOUNTER
Caller: ALBIN ELIAS    Relationship: Emergency Contact    Best call back number: [phone]         What test was performed: BIOPSY OF BLADDER     When was the test performed: 5/25/2022    Where was the test performed: DR. OLIVA     Additional notes:     DR. OLIVA DID A BIOPSY ON PLACE ON BLADDER, MS ELIAS HAS NOT GOTTEN A CALL BACK WITH RESULTS    PLEASE ADVISE

## 2022-06-13 NOTE — TELEPHONE ENCOUNTER
Caller: ALBIN ELIAS    Relationship: Emergency Contact    Best call back number:[phone]   What medications are you currently taking:   Current Outpatient Medications on File Prior to Visit   Medication Sig Dispense Refill   • atenolol (TENORMIN) 25 MG tablet Take 1 tablet by mouth Daily. 90 tablet 3   • atorvastatin (LIPITOR) 40 MG tablet Take 1 tablet by mouth every night at bedtime. 90 tablet 3   • colestipol (COLESTID) 1 g tablet Take 1 g by mouth 2 (Two) Times a Day.     • glimepiride (AMARYL) 2 MG tablet TAKE 3 TABLETS EVERY MORNING BEFORE BREAKFAST 270 tablet 2   • Omega-3 Fatty Acids (FISH OIL) 1200 MG capsule capsule Take 1 capsule by mouth Daily. On hold for procedure     • omeprazole-sodium bicarbonate (ZEGERID)  MG per capsule Take 1 capsule by mouth Daily. prn     • rivaroxaban (XARELTO) 20 MG tablet Take 1 tablet by mouth Daily With Dinner for 30 days. Indications: Atrial Fibrillation 30 tablet 0   • Semaglutide (Rybelsus) 3 MG tablet Take 1 tablet by mouth Daily for 30 days. Call when on last 4-5 days and we may increase dose. 30 tablet 0   • vitamin C (ASCORBIC ACID) 500 MG tablet Take 500 mg by mouth Daily.       No current facility-administered medications on file prior to visit.          When did you start taking these medications: 6/1/2022    Which medication are you concerned about:     Semaglutide (Rybelsus) 3 MG tablet  glimepiride (AMARYL) 2 MG tablet  Who prescribed you this medication:   What are your concerns:     glimepiride (AMARYL) 2 MG tablet  ALBIN SAYS  DR. YOUNG WANTED HIM TO TAKE MEDICATION 2 IN MORNING AND 2 AT NIGHT, HE IS CURRENTLY TAKING 2 IN MORNING AND 1 AT NIGHT SINCE BEING DISCHARGED FROM HOSPITAL.     Semaglutide (Rybelsus) JAYNE HAS STOPPED TAKING THE MEDICATION, HE FEELS LIKE IT IS CAUSING HIS VOMITING , HE HAS NOT TAKEN MEDICATION SINCE 6/7/2022    HE WAS DIAGNOSED WITH AFIB WHILE DURING PROCEDURE(BIOPSY) THAT WAS  DONE AT DR. OLIVA  WHILE ADMITTED TO  [facility] XARELTO 20 MG TABLET PRESCRIBED  BY DR. CUEVAS.          How long have you had these concerns: TODAY

## 2022-06-16 ENCOUNTER — OFFICE VISIT (OUTPATIENT)
Dept: FAMILY MEDICINE CLINIC | Facility: CLINIC | Age: 82
End: 2022-06-16

## 2022-06-16 ENCOUNTER — LAB (OUTPATIENT)
Dept: FAMILY MEDICINE CLINIC | Facility: CLINIC | Age: 82
End: 2022-06-16

## 2022-06-16 VITALS
OXYGEN SATURATION: 99 % | HEIGHT: 69 IN | WEIGHT: 195 LBS | DIASTOLIC BLOOD PRESSURE: 88 MMHG | RESPIRATION RATE: 18 BRPM | SYSTOLIC BLOOD PRESSURE: 120 MMHG | TEMPERATURE: 97.6 F | BODY MASS INDEX: 28.88 KG/M2 | HEART RATE: 105 BPM

## 2022-06-16 DIAGNOSIS — Z85.51 HISTORY OF BLADDER CANCER: ICD-10-CM

## 2022-06-16 DIAGNOSIS — K52.9 GASTROENTERITIS: Primary | ICD-10-CM

## 2022-06-16 DIAGNOSIS — C61 PROSTATE CANCER: ICD-10-CM

## 2022-06-16 DIAGNOSIS — E78.2 MIXED HYPERLIPIDEMIA: ICD-10-CM

## 2022-06-16 DIAGNOSIS — N18.9 CHRONIC RENAL IMPAIRMENT, UNSPECIFIED CKD STAGE: ICD-10-CM

## 2022-06-16 DIAGNOSIS — E11.65 TYPE 2 DIABETES MELLITUS WITH HYPERGLYCEMIA, WITHOUT LONG-TERM CURRENT USE OF INSULIN: ICD-10-CM

## 2022-06-16 DIAGNOSIS — E55.9 VITAMIN D DEFICIENCY, UNSPECIFIED: ICD-10-CM

## 2022-06-16 DIAGNOSIS — I25.10 CORONARY ARTERY DISEASE INVOLVING NATIVE CORONARY ARTERY OF NATIVE HEART WITHOUT ANGINA PECTORIS: ICD-10-CM

## 2022-06-16 DIAGNOSIS — I10 ESSENTIAL HYPERTENSION: ICD-10-CM

## 2022-06-16 LAB
BACTERIA UR QL AUTO: ABNORMAL /HPF
BILIRUB UR QL STRIP: NEGATIVE
CLARITY UR: CLEAR
COLOR UR: YELLOW
GLUCOSE UR STRIP-MCNC: NEGATIVE MG/DL
HGB UR QL STRIP.AUTO: ABNORMAL
HYALINE CASTS UR QL AUTO: ABNORMAL /LPF
KETONES UR QL STRIP: NEGATIVE
LEUKOCYTE ESTERASE UR QL STRIP.AUTO: ABNORMAL
NITRITE UR QL STRIP: NEGATIVE
PH UR STRIP.AUTO: 6.5 [PH] (ref 5–8)
PROT UR QL STRIP: ABNORMAL
RBC # UR STRIP: ABNORMAL /HPF
REF LAB TEST METHOD: ABNORMAL
SP GR UR STRIP: 1.02 (ref 1–1.03)
SQUAMOUS #/AREA URNS HPF: ABNORMAL /HPF
UROBILINOGEN UR QL STRIP: ABNORMAL
WBC # UR STRIP: ABNORMAL /HPF

## 2022-06-16 PROCEDURE — 99496 TRANSJ CARE MGMT HIGH F2F 7D: CPT | Performed by: FAMILY MEDICINE

## 2022-06-16 PROCEDURE — 1111F DSCHRG MED/CURRENT MED MERGE: CPT | Performed by: FAMILY MEDICINE

## 2022-06-16 PROCEDURE — 36415 COLL VENOUS BLD VENIPUNCTURE: CPT | Performed by: FAMILY MEDICINE

## 2022-06-16 PROCEDURE — 81001 URINALYSIS AUTO W/SCOPE: CPT | Performed by: FAMILY MEDICINE

## 2022-06-16 NOTE — PROGRESS NOTES
Transitional Care Follow Up Visit  Subjective     Cisco Frank is a 82 y.o. male who presents for a transitional care management visit.    Within 48 business hours after discharge our office contacted him via telephone to coordinate his care and needs.      I reviewed and discussed the details of that call along with the discharge summary, hospital problems, inpatient lab results, inpatient diagnostic studies, and consultation reports with Cisco.     Current outpatient and discharge medications have been reconciled for the patient.  Reviewed by: Nuno Patton MD      Date of TCM Phone Call 6/10/2022   [facility]   Date of Admission 6/8/2022   Date of Discharge 6/10/2022   Discharge Disposition Home or Self Care     Risk for Readmission (LACE) Score: 8 (6/10/2022  6:02 AM)      History of Present Illness   HISTORY OF PRESENT ILLNESS:     HPI     Obtained from admitting physician HPI:  History of present illness 81-year-old male who presents with a 2-day history of intermittent vomiting really not able to eat or drink anything.  No diarrhea no black or bloody stool no fever chills.  No chest pain neck arm jaw pain has had some generalized weakness and fatigue and mild shortness of breath.  No one in the home with similar illness no leg pain or swelling no urinary problems.  Patient reports he was supposed to be put to sleep for procedure that his urologist was going to do recently but he had some abnormality on his EKG and his been referred to cardiology who he sees Monday.  He denies any antibiotic use.  No dysuria or frequency.  No black or bloody stool no other complaints or associated symptoms 2 days continuous moderate severe she has been at home associated the above.  No significant abdominal pain patient states he just recently had his glimepiride discontinued and placed on rebellious     6/9/2022: Patient confirms the HPI noted above including several day history of of burning epigastric pain with  associated nausea, nonbloody vomiting and diarrhea.  Some occasional intermittent dyspnea along with an irregular heartbeat as noted and patient reports that he was recently evaluated outpatient for planned elective procedure and found to be in atrial fibrillation at that time.  He had a cardiology appointment scheduled but has not had any follow-up, further evaluation or treatment and is not currently on any anticoagulation.  No other changes to his medications, unusual p.o. intake or sick contacts are noted.  He denies any fever, peripheral edema, syncope or near syncope.       The patient presents today for a transitional care visit. He is accompanied by his wife.     The patient states that he was hospitalized at [facility] for gastroenteritis with nausea, vomiting, and diarrhea. He became dehydrated and went to the emergency room and was eventually admitted after their evaluation showed dehydration. The patient said that this began after taking Rybelsus. He had some extra pills of it even though we had taken him off. He was in the hospital for 2 nights and was given IV fluids. They discovered that he had atrial fibrillation, and gastroenteritis. He was started on Xarelto 20 mg daily with dinner and this is going to be continued for the atrial fibrillation. The patient is tolerating Xarelto well so far, we will continue this for now. He notes that he has been in atrial fibrillation for a while, and he had no energy to do anything, he was often forcing himself to do things. The patient reports being taken off the baby aspirin and fish oil.     The patient has been discharged from the hospital. The gastroenteritis is now cleared, the dehydration has resolved, and he reports feeling better.    He states that he is still taking glimepiride 2 tablets in the morning and 2 tablets at night before he goes to bed. He notes taking his blood glucose at home for the last week or so, and the readings are a lot  better than they were before. While in the hospital his blood glucose was 112.    The patient states that he has had a tumor removed from his bladder, and his wife notes that the pathology report was negative. He does have a history of bladder cancer.        Medical History        Past Medical History:   Diagnosis Date   • Cancer (HCC)     • Coronary artery disease     • History of bladder cancer     • Hyperlipidemia           Surgical History         Past Surgical History:   Procedure Laterality Date   • BLADDER SURGERY   10/26/2019     [facility] Dr. Cope   • CARDIAC CATHETERIZATION       • COLONOSCOPY       • COLONOSCOPY W/ POLYPECTOMY   2021   • SKIN CANCER EXCISION         right temple BX   • UPPER ENDOSCOPIC ULTRASOUND W/ FNA N/A 3/12/2021     Procedure: ESOPHAGOGASTRODUODENOSCOPY WITH endoscopic mucosal resection, biopsy x 1 area, and endoscopic ULTRASOUND;  Surgeon: Abner Infante MD;  Location: [facility] ENDOSCOPY;  Service: Gastroenterology;  Laterality: N/A;  post op: hiatal hernia, duodenal polyp               Family History   Problem Relation Age of Onset   • Heart disease Mother     • Heart attack Father     • No Known Problems Sister     • No Known Problems Brother     • No Known Problems Maternal Aunt     • No Known Problems Maternal Uncle     • No Known Problems Paternal Aunt     • No Known Problems Paternal Uncle     • No Known Problems Maternal Grandmother     • No Known Problems Maternal Grandfather     • No Known Problems Paternal Grandmother     • No Known Problems Paternal Grandfather     • No Known Problems Other     • Anemia Neg Hx     • Arrhythmia Neg Hx     • Asthma Neg Hx     • Clotting disorder Neg Hx     • Fainting Neg Hx     • Heart failure Neg Hx     • Hyperlipidemia Neg Hx     • Hypertension Neg Hx        Social History            Tobacco Use   • Smoking status: Former Smoker       Quit date: 1980       Years since quittin.4   • Smokeless tobacco: Never Used   Vaping Use   •  Vaping Use: Never used   Substance Use Topics   • Alcohol use: Not Currently   • Drug use: Never      Prescriptions Prior to Admission           Medications Prior to Admission   Medication Sig Dispense Refill Last Dose   • aspirin 81 MG chewable tablet Chew 81 mg Daily. On hold for procedure     Past Week at Unknown time   • atenolol (TENORMIN) 25 MG tablet Take 1 tablet by mouth Daily. 90 tablet 3 6/8/2022 at Unknown time   • atorvastatin (LIPITOR) 40 MG tablet Take 1 tablet by mouth every night at bedtime. 90 tablet 3 6/7/2022 at Unknown time   • colestipol (COLESTID) 1 g tablet Take 1 g by mouth 2 (Two) Times a Day.     6/8/2022 at Unknown time   • glimepiride (AMARYL) 2 MG tablet TAKE 3 TABLETS EVERY MORNING BEFORE BREAKFAST 270 tablet 2 6/8/2022 at Unknown time   • Omega-3 Fatty Acids (FISH OIL) 1200 MG capsule capsule Take 1 capsule by mouth Daily. On hold for procedure     Past Week at Unknown time   • omeprazole-sodium bicarbonate (ZEGERID)  MG per capsule Take 1 capsule by mouth Daily. prn     6/8/2022 at Unknown time   • Semaglutide (Rybelsus) 3 MG tablet Take 1 tablet by mouth Daily for 30 days. Call when on last 4-5 days and we may increase dose. 30 tablet 0 6/7/2022 at Unknown time   • vitamin C (ASCORBIC ACID) 500 MG tablet Take 500 mg by mouth Daily.     6/8/2022 at Unknown time         Allergies:  Patient has no known allergies.          Immunization History   Administered Date(s) Administered   • COVID-19 (PFIZER) PURPLE CAP 12/29/2020, 01/19/2021, 09/09/2021   • Fluzone High Dose =>65 Years ([employer] ONLY) 10/10/2015, 09/16/2016, 10/14/2017, 09/22/2018, 11/05/2019, 10/05/2020   • Fluzone High-Dose 65+yrs 10/08/2021   • Hepatitis A 04/23/2018, 12/20/2018   • Pneumococcal Conjugate 13-Valent (PCV13) 11/05/2020   • Pneumococcal Polysaccharide (PPSV23) 01/10/2013, 02/01/2021        Course During Hospital Stay: 3 days       Review of Systems   Constitutional: Negative.  Negative for diaphoresis,  fatigue and fever.   HENT: Negative.  Negative for congestion, ear pain, hearing loss, postnasal drip, sinus pressure, sore throat, trouble swallowing and voice change.    Eyes: Negative.  Negative for pain, redness and visual disturbance.   Respiratory: Negative.  Negative for cough, chest tightness and shortness of breath.    Cardiovascular: Negative.  Negative for chest pain, palpitations and leg swelling.   Gastrointestinal: Negative.  Negative for abdominal pain, blood in stool, constipation and diarrhea.   Endocrine: Negative.  Negative for cold intolerance and heat intolerance.   Genitourinary: Negative.  Negative for difficulty urinating, enuresis, flank pain, frequency and urgency.   Musculoskeletal: Negative.  Negative for arthralgias, back pain, myalgias, neck pain and neck stiffness.   Skin: Negative.  Negative for color change and rash.   Allergic/Immunologic: Negative.  Negative for environmental allergies.   Neurological: Negative.  Negative for syncope, weakness and headaches.   Hematological: Negative.  Does not bruise/bleed easily.   Psychiatric/Behavioral: Negative.  Negative for behavioral problems, decreased concentration, dysphoric mood and suicidal ideas. The patient is not nervous/anxious.        Objective   Physical Exam  Constitutional:       Appearance: Normal appearance. He is well-developed and normal weight.   HENT:      Head: Normocephalic and atraumatic.      Right Ear: Tympanic membrane, ear canal and external ear normal.      Left Ear: Tympanic membrane, ear canal and external ear normal.      Nose: Nose normal.      Mouth/Throat:      Mouth: Mucous membranes are moist.      Pharynx: Oropharynx is clear. No oropharyngeal exudate.   Eyes:      Extraocular Movements: Extraocular movements intact.      Conjunctiva/sclera: Conjunctivae normal.      Pupils: Pupils are equal, round, and reactive to light.   Cardiovascular:      Rate and Rhythm: Normal rate and regular rhythm.      Pulses:  Normal pulses.      Heart sounds: Normal heart sounds.   Pulmonary:      Effort: Pulmonary effort is normal.      Breath sounds: Normal breath sounds.   Abdominal:      General: Bowel sounds are normal.      Palpations: Abdomen is soft.   Musculoskeletal:         General: Normal range of motion.      Cervical back: Normal range of motion and neck supple.   Skin:     General: Skin is warm and dry.   Neurological:      General: No focal deficit present.      Mental Status: He is alert and oriented to person, place, and time. Mental status is at baseline.   Psychiatric:         Mood and Affect: Mood normal.         Behavior: Behavior normal.         Thought Content: Thought content normal.         Judgment: Judgment normal.         Assessment & Plan       1. Gastroenteritis  - The patient is an 82-year-old white male who just recently was hospitalized at [facility] for gastroenteritis with nausea, vomiting, and diarrhea. He became dehydrated and went to the emergency room and was eventually admitted after their evaluation showed dehydration. He was in the hospital for 2 nights and was given IV fluids and was found to be in atrial fibrillation along with the gastroenteritis. He was started on Xarelto 20 mg daily with dinner and this is going to be continued for the atrial fibrillation. He is tolerating that quite well so far and we will continue this for now. The gastroenteritis is cleared. The dehydration was resolved and he is now back home.    2. Hypertension  - The patient's blood pressure was normal today at 120/88 mmHg. He is on atenolol 25 mg a day and we will continue that for now.    3. Type 2 diabetes mellitus  - The patient has had a great deal of difficulty during the spring getting his blood glucose down. He had been running in the 300-400 mg/dL, so we were trying different combinations of medicines. We tried several GLP-1 agonists and every single one of them made him very nauseated and upset. He  was tried on a dipeptidyl peptidase inhibitor, and he seems to tolerate that in the form of Tradjenta 5 mg a day. I am not sure it is going to be strong enough to bring down his blood glucose. His still on glimepiride is still 8 mg a day, and we will continue both of these for now to see how he does. He is checking his blood glucose at home and will have to stay away from the GLP-1 agonist. He feels like they may have contributed to his recent gastroenteritis.    4. Coronary artery disease  - The patient has known coronary artery disease, but he is not having any angina to speak of right now. We will continue his atenolol and Xarelto as mentioned above.    5. Chronic renal impairment  - The patient has chronic kidney disease stage 3. He is being followed carefully by nephrology and us, with no major changes recently. I will recheck his kidney function today.    6. Mixed hyperlipidemia  - The patient has lipid disorder and is on atorvastatin 40 mg, we will continue that. He is following a low carb diet and we will repeat his lipid panel today.    7. Prostate cancer  - The patient will have a PSA done today for screening purposes.    8. History of bladder cancer  - The patient recently had a cystoscopy with biopsy done and there is no evidence of cancer in the bladder at this moment.    9. Vitamin D deficiency  -The patient will have his vitamin D level checked today, and we will begin vitamin D supplementation if he is still low.          Transcribed from ambient dictation for Nuno Patton MD by Kacy Mckeon..  06/16/22   13:57 EDT    Patient verbalized consent to the visit recording.

## 2022-06-17 ENCOUNTER — LAB (OUTPATIENT)
Dept: FAMILY MEDICINE CLINIC | Facility: CLINIC | Age: 82
End: 2022-06-17

## 2022-06-17 LAB
25(OH)D3 SERPL-MCNC: 54.1 NG/ML (ref 30–100)
ALBUMIN SERPL-MCNC: 3.8 G/DL (ref 3.5–5.2)
ALBUMIN/GLOB SERPL: 1.2 G/DL
ALP SERPL-CCNC: 121 U/L (ref 39–117)
ALT SERPL W P-5'-P-CCNC: 18 U/L (ref 1–41)
ANION GAP SERPL CALCULATED.3IONS-SCNC: 13 MMOL/L (ref 5–15)
AST SERPL-CCNC: 14 U/L (ref 1–40)
BASOPHILS # BLD AUTO: 0 10*3/MM3 (ref 0–0.2)
BASOPHILS NFR BLD AUTO: 0.6 % (ref 0–1.5)
BILIRUB SERPL-MCNC: 0.7 MG/DL (ref 0–1.2)
BUN SERPL-MCNC: 21 MG/DL (ref 8–23)
BUN/CREAT SERPL: 16.8 (ref 7–25)
CALCIUM SPEC-SCNC: 8.9 MG/DL (ref 8.6–10.5)
CHLORIDE SERPL-SCNC: 98 MMOL/L (ref 98–107)
CHOLEST SERPL-MCNC: 81 MG/DL (ref 0–200)
CO2 SERPL-SCNC: 25 MMOL/L (ref 22–29)
CREAT SERPL-MCNC: 1.25 MG/DL (ref 0.76–1.27)
DEPRECATED RDW RBC AUTO: 46.4 FL (ref 37–54)
EGFRCR SERPLBLD CKD-EPI 2021: 58.2 ML/MIN/1.73
EOSINOPHIL # BLD AUTO: 0.1 10*3/MM3 (ref 0–0.4)
EOSINOPHIL NFR BLD AUTO: 2.2 % (ref 0.3–6.2)
ERYTHROCYTE [DISTWIDTH] IN BLOOD BY AUTOMATED COUNT: 14.8 % (ref 12.3–15.4)
GLOBULIN UR ELPH-MCNC: 3.2 GM/DL
GLUCOSE SERPL-MCNC: 226 MG/DL (ref 65–99)
HBA1C MFR BLD: 8.7 % (ref 3.5–5.6)
HCT VFR BLD AUTO: 42.8 % (ref 37.5–51)
HDLC SERPL-MCNC: 33 MG/DL (ref 40–60)
HGB BLD-MCNC: 14.2 G/DL (ref 13–17.7)
LDLC SERPL CALC-MCNC: 25 MG/DL (ref 0–100)
LDLC/HDLC SERPL: 0.67 {RATIO}
LYMPHOCYTES # BLD AUTO: 1.4 10*3/MM3 (ref 0.7–3.1)
LYMPHOCYTES NFR BLD AUTO: 21.5 % (ref 19.6–45.3)
MCH RBC QN AUTO: 29.5 PG (ref 26.6–33)
MCHC RBC AUTO-ENTMCNC: 33.2 G/DL (ref 31.5–35.7)
MCV RBC AUTO: 89.1 FL (ref 79–97)
MONOCYTES # BLD AUTO: 0.5 10*3/MM3 (ref 0.1–0.9)
MONOCYTES NFR BLD AUTO: 8.1 % (ref 5–12)
NEUTROPHILS NFR BLD AUTO: 4.2 10*3/MM3 (ref 1.7–7)
NEUTROPHILS NFR BLD AUTO: 67.6 % (ref 42.7–76)
NRBC BLD AUTO-RTO: 0.2 /100 WBC (ref 0–0.2)
PLATELET # BLD AUTO: 163 10*3/MM3 (ref 140–450)
PMV BLD AUTO: 8.1 FL (ref 6–12)
POTASSIUM SERPL-SCNC: 4.9 MMOL/L (ref 3.5–5.2)
PROT SERPL-MCNC: 7 G/DL (ref 6–8.5)
RBC # BLD AUTO: 4.81 10*6/MM3 (ref 4.14–5.8)
SODIUM SERPL-SCNC: 136 MMOL/L (ref 136–145)
T4 FREE SERPL-MCNC: 1.14 NG/DL (ref 0.93–1.7)
TRIGL SERPL-MCNC: 129 MG/DL (ref 0–150)
TSH SERPL DL<=0.05 MIU/L-ACNC: 2.46 UIU/ML (ref 0.27–4.2)
VIT B12 BLD-MCNC: 322 PG/ML (ref 211–946)
VLDLC SERPL-MCNC: 23 MG/DL (ref 5–40)
WBC NRBC COR # BLD: 6.3 10*3/MM3 (ref 3.4–10.8)

## 2022-06-17 PROCEDURE — 85025 COMPLETE CBC W/AUTO DIFF WBC: CPT | Performed by: FAMILY MEDICINE

## 2022-06-17 PROCEDURE — 80053 COMPREHEN METABOLIC PANEL: CPT | Performed by: FAMILY MEDICINE

## 2022-06-17 PROCEDURE — 80061 LIPID PANEL: CPT | Performed by: FAMILY MEDICINE

## 2022-06-17 PROCEDURE — 83036 HEMOGLOBIN GLYCOSYLATED A1C: CPT | Performed by: FAMILY MEDICINE

## 2022-06-17 PROCEDURE — 84443 ASSAY THYROID STIM HORMONE: CPT | Performed by: FAMILY MEDICINE

## 2022-06-17 PROCEDURE — 84439 ASSAY OF FREE THYROXINE: CPT | Performed by: FAMILY MEDICINE

## 2022-06-17 PROCEDURE — 82306 VITAMIN D 25 HYDROXY: CPT | Performed by: FAMILY MEDICINE

## 2022-06-17 PROCEDURE — 82607 VITAMIN B-12: CPT | Performed by: FAMILY MEDICINE

## 2022-06-30 ENCOUNTER — ANESTHESIA (OUTPATIENT)
Dept: GASTROENTEROLOGY | Facility: HOSPITAL | Age: 82
End: 2022-06-30

## 2022-06-30 ENCOUNTER — ANESTHESIA EVENT (OUTPATIENT)
Dept: GASTROENTEROLOGY | Facility: HOSPITAL | Age: 82
End: 2022-06-30

## 2022-06-30 ENCOUNTER — TELEPHONE (OUTPATIENT)
Dept: FAMILY MEDICINE CLINIC | Facility: CLINIC | Age: 82
End: 2022-06-30

## 2022-06-30 ENCOUNTER — HOSPITAL ENCOUNTER (OUTPATIENT)
Facility: HOSPITAL | Age: 82
Discharge: HOME | End: 2022-07-01
Attending: EMERGENCY MEDICINE | Admitting: INTERNAL MEDICINE

## 2022-06-30 ENCOUNTER — APPOINTMENT (OUTPATIENT)
Dept: CT IMAGING | Facility: HOSPITAL | Age: 82
End: 2022-06-30

## 2022-06-30 DIAGNOSIS — R04.2 HEMOPTYSIS: Primary | ICD-10-CM

## 2022-06-30 PROBLEM — Z85.46 HISTORY OF PROSTATE CANCER: Chronic | Status: ACTIVE | Noted: 2022-06-30

## 2022-06-30 PROBLEM — C61 PROSTATE CANCER: Chronic | Status: ACTIVE | Noted: 2020-03-19

## 2022-06-30 PROBLEM — I31.39 PERICARDIAL EFFUSION: Status: ACTIVE | Noted: 2022-06-30

## 2022-06-30 PROBLEM — I48.91 ATRIAL FIBRILLATION (HCC): Chronic | Status: ACTIVE | Noted: 2022-06-30

## 2022-06-30 LAB
ABO GROUP BLD: NORMAL
ALBUMIN SERPL-MCNC: 3.5 G/DL (ref 3.5–5.2)
ALBUMIN/GLOB SERPL: 1.3 G/DL
ALP SERPL-CCNC: 138 U/L (ref 39–117)
ALT SERPL W P-5'-P-CCNC: 14 U/L (ref 1–41)
ANION GAP SERPL CALCULATED.3IONS-SCNC: 11 MMOL/L (ref 5–15)
APTT PPP: 31.8 SECONDS (ref 61–76.5)
AST SERPL-CCNC: 11 U/L (ref 1–40)
B PARAPERT DNA SPEC QL NAA+PROBE: NOT DETECTED
B PARAPERT DNA SPEC QL NAA+PROBE: NOT DETECTED
B PERT DNA SPEC QL NAA+PROBE: NOT DETECTED
B PERT DNA SPEC QL NAA+PROBE: NOT DETECTED
BASOPHILS # BLD AUTO: 0 10*3/MM3 (ref 0–0.2)
BASOPHILS NFR BLD AUTO: 0.4 % (ref 0–1.5)
BILIRUB SERPL-MCNC: 0.5 MG/DL (ref 0–1.2)
BLD GP AB SCN SERPL QL: NEGATIVE
BUN SERPL-MCNC: 25 MG/DL (ref 8–23)
BUN/CREAT SERPL: 23.6 (ref 7–25)
C PNEUM DNA NPH QL NAA+NON-PROBE: NOT DETECTED
C PNEUM DNA NPH QL NAA+NON-PROBE: NOT DETECTED
CALCIUM SPEC-SCNC: 8.6 MG/DL (ref 8.6–10.5)
CHLORIDE SERPL-SCNC: 101 MMOL/L (ref 98–107)
CO2 SERPL-SCNC: 25 MMOL/L (ref 22–29)
CREAT SERPL-MCNC: 1.06 MG/DL (ref 0.76–1.27)
D DIMER PPP FEU-MCNC: 0.24 MG/L (FEU) (ref 0–0.59)
DEPRECATED RDW RBC AUTO: 44.2 FL (ref 37–54)
EGFRCR SERPLBLD CKD-EPI 2021: 70.9 ML/MIN/1.73
EOSINOPHIL # BLD AUTO: 0.6 10*3/MM3 (ref 0–0.4)
EOSINOPHIL NFR BLD AUTO: 10.6 % (ref 0.3–6.2)
ERYTHROCYTE [DISTWIDTH] IN BLOOD BY AUTOMATED COUNT: 14.5 % (ref 12.3–15.4)
FLUAV SUBTYP SPEC NAA+PROBE: NOT DETECTED
FLUAV SUBTYP SPEC NAA+PROBE: NOT DETECTED
FLUBV RNA ISLT QL NAA+PROBE: NOT DETECTED
FLUBV RNA ISLT QL NAA+PROBE: NOT DETECTED
GLOBULIN UR ELPH-MCNC: 2.7 GM/DL
GLUCOSE BLDC GLUCOMTR-MCNC: 110 MG/DL (ref 70–105)
GLUCOSE BLDC GLUCOMTR-MCNC: 179 MG/DL (ref 70–105)
GLUCOSE BLDC GLUCOMTR-MCNC: 264 MG/DL (ref 70–105)
GLUCOSE SERPL-MCNC: 274 MG/DL (ref 65–99)
HADV DNA SPEC NAA+PROBE: NOT DETECTED
HADV DNA SPEC NAA+PROBE: NOT DETECTED
HCOV 229E RNA SPEC QL NAA+PROBE: NOT DETECTED
HCOV 229E RNA SPEC QL NAA+PROBE: NOT DETECTED
HCOV HKU1 RNA SPEC QL NAA+PROBE: NOT DETECTED
HCOV HKU1 RNA SPEC QL NAA+PROBE: NOT DETECTED
HCOV NL63 RNA SPEC QL NAA+PROBE: NOT DETECTED
HCOV NL63 RNA SPEC QL NAA+PROBE: NOT DETECTED
HCOV OC43 RNA SPEC QL NAA+PROBE: NOT DETECTED
HCOV OC43 RNA SPEC QL NAA+PROBE: NOT DETECTED
HCT VFR BLD AUTO: 35.7 % (ref 37.5–51)
HCT VFR BLD AUTO: 36.6 % (ref 37.5–51)
HCT VFR BLD AUTO: 38.6 % (ref 37.5–51)
HGB BLD-MCNC: 12.2 G/DL (ref 13–17.7)
HGB BLD-MCNC: 12.3 G/DL (ref 13–17.7)
HGB BLD-MCNC: 13.4 G/DL (ref 13–17.7)
HMPV RNA NPH QL NAA+NON-PROBE: NOT DETECTED
HMPV RNA NPH QL NAA+NON-PROBE: NOT DETECTED
HOLD SPECIMEN: NORMAL
HPIV1 RNA ISLT QL NAA+PROBE: NOT DETECTED
HPIV1 RNA ISLT QL NAA+PROBE: NOT DETECTED
HPIV2 RNA SPEC QL NAA+PROBE: NOT DETECTED
HPIV2 RNA SPEC QL NAA+PROBE: NOT DETECTED
HPIV3 RNA NPH QL NAA+PROBE: NOT DETECTED
HPIV3 RNA NPH QL NAA+PROBE: NOT DETECTED
HPIV4 P GENE NPH QL NAA+PROBE: NOT DETECTED
HPIV4 P GENE NPH QL NAA+PROBE: NOT DETECTED
INR PPP: 1.32 (ref 0.93–1.1)
LYMPHOCYTES # BLD AUTO: 1.2 10*3/MM3 (ref 0.7–3.1)
LYMPHOCYTES NFR BLD AUTO: 22.7 % (ref 19.6–45.3)
M PNEUMO IGG SER IA-ACNC: NOT DETECTED
M PNEUMO IGG SER IA-ACNC: NOT DETECTED
MCH RBC QN AUTO: 30 PG (ref 26.6–33)
MCHC RBC AUTO-ENTMCNC: 34.5 G/DL (ref 31.5–35.7)
MCV RBC AUTO: 86.9 FL (ref 79–97)
MONOCYTES # BLD AUTO: 0.4 10*3/MM3 (ref 0.1–0.9)
MONOCYTES NFR BLD AUTO: 8 % (ref 5–12)
NEUTROPHILS NFR BLD AUTO: 3.2 10*3/MM3 (ref 1.7–7)
NEUTROPHILS NFR BLD AUTO: 58.3 % (ref 42.7–76)
NRBC BLD AUTO-RTO: 0 /100 WBC (ref 0–0.2)
PLATELET # BLD AUTO: 126 10*3/MM3 (ref 140–450)
PMV BLD AUTO: 7.5 FL (ref 6–12)
POTASSIUM SERPL-SCNC: 4.1 MMOL/L (ref 3.5–5.2)
PROT SERPL-MCNC: 6.2 G/DL (ref 6–8.5)
PROTHROMBIN TIME: 13.4 SECONDS (ref 9.6–11.7)
RBC # BLD AUTO: 4.11 10*6/MM3 (ref 4.14–5.8)
RH BLD: POSITIVE
RHINOVIRUS RNA SPEC NAA+PROBE: NOT DETECTED
RHINOVIRUS RNA SPEC NAA+PROBE: NOT DETECTED
RSV RNA NPH QL NAA+NON-PROBE: NOT DETECTED
RSV RNA NPH QL NAA+NON-PROBE: NOT DETECTED
SARS-COV-2 RNA NPH QL NAA+NON-PROBE: NOT DETECTED
SARS-COV-2 RNA NPH QL NAA+NON-PROBE: NOT DETECTED
SODIUM SERPL-SCNC: 137 MMOL/L (ref 136–145)
T&S EXPIRATION DATE: NORMAL
WBC NRBC COR # BLD: 5.5 10*3/MM3 (ref 3.4–10.8)

## 2022-06-30 PROCEDURE — 85730 THROMBOPLASTIN TIME PARTIAL: CPT | Performed by: EMERGENCY MEDICINE

## 2022-06-30 PROCEDURE — 63710000001 PANTOPRAZOLE 40 MG TABLET DELAYED-RELEASE: Performed by: INTERNAL MEDICINE

## 2022-06-30 PROCEDURE — 36415 COLL VENOUS BLD VENIPUNCTURE: CPT | Performed by: EMERGENCY MEDICINE

## 2022-06-30 PROCEDURE — 63710000001 INSULIN LISPRO (HUMAN) PER 5 UNITS: Performed by: NURSE PRACTITIONER

## 2022-06-30 PROCEDURE — 85025 COMPLETE CBC W/AUTO DIFF WBC: CPT | Performed by: EMERGENCY MEDICINE

## 2022-06-30 PROCEDURE — 99219 PR INITIAL OBSERVATION CARE/DAY 50 MINUTES: CPT | Performed by: INTERNAL MEDICINE

## 2022-06-30 PROCEDURE — A9270 NON-COVERED ITEM OR SERVICE: HCPCS | Performed by: INTERNAL MEDICINE

## 2022-06-30 PROCEDURE — 80053 COMPREHEN METABOLIC PANEL: CPT | Performed by: EMERGENCY MEDICINE

## 2022-06-30 PROCEDURE — G0378 HOSPITAL OBSERVATION PER HR: HCPCS

## 2022-06-30 PROCEDURE — 63710000001 ACETAMINOPHEN 325 MG TABLET: Performed by: INTERNAL MEDICINE

## 2022-06-30 PROCEDURE — 87205 SMEAR GRAM STAIN: CPT | Performed by: INTERNAL MEDICINE

## 2022-06-30 PROCEDURE — 87070 CULTURE OTHR SPECIMN AEROBIC: CPT | Performed by: INTERNAL MEDICINE

## 2022-06-30 PROCEDURE — 0 IOPAMIDOL PER 1 ML: Performed by: EMERGENCY MEDICINE

## 2022-06-30 PROCEDURE — 0202U NFCT DS 22 TRGT SARS-COV-2: CPT | Performed by: EMERGENCY MEDICINE

## 2022-06-30 PROCEDURE — 63710000001 INSULIN LISPRO (HUMAN) PER 5 UNITS: Performed by: INTERNAL MEDICINE

## 2022-06-30 PROCEDURE — 88108 CYTOPATH CONCENTRATE TECH: CPT | Performed by: INTERNAL MEDICINE

## 2022-06-30 PROCEDURE — 86901 BLOOD TYPING SEROLOGIC RH(D): CPT

## 2022-06-30 PROCEDURE — 87102 FUNGUS ISOLATION CULTURE: CPT | Performed by: INTERNAL MEDICINE

## 2022-06-30 PROCEDURE — 87116 MYCOBACTERIA CULTURE: CPT | Performed by: INTERNAL MEDICINE

## 2022-06-30 PROCEDURE — 99284 EMERGENCY DEPT VISIT MOD MDM: CPT

## 2022-06-30 PROCEDURE — 87206 SMEAR FLUORESCENT/ACID STAI: CPT | Performed by: INTERNAL MEDICINE

## 2022-06-30 PROCEDURE — 85014 HEMATOCRIT: CPT | Performed by: NURSE PRACTITIONER

## 2022-06-30 PROCEDURE — 93010 ELECTROCARDIOGRAM REPORT: CPT | Performed by: INTERNAL MEDICINE

## 2022-06-30 PROCEDURE — 96374 THER/PROPH/DIAG INJ IV PUSH: CPT

## 2022-06-30 PROCEDURE — 85610 PROTHROMBIN TIME: CPT | Performed by: EMERGENCY MEDICINE

## 2022-06-30 PROCEDURE — 82962 GLUCOSE BLOOD TEST: CPT

## 2022-06-30 PROCEDURE — 86850 RBC ANTIBODY SCREEN: CPT | Performed by: EMERGENCY MEDICINE

## 2022-06-30 PROCEDURE — 71275 CT ANGIOGRAPHY CHEST: CPT

## 2022-06-30 PROCEDURE — 86901 BLOOD TYPING SEROLOGIC RH(D): CPT | Performed by: EMERGENCY MEDICINE

## 2022-06-30 PROCEDURE — 86900 BLOOD TYPING SEROLOGIC ABO: CPT

## 2022-06-30 PROCEDURE — 93005 ELECTROCARDIOGRAM TRACING: CPT | Performed by: NURSE PRACTITIONER

## 2022-06-30 PROCEDURE — 87798 DETECT AGENT NOS DNA AMP: CPT | Performed by: INTERNAL MEDICINE

## 2022-06-30 PROCEDURE — 85018 HEMOGLOBIN: CPT | Performed by: NURSE PRACTITIONER

## 2022-06-30 PROCEDURE — 85379 FIBRIN DEGRADATION QUANT: CPT | Performed by: EMERGENCY MEDICINE

## 2022-06-30 PROCEDURE — 86900 BLOOD TYPING SEROLOGIC ABO: CPT | Performed by: EMERGENCY MEDICINE

## 2022-06-30 PROCEDURE — 25010000002 PROPOFOL 200 MG/20ML EMULSION: Performed by: ANESTHESIOLOGY

## 2022-06-30 RX ORDER — ACETAMINOPHEN 650 MG/1
650 SUPPOSITORY RECTAL EVERY 4 HOURS PRN
Status: DISCONTINUED | OUTPATIENT
Start: 2022-06-30 | End: 2022-07-01 | Stop reason: HOSPADM

## 2022-06-30 RX ORDER — SODIUM CHLORIDE 0.9 % (FLUSH) 0.9 %
10 SYRINGE (ML) INJECTION EVERY 12 HOURS SCHEDULED
Status: DISCONTINUED | OUTPATIENT
Start: 2022-06-30 | End: 2022-07-01 | Stop reason: HOSPADM

## 2022-06-30 RX ORDER — PANTOPRAZOLE SODIUM 40 MG/1
40 TABLET, DELAYED RELEASE ORAL DAILY PRN
Status: DISCONTINUED | OUTPATIENT
Start: 2022-06-30 | End: 2022-07-01 | Stop reason: HOSPADM

## 2022-06-30 RX ORDER — ALBUTEROL SULFATE 2.5 MG/3ML
2.5 SOLUTION RESPIRATORY (INHALATION) ONCE AS NEEDED
Status: CANCELLED | OUTPATIENT
Start: 2022-06-30

## 2022-06-30 RX ORDER — PROPOFOL 10 MG/ML
INJECTION, EMULSION INTRAVENOUS AS NEEDED
Status: DISCONTINUED | OUTPATIENT
Start: 2022-06-30 | End: 2022-06-30 | Stop reason: SURG

## 2022-06-30 RX ORDER — ONDANSETRON 4 MG/1
4 TABLET, FILM COATED ORAL EVERY 6 HOURS PRN
Status: DISCONTINUED | OUTPATIENT
Start: 2022-06-30 | End: 2022-07-01 | Stop reason: HOSPADM

## 2022-06-30 RX ORDER — POTASSIUM CHLORIDE 7.45 MG/ML
10 INJECTION INTRAVENOUS
Status: DISCONTINUED | OUTPATIENT
Start: 2022-06-30 | End: 2022-07-01 | Stop reason: HOSPADM

## 2022-06-30 RX ORDER — DIPHENHYDRAMINE HYDROCHLORIDE 50 MG/ML
12.5 INJECTION INTRAMUSCULAR; INTRAVENOUS
Status: CANCELLED | OUTPATIENT
Start: 2022-06-30

## 2022-06-30 RX ORDER — DEXTROSE MONOHYDRATE 25 G/50ML
25 INJECTION, SOLUTION INTRAVENOUS
Status: DISCONTINUED | OUTPATIENT
Start: 2022-06-30 | End: 2022-07-01 | Stop reason: HOSPADM

## 2022-06-30 RX ORDER — NICOTINE POLACRILEX 4 MG
15 LOZENGE BUCCAL
Status: DISCONTINUED | OUTPATIENT
Start: 2022-06-30 | End: 2022-07-01 | Stop reason: HOSPADM

## 2022-06-30 RX ORDER — INSULIN LISPRO 100 [IU]/ML
0-7 INJECTION, SOLUTION INTRAVENOUS; SUBCUTANEOUS AS NEEDED
Status: DISCONTINUED | OUTPATIENT
Start: 2022-06-30 | End: 2022-07-01 | Stop reason: HOSPADM

## 2022-06-30 RX ORDER — ACETAMINOPHEN 325 MG/1
650 TABLET ORAL EVERY 4 HOURS PRN
Status: DISCONTINUED | OUTPATIENT
Start: 2022-06-30 | End: 2022-07-01 | Stop reason: HOSPADM

## 2022-06-30 RX ORDER — NALOXONE HCL 0.4 MG/ML
0.4 VIAL (ML) INJECTION AS NEEDED
Status: CANCELLED | OUTPATIENT
Start: 2022-06-30

## 2022-06-30 RX ORDER — FLUMAZENIL 0.1 MG/ML
0.2 INJECTION INTRAVENOUS AS NEEDED
Status: CANCELLED | OUTPATIENT
Start: 2022-06-30

## 2022-06-30 RX ORDER — MEPERIDINE HYDROCHLORIDE 25 MG/ML
12.5 INJECTION INTRAMUSCULAR; INTRAVENOUS; SUBCUTANEOUS
Status: CANCELLED | OUTPATIENT
Start: 2022-06-30 | End: 2022-07-01

## 2022-06-30 RX ORDER — NALBUPHINE HCL 10 MG/ML
10 AMPUL (ML) INJECTION EVERY 4 HOURS PRN
Status: CANCELLED | OUTPATIENT
Start: 2022-06-30

## 2022-06-30 RX ORDER — CHOLECALCIFEROL (VITAMIN D3) 125 MCG
5 CAPSULE ORAL NIGHTLY PRN
Status: DISCONTINUED | OUTPATIENT
Start: 2022-06-30 | End: 2022-07-01 | Stop reason: HOSPADM

## 2022-06-30 RX ORDER — POTASSIUM CHLORIDE 20 MEQ/1
40 TABLET, EXTENDED RELEASE ORAL AS NEEDED
Status: DISCONTINUED | OUTPATIENT
Start: 2022-06-30 | End: 2022-07-01 | Stop reason: HOSPADM

## 2022-06-30 RX ORDER — MIDAZOLAM HYDROCHLORIDE 1 MG/ML
1 INJECTION INTRAMUSCULAR; INTRAVENOUS
Status: CANCELLED | OUTPATIENT
Start: 2022-06-30

## 2022-06-30 RX ORDER — NALBUPHINE HCL 10 MG/ML
2 AMPUL (ML) INJECTION EVERY 4 HOURS PRN
Status: CANCELLED | OUTPATIENT
Start: 2022-06-30

## 2022-06-30 RX ORDER — ACETAMINOPHEN 160 MG/5ML
650 SOLUTION ORAL EVERY 4 HOURS PRN
Status: DISCONTINUED | OUTPATIENT
Start: 2022-06-30 | End: 2022-07-01 | Stop reason: HOSPADM

## 2022-06-30 RX ORDER — ONDANSETRON 2 MG/ML
4 INJECTION INTRAMUSCULAR; INTRAVENOUS ONCE AS NEEDED
Status: CANCELLED | OUTPATIENT
Start: 2022-06-30

## 2022-06-30 RX ORDER — ONDANSETRON 2 MG/ML
4 INJECTION INTRAMUSCULAR; INTRAVENOUS EVERY 6 HOURS PRN
Status: DISCONTINUED | OUTPATIENT
Start: 2022-06-30 | End: 2022-07-01 | Stop reason: HOSPADM

## 2022-06-30 RX ORDER — PANTOPRAZOLE SODIUM 40 MG/10ML
40 INJECTION, POWDER, LYOPHILIZED, FOR SOLUTION INTRAVENOUS
Status: DISCONTINUED | OUTPATIENT
Start: 2022-06-30 | End: 2022-07-01

## 2022-06-30 RX ORDER — SODIUM CHLORIDE 0.9 % (FLUSH) 0.9 %
10 SYRINGE (ML) INJECTION AS NEEDED
Status: DISCONTINUED | OUTPATIENT
Start: 2022-06-30 | End: 2022-07-01 | Stop reason: HOSPADM

## 2022-06-30 RX ORDER — POTASSIUM CHLORIDE 1.5 G/1.77G
40 POWDER, FOR SOLUTION ORAL AS NEEDED
Status: DISCONTINUED | OUTPATIENT
Start: 2022-06-30 | End: 2022-07-01 | Stop reason: HOSPADM

## 2022-06-30 RX ORDER — LABETALOL HYDROCHLORIDE 5 MG/ML
10 INJECTION, SOLUTION INTRAVENOUS ONCE
Status: COMPLETED | OUTPATIENT
Start: 2022-06-30 | End: 2022-06-30

## 2022-06-30 RX ORDER — LIDOCAINE HYDROCHLORIDE 20 MG/ML
INJECTION, SOLUTION EPIDURAL; INFILTRATION; INTRACAUDAL; PERINEURAL AS NEEDED
Status: DISCONTINUED | OUTPATIENT
Start: 2022-06-30 | End: 2022-06-30 | Stop reason: SURG

## 2022-06-30 RX ORDER — INSULIN LISPRO 100 [IU]/ML
0-7 INJECTION, SOLUTION INTRAVENOUS; SUBCUTANEOUS
Status: DISCONTINUED | OUTPATIENT
Start: 2022-06-30 | End: 2022-07-01 | Stop reason: HOSPADM

## 2022-06-30 RX ORDER — LIDOCAINE HYDROCHLORIDE 20 MG/ML
INJECTION, SOLUTION EPIDURAL; INFILTRATION; INTRACAUDAL; PERINEURAL
Status: DISPENSED
Start: 2022-06-30 | End: 2022-07-01

## 2022-06-30 RX ORDER — ALUMINA, MAGNESIA, AND SIMETHICONE 2400; 2400; 240 MG/30ML; MG/30ML; MG/30ML
15 SUSPENSION ORAL EVERY 6 HOURS PRN
Status: DISCONTINUED | OUTPATIENT
Start: 2022-06-30 | End: 2022-07-01 | Stop reason: HOSPADM

## 2022-06-30 RX ORDER — OLANZAPINE 10 MG/2ML
1 INJECTION, POWDER, LYOPHILIZED, FOR SOLUTION INTRAMUSCULAR AS NEEDED
Status: DISCONTINUED | OUTPATIENT
Start: 2022-06-30 | End: 2022-07-01 | Stop reason: HOSPADM

## 2022-06-30 RX ORDER — LIDOCAINE 50 MG/G
OINTMENT TOPICAL
Status: DISPENSED
Start: 2022-06-30 | End: 2022-07-01

## 2022-06-30 RX ADMIN — INSULIN LISPRO 2 UNITS: 100 INJECTION, SOLUTION INTRAVENOUS; SUBCUTANEOUS at 12:14

## 2022-06-30 RX ADMIN — Medication 10 ML: at 09:10

## 2022-06-30 RX ADMIN — Medication 10 ML: at 20:25

## 2022-06-30 RX ADMIN — ACETAMINOPHEN 650 MG: 325 TABLET ORAL at 21:58

## 2022-06-30 RX ADMIN — LIDOCAINE HYDROCHLORIDE 100 MG: 20 INJECTION, SOLUTION EPIDURAL; INFILTRATION; INTRACAUDAL; PERINEURAL at 15:03

## 2022-06-30 RX ADMIN — INSULIN LISPRO 4 UNITS: 100 INJECTION, SOLUTION INTRAVENOUS; SUBCUTANEOUS at 09:16

## 2022-06-30 RX ADMIN — PANTOPRAZOLE SODIUM 40 MG: 40 INJECTION, POWDER, FOR SOLUTION INTRAVENOUS at 09:09

## 2022-06-30 RX ADMIN — PROPOFOL 220 MG: 10 INJECTION, EMULSION INTRAVENOUS at 15:03

## 2022-06-30 RX ADMIN — PANTOPRAZOLE SODIUM 40 MG: 40 TABLET, DELAYED RELEASE ORAL at 20:25

## 2022-06-30 RX ADMIN — IOPAMIDOL 100 ML: 755 INJECTION, SOLUTION INTRAVENOUS at 04:53

## 2022-06-30 RX ADMIN — Medication 10 MG: at 22:50

## 2022-06-30 NOTE — ANESTHESIA PREPROCEDURE EVALUATION
Anesthesia Evaluation     Patient summary reviewed and Nursing notes reviewed   NPO Solid Status: > 8 hours  NPO Liquid Status: > 8 hours           Airway   Mallampati: II  TM distance: >3 FB  Neck ROM: full  No difficulty expected  Dental - normal exam     Pulmonary - negative pulmonary ROS and normal exam    breath sounds clear to auscultation  Cardiovascular - normal exam  Exercise tolerance: unable to assess    ECG reviewed  Rhythm: regular  Rate: normal    (+) hypertension, past MI , CAD, dysrhythmias Atrial Fib, pericardial effusion, hyperlipidemia,       Neuro/Psych- negative ROS  GI/Hepatic/Renal/Endo    (+)  GERD,  renal disease CRI, diabetes mellitus,     Musculoskeletal (-) negative ROS    Abdominal  - normal exam   Substance History - negative use     OB/GYN negative ob/gyn ROS         Other      history of cancer remission                    Anesthesia Plan    ASA 4     MAC     intravenous induction     Anesthetic plan, risks, benefits, and alternatives have been provided, discussed and informed consent has been obtained with: patient.        CODE STATUS:    Level Of Support Discussed With: Patient  Code Status (Patient has no pulse and is not breathing): CPR (Attempt to Resuscitate)  Medical Interventions (Patient has pulse or is breathing): Full Support

## 2022-06-30 NOTE — ANESTHESIA POSTPROCEDURE EVALUATION
Patient: Cisco Frank    Procedure Summary     Date: 06/30/22 Room / Location: [facility] ENDOSCOPY 2 / [facility] ENDOSCOPY    Anesthesia Start: 1452 Anesthesia Stop: 1516    Procedure: BRONCHOSCOPY with bilateral lung wash (N/A Bronchus) Diagnosis:       Hemoptysis      (Hemoptysis [R04.2])    Surgeons: Ayush Velarde MD Provider: Andrew Schmitt MD    Anesthesia Type: MAC ASA Status: 4          Anesthesia Type: MAC    Vitals  No vitals data found for the desired time range.          Post Anesthesia Care and Evaluation    Patient location during evaluation: PACU  Patient participation: complete - patient participated  Level of consciousness: awake  Pain scale: See nurse's notes for pain score.  Pain management: adequate    Airway patency: patent  Anesthetic complications: No anesthetic complications  PONV Status: none  Cardiovascular status: acceptable  Respiratory status: acceptable  Hydration status: acceptable    Comments: Patient seen and examined postoperatively; vital signs stable; SpO2 greater than or equal to 90%; cardiopulmonary status stable; nausea/vomiting adequately controlled; pain adequately controlled; no apparent anesthesia complications; patient discharged from anesthesia care when discharge criteria were met

## 2022-07-01 ENCOUNTER — ON CAMPUS - OUTPATIENT (AMBULATORY)
Dept: URBAN - METROPOLITAN AREA HOSPITAL 85 | Facility: HOSPITAL | Age: 82
End: 2022-07-01

## 2022-07-01 ENCOUNTER — TELEPHONE (OUTPATIENT)
Dept: FAMILY MEDICINE CLINIC | Facility: CLINIC | Age: 82
End: 2022-07-01

## 2022-07-01 ENCOUNTER — READMISSION MANAGEMENT (OUTPATIENT)
Dept: CALL CENTER | Facility: HOSPITAL | Age: 82
End: 2022-07-01

## 2022-07-01 VITALS
WEIGHT: 192.6 LBS | HEIGHT: 69 IN | BODY MASS INDEX: 28.53 KG/M2 | RESPIRATION RATE: 18 BRPM | OXYGEN SATURATION: 94 % | HEART RATE: 114 BPM | DIASTOLIC BLOOD PRESSURE: 85 MMHG | TEMPERATURE: 96.6 F | SYSTOLIC BLOOD PRESSURE: 121 MMHG

## 2022-07-01 DIAGNOSIS — K21.9 GASTRO-ESOPHAGEAL REFLUX DISEASE WITHOUT ESOPHAGITIS: ICD-10-CM

## 2022-07-01 DIAGNOSIS — D64.9 ANEMIA, UNSPECIFIED: ICD-10-CM

## 2022-07-01 DIAGNOSIS — K22.70 BARRETT'S ESOPHAGUS WITHOUT DYSPLASIA: ICD-10-CM

## 2022-07-01 DIAGNOSIS — R04.2 HEMOPTYSIS: ICD-10-CM

## 2022-07-01 LAB
ANION GAP SERPL CALCULATED.3IONS-SCNC: 11 MMOL/L (ref 5–15)
BASOPHILS # BLD AUTO: 0 10*3/MM3 (ref 0–0.2)
BASOPHILS NFR BLD AUTO: 0.2 % (ref 0–1.5)
BUN SERPL-MCNC: 19 MG/DL (ref 8–23)
BUN/CREAT SERPL: 17.8 (ref 7–25)
CALCIUM SPEC-SCNC: 8.5 MG/DL (ref 8.6–10.5)
CHLORIDE SERPL-SCNC: 102 MMOL/L (ref 98–107)
CO2 SERPL-SCNC: 24 MMOL/L (ref 22–29)
CREAT SERPL-MCNC: 1.07 MG/DL (ref 0.76–1.27)
DEPRECATED RDW RBC AUTO: 45.1 FL (ref 37–54)
EGFRCR SERPLBLD CKD-EPI 2021: 70.2 ML/MIN/1.73
EOSINOPHIL # BLD AUTO: 0.2 10*3/MM3 (ref 0–0.4)
EOSINOPHIL NFR BLD AUTO: 2 % (ref 0.3–6.2)
ERYTHROCYTE [DISTWIDTH] IN BLOOD BY AUTOMATED COUNT: 14.6 % (ref 12.3–15.4)
GLUCOSE BLDC GLUCOMTR-MCNC: 171 MG/DL (ref 70–105)
GLUCOSE SERPL-MCNC: 167 MG/DL (ref 65–99)
HCT VFR BLD AUTO: 35 % (ref 37.5–51)
HGB BLD-MCNC: 11.8 G/DL (ref 13–17.7)
LYMPHOCYTES # BLD AUTO: 1.3 10*3/MM3 (ref 0.7–3.1)
LYMPHOCYTES NFR BLD AUTO: 14.8 % (ref 19.6–45.3)
MCH RBC QN AUTO: 29.6 PG (ref 26.6–33)
MCHC RBC AUTO-ENTMCNC: 33.8 G/DL (ref 31.5–35.7)
MCV RBC AUTO: 87.6 FL (ref 79–97)
MONOCYTES # BLD AUTO: 0.4 10*3/MM3 (ref 0.1–0.9)
MONOCYTES NFR BLD AUTO: 5.1 % (ref 5–12)
NEUTROPHILS NFR BLD AUTO: 6.8 10*3/MM3 (ref 1.7–7)
NEUTROPHILS NFR BLD AUTO: 77.9 % (ref 42.7–76)
NRBC BLD AUTO-RTO: 0.1 /100 WBC (ref 0–0.2)
PLATELET # BLD AUTO: 134 10*3/MM3 (ref 140–450)
PMV BLD AUTO: 8.2 FL (ref 6–12)
POTASSIUM SERPL-SCNC: 4.1 MMOL/L (ref 3.5–5.2)
RBC # BLD AUTO: 4 10*6/MM3 (ref 4.14–5.8)
SODIUM SERPL-SCNC: 137 MMOL/L (ref 136–145)
WBC NRBC COR # BLD: 8.7 10*3/MM3 (ref 3.4–10.8)

## 2022-07-01 PROCEDURE — 85025 COMPLETE CBC W/AUTO DIFF WBC: CPT | Performed by: NURSE PRACTITIONER

## 2022-07-01 PROCEDURE — 93010 ELECTROCARDIOGRAM REPORT: CPT | Performed by: INTERNAL MEDICINE

## 2022-07-01 PROCEDURE — 99212 OFFICE O/P EST SF 10 MIN: CPT | Performed by: NURSE PRACTITIONER

## 2022-07-01 PROCEDURE — A9270 NON-COVERED ITEM OR SERVICE: HCPCS | Performed by: INTERNAL MEDICINE

## 2022-07-01 PROCEDURE — G0378 HOSPITAL OBSERVATION PER HR: HCPCS

## 2022-07-01 PROCEDURE — 63710000001 INSULIN LISPRO (HUMAN) PER 5 UNITS: Performed by: NURSE PRACTITIONER

## 2022-07-01 PROCEDURE — 80048 BASIC METABOLIC PNL TOTAL CA: CPT | Performed by: NURSE PRACTITIONER

## 2022-07-01 PROCEDURE — 63710000001 ATENOLOL 25 MG TABLET: Performed by: INTERNAL MEDICINE

## 2022-07-01 PROCEDURE — 93005 ELECTROCARDIOGRAM TRACING: CPT | Performed by: INTERNAL MEDICINE

## 2022-07-01 PROCEDURE — 63710000001 GLIPIZIDE 5 MG TABLET: Performed by: INTERNAL MEDICINE

## 2022-07-01 PROCEDURE — 63710000001 LINAGLIPTIN 5 MG TABLET: Performed by: INTERNAL MEDICINE

## 2022-07-01 PROCEDURE — 63710000001 INSULIN LISPRO (HUMAN) PER 5 UNITS: Performed by: INTERNAL MEDICINE

## 2022-07-01 PROCEDURE — 82962 GLUCOSE BLOOD TEST: CPT

## 2022-07-01 PROCEDURE — 99217 PR OBSERVATION CARE DISCHARGE MANAGEMENT: CPT | Performed by: INTERNAL MEDICINE

## 2022-07-01 RX ORDER — ATORVASTATIN CALCIUM 40 MG/1
40 TABLET, FILM COATED ORAL NIGHTLY
Status: DISCONTINUED | OUTPATIENT
Start: 2022-07-01 | End: 2022-07-01 | Stop reason: HOSPADM

## 2022-07-01 RX ORDER — GLIPIZIDE 5 MG/1
2.5 TABLET ORAL
Refills: 2 | Status: DISCONTINUED | OUTPATIENT
Start: 2022-07-01 | End: 2022-07-01 | Stop reason: HOSPADM

## 2022-07-01 RX ORDER — CHOLESTYRAMINE LIGHT 4 G/5.7G
1 POWDER, FOR SUSPENSION ORAL
Status: DISCONTINUED | OUTPATIENT
Start: 2022-07-01 | End: 2022-07-01 | Stop reason: HOSPADM

## 2022-07-01 RX ORDER — BUDESONIDE AND FORMOTEROL FUMARATE DIHYDRATE 160; 4.5 UG/1; UG/1
2 AEROSOL RESPIRATORY (INHALATION)
Status: DISCONTINUED | OUTPATIENT
Start: 2022-07-01 | End: 2022-07-01 | Stop reason: HOSPADM

## 2022-07-01 RX ORDER — ATENOLOL 25 MG/1
25 TABLET ORAL DAILY
Status: DISCONTINUED | OUTPATIENT
Start: 2022-07-01 | End: 2022-07-01 | Stop reason: HOSPADM

## 2022-07-01 RX ORDER — DILTIAZEM HYDROCHLORIDE 5 MG/ML
5 INJECTION INTRAVENOUS ONCE
Status: COMPLETED | OUTPATIENT
Start: 2022-07-01 | End: 2022-07-01

## 2022-07-01 RX ORDER — PANTOPRAZOLE SODIUM 40 MG/1
40 TABLET, DELAYED RELEASE ORAL
Status: DISCONTINUED | OUTPATIENT
Start: 2022-07-02 | End: 2022-07-01 | Stop reason: HOSPADM

## 2022-07-01 RX ADMIN — GLIPIZIDE 2.5 MG: 5 TABLET ORAL at 10:52

## 2022-07-01 RX ADMIN — DILTIAZEM HYDROCHLORIDE 5 MG: 5 INJECTION, SOLUTION INTRAVENOUS at 00:29

## 2022-07-01 RX ADMIN — Medication 10 ML: at 08:50

## 2022-07-01 RX ADMIN — LINAGLIPTIN 5 MG: 5 TABLET, FILM COATED ORAL at 10:52

## 2022-07-01 RX ADMIN — PANTOPRAZOLE SODIUM 40 MG: 40 INJECTION, POWDER, FOR SOLUTION INTRAVENOUS at 06:21

## 2022-07-01 RX ADMIN — ATENOLOL 25 MG: 25 TABLET ORAL at 10:52

## 2022-07-01 RX ADMIN — INSULIN LISPRO 2 UNITS: 100 INJECTION, SOLUTION INTRAVENOUS; SUBCUTANEOUS at 08:49

## 2022-07-01 RX ADMIN — SODIUM CHLORIDE 500 ML: 9 INJECTION, SOLUTION INTRAVENOUS at 00:29

## 2022-07-01 NOTE — CASE MANAGEMENT/SOCIAL WORK
Case Management Discharge Note      Final Note: Routine home.         Selected Continued Care - Discharged on 7/1/2022 Admission date: 6/30/2022 - Discharge disposition: Home or Self Care       Transportation Services  Private: Car    Final Discharge Disposition Code: 01 - home or self-care

## 2022-07-01 NOTE — TELEPHONE ENCOUNTER
I got someone on 7/8/2022 to move to 7/7/2022.  Spoke with patient's wife and scheduled a hospital follow up appt with MURALI on 7/8/2022 at 3pm.

## 2022-07-01 NOTE — TELEPHONE ENCOUNTER
Caller: ALBIN ELIAS    Relationship to patient: Emergency Contact    Best call back number:[phone]    Patient is needing:     ALBIN CALLED TO CANCEL [facility] AT 4:30 HE IS CURRENTLY ADMITTED AT [facility]. I COULD NOT CANCEL THE APPOINTMENT IS MARKED ARRIVED    THANKS

## 2022-07-01 NOTE — DISCHARGE SUMMARY
[facility] Medicine Services  DISCHARGE SUMMARY    Patient Name: Cisco Frank  : 1940  MRN: [id number]    Date of Admission: 2022  Discharge Diagnosis: hemoptysis  Date of Discharge:  22*  Primary Care Physician: Nuno Patton MD      Presenting Problem:   Hemoptysis [R04.2]    Active and Resolved Hospital Problems:  Active Hospital Problems    Diagnosis POA   • **Hemoptysis [R04.2] Yes   • Pericardial effusion [I31.3] Yes   • History of prostate cancer [Z85.46] Not Applicable   • Atrial fibrillation (HCC) [I48.91] Yes   • Coronary artery disease [I25.10] Yes   • Gastroesophageal reflux disease [K21.9] Yes   • History of bladder cancer [Z85.51] Not Applicable   • Hyperlipidemia [E78.5] Yes   • Essential hypertension [I10] Yes   • Type 2 diabetes mellitus with hyperglycemia, without long-term current use of insulin (HCC) [E11.65] Yes      Resolved Hospital Problems   No resolved problems to display.     Acute hemoptysis  - CTA reviewed  - Hbg 12.3, monitor  - Keep oxygen sats >92%  - NPO   - Holding home medications, resume when appropriate  - Xarelto on hold until seen by pcp/GI/cardiology/pulmonary as out patient  - Pulmonology consulted      Pericardial effusion  - Monitor  - Continuous cardiac monitoring      Essential HTN- Controlled  - Monitor blood pressure  - on home atenolol     HLD  -Holding home statin and colestipol     Atrial fibrillation  -EKG ordered  -Continuous cardiac monitoring  -Holding home Xarelto due to hemoptysis     Chronic CAD  -Holding home statin and atenolol     Type II DM with hyperglycemia  -  upon admission  - Start sliding scale, Accuchecks ACHS  - Hbg A1C 8.7   -continue home Tradjenta and Amaryl     History of Prostate and bladder cancer - In remission  - Managed by Dr. Cope outpatient      GERD  - PPI ordered     Hospital Course     Hospital Course:  History of Present Illness: Cisco Frank is a 82 y.o. male with a  "past medical history of CAD, hypertension, GERD, bladder cancer, prostate cancer, type 2 diabetes mellitus, and atrial fibrillation who presented to [facility] on 6/30/2022 complaining of coughing up blood.  Patient reports on Tuesday he woke up with a mouthful of bright red blood and then the bleeding quit.  He reports this morning he woke up again with bright red blood in his mouth.  He reports this has never happened before.  He has had accompanying nausea.  He reports he has had some coughing.  Per his wife at bedside she states at times \"he will just be standing there and will breathe very fast.\" Patient was started on Xarelto on 6/11/2022 for atrial fibrillation.  He denies any recent vomiting, diarrhea, fever, chills, abdominal pain, or chest pain.  He explains he was here previously with nausea vomiting and diarrhea, but this has since subsided.  He had an EGD recently that was normal.  He is currently on room air.     In the ED, CTA chest showed No pulmonary embolism.   Small pericardial effusion. . Small left pleural effusion.    Large hiatal hernia.  All vital signs stable upon admission.  All labs unremarkable upon admission except blood glucose 274, alk phos 138.  Hospitalist were contacted to admit patient for further care management.    7/1/22 doing better today, will dc home continue meds per MAR, condition on dc stable.    DISCHARGE Follow Up Recommendations for labs and diagnostics: follow up with pcp in one week  Follow up with GI In one week  Xarelto on hold until seen by pcp/GI/cardiology/pulmonary as out patient    Reasons For Change In Medications and Indications for New Medications:  Xarelto on hold until seen by pcp/GI/cardiology/pulmonary as out patient    Day of Discharge     Vital Signs:  Temp:  [96.6 °F (35.9 °C)-98.9 °F (37.2 °C)] 96.6 °F (35.9 °C)  Heart Rate:  [] 114  Resp:  [15-22] 18  BP: ()/() 121/85  Flow (L/min):  [2-8] 3    :  Physical Exam "   Constitutional:       Appearance: Normal appearance. He is normal weight.   HENT:      Head: Normocephalic and atraumatic.      Nose: Nose normal.      Mouth/Throat:      Mouth: Mucous membranes are moist.      Pharynx: Oropharynx is clear.   Eyes:      Extraocular Movements: Extraocular movements intact.      Conjunctiva/sclera: Conjunctivae normal.      Pupils: Pupils are equal, round, and reactive to light.   Cardiovascular:      Rate and Rhythm: Normal rate. Rhythm irregular.      Pulses: Normal pulses.      Heart sounds: Normal heart sounds.      Comments: S1, S2 audible  Pulmonary:      Effort: Pulmonary effort is normal.      Comments: Coarse breath sounds in RUL, On room air   Abdominal:      General: Abdomen is flat. Bowel sounds are normal.      Palpations: Abdomen is soft.   Musculoskeletal:         General: Normal range of motion.      Cervical back: Normal range of motion.   Skin:     General: Skin is warm and dry.   Neurological:      General: No focal deficit present.      Mental Status: He is alert and oriented to person, place, and time. Mental status is at baseline.   Psychiatric:         Mood and Affect: Mood normal.         Behavior: Behavior normal.         Thought Content: Thought content normal.         Judgment: Judgment normal.        Pertinent  and/or Most Recent Results     LAB RESULTS:      Lab 07/01/22  0202 06/30/22  1722 06/30/22  1104 06/30/22  0358   WBC 8.70  --   --  5.50   HEMOGLOBIN 11.8* 13.4 12.2* 12.3*   HEMATOCRIT 35.0* 38.6 36.6* 35.7*   PLATELETS 134*  --   --  126*   NEUTROS ABS 6.80  --   --  3.20   LYMPHS ABS 1.30  --   --  1.20   MONOS ABS 0.40  --   --  0.40   EOS ABS 0.20  --   --  0.60*   MCV 87.6  --   --  86.9   PROTIME  --   --   --  13.4*   APTT  --   --   --  31.8*         Lab 07/01/22  0203 06/30/22  0358   SODIUM 137 137   POTASSIUM 4.1 4.1   CHLORIDE 102 101   CO2 24.0 25.0   ANION GAP 11.0 11.0   BUN 19 25*   CREATININE 1.07 1.06   EGFR 70.2 70.9   GLUCOSE  167* 274*   CALCIUM 8.5* 8.6         Lab 06/30/22  0358   TOTAL PROTEIN 6.2   ALBUMIN 3.50   GLOBULIN 2.7   ALT (SGPT) 14   AST (SGOT) 11   BILIRUBIN 0.5   ALK PHOS 138*         Lab 06/30/22 0358   PROTIME 13.4*   INR 1.32*             Lab 06/30/22 0358   ABO TYPING O   RH TYPING Positive   ANTIBODY SCREEN Negative         Brief Urine Lab Results  (Last result in the past 365 days)      Color   Clarity   Blood   Leuk Est   Nitrite   Protein   CREAT   Urine HCG        06/16/22 1106 Yellow   Clear   Trace   Trace   Negative   100 mg/dL (2+)               Microbiology Results (last 10 days)     Procedure Component Value - Date/Time    AFB Culture - Wash, Bronchus [[id number]] Collected: 06/30/22 1508    Lab Status: Preliminary result Specimen: Wash from Bronchus Updated: 07/01/22 1015     AFB Stain No acid fast bacilli seen    Respiratory Culture - Wash, Bronchus [[id number]] Collected: 06/30/22 1508    Lab Status: Preliminary result Specimen: Wash from Bronchus Updated: 07/01/22 0952     Respiratory Culture Light growth (2+) The culture consists of normal respiratory erick. This is a preliminary report; final report to follow.     Gram Stain Rare (1+) Epithelial cells per low power field      Few (2+) WBCs per low power field      Few (2+) Ciliated columnar epithelial cells      Moderate (3+) Mixed bacterial morphotypes seen on Gram Stain    Respiratory Panel PCR w/COVID-19(SARS-CoV-2) [facility]/KOBY/LUIS ALBERTO/PAD/COR/MAD/LORI In-House, NP Swab in UTM/VTM, 3-4 HR TAT - Wash, Bronchus [[id number]]  (Normal) Collected: 06/30/22 1508    Lab Status: Final result Specimen: Wash from Bronchus Updated: 06/30/22 1631     ADENOVIRUS, PCR Not Detected     Coronavirus 229E Not Detected     Coronavirus HKU1 Not Detected     Coronavirus NL63 Not Detected     Coronavirus OC43 Not Detected     COVID19 Not Detected     Human Metapneumovirus Not Detected     Human Rhinovirus/Enterovirus Not Detected     Influenza A PCR Not Detected     Influenza B  PCR Not Detected     Parainfluenza Virus 1 Not Detected     Parainfluenza Virus 2 Not Detected     Parainfluenza Virus 3 Not Detected     Parainfluenza Virus 4 Not Detected     RSV, PCR Not Detected     Bordetella pertussis pcr Not Detected     Bordetella parapertussis PCR Not Detected     Chlamydophila pneumoniae PCR Not Detected     Mycoplasma pneumo by PCR Not Detected    Narrative:      In the setting of a positive respiratory panel with a viral infection PLUS a negative procalcitonin without other underlying concern for bacterial infection, consider observing off antibiotics or discontinuation of antibiotics and continue supportive care. If the respiratory panel is positive for atypical bacterial infection (Bordetella pertussis, Chlamydophila pneumoniae, or Mycoplasma pneumoniae), consider antibiotic de-escalation to target atypical bacterial infection.    Respiratory Panel PCR w/COVID-19(SARS-CoV-2) [facility]/LUIS ALBERTO/[facility]I In-House, NP Swab in UTM/VTM, 3-4 HR TAT - Swab, Nasopharynx [[id number]]  (Normal) Collected: 06/30/22 0358    Lab Status: Final result Specimen: Swab from Nasopharynx Updated: 06/30/22 0451     ADENOVIRUS, PCR Not Detected     Coronavirus 229E Not Detected     Coronavirus HKU1 Not Detected     Coronavirus NL63 Not Detected     Coronavirus OC43 Not Detected     COVID19 Not Detected     Human Metapneumovirus Not Detected     Human Rhinovirus/Enterovirus Not Detected     Influenza A PCR Not Detected     Influenza B PCR Not Detected     Parainfluenza Virus 1 Not Detected     Parainfluenza Virus 2 Not Detected     Parainfluenza Virus 3 Not Detected     Parainfluenza Virus 4 Not Detected     RSV, PCR Not Detected     Bordetella pertussis pcr Not Detected     Bordetella parapertussis PCR Not Detected     Chlamydophila pneumoniae PCR Not Detected     Mycoplasma pneumo by PCR Not Detected    Narrative:      In the setting of a positive respiratory panel with a viral infection PLUS a negative  procalcitonin without other underlying concern for bacterial infection, consider observing off antibiotics or discontinuation of antibiotics and continue supportive care. If the respiratory panel is positive for atypical bacterial infection (Bordetella pertussis, Chlamydophila pneumoniae, or Mycoplasma pneumoniae), consider antibiotic de-escalation to target atypical bacterial infection.          XR Chest 1 View    Result Date: 6/8/2022  Impression:  1. Cardiomegaly. 2. Large hiatal hernia. 3. No active pulmonary disease.  Electronically Signed By-Eagle Guzmán MD On:6/8/2022 5:35 PM This report was finalized on [id number] by  Eagle Guzmán MD.    CT Angiogram Chest Pulmonary Embolism    Result Date: 6/30/2022  Impression: 1. No pulmonary embolism. 2. Small pericardial effusion. 3. Small left pleural effusion. 4. Large hiatal hernia. Electronically signed by:  Kalin Sharp M.D.  6/30/2022 3:27 AM                  Labs Pending at Discharge:  Pending Labs     Order Current Status    Fungus Culture - Wash, Bronchus In process    Non-gynecologic Cytology In process    Pneumocystis PCR - Wash, Bronchus In process    AFB Culture - Wash, Bronchus Preliminary result    Respiratory Culture - Wash, Bronchus Preliminary result          Procedures Performed  Procedure(s):  BRONCHOSCOPY with bilateral lung wash         Consults:   Consults     Date and Time Order Name Status Description    6/30/2022  6:16 PM Inpatient Gastroenterology Consult Completed     6/30/2022  6:12 PM Inpatient Gastroenterology Consult Completed     6/30/2022  7:48 AM Inpatient Pulmonology Consult Completed     6/30/2022  5:43 AM Inpatient Hospitalist Consult      6/8/2022  7:46 PM Inpatient Cardiology Consult Completed       ASSESSMENT:  Possible hemoptysis -resolved  GERD/Schroeder's esophagus  DMII  CAD/Afib -on Xarelto  History of bladder and prostate cancer     PLAN:  Patient presents after waking up on Tuesday with blood in his mouth with possible  hemoptysis.  Bronchoscopy without etiology identified.  He denies any GI complaints or further bleeding noted.  He had EGD in February with Schroeder's and medium hiatal hernia but no evidence of ulcer.  Hemoglobin remained stable.  He is asking for diet advancement and discharge home.  We discussed continuing PPI and following up with GI as outpatient with any further bleeding identified and repeat EGD can be done.  We will see inpatient as needed, call questions or concerns.  Okay to discharge from GI standpoint.        I discussed the patients findings and my recommendations with the patient.     We appreciate the referral     Electronically signed by Kylee Huertas, APRN, 07/01/22, 8:33 AM EDT.    ASSESSMENT:  Hemoptysis  COPD  HTN  DM II  CAD  GERD  Hx bladder and prostate cancer  HLD  Pericardial effusion  A-fib     PLAN:  Add Symbicort   PT/OT  Electrolytes/ glycemic control  DVT and GI prophylaxis.     Discussed with Dr. Kimberley Enamorado, RIAN   7/1/2022  08:40 EDT      I personally have examined  and interviewed the patient. I have reviewed the history, data, problems, assessment and plan with our NP.  Total Critical care time in direct medical management (   ) minutes, This time specifically excludes time spent performing procedures.     Bobby Chu MD   7/1/2022    Discharge Details        Discharge Medications      Changes to Medications      Instructions Start Date   rivaroxaban 20 MG tablet  Commonly known as: XARELTO  What changed: These instructions start on July 8, 2022. If you are unsure what to do until then, ask your doctor or other care provider.   20 mg, Oral, Daily With Dinner   Start Date: July 8, 2022        Continue These Medications      Instructions Start Date   atenolol 25 MG tablet  Commonly known as: TENORMIN   25 mg, Oral, Daily      atorvastatin 40 MG tablet  Commonly known as: LIPITOR   40 mg, Oral, Every Night at Bedtime      colestipol 1 g tablet  Commonly known as:  COLESTID   1 g, Oral, 2 Times Daily      glimepiride 2 MG tablet  Commonly known as: AMARYL   TAKE 3 TABLETS EVERY MORNING BEFORE BREAKFAST      linagliptin 5 MG tablet tablet  Commonly known as: TRADJENTA   5 mg, Oral, Daily      omeprazole-sodium bicarbonate  MG per capsule  Commonly known as: ZEGERID   1 capsule, Oral, Daily, prn      vitamin C 500 MG tablet  Commonly known as: ASCORBIC ACID   500 mg, Oral, Daily             No Known Allergies      Discharge Disposition:   Home or Self Care    Diet:  Hospital:  Diet Order   Procedures   • Diet Diabetic/Consistent Carbs; Diabetic - Consistent Carb         Discharge Activity:   Activity Instructions     Gradually Increase Activity Until at Pre-Hospitalization Level              CODE STATUS:  Code Status and Medical Interventions:   Ordered at: 06/30/22 0750     Level Of Support Discussed With:    Patient     Code Status (Patient has no pulse and is not breathing):    CPR (Attempt to Resuscitate)     Medical Interventions (Patient has pulse or is breathing):    Full Support         Future Appointments   Date Time Provider Department Center   7/13/2022  2:10 PM Kailash Garsia MD [facility]   12/21/2022 12:50 PM Kailash Garsia MD [facility]       Additional Instructions for the Follow-ups that You Need to Schedule     Discharge Follow-up with PCP   As directed       Currently Documented PCP:    Nuno Patton MD    PCP Phone Number:    [phone]     Follow Up Details: 1 week         Discharge Follow-up with Specified Provider: GI; 1 Week   As directed      To: GI    Follow Up: 1 Week               Time spent on Discharge including face to face service:  34 minutes    This patient has been examined wearing appropriate Personal Protective Equipment and discussed with rn. 07/01/22      Signature: Electronically signed by Marty Corona MD, 07/01/22, 10:31 AM EDT.

## 2022-07-01 NOTE — PROGRESS NOTES
"PULMONARY CRITICAL CARE Progress  NOTE      PATIENT IDENTIFICATION:  Name: Cisco Frnak  MRN: [id number]  :  1940     Age: 82 y.o.  Sex: male    DATE OF Note:  2022   Referring Physician: Marty Corona MD                  Subjective:   Feeling better,  no SOB, on room air, no bowel or bladder issues       Objective:  tMax 24 hrs: Temp (24hrs), Av.8 °F (36.6 °C), Min:96.6 °F (35.9 °C), Max:98.9 °F (37.2 °C)      Vitals Ranges:   Temp:  [96.6 °F (35.9 °C)-98.9 °F (37.2 °C)] 96.6 °F (35.9 °C)  Heart Rate:  [] 114  Resp:  [15-22] 18  BP: ()/() 121/85    Intake and Output Last 3 Shifts:   I/O last 3 completed shifts:  In: 360 [P.O.:360]  Out: 1200 [Urine:1200]    Exam:  /85 (BP Location: Left arm, Patient Position: Lying)   Pulse 114   Temp 96.6 °F (35.9 °C) (Oral)   Resp 18   Ht 175.3 cm (69\")   Wt 87.4 kg (192 lb 9.6 oz)   SpO2 94%   BMI 28.44 kg/m²     General Appearance: Alert    HEENT:  Normocephalic, without obvious abnormality, Conjunctivae/corneas clear.  Normal external ear canals, nares normal, no drainage     Neck:  Supple, symmetrical, trachea midline. No JVD.  Lungs /Chest wall:   Bilateral basal rhonchi, respirations unlabored, symmetrical wall movement.     Heart:  Regular rate and rhythm, systolic murmur PMI left sternal border  Abdomen: Soft, nontender, no masses, no organomegaly.    Extremities: Trace edema, no clubbing or cyanosis        Medications:    Current Facility-Administered Medications:   •  acetaminophen (TYLENOL) tablet 650 mg, 650 mg, Oral, Q4H PRN, 650 mg at 22 2158 **OR** acetaminophen (TYLENOL) 160 MG/5ML solution 650 mg, 650 mg, Oral, Q4H PRN **OR** acetaminophen (TYLENOL) suppository 650 mg, 650 mg, Rectal, Q4H PRN, Adalgisa Smith, RIAN  •  aluminum-magnesium hydroxide-simethicone (MAALOX MAX) 400-400-40 MG/5ML suspension 15 mL, 15 mL, Oral, Q6H PRN, Adalgisa Smith, RIAN  •  dextrose (D50W) (25 g/50 mL) IV injection " 25 g, 25 g, Intravenous, Q15 Min PRN, Adalgisa Smith, APRN  •  dextrose (GLUTOSE) oral gel 15 g, 15 g, Oral, Q15 Min PRN, Svitlana Smithsea Z, APRN  •  glucagon (human recombinant) (GLUCAGEN DIAGNOSTIC) 1 mg in sterile water (preservative free) 1 mL injection, 1 mg, Subcutaneous, PRN, Svitlana Smithsea Z, APRN  •  insulin lispro (ADMELOG) injection 0-7 Units, 0-7 Units, Subcutaneous, TID AC, 2 Units at 06/30/22 1214 **AND** insulin lispro (ADMELOG) injection 0-7 Units, 0-7 Units, Subcutaneous, PRN, Svitlana Smithsea KOBY, APRN  •  melatonin tablet 5 mg, 5 mg, Oral, Nightly PRN, Svitlana Smithsea KOBY, APRN  •  ondansetron (ZOFRAN) tablet 4 mg, 4 mg, Oral, Q6H PRN **OR** ondansetron (ZOFRAN) injection 4 mg, 4 mg, Intravenous, Q6H PRN, Adalgisa Smith, APRN  •  pantoprazole (PROTONIX) EC tablet 40 mg, 40 mg, Oral, Daily PRN, Chanel Emmanuel, APRN, 40 mg at 06/30/22 2025  •  [START ON 7/2/2022] pantoprazole (PROTONIX) EC tablet 40 mg, 40 mg, Oral, Q AM, Kylee Huertas APRN  •  potassium chloride (K-DUR,KLOR-CON) CR tablet 40 mEq, 40 mEq, Oral, PRN **OR** potassium chloride (KLOR-CON) packet 40 mEq, 40 mEq, Oral, PRN **OR** potassium chloride 10 mEq in 100 mL IVPB, 10 mEq, Intravenous, Q1H PRN, Adalgisa Smith, APRN  •  sodium chloride 0.9 % flush 10 mL, 10 mL, Intravenous, Q12H, Svitlana Smithsea KOBY, APRN, 10 mL at 06/30/22 2025  •  sodium chloride 0.9 % flush 10 mL, 10 mL, Intravenous, PRN, KelAdalgisa cadet, APRN    Data Review:  All labs (24hrs):   Recent Results (from the past 24 hour(s))   POC Glucose Once    Collection Time: 06/30/22  9:08 AM    Specimen: Blood   Result Value Ref Range    Glucose 264 (H) 70 - 105 mg/dL   Hemoglobin & Hematocrit, Blood    Collection Time: 06/30/22 11:04 AM    Specimen: Blood   Result Value Ref Range    Hemoglobin 12.2 (L) 13.0 - 17.7 g/dL    Hematocrit 36.6 (L) 37.5 - 51.0 %   POC Glucose Once    Collection Time: 06/30/22 11:55 AM    Specimen: Blood   Result Value Ref Range     Glucose 179 (H) 70 - 105 mg/dL   Respiratory Culture - Wash, Bronchus    Collection Time: 06/30/22  3:08 PM    Specimen: Bronchus; Wash   Result Value Ref Range    Gram Stain Rare (1+) Epithelial cells per low power field     Gram Stain Few (2+) WBCs per low power field     Gram Stain Few (2+) Ciliated columnar epithelial cells     Gram Stain       Moderate (3+) Mixed bacterial morphotypes seen on Gram Stain   Respiratory Panel PCR w/COVID-19(SARS-CoV-2) [facility] In-House, NP Swab in UTM/VTM, 3-4 HR TAT - Wash, Bronchus    Collection Time: 06/30/22  3:08 PM    Specimen: Bronchus; Wash   Result Value Ref Range    ADENOVIRUS, PCR Not Detected Not Detected    Coronavirus 229E Not Detected Not Detected    Coronavirus HKU1 Not Detected Not Detected    Coronavirus NL63 Not Detected Not Detected    Coronavirus OC43 Not Detected Not Detected    COVID19 Not Detected Not Detected - Ref. Range    Human Metapneumovirus Not Detected Not Detected    Human Rhinovirus/Enterovirus Not Detected Not Detected    Influenza A PCR Not Detected Not Detected    Influenza B PCR Not Detected Not Detected    Parainfluenza Virus 1 Not Detected Not Detected    Parainfluenza Virus 2 Not Detected Not Detected    Parainfluenza Virus 3 Not Detected Not Detected    Parainfluenza Virus 4 Not Detected Not Detected    RSV, PCR Not Detected Not Detected    Bordetella pertussis pcr Not Detected Not Detected    Bordetella parapertussis PCR Not Detected Not Detected    Chlamydophila pneumoniae PCR Not Detected Not Detected    Mycoplasma pneumo by PCR Not Detected Not Detected   Hemoglobin & Hematocrit, Blood    Collection Time: 06/30/22  5:22 PM    Specimen: Blood   Result Value Ref Range    Hemoglobin 13.4 13.0 - 17.7 g/dL    Hematocrit 38.6 37.5 - 51.0 %   POC Glucose Once    Collection Time: 06/30/22  8:00 PM    Specimen: Blood   Result Value Ref Range    Glucose 110 (H) 70 - 105 mg/dL   ECG 12 Lead    Collection Time: 06/30/22 10:45  PM   Result Value Ref Range    QT Interval 331 ms   CBC Auto Differential    Collection Time: 07/01/22  2:02 AM    Specimen: Blood   Result Value Ref Range    WBC 8.70 3.40 - 10.80 10*3/mm3    RBC 4.00 (L) 4.14 - 5.80 10*6/mm3    Hemoglobin 11.8 (L) 13.0 - 17.7 g/dL    Hematocrit 35.0 (L) 37.5 - 51.0 %    MCV 87.6 79.0 - 97.0 fL    MCH 29.6 26.6 - 33.0 pg    MCHC 33.8 31.5 - 35.7 g/dL    RDW 14.6 12.3 - 15.4 %    RDW-SD 45.1 37.0 - 54.0 fl    MPV 8.2 6.0 - 12.0 fL    Platelets 134 (L) 140 - 450 10*3/mm3    Neutrophil % 77.9 (H) 42.7 - 76.0 %    Lymphocyte % 14.8 (L) 19.6 - 45.3 %    Monocyte % 5.1 5.0 - 12.0 %    Eosinophil % 2.0 0.3 - 6.2 %    Basophil % 0.2 0.0 - 1.5 %    Neutrophils, Absolute 6.80 1.70 - 7.00 10*3/mm3    Lymphocytes, Absolute 1.30 0.70 - 3.10 10*3/mm3    Monocytes, Absolute 0.40 0.10 - 0.90 10*3/mm3    Eosinophils, Absolute 0.20 0.00 - 0.40 10*3/mm3    Basophils, Absolute 0.00 0.00 - 0.20 10*3/mm3    nRBC 0.1 0.0 - 0.2 /100 WBC   Basic Metabolic Panel    Collection Time: 07/01/22  2:03 AM    Specimen: Blood   Result Value Ref Range    Glucose 167 (H) 65 - 99 mg/dL    BUN 19 8 - 23 mg/dL    Creatinine 1.07 0.76 - 1.27 mg/dL    Sodium 137 136 - 145 mmol/L    Potassium 4.1 3.5 - 5.2 mmol/L    Chloride 102 98 - 107 mmol/L    CO2 24.0 22.0 - 29.0 mmol/L    Calcium 8.5 (L) 8.6 - 10.5 mg/dL    BUN/Creatinine Ratio 17.8 7.0 - 25.0    Anion Gap 11.0 5.0 - 15.0 mmol/L    eGFR 70.2 >60.0 mL/min/1.73   POC Glucose Once    Collection Time: 07/01/22  7:30 AM    Specimen: Blood   Result Value Ref Range    Glucose 171 (H) 70 - 105 mg/dL        Imaging:  CT Angiogram Chest Pulmonary Embolism  Narrative: CTA CHEST WITH CONTRAST    CLINICAL INDICATION: Hemoptysis.    COMPARISON: None.    TECHNIQUE: 100 cc Isovue-370 administered intravenously. CTA images of the chest were obtained. Three-dimensional volume reconstructions were generated. CT dose lowering techniques were used, to include: automated exposure control,  adjustment for patient   size, and or use of iterative reconstruction.    FINDINGS:    CTA: No pulmonary arterial filling defect. Heart size normal with small pericardial effusion. Mediastinal vessels normal in caliber and patency.    Mediastinum: No mediastinal or hilar lymphadenopathy. Trachea and central airways are patent. Thyroid is normal. Large hiatal hernia.     Lungs and pleura: Small left pleural effusion.     Upper abdomen: No acute abnormality.    Body wall: No acute abnormality.    Bones: No acute osseous abnormality.  Impression: 1. No pulmonary embolism.  2. Small pericardial effusion.  3. Small left pleural effusion.  4. Large hiatal hernia.    Electronically signed by:  Kalin Sharp M.D.    6/30/2022 3:27 AM       ASSESSMENT:  Hemoptysis  COPD  HTN  DM II  CAD  GERD  Hx bladder and prostate cancer  HLD  Pericardial effusion  A-fib    PLAN:  Add Symbicort   PT/OT  Electrolytes/ glycemic control  DVT and GI prophylaxis.    Discussed with Dr. Kimberley Enamorado, RIAN   7/1/2022  08:40 EDT     I personally have examined  and interviewed the patient. I have reviewed the history, data, problems, assessment and plan with our NP.  Total Critical care time in direct medical management (   ) minutes, This time specifically excludes time spent performing procedures.    Bobby Chu MD   7/1/2022  09:07 EDT

## 2022-07-01 NOTE — CONSULTS
"GI CONSULT  NOTE:    Referring Provider:  Dr. Corona    Chief complaint: Hemoptysis    Subjective . \"  I had some blood in my mouth but it has resolved\"    History of present illness: Cisco Frank is a 82 y.o. male who has a history of GERD, Schroeder's esophagus, CAD, bladder cancer, prostate cancer, diabetes and A. fib on Xarelto.  He presented after waking up with blood in his mouth with reported hemoptysis on Tuesday.  Bronchoscopy yesterday without etiology identified.  He denies any further bleeding since.  Denies nausea, vomiting or heartburn on daily PPI.  No abdominal pain.  No NSAID use.  No alcohol abuse.  No dysphagia.  No melena or rectal bleeding with brown stool this morning.  He is tolerating diet without difficulty.  He is asking for discharge home.    Endo History:  2022 EGD by Dr. Urban -Schroeder's esophagus without dysplasia, medium hiatal hernia, gastric polyp  2018 colonoscopy -hemorrhoids, random colon biopsies benign    Past Medical History:  Past Medical History:   Diagnosis Date   • Cancer (HCC)    • Coronary artery disease    • History of bladder cancer    • Hyperlipidemia        Past Surgical History:  Past Surgical History:   Procedure Laterality Date   • BLADDER SURGERY  10/26/2019    [facility] Dr. Cope   • CARDIAC CATHETERIZATION     • COLONOSCOPY     • COLONOSCOPY W/ POLYPECTOMY  2021   • SKIN CANCER EXCISION      right temple BX   • UPPER ENDOSCOPIC ULTRASOUND W/ FNA N/A 3/12/2021    Procedure: ESOPHAGOGASTRODUODENOSCOPY WITH endoscopic mucosal resection, biopsy x 1 area, and endoscopic ULTRASOUND;  Surgeon: Abner Infante MD;  Location: [facility] ENDOSCOPY;  Service: Gastroenterology;  Laterality: N/A;  post op: hiatal hernia, duodenal polyp       Social History:  Social History     Tobacco Use   • Smoking status: Former Smoker     Quit date: 1980     Years since quittin.5   • Smokeless tobacco: Never Used   Vaping Use   • Vaping Use: Never used   Substance Use Topics "   • Alcohol use: Not Currently   • Drug use: Never       Family History:  Family History   Problem Relation Age of Onset   • Heart disease Mother    • Heart attack Father    • No Known Problems Sister    • No Known Problems Brother    • No Known Problems Maternal Aunt    • No Known Problems Maternal Uncle    • No Known Problems Paternal Aunt    • No Known Problems Paternal Uncle    • No Known Problems Maternal Grandmother    • No Known Problems Maternal Grandfather    • No Known Problems Paternal Grandmother    • No Known Problems Paternal Grandfather    • No Known Problems Other    • Anemia Neg Hx    • Arrhythmia Neg Hx    • Asthma Neg Hx    • Clotting disorder Neg Hx    • Fainting Neg Hx    • Heart failure Neg Hx    • Hyperlipidemia Neg Hx    • Hypertension Neg Hx        Medications:  Medications Prior to Admission   Medication Sig Dispense Refill Last Dose   • atenolol (TENORMIN) 25 MG tablet Take 1 tablet by mouth Daily. 90 tablet 3 6/29/2022 at Unknown time   • atorvastatin (LIPITOR) 40 MG tablet Take 1 tablet by mouth every night at bedtime. 90 tablet 3 6/29/2022 at Unknown time   • colestipol (COLESTID) 1 g tablet Take 1 g by mouth 2 (Two) Times a Day.   6/29/2022 at Unknown time   • glimepiride (AMARYL) 2 MG tablet TAKE 3 TABLETS EVERY MORNING BEFORE BREAKFAST 270 tablet 2 6/29/2022 at Unknown time   • linagliptin (TRADJENTA) 5 MG tablet tablet Take 1 tablet by mouth Daily for 30 days. 30 tablet  6/29/2022 at Unknown time   • omeprazole-sodium bicarbonate (ZEGERID)  MG per capsule Take 1 capsule by mouth Daily. prn   6/29/2022 at Unknown time   • rivaroxaban (XARELTO) 20 MG tablet Take 1 tablet by mouth Daily With Dinner for 30 days. Indications: Atrial Fibrillation 30 tablet 0 6/29/2022 at Unknown time   • vitamin C (ASCORBIC ACID) 500 MG tablet Take 500 mg by mouth Daily.   Past Week at Unknown time   • Omega-3 Fatty Acids (FISH OIL) 1200 MG capsule capsule Take 1 capsule by mouth Daily. On hold for  procedure   Unknown at Unknown time       Scheduled Meds:insulin lispro, 0-7 Units, Subcutaneous, TID AC  [START ON 7/2/2022] pantoprazole, 40 mg, Oral, Q AM  sodium chloride, 10 mL, Intravenous, Q12H      Continuous Infusions:   PRN Meds:.•  acetaminophen **OR** acetaminophen **OR** acetaminophen  •  aluminum-magnesium hydroxide-simethicone  •  dextrose  •  dextrose  •  glucagon (human recombinant)  •  insulin lispro **AND** insulin lispro  •  melatonin  •  ondansetron **OR** ondansetron  •  pantoprazole  •  potassium chloride **OR** potassium chloride **OR** potassium chloride  •  sodium chloride    ALLERGIES:  Patient has no known allergies.    ROS:  The following systems were reviewed  Constitution:  No fevers, chills, no unintentional weight loss  Skin: no rash, no jaundice  Eyes:  No blurry vision, no eye pain  HENT:  No change in hearing or smell  Resp:  No dyspnea or cough  CV:  No chest pain or palpitations  :  No dysuria, hematuria  Musculoskeletal:  No leg cramps or arthralgias  Neuro:  No tremor, no numbness  Psych:  No depression or confusion    Objective Resting in bed, no acute distress, no family present    Vital Signs:   Vitals:    07/01/22 0001 07/01/22 0220 07/01/22 0322 07/01/22 0700   BP: 96/70 120/79 119/78 121/85   BP Location: Right arm Right arm Right arm Left arm   Patient Position: Lying Lying Lying Lying   Pulse: (!) 123 104 100 114   Resp:   16 18   Temp:   98.5 °F (36.9 °C) 96.6 °F (35.9 °C)   TempSrc:   Oral Oral   SpO2:   96% 94%   Weight:   87.4 kg (192 lb 9.6 oz)    Height:           Physical Exam:       General Appearance:    Awake and alert, in no acute distress   Head:    Normocephalic, without obvious abnormality, atraumatic   Throat:   No oral lesions, no thrush, oral mucosa moist   Lungs:     Respirations regular, even and unlabored   Chest Wall:    No abnormalities observed   Abdomen:     Soft, non-tender, nondistended   Rectal:     Deferred   Extremities:   Moves all  extremities,no cyanosis       Skin:   No rash, no jaundice, normal palpation       Neurologic:   Cranial nerves 2 - 12 grossly intact       Results Review:   I reviewed the patient's labs and imaging.  CBC    Results from last 7 days   Lab Units 07/01/22  0202 06/30/22  1722 06/30/22  1104 06/30/22  0358   WBC 10*3/mm3 8.70  --   --  5.50   HEMOGLOBIN g/dL 11.8* 13.4 12.2* 12.3*   PLATELETS 10*3/mm3 134*  --   --  126*     CMP   Results from last 7 days   Lab Units 07/01/22  0203 06/30/22  0358   SODIUM mmol/L 137 137   POTASSIUM mmol/L 4.1 4.1   CHLORIDE mmol/L 102 101   CO2 mmol/L 24.0 25.0   BUN mg/dL 19 25*   CREATININE mg/dL 1.07 1.06   GLUCOSE mg/dL 167* 274*   ALBUMIN g/dL  --  3.50   BILIRUBIN mg/dL  --  0.5   ALK PHOS U/L  --  138*   AST (SGOT) U/L  --  11   ALT (SGPT) U/L  --  14     Cr Clearance Estimated Creatinine Clearance: 60.3 mL/min (by C-G formula based on SCr of 1.07 mg/dL).  Coag   Results from last 7 days   Lab Units 06/30/22  0358   INR  1.32*   APTT seconds 31.8*     HbA1C   Lab Results   Component Value Date    HGBA1C 8.7 (H) 06/17/2022    HGBA1C 8.8 (H) 01/14/2022    HGBA1C 9.9 (H) 10/08/2021     Blood Glucose   Glucose   Date/Time Value Ref Range Status   07/01/2022 0730 171 (H) 70 - 105 mg/dL Final     Comment:     Serial Number: [id number]:  [id number]   06/30/2022 2000 110 (H) 70 - 105 mg/dL Final     Comment:     Serial Number: [id number]:  [id number]   06/30/2022 1155 179 (H) 70 - 105 mg/dL Final     Comment:     Serial Number: [id number]:  [id number]   06/30/2022 0908 264 (H) 70 - 105 mg/dL Final     Comment:     Serial Number: [id number]:  [id number]     Infection     UA      Radiology(recent) CT Angiogram Chest Pulmonary Embolism    Result Date: 6/30/2022  1. No pulmonary embolism. 2. Small pericardial effusion. 3. Small left pleural effusion. 4. Large hiatal hernia. Electronically signed by:  Kalin Sharp M.D.  6/30/2022 3:27 AM          ASSESSMENT:  Possible hemoptysis -resolved  GERD/Schroeder's esophagus  DMII  CAD/Afib -on Xarelto  History of bladder and prostate cancer    PLAN:  Patient presents after waking up on Tuesday with blood in his mouth with possible hemoptysis.  Bronchoscopy without etiology identified.  He denies any GI complaints or further bleeding noted.  He had EGD in February with Schroeder's and medium hiatal hernia but no evidence of ulcer.  Hemoglobin remained stable.  He is asking for diet advancement and discharge home.  We discussed continuing PPI and following up with GI as outpatient with any further bleeding identified and repeat EGD can be done.  We will see inpatient as needed, call questions or concerns.  Okay to discharge from GI standpoint.      I discussed the patients findings and my recommendations with the patient.    We appreciate the referral    Electronically signed by RIAN Jeffers, 07/01/22, 8:33 AM EDT.

## 2022-07-01 NOTE — TELEPHONE ENCOUNTER
Well the only way it can be next Friday7/8/22 is if you want to call someone in the afternoon and see if they can come in on Thur 7/7/22

## 2022-07-01 NOTE — TELEPHONE ENCOUNTER
Patient's wife called and spoke with the [facility] and the appt was cancelled because patient is in the hospital.

## 2022-07-01 NOTE — TELEPHONE ENCOUNTER
Patient was discharged from the hospital today and was told to see PMJ in one week.  Wife requests a Friday appt if possible because he is off that day.  When can PMJ see him?

## 2022-07-02 LAB
BACTERIA SPEC RESP CULT: NORMAL
GRAM STN SPEC: NORMAL

## 2022-07-02 NOTE — OUTREACH NOTE
Prep Survey    Flowsheet Row Responses   [facility] discharged from? [facility]   Is LACE score < 7 ? No   Emergency Room discharge w/ pulse ox? No   Eligibility TCM   H[facility]   Date of Admission 06/30/22   Date of Discharge 07/01/22   Discharge Disposition Home or Self Care   Discharge diagnosis acute hemoptysis, pericardial effusion, A-fib, T2DM   Does the patient have one of the following disease processes/diagnoses(primary or secondary)? Other   Does the patient have Home health ordered? No   Is there a DME ordered? No   Prep survey completed? Yes          [facility] Main Registered Nurse

## 2022-07-04 ENCOUNTER — TRANSITIONAL CARE MANAGEMENT TELEPHONE ENCOUNTER (OUTPATIENT)
Dept: CALL CENTER | Facility: HOSPITAL | Age: 82
End: 2022-07-04

## 2022-07-04 LAB
QT INTERVAL: 331 MS
QT INTERVAL: 356 MS

## 2022-07-04 NOTE — OUTREACH NOTE
Call Center TCM Note    Flowsheet Row Responses   [facility] patient discharged from? [facility]   Does the patient have one of the following disease processes/diagnoses(primary or secondary)? Other   TCM attempt successful? Yes   Call start time 1454   Call end time 1456   Discharge diagnosis acute hemoptysis, pericardial effusion, A-fib, T2DM   Is patient permission given to speak with other caregiver? Yes   List who call center can speak with spouse- Christine   Person spoke with today (if not patient) and relationship spouse   Does the patient have all medications ordered at discharge? Yes   Is the patient taking all medications as directed (includes completed medication regime)? Yes   Comments regarding appointments f/u with cardiology on 7/13   Does the patient have a primary care provider?  Yes   Does the patient have an appointment with their PCP within 7 days of discharge? Yes   Comments regarding PCP hospital f/u with PCP on 7/8   Has the patient kept scheduled appointments due by today? N/A   Has home health visited the patient within 72 hours of discharge? N/A   Psychosocial issues? No   Did the patient receive a copy of their discharge instructions? Yes   What is the patient's perception of their health status since discharge? Improving   Is the patient/caregiver able to teach back the hierarchy of who to call/visit for symptoms/problems? PCP, Specialist, Home health nurse, Urgent Care, ED, 911 Yes   TCM call completed? Yes   Wrap up additional comments Per spouse, patient is doing well, will see PCP on 7/8.          Christelle Fuchs RN    7/4/2022, 14:56 EDT

## 2022-07-05 LAB
LAB AP CASE REPORT: NORMAL
P JIROVECII DNA L RESP QL NAA+NON-PROBE: NEGATIVE
PATH REPORT.FINAL DX SPEC: NORMAL
PATH REPORT.GROSS SPEC: NORMAL
REF LAB TEST METHOD: NORMAL

## 2022-07-05 RX ORDER — ATORVASTATIN CALCIUM 40 MG/1
40 TABLET, FILM COATED ORAL
Qty: 90 TABLET | Refills: 3 | Status: SHIPPED | OUTPATIENT
Start: 2022-07-05 | End: 2023-03-09 | Stop reason: SDUPTHER

## 2022-07-05 NOTE — TELEPHONE ENCOUNTER
Rx Refill Note  Requested Prescriptions     Pending Prescriptions Disp Refills   • atorvastatin (LIPITOR) 40 MG tablet 90 tablet 3     Sig: Take 1 tablet by mouth every night at bedtime.      Last office visit with prescribing clinician: 4/25/2022      Next office visit with prescribing clinician: 7/13/2022            Marlena Chris MA  07/05/22, 09:17 EDT

## 2022-07-08 ENCOUNTER — OFFICE VISIT (OUTPATIENT)
Dept: FAMILY MEDICINE CLINIC | Facility: CLINIC | Age: 82
End: 2022-07-08

## 2022-07-08 VITALS
TEMPERATURE: 97.6 F | BODY MASS INDEX: 29.03 KG/M2 | HEART RATE: 121 BPM | OXYGEN SATURATION: 96 % | WEIGHT: 196 LBS | HEIGHT: 69 IN | DIASTOLIC BLOOD PRESSURE: 80 MMHG | SYSTOLIC BLOOD PRESSURE: 130 MMHG | RESPIRATION RATE: 16 BRPM

## 2022-07-08 DIAGNOSIS — R04.2 HEMOPTYSIS: Primary | ICD-10-CM

## 2022-07-08 PROCEDURE — 99999 PR OFFICE/OUTPT VISIT,PROCEDURE ONLY: CPT | Performed by: FAMILY MEDICINE

## 2022-07-08 PROCEDURE — 99213 OFFICE O/P EST LOW 20 MIN: CPT | Performed by: FAMILY MEDICINE

## 2022-07-08 RX ORDER — OXYMETAZOLINE HYDROCHLORIDE 0.05 G/100ML
2-3 SPRAY NASAL 2 TIMES DAILY
Qty: 15 ML | Refills: 1 | Status: SHIPPED | OUTPATIENT
Start: 2022-07-08 | End: 2022-07-11

## 2022-07-08 RX ORDER — BROMPHENIRAMINE MALEATE, PSEUDOEPHEDRINE HYDROCHLORIDE, AND DEXTROMETHORPHAN HYDROBROMIDE 2; 30; 10 MG/5ML; MG/5ML; MG/5ML
5-10 SYRUP ORAL 4 TIMES DAILY PRN
Qty: 240 ML | Refills: 2 | Status: SHIPPED | OUTPATIENT
Start: 2022-07-08 | End: 2022-07-18

## 2022-07-08 NOTE — PROGRESS NOTES
Transitional Care Follow Up Visit  Subjective     Cisco Frank is a 82 y.o. male who presents for a transitional care management visit.    The patient reports that he has been to the hospital twice. The patient woke up from a sound sleep in the early morning of 06/30/2022. He woke up with a mouth full of blood and he had blood down the front of his T-shirt and across the front of his face. It was frightening to see that, so he went to the emergency room. In the emergency room, he had an extensive evaluation that included an EGD with no GI source of blood found. He also underwent bronchoscopy, but that did not reveal any source of bleeding either. The night prior to his emergency room visit, he felt okay. He states that he did not eat or drink anything unusual. He denies any nausea or vomiting during the night. He notes that he woke up 1 to 2 times during the night to urinate. He is unsure if he experienced a nose bleed in the back of his throat. He states that since he had the bronchoscopy, the side of his throat is painful and sore. The patient has been coughing and spitting up mucus. The patient continues to have rhinitis with postnasal drip, and he continues to cough. The sputum that he is bringing up is clear. The adult female states that his nose always runs and he does not realize it. He notes that he had a COVID-19 test. The patient is fully vaccinated against COVID-19. The patient has been off of Xarelto for 1 week. The adult female states that he is supposed to restart Xarelto today, 07/08/2022. The adult female states that he has not bled since right before he went to the hospital. The adult female states that he bled on two different days. The adult female states that the patient has an appointment with Dr. Garsia on Wednesday, 07/13/2022. He notes that the only medication he took at the hospital was 1 pill for acid reflux. He states that they did not give him his glimepiride. He went home from the  hospital and cut the grass, and he felt good.     The adult female states that he is still taking the glimepiride, 2 tablets in the morning and 2 tablets at night. He states that they got his blood sugar down to 110 with an insulin shot. He notes that he can not tell if it is going down with glimepiride and Tradjenta. He adds that his blood glucose has been 140 to 160. He states that he is not using any nasal spray. He adds that he took cough medicine last night. The patient states that they have been out walking in the last week.     Within 48 business hours after discharge our office contacted him via telephone to coordinate his care and needs.      I reviewed and discussed the details of that call along with the discharge summary, hospital problems, inpatient lab results, inpatient diagnostic studies, and consultation reports with Cisco.     Current outpatient and discharge medications have been reconciled for the patient.  Reviewed by:     Date of TCM Phone Call 7/1/2022   [facility]   Date of Admission 6/30/2022   Date of Discharge 7/1/2022   Discharge Disposition Home or Self Care     Risk for Readmission (LACE) Score: 8 (7/1/2022  6:01 AM)      History of Present Illness   Course During Hospital Stay: 1 day   The following portions of the patient's history were reviewed and updated as appropriate: allergies, current medications, past family history, past medical history, past social history, past surgical history and problem list.  No change from last review    Review of Systems   Constitutional: Negative.  Negative for diaphoresis, fatigue and fever.   HENT: Positive for congestion and postnasal drip. Negative for ear pain, hearing loss, sinus pressure, sore throat, trouble swallowing and voice change.    Eyes: Negative.  Negative for pain, redness and visual disturbance.   Respiratory: Negative.  Negative for cough, chest tightness and shortness of breath.    Cardiovascular: Negative.  Negative for  chest pain, palpitations and leg swelling.   Gastrointestinal: Negative.  Negative for abdominal pain, blood in stool, constipation and diarrhea.   Endocrine: Negative.  Negative for cold intolerance and heat intolerance.   Genitourinary: Negative.  Negative for difficulty urinating, enuresis, flank pain, frequency and urgency.   Musculoskeletal: Negative.  Negative for arthralgias, back pain, myalgias, neck pain and neck stiffness.   Skin: Negative.  Negative for color change and rash.   Allergic/Immunologic: Negative.  Negative for environmental allergies.   Neurological: Negative.  Negative for syncope, weakness and headaches.   Hematological: Negative.  Does not bruise/bleed easily.   Psychiatric/Behavioral: Negative.  Negative for behavioral problems, decreased concentration, dysphoric mood and suicidal ideas. The patient is not nervous/anxious.        Objective   Physical Exam  Constitutional:       Appearance: Normal appearance. He is well-developed and normal weight.   HENT:      Head: Normocephalic and atraumatic.      Right Ear: Tympanic membrane, ear canal and external ear normal.      Left Ear: Tympanic membrane, ear canal and external ear normal.      Nose: Nose normal.      Mouth/Throat:      Mouth: Mucous membranes are moist.      Pharynx: Oropharynx is clear. No oropharyngeal exudate.   Eyes:      Extraocular Movements: Extraocular movements intact.      Conjunctiva/sclera: Conjunctivae normal.      Pupils: Pupils are equal, round, and reactive to light.   Cardiovascular:      Rate and Rhythm: Normal rate and regular rhythm.      Pulses: Normal pulses.      Heart sounds: Normal heart sounds.   Pulmonary:      Effort: Pulmonary effort is normal.      Breath sounds: Normal breath sounds.   Abdominal:      General: Bowel sounds are normal.      Palpations: Abdomen is soft.   Musculoskeletal:         General: Normal range of motion.      Cervical back: Normal range of motion and neck supple.   Skin:      General: Skin is warm and dry.   Neurological:      General: No focal deficit present.      Mental Status: He is alert and oriented to person, place, and time. Mental status is at baseline.   Psychiatric:         Mood and Affect: Mood normal.         Behavior: Behavior normal.         Thought Content: Thought content normal.         Judgment: Judgment normal.       Return in about 3 months (around 10/8/2022), or if symptoms worsen or fail to improve, for Medicare Wellness.    Assessment & Plan     1. Hemoptysis   - Cisco is an 82-year-old white male who is coming in today to follow up on a hospital ER visit that occurred last week. Cisco woke up from a sound sleep in the early morning of 06/30/2022. He woke up with a mouth full of blood and he had blood down the front of his T-shirt and across the front of his face. It was frightening to see that, so he went to the emergency room. In the emergency room, he had an extensive evaluation that included an EGD with no GI source of blood found. He also apparently underwent bronchoscopy, but that did not reveal any source of bleeding either. There was no further bleeding that afternoon or at the end of the afternoon or evening hours. He was allowed to go home and he was restarted on his medications. He has gone home and the only thing he can say now is that he continues to have rhinitis with postnasal drip and he continues to cough. The sputum that he is bringing up is apparently clear. He is wondering now if he should go back on his Xarelto. He has had chronic atrial fibrillation and has been on Xarelto for some time. They stopped it immediately at the hospital because of the bleeding and told him to restart it today, but he is hesitant to do that. On my examination today, I did not really see any sources of bleeding in the back of his throat or in his nose. I think we will give the patient some Afrin nose spray to dry his nose up today. I will have him use that for about  3 to 4 days, 2 sprays each side of the nostril morning and night. I will give him some Bromfed DM for his cough that seems to be triggered by this postnasal drip and then we will wait until next week after he sees his cardiologist about Wednesday or Thursday. If he has had no further bleeding by then and the cardiologist feels strongly that he needs to be back on his Xarelto, we will ask him to go ahead and start that. Once the Xarelto is started, we will watch carefully for bleeding. As best I can tell, my thoughts would be that he probably had some posterior nosebleed that woke him up and that the bleeding probably stopped almost as quickly as it started. Because of the blood thinner, it was probably rather impressive initially and then slowed down and stopped. We will see how he does with the changes we made, and he will call me next week.       Other orders  -     brompheniramine-pseudoephedrine-DM 30-2-10 MG/5ML syrup; Take 5-10 mL by mouth 4 (Four) Times a Day As Needed for Congestion, Cough or Allergies for up to 10 days.  Dispense: 240 mL; Refill: 2  -     oxymetazoline (Afrin Nasal Spray) 0.05 % nasal spray; 2-3 sprays into the nostril(s) as directed by provider 2 (Two) Times a Day for 3 days.  Dispense: 15 mL; Refill: 1      Return in about 3 months (around 10/8/2022), or if symptoms worsen or fail to improve, for Medicare Wellness.    Transcribed from ambient dictation for Nuno Patton MD by Dilma Cassidy.  07/08/22   19:02 EDT    Patient verbalized consent to the visit recording.

## 2022-07-13 ENCOUNTER — OFFICE VISIT (OUTPATIENT)
Dept: CARDIOLOGY | Facility: CLINIC | Age: 82
End: 2022-07-13

## 2022-07-13 VITALS
OXYGEN SATURATION: 98 % | DIASTOLIC BLOOD PRESSURE: 79 MMHG | BODY MASS INDEX: 29.18 KG/M2 | HEIGHT: 69 IN | WEIGHT: 197 LBS | HEART RATE: 102 BPM | SYSTOLIC BLOOD PRESSURE: 116 MMHG

## 2022-07-13 DIAGNOSIS — I48.0 PAROXYSMAL ATRIAL FIBRILLATION: ICD-10-CM

## 2022-07-13 DIAGNOSIS — I10 ESSENTIAL HYPERTENSION: Primary | ICD-10-CM

## 2022-07-13 DIAGNOSIS — I25.10 CORONARY ARTERY DISEASE INVOLVING NATIVE CORONARY ARTERY OF NATIVE HEART WITHOUT ANGINA PECTORIS: ICD-10-CM

## 2022-07-13 DIAGNOSIS — E11.65 TYPE 2 DIABETES MELLITUS WITH HYPERGLYCEMIA, WITHOUT LONG-TERM CURRENT USE OF INSULIN: ICD-10-CM

## 2022-07-13 DIAGNOSIS — E78.2 MIXED HYPERLIPIDEMIA: ICD-10-CM

## 2022-07-13 PROCEDURE — 99214 OFFICE O/P EST MOD 30 MIN: CPT | Performed by: INTERNAL MEDICINE

## 2022-07-13 NOTE — PROGRESS NOTES
"    Subjective:     Encounter Date:07/13/2022      Patient ID: Cisco Frank is a 82 y.o. male.    Chief Complaint:  History of Present Illness 82-year-old white male with history of coronary disease hypertension hyperlipidemia and diabetes presents to my office for follow-up.  Patient is currently stable without any symptoms of chest pain but has some shortness of breath with exertion.  No complains any PND orthopnea.  No palpitation dizziness syncope or swelling of the feet but is take his meds regular but he does not smoke.    The following portions of the patient's history were reviewed and updated as appropriate: allergies, current medications, past family history, past medical history, past social history, past surgical history and problem list.  Past Medical History:   Diagnosis Date   • Cancer (HCC)    • Coronary artery disease    • History of bladder cancer    • Hyperlipidemia      Past Surgical History:   Procedure Laterality Date   • BLADDER SURGERY  10/26/2019    [facility] Dr. Cope   • BRONCHOSCOPY N/A 6/30/2022    Procedure: BRONCHOSCOPY with bilateral lung wash;  Surgeon: Ayush Velarde MD;  Location: [facility] ENDOSCOPY;  Service: Pulmonary;  Laterality: N/A;  normal bronch   • CARDIAC CATHETERIZATION     • COLONOSCOPY     • COLONOSCOPY W/ POLYPECTOMY  02/23/2021   • SKIN CANCER EXCISION      right temple BX   • UPPER ENDOSCOPIC ULTRASOUND W/ FNA N/A 3/12/2021    Procedure: ESOPHAGOGASTRODUODENOSCOPY WITH endoscopic mucosal resection, biopsy x 1 area, and endoscopic ULTRASOUND;  Surgeon: Abner Infante MD;  Location: [facility] ENDOSCOPY;  Service: Gastroenterology;  Laterality: N/A;  post op: hiatal hernia, duodenal polyp     /79   Pulse 102   Ht 175.3 cm (69\")   Wt 89.4 kg (197 lb)   SpO2 98%   BMI 29.09 kg/m²   Family History   Problem Relation Age of Onset   • Heart disease Mother    • Heart attack Father    • No Known Problems Sister    • No Known Problems Brother    • No Known Problems Maternal " Aunt    • No Known Problems Maternal Uncle    • No Known Problems Paternal Aunt    • No Known Problems Paternal Uncle    • No Known Problems Maternal Grandmother    • No Known Problems Maternal Grandfather    • No Known Problems Paternal Grandmother    • No Known Problems Paternal Grandfather    • No Known Problems Other    • Anemia Neg Hx    • Arrhythmia Neg Hx    • Asthma Neg Hx    • Clotting disorder Neg Hx    • Fainting Neg Hx    • Heart failure Neg Hx    • Hyperlipidemia Neg Hx    • Hypertension Neg Hx        Current Outpatient Medications:   •  atenolol (TENORMIN) 25 MG tablet, Take 1 tablet by mouth Daily., Disp: 90 tablet, Rfl: 3  •  atorvastatin (LIPITOR) 40 MG tablet, Take 1 tablet by mouth every night at bedtime., Disp: 90 tablet, Rfl: 3  •  brompheniramine-pseudoephedrine-DM 30-2-10 MG/5ML syrup, Take 5-10 mL by mouth 4 (Four) Times a Day As Needed for Congestion, Cough or Allergies for up to 10 days., Disp: 240 mL, Rfl: 2  •  colestipol (COLESTID) 1 g tablet, Take 1 g by mouth 2 (Two) Times a Day., Disp: , Rfl:   •  glimepiride (AMARYL) 2 MG tablet, TAKE 3 TABLETS EVERY MORNING BEFORE BREAKFAST, Disp: 270 tablet, Rfl: 2  •  linagliptin (TRADJENTA) 5 MG tablet tablet, Take 1 tablet by mouth Daily for 30 days., Disp: 30 tablet, Rfl:   •  omeprazole-sodium bicarbonate (ZEGERID)  MG per capsule, Take 1 capsule by mouth Daily. prn, Disp: , Rfl:   •  vitamin C (ASCORBIC ACID) 500 MG tablet, Take 500 mg by mouth Daily., Disp: , Rfl:   •  rivaroxaban (XARELTO) 20 MG tablet, Take 1 tablet by mouth Daily With Dinner for 30 days. Indications: Atrial Fibrillation, Disp: 30 tablet, Rfl: 0  No Known Allergies  Social History     Socioeconomic History   • Marital status:    Tobacco Use   • Smoking status: Former Smoker     Quit date: 1980     Years since quittin.5   • Smokeless tobacco: Never Used   Vaping Use   • Vaping Use: Never used   Substance and Sexual Activity   • Alcohol use: Not  Currently   • Drug use: Never   • Sexual activity: Defer     Review of Systems   Constitutional: Negative for fever and malaise/fatigue.   Cardiovascular: Negative for chest pain, dyspnea on exertion and palpitations.   Respiratory: Positive for shortness of breath. Negative for cough.    Skin: Negative for rash.   Gastrointestinal: Negative for abdominal pain, nausea and vomiting.   Neurological: Negative for focal weakness and headaches.   All other systems reviewed and are negative.             Objective:     Constitutional:       Appearance: Well-developed.   Eyes:      General: No scleral icterus.     Conjunctiva/sclera: Conjunctivae normal.   HENT:      Head: Normocephalic and atraumatic.   Neck:      Vascular: No carotid bruit or JVD.   Pulmonary:      Effort: Pulmonary effort is normal.      Breath sounds: Normal breath sounds. No wheezing. No rales.   Cardiovascular:      Normal rate. Regular rhythm.      Murmurs: There is a systolic murmur.   Pulses:     Intact distal pulses.   Abdominal:      General: Bowel sounds are normal.      Palpations: Abdomen is soft.   Musculoskeletal:      Cervical back: Normal range of motion and neck supple. Skin:     General: Skin is warm and dry.      Findings: No rash.   Neurological:      Mental Status: Alert.       Procedures    Lab Review:         MDM  1.  Coronary disease  Patient has nonobstructive disease with normal V function is currently stable on medications  2.  Hypertension  Patient blood pressure currently stable on atenolol  3.  Hyperlipidemia  Patient is on atorvastatin the lipid levels are well within normal limits  4.  Diabetes  Patient is on oral medicines and followed by the primary care doctor  05 atrial fibrillation  Patient has paroxysmal fibrillation and is currently stable on atenolol and Xarelto for anticoagulation.      Patient's previous medical records, labs, and EKG were reviewed and discussed with the patient at today's visit.

## 2022-07-14 LAB — FUNGUS WND CULT: ABNORMAL

## 2022-07-16 RX ORDER — PREDNISONE 20 MG/1
20 TABLET ORAL 2 TIMES DAILY
Qty: 10 TABLET | Refills: 0 | Status: SHIPPED | OUTPATIENT
Start: 2022-07-16 | End: 2022-07-22

## 2022-07-16 RX ORDER — AZITHROMYCIN 250 MG/1
TABLET, FILM COATED ORAL
Qty: 6 TABLET | Refills: 0 | Status: SHIPPED | OUTPATIENT
Start: 2022-07-16 | End: 2022-07-24 | Stop reason: HOSPADM

## 2022-07-22 ENCOUNTER — HOSPITAL ENCOUNTER (OUTPATIENT)
Facility: HOSPITAL | Age: 82
Setting detail: OBSERVATION
Discharge: HOME | End: 2022-07-24
Attending: EMERGENCY MEDICINE | Admitting: EMERGENCY MEDICINE

## 2022-07-22 DIAGNOSIS — U07.1 COVID-19: Primary | ICD-10-CM

## 2022-07-22 DIAGNOSIS — N17.9 ACUTE KIDNEY INJURY: ICD-10-CM

## 2022-07-22 DIAGNOSIS — I95.1 ORTHOSTATIC HYPOTENSION: ICD-10-CM

## 2022-07-22 DIAGNOSIS — E86.0 DEHYDRATION: ICD-10-CM

## 2022-07-22 LAB
ANION GAP SERPL CALCULATED.3IONS-SCNC: 12 MMOL/L (ref 5–15)
BASOPHILS # BLD AUTO: 0.1 10*3/MM3 (ref 0–0.2)
BASOPHILS NFR BLD AUTO: 0.6 % (ref 0–1.5)
BUN SERPL-MCNC: 41 MG/DL (ref 8–23)
BUN/CREAT SERPL: 25.6 (ref 7–25)
CALCIUM SPEC-SCNC: 8.7 MG/DL (ref 8.6–10.5)
CHLORIDE SERPL-SCNC: 98 MMOL/L (ref 98–107)
CO2 SERPL-SCNC: 25 MMOL/L (ref 22–29)
CREAT SERPL-MCNC: 1.6 MG/DL (ref 0.76–1.27)
DEPRECATED RDW RBC AUTO: 45.1 FL (ref 37–54)
EGFRCR SERPLBLD CKD-EPI 2021: 43.3 ML/MIN/1.73
EOSINOPHIL # BLD AUTO: 0.3 10*3/MM3 (ref 0–0.4)
EOSINOPHIL NFR BLD AUTO: 3 % (ref 0.3–6.2)
ERYTHROCYTE [DISTWIDTH] IN BLOOD BY AUTOMATED COUNT: 14.9 % (ref 12.3–15.4)
GLUCOSE SERPL-MCNC: 125 MG/DL (ref 65–99)
HCT VFR BLD AUTO: 43.1 % (ref 37.5–51)
HGB BLD-MCNC: 14.4 G/DL (ref 13–17.7)
LYMPHOCYTES # BLD AUTO: 1.8 10*3/MM3 (ref 0.7–3.1)
LYMPHOCYTES NFR BLD AUTO: 16.9 % (ref 19.6–45.3)
MCH RBC QN AUTO: 29.1 PG (ref 26.6–33)
MCHC RBC AUTO-ENTMCNC: 33.5 G/DL (ref 31.5–35.7)
MCV RBC AUTO: 86.8 FL (ref 79–97)
MONOCYTES # BLD AUTO: 0.8 10*3/MM3 (ref 0.1–0.9)
MONOCYTES NFR BLD AUTO: 7.9 % (ref 5–12)
NEUTROPHILS NFR BLD AUTO: 7.7 10*3/MM3 (ref 1.7–7)
NEUTROPHILS NFR BLD AUTO: 71.6 % (ref 42.7–76)
NRBC BLD AUTO-RTO: 0.1 /100 WBC (ref 0–0.2)
PLATELET # BLD AUTO: 195 10*3/MM3 (ref 140–450)
PMV BLD AUTO: 7.8 FL (ref 6–12)
POTASSIUM SERPL-SCNC: 4.5 MMOL/L (ref 3.5–5.2)
RBC # BLD AUTO: 4.96 10*6/MM3 (ref 4.14–5.8)
SARS-COV-2 RNA PNL SPEC NAA+PROBE: DETECTED
SODIUM SERPL-SCNC: 135 MMOL/L (ref 136–145)
TROPONIN T SERPL-MCNC: <0.01 NG/ML (ref 0–0.03)
WBC NRBC COR # BLD: 10.7 10*3/MM3 (ref 3.4–10.8)

## 2022-07-22 PROCEDURE — 82962 GLUCOSE BLOOD TEST: CPT

## 2022-07-22 PROCEDURE — 84484 ASSAY OF TROPONIN QUANT: CPT | Performed by: EMERGENCY MEDICINE

## 2022-07-22 PROCEDURE — 87635 SARS-COV-2 COVID-19 AMP PRB: CPT | Performed by: EMERGENCY MEDICINE

## 2022-07-22 PROCEDURE — 80048 BASIC METABOLIC PNL TOTAL CA: CPT | Performed by: EMERGENCY MEDICINE

## 2022-07-22 PROCEDURE — C9803 HOPD COVID-19 SPEC COLLECT: HCPCS | Performed by: FAMILY MEDICINE

## 2022-07-22 PROCEDURE — 99284 EMERGENCY DEPT VISIT MOD MDM: CPT

## 2022-07-22 PROCEDURE — 93005 ELECTROCARDIOGRAM TRACING: CPT | Performed by: EMERGENCY MEDICINE

## 2022-07-22 PROCEDURE — 85025 COMPLETE CBC W/AUTO DIFF WBC: CPT | Performed by: EMERGENCY MEDICINE

## 2022-07-22 PROCEDURE — G0378 HOSPITAL OBSERVATION PER HR: HCPCS

## 2022-07-22 RX ORDER — ACETAMINOPHEN 325 MG/1
650 TABLET ORAL EVERY 4 HOURS PRN
Status: DISCONTINUED | OUTPATIENT
Start: 2022-07-22 | End: 2022-07-23

## 2022-07-22 RX ORDER — CHOLECALCIFEROL (VITAMIN D3) 125 MCG
5 CAPSULE ORAL NIGHTLY PRN
Status: DISCONTINUED | OUTPATIENT
Start: 2022-07-22 | End: 2022-07-24 | Stop reason: HOSPADM

## 2022-07-22 RX ORDER — ONDANSETRON 2 MG/ML
4 INJECTION INTRAMUSCULAR; INTRAVENOUS EVERY 6 HOURS PRN
Status: DISCONTINUED | OUTPATIENT
Start: 2022-07-22 | End: 2022-07-23

## 2022-07-22 RX ORDER — SODIUM CHLORIDE 0.9 % (FLUSH) 0.9 %
10 SYRINGE (ML) INJECTION EVERY 12 HOURS SCHEDULED
Status: DISCONTINUED | OUTPATIENT
Start: 2022-07-22 | End: 2022-07-24 | Stop reason: HOSPADM

## 2022-07-22 RX ORDER — SODIUM CHLORIDE 9 MG/ML
125 INJECTION, SOLUTION INTRAVENOUS CONTINUOUS
Status: DISPENSED | OUTPATIENT
Start: 2022-07-22 | End: 2022-07-23

## 2022-07-22 RX ORDER — SODIUM CHLORIDE 0.9 % (FLUSH) 0.9 %
10 SYRINGE (ML) INJECTION AS NEEDED
Status: DISCONTINUED | OUTPATIENT
Start: 2022-07-22 | End: 2022-07-24 | Stop reason: HOSPADM

## 2022-07-22 RX ADMIN — SODIUM CHLORIDE 1000 ML: 9 INJECTION, SOLUTION INTRAVENOUS at 20:18

## 2022-07-22 NOTE — ED PROVIDER NOTES
Subjective   Chief complaint: General weakness    82-year-old male presents with general weakness.  Patient states he tested positive for COVID about 10 days ago on a home test.  He took a steroid and antibiotic that was called in by his primary doctor.  He states he had a cough but that is starting to improve.  He states he is feeling generally weak and fatigued.  He went to an urgent care center today and they did orthostatic vital signs and his blood pressure was low when he stood up.  He feels like he is dehydrated.  He denies any vomiting or diarrhea.  He has had no chest pain or shortness of breath.  He denies any other alleviating or exacerbating factors.      History provided by:  Patient      Review of Systems   Constitutional: Positive for fatigue. Negative for fever.   HENT: Negative for congestion.    Eyes: Negative for redness.   Respiratory: Positive for cough. Negative for shortness of breath.    Cardiovascular: Negative for chest pain.   Gastrointestinal: Negative for abdominal pain, diarrhea and vomiting.   Genitourinary: Negative for dysuria.   Musculoskeletal: Negative for back pain.   Skin: Negative for rash.   Neurological: Positive for weakness. Negative for headaches.   Psychiatric/Behavioral: Negative for confusion.       Past Medical History:   Diagnosis Date   • Atrial fibrillation (HCC)    • Cancer (HCC)    • Coronary artery disease    • History of bladder cancer    • Hyperlipidemia        No Known Allergies    Past Surgical History:   Procedure Laterality Date   • BLADDER SURGERY  10/26/2019    [facility] Dr. Cope   • BRONCHOSCOPY N/A 6/30/2022    Procedure: BRONCHOSCOPY with bilateral lung wash;  Surgeon: Ayush Velarde MD;  Location: [facility] ENDOSCOPY;  Service: Pulmonary;  Laterality: N/A;  normal bronch   • CARDIAC CATHETERIZATION     • COLONOSCOPY     • COLONOSCOPY W/ POLYPECTOMY  02/23/2021   • SKIN CANCER EXCISION      right temple BX   • UPPER ENDOSCOPIC ULTRASOUND W/ FNA N/A 3/12/2021     "Procedure: ESOPHAGOGASTRODUODENOSCOPY WITH endoscopic mucosal resection, biopsy x 1 area, and endoscopic ULTRASOUND;  Surgeon: Abner Infante MD;  Location: [facility] ENDOSCOPY;  Service: Gastroenterology;  Laterality: N/A;  post op: hiatal hernia, duodenal polyp       Family History   Problem Relation Age of Onset   • Heart disease Mother    • Heart attack Father    • No Known Problems Sister    • No Known Problems Brother    • No Known Problems Maternal Aunt    • No Known Problems Maternal Uncle    • No Known Problems Paternal Aunt    • No Known Problems Paternal Uncle    • No Known Problems Maternal Grandmother    • No Known Problems Maternal Grandfather    • No Known Problems Paternal Grandmother    • No Known Problems Paternal Grandfather    • No Known Problems Other    • Anemia Neg Hx    • Arrhythmia Neg Hx    • Asthma Neg Hx    • Clotting disorder Neg Hx    • Fainting Neg Hx    • Heart failure Neg Hx    • Hyperlipidemia Neg Hx    • Hypertension Neg Hx        Social History     Socioeconomic History   • Marital status:    Tobacco Use   • Smoking status: Former Smoker     Quit date: 1980     Years since quittin.5   • Smokeless tobacco: Never Used   Vaping Use   • Vaping Use: Never used   Substance and Sexual Activity   • Alcohol use: Not Currently   • Drug use: Never   • Sexual activity: Defer       /78   Pulse 107   Temp 97.9 °F (36.6 °C) (Oral)   Resp 16   Ht 175.3 cm (69\")   Wt 86.1 kg (189 lb 13.1 oz)   SpO2 97%   BMI 28.03 kg/m²       Objective   Physical Exam  Vitals and nursing note reviewed.   Constitutional:       Appearance: Normal appearance. He is well-developed.   HENT:      Head: Normocephalic and atraumatic.   Eyes:      Pupils: Pupils are equal, round, and reactive to light.   Cardiovascular:      Rate and Rhythm: Normal rate. Rhythm irregularly irregular.      Heart sounds: Normal heart sounds.   Pulmonary:      Effort: Pulmonary effort is normal. No respiratory " distress.      Breath sounds: Normal breath sounds.   Abdominal:      General: Bowel sounds are normal.      Palpations: Abdomen is soft.      Tenderness: There is no abdominal tenderness.   Skin:     General: Skin is warm and dry.   Neurological:      General: No focal deficit present.      Mental Status: He is alert and oriented to person, place, and time.         Procedures           ED Course      My interpretation of EKG shows atrial fibrillation, rate of 102, no ST elevation     Results for orders placed or performed during the hospital encounter of 07/22/22   COVID-19,CEPHEID/SERA,COR/LUIS ALBERTO/PAD/JOSE RAMON IN-HOUSE(OR EMERGENT/ADD-ON),NP SWAB IN TRANSPORT MEDIA 3-4 HR TAT, RT-PCR - Swab, Nasopharynx    Specimen: Nasopharynx; Swab   Result Value Ref Range    COVID19 Detected (C) Not Detected - Ref. Range   Basic Metabolic Panel    Specimen: Blood   Result Value Ref Range    Glucose 125 (H) 65 - 99 mg/dL    BUN 41 (H) 8 - 23 mg/dL    Creatinine 1.60 (H) 0.76 - 1.27 mg/dL    Sodium 135 (L) 136 - 145 mmol/L    Potassium 4.5 3.5 - 5.2 mmol/L    Chloride 98 98 - 107 mmol/L    CO2 25.0 22.0 - 29.0 mmol/L    Calcium 8.7 8.6 - 10.5 mg/dL    BUN/Creatinine Ratio 25.6 (H) 7.0 - 25.0    Anion Gap 12.0 5.0 - 15.0 mmol/L    eGFR 43.3 (L) >60.0 mL/min/1.73   CBC Auto Differential    Specimen: Blood   Result Value Ref Range    WBC 10.70 3.40 - 10.80 10*3/mm3    RBC 4.96 4.14 - 5.80 10*6/mm3    Hemoglobin 14.4 13.0 - 17.7 g/dL    Hematocrit 43.1 37.5 - 51.0 %    MCV 86.8 79.0 - 97.0 fL    MCH 29.1 26.6 - 33.0 pg    MCHC 33.5 31.5 - 35.7 g/dL    RDW 14.9 12.3 - 15.4 %    RDW-SD 45.1 37.0 - 54.0 fl    MPV 7.8 6.0 - 12.0 fL    Platelets 195 140 - 450 10*3/mm3    Neutrophil % 71.6 42.7 - 76.0 %    Lymphocyte % 16.9 (L) 19.6 - 45.3 %    Monocyte % 7.9 5.0 - 12.0 %    Eosinophil % 3.0 0.3 - 6.2 %    Basophil % 0.6 0.0 - 1.5 %    Neutrophils, Absolute 7.70 (H) 1.70 - 7.00 10*3/mm3    Lymphocytes, Absolute 1.80 0.70 - 3.10 10*3/mm3     Monocytes, Absolute 0.80 0.10 - 0.90 10*3/mm3    Eosinophils, Absolute 0.30 0.00 - 0.40 10*3/mm3    Basophils, Absolute 0.10 0.00 - 0.20 10*3/mm3    nRBC 0.1 0.0 - 0.2 /100 WBC   Troponin    Specimen: Blood   Result Value Ref Range    Troponin T <0.010 0.000 - 0.030 ng/mL   ECG 12 Lead   Result Value Ref Range    QT Interval 328 ms     XR Chest 2 View    Result Date: 7/22/2022   1. No radiographic findings of pneumonia 2. Cardiomegaly with no evidence of edema 3. Hiatal hernia  Electronically Signed By-Baljit Hickey On:7/22/2022 6:22 PM This report was finalized on [id number] by  Baljit Hickey, .                                    [facility]   Chest x-ray shows no acute disease.  White blood cell count is normal.  Patient was found to have acute kidney injury with a creatinine of 1.6 and BUN of 41.  Orthostatic vital signs were obtained and were positive.  His heart rate went from 91 while laying up to 115 while standing.  Blood pressure remained fairly stable and patient did not have any dizziness or syncope.  He was started on IV fluids.  He did test positive for COVID.  He had a positive home COVID test about 10 days ago.  Patient will be placed in observation for monitoring and further IV fluids.  Patient is agreeable with this plan.      Final diagnoses:   COVID-19   Acute kidney injury (HCC)   Dehydration   Orthostatic hypotension       ED Disposition  ED Disposition     ED Disposition   Decision to Admit    Condition   --    Comment   --             No follow-up provider specified.       Medication List      No changes were made to your prescriptions during this visit.          Angel Arriaga MD  07/22/22 2391

## 2022-07-23 LAB
ADV 40+41 DNA STL QL NAA+NON-PROBE: NOT DETECTED
ALBUMIN SERPL-MCNC: 3.4 G/DL (ref 3.5–5.2)
ALBUMIN/GLOB SERPL: 1.2 G/DL
ALP SERPL-CCNC: 118 U/L (ref 39–117)
ALT SERPL W P-5'-P-CCNC: 16 U/L (ref 1–41)
ANION GAP SERPL CALCULATED.3IONS-SCNC: 10 MMOL/L (ref 5–15)
ANION GAP SERPL CALCULATED.3IONS-SCNC: 11 MMOL/L (ref 5–15)
ANION GAP SERPL CALCULATED.3IONS-SCNC: 11 MMOL/L (ref 5–15)
AST SERPL-CCNC: 8 U/L (ref 1–40)
ASTRO TYP 1-8 RNA STL QL NAA+NON-PROBE: NOT DETECTED
BASOPHILS # BLD AUTO: 0 10*3/MM3 (ref 0–0.2)
BASOPHILS # BLD AUTO: 0 10*3/MM3 (ref 0–0.2)
BASOPHILS NFR BLD AUTO: 0.2 % (ref 0–1.5)
BASOPHILS NFR BLD AUTO: 0.4 % (ref 0–1.5)
BILIRUB SERPL-MCNC: 0.7 MG/DL (ref 0–1.2)
BUN SERPL-MCNC: 35 MG/DL (ref 8–23)
BUN SERPL-MCNC: 35 MG/DL (ref 8–23)
BUN SERPL-MCNC: 38 MG/DL (ref 8–23)
BUN/CREAT SERPL: 22.9 (ref 7–25)
BUN/CREAT SERPL: 26.1 (ref 7–25)
BUN/CREAT SERPL: 29.5 (ref 7–25)
C CAYETANENSIS DNA STL QL NAA+NON-PROBE: NOT DETECTED
C COLI+JEJ+UPSA DNA STL QL NAA+NON-PROBE: NOT DETECTED
CALCIUM SPEC-SCNC: 7.9 MG/DL (ref 8.6–10.5)
CALCIUM SPEC-SCNC: 8.5 MG/DL (ref 8.6–10.5)
CALCIUM SPEC-SCNC: 8.6 MG/DL (ref 8.6–10.5)
CHLORIDE SERPL-SCNC: 100 MMOL/L (ref 98–107)
CHLORIDE SERPL-SCNC: 95 MMOL/L (ref 98–107)
CHLORIDE SERPL-SCNC: 99 MMOL/L (ref 98–107)
CO2 SERPL-SCNC: 23 MMOL/L (ref 22–29)
CO2 SERPL-SCNC: 25 MMOL/L (ref 22–29)
CO2 SERPL-SCNC: 27 MMOL/L (ref 22–29)
CREAT SERPL-MCNC: 1.29 MG/DL (ref 0.76–1.27)
CREAT SERPL-MCNC: 1.34 MG/DL (ref 0.76–1.27)
CREAT SERPL-MCNC: 1.53 MG/DL (ref 0.76–1.27)
CRYPTOSP DNA STL QL NAA+NON-PROBE: NOT DETECTED
DEPRECATED RDW RBC AUTO: 45.1 FL (ref 37–54)
DEPRECATED RDW RBC AUTO: 46.8 FL (ref 37–54)
E HISTOLYT DNA STL QL NAA+NON-PROBE: NOT DETECTED
EAEC PAA PLAS AGGR+AATA ST NAA+NON-PRB: NOT DETECTED
EC STX1+STX2 GENES STL QL NAA+NON-PROBE: NOT DETECTED
EGFRCR SERPLBLD CKD-EPI 2021: 45.7 ML/MIN/1.73
EGFRCR SERPLBLD CKD-EPI 2021: 53.6 ML/MIN/1.73
EGFRCR SERPLBLD CKD-EPI 2021: 56.1 ML/MIN/1.73
EOSINOPHIL # BLD AUTO: 0.2 10*3/MM3 (ref 0–0.4)
EOSINOPHIL # BLD AUTO: 0.4 10*3/MM3 (ref 0–0.4)
EOSINOPHIL NFR BLD AUTO: 1.8 % (ref 0.3–6.2)
EOSINOPHIL NFR BLD AUTO: 4.1 % (ref 0.3–6.2)
EPEC EAE GENE STL QL NAA+NON-PROBE: NOT DETECTED
ERYTHROCYTE [DISTWIDTH] IN BLOOD BY AUTOMATED COUNT: 14.7 % (ref 12.3–15.4)
ERYTHROCYTE [DISTWIDTH] IN BLOOD BY AUTOMATED COUNT: 15 % (ref 12.3–15.4)
ETEC LTA+ST1A+ST1B TOX ST NAA+NON-PROBE: NOT DETECTED
G LAMBLIA DNA STL QL NAA+NON-PROBE: NOT DETECTED
GLOBULIN UR ELPH-MCNC: 2.9 GM/DL
GLUCOSE BLDC GLUCOMTR-MCNC: 154 MG/DL (ref 70–105)
GLUCOSE BLDC GLUCOMTR-MCNC: 340 MG/DL (ref 70–105)
GLUCOSE BLDC GLUCOMTR-MCNC: 361 MG/DL (ref 70–105)
GLUCOSE BLDC GLUCOMTR-MCNC: 415 MG/DL (ref 70–105)
GLUCOSE BLDC GLUCOMTR-MCNC: 419 MG/DL (ref 70–105)
GLUCOSE SERPL-MCNC: 277 MG/DL (ref 65–99)
GLUCOSE SERPL-MCNC: 429 MG/DL (ref 65–99)
GLUCOSE SERPL-MCNC: 74 MG/DL (ref 65–99)
HCT VFR BLD AUTO: 39.5 % (ref 37.5–51)
HCT VFR BLD AUTO: 41.5 % (ref 37.5–51)
HGB BLD-MCNC: 12.9 G/DL (ref 13–17.7)
HGB BLD-MCNC: 13.7 G/DL (ref 13–17.7)
LYMPHOCYTES # BLD AUTO: 0.7 10*3/MM3 (ref 0.7–3.1)
LYMPHOCYTES # BLD AUTO: 2 10*3/MM3 (ref 0.7–3.1)
LYMPHOCYTES NFR BLD AUTO: 20.3 % (ref 19.6–45.3)
LYMPHOCYTES NFR BLD AUTO: 8.5 % (ref 19.6–45.3)
MCH RBC QN AUTO: 28.9 PG (ref 26.6–33)
MCH RBC QN AUTO: 29.1 PG (ref 26.6–33)
MCHC RBC AUTO-ENTMCNC: 32.8 G/DL (ref 31.5–35.7)
MCHC RBC AUTO-ENTMCNC: 33 G/DL (ref 31.5–35.7)
MCV RBC AUTO: 87.6 FL (ref 79–97)
MCV RBC AUTO: 88.9 FL (ref 79–97)
MONOCYTES # BLD AUTO: 0.3 10*3/MM3 (ref 0.1–0.9)
MONOCYTES # BLD AUTO: 0.8 10*3/MM3 (ref 0.1–0.9)
MONOCYTES NFR BLD AUTO: 3.2 % (ref 5–12)
MONOCYTES NFR BLD AUTO: 8.1 % (ref 5–12)
NEUTROPHILS NFR BLD AUTO: 6.7 10*3/MM3 (ref 1.7–7)
NEUTROPHILS NFR BLD AUTO: 67.1 % (ref 42.7–76)
NEUTROPHILS NFR BLD AUTO: 7.4 10*3/MM3 (ref 1.7–7)
NEUTROPHILS NFR BLD AUTO: 86.3 % (ref 42.7–76)
NOROVIRUS GI+II RNA STL QL NAA+NON-PROBE: NOT DETECTED
NRBC BLD AUTO-RTO: 0 /100 WBC (ref 0–0.2)
NRBC BLD AUTO-RTO: 0.1 /100 WBC (ref 0–0.2)
NT-PROBNP SERPL-MCNC: 2368 PG/ML (ref 0–1800)
P SHIGELLOIDES DNA STL QL NAA+NON-PROBE: NOT DETECTED
PLATELET # BLD AUTO: 141 10*3/MM3 (ref 140–450)
PLATELET # BLD AUTO: 166 10*3/MM3 (ref 140–450)
PMV BLD AUTO: 7.9 FL (ref 6–12)
PMV BLD AUTO: 8.1 FL (ref 6–12)
POTASSIUM SERPL-SCNC: 4.2 MMOL/L (ref 3.5–5.2)
POTASSIUM SERPL-SCNC: 5.1 MMOL/L (ref 3.5–5.2)
POTASSIUM SERPL-SCNC: 5.4 MMOL/L (ref 3.5–5.2)
PROT SERPL-MCNC: 6.3 G/DL (ref 6–8.5)
RBC # BLD AUTO: 4.44 10*6/MM3 (ref 4.14–5.8)
RBC # BLD AUTO: 4.74 10*6/MM3 (ref 4.14–5.8)
RVA RNA STL QL NAA+NON-PROBE: NOT DETECTED
S ENT+BONG DNA STL QL NAA+NON-PROBE: NOT DETECTED
SAPO I+II+IV+V RNA STL QL NAA+NON-PROBE: NOT DETECTED
SHIGELLA SP+EIEC IPAH ST NAA+NON-PROBE: NOT DETECTED
SODIUM SERPL-SCNC: 129 MMOL/L (ref 136–145)
SODIUM SERPL-SCNC: 134 MMOL/L (ref 136–145)
SODIUM SERPL-SCNC: 138 MMOL/L (ref 136–145)
TROPONIN T SERPL-MCNC: <0.01 NG/ML (ref 0–0.03)
V CHOL+PARA+VUL DNA STL QL NAA+NON-PROBE: NOT DETECTED
V CHOLERAE DNA STL QL NAA+NON-PROBE: NOT DETECTED
WBC NRBC COR # BLD: 10 10*3/MM3 (ref 3.4–10.8)
WBC NRBC COR # BLD: 8.6 10*3/MM3 (ref 3.4–10.8)
Y ENTEROCOL DNA STL QL NAA+NON-PROBE: NOT DETECTED

## 2022-07-23 PROCEDURE — 96374 THER/PROPH/DIAG INJ IV PUSH: CPT

## 2022-07-23 PROCEDURE — 87507 IADNA-DNA/RNA PROBE TQ 12-25: CPT | Performed by: NURSE PRACTITIONER

## 2022-07-23 PROCEDURE — G0378 HOSPITAL OBSERVATION PER HR: HCPCS

## 2022-07-23 PROCEDURE — 85025 COMPLETE CBC W/AUTO DIFF WBC: CPT | Performed by: EMERGENCY MEDICINE

## 2022-07-23 PROCEDURE — 80053 COMPREHEN METABOLIC PANEL: CPT | Performed by: EMERGENCY MEDICINE

## 2022-07-23 PROCEDURE — 83880 ASSAY OF NATRIURETIC PEPTIDE: CPT | Performed by: NURSE PRACTITIONER

## 2022-07-23 PROCEDURE — 94799 UNLISTED PULMONARY SVC/PX: CPT

## 2022-07-23 PROCEDURE — 63710000001 INSULIN LISPRO (HUMAN) PER 5 UNITS: Performed by: NURSE PRACTITIONER

## 2022-07-23 PROCEDURE — 94761 N-INVAS EAR/PLS OXIMETRY MLT: CPT

## 2022-07-23 PROCEDURE — 94760 N-INVAS EAR/PLS OXIMETRY 1: CPT

## 2022-07-23 PROCEDURE — 96361 HYDRATE IV INFUSION ADD-ON: CPT

## 2022-07-23 PROCEDURE — 25010000002 METHYLPREDNISOLONE PER 40 MG: Performed by: NURSE PRACTITIONER

## 2022-07-23 PROCEDURE — 84484 ASSAY OF TROPONIN QUANT: CPT | Performed by: NURSE PRACTITIONER

## 2022-07-23 PROCEDURE — 82962 GLUCOSE BLOOD TEST: CPT

## 2022-07-23 PROCEDURE — 63710000001 INSULIN LISPRO (HUMAN) PER 5 UNITS: Performed by: EMERGENCY MEDICINE

## 2022-07-23 PROCEDURE — 36415 COLL VENOUS BLD VENIPUNCTURE: CPT | Performed by: EMERGENCY MEDICINE

## 2022-07-23 PROCEDURE — 96376 TX/PRO/DX INJ SAME DRUG ADON: CPT

## 2022-07-23 PROCEDURE — 85025 COMPLETE CBC W/AUTO DIFF WBC: CPT | Performed by: NURSE PRACTITIONER

## 2022-07-23 RX ORDER — BISACODYL 10 MG
10 SUPPOSITORY, RECTAL RECTAL DAILY PRN
Status: DISCONTINUED | OUTPATIENT
Start: 2022-07-23 | End: 2022-07-24 | Stop reason: HOSPADM

## 2022-07-23 RX ORDER — OLANZAPINE 10 MG/2ML
1 INJECTION, POWDER, LYOPHILIZED, FOR SOLUTION INTRAMUSCULAR
Status: DISCONTINUED | OUTPATIENT
Start: 2022-07-23 | End: 2022-07-24 | Stop reason: HOSPADM

## 2022-07-23 RX ORDER — ONDANSETRON 4 MG/1
4 TABLET, FILM COATED ORAL EVERY 6 HOURS PRN
Status: DISCONTINUED | OUTPATIENT
Start: 2022-07-23 | End: 2022-07-24 | Stop reason: HOSPADM

## 2022-07-23 RX ORDER — ATORVASTATIN CALCIUM 40 MG/1
40 TABLET, FILM COATED ORAL DAILY
Status: DISCONTINUED | OUTPATIENT
Start: 2022-07-23 | End: 2022-07-24 | Stop reason: HOSPADM

## 2022-07-23 RX ORDER — ONDANSETRON 2 MG/ML
4 INJECTION INTRAMUSCULAR; INTRAVENOUS EVERY 6 HOURS PRN
Status: DISCONTINUED | OUTPATIENT
Start: 2022-07-23 | End: 2022-07-24 | Stop reason: HOSPADM

## 2022-07-23 RX ORDER — AMIODARONE HYDROCHLORIDE 200 MG/1
200 TABLET ORAL
Status: DISCONTINUED | OUTPATIENT
Start: 2022-07-23 | End: 2022-07-23

## 2022-07-23 RX ORDER — NICOTINE POLACRILEX 4 MG
15 LOZENGE BUCCAL
Status: DISCONTINUED | OUTPATIENT
Start: 2022-07-23 | End: 2022-07-24 | Stop reason: HOSPADM

## 2022-07-23 RX ORDER — POLYETHYLENE GLYCOL 3350 17 G/17G
17 POWDER, FOR SOLUTION ORAL DAILY PRN
Status: DISCONTINUED | OUTPATIENT
Start: 2022-07-23 | End: 2022-07-24 | Stop reason: HOSPADM

## 2022-07-23 RX ORDER — METHYLPREDNISOLONE SODIUM SUCCINATE 40 MG/ML
40 INJECTION, POWDER, LYOPHILIZED, FOR SOLUTION INTRAMUSCULAR; INTRAVENOUS EVERY 12 HOURS
Status: DISCONTINUED | OUTPATIENT
Start: 2022-07-23 | End: 2022-07-24

## 2022-07-23 RX ORDER — ATENOLOL 25 MG/1
25 TABLET ORAL DAILY
Status: DISCONTINUED | OUTPATIENT
Start: 2022-07-23 | End: 2022-07-24 | Stop reason: HOSPADM

## 2022-07-23 RX ORDER — GUAIFENESIN 600 MG/1
600 TABLET, EXTENDED RELEASE ORAL EVERY 12 HOURS SCHEDULED
Status: DISCONTINUED | OUTPATIENT
Start: 2022-07-23 | End: 2022-07-24 | Stop reason: HOSPADM

## 2022-07-23 RX ORDER — BISACODYL 5 MG/1
5 TABLET, DELAYED RELEASE ORAL DAILY PRN
Status: DISCONTINUED | OUTPATIENT
Start: 2022-07-23 | End: 2022-07-24 | Stop reason: HOSPADM

## 2022-07-23 RX ORDER — CHOLESTYRAMINE LIGHT 4 G/5.7G
1 POWDER, FOR SUSPENSION ORAL EVERY 12 HOURS SCHEDULED
Status: DISCONTINUED | OUTPATIENT
Start: 2022-07-23 | End: 2022-07-23

## 2022-07-23 RX ORDER — SODIUM CHLORIDE 0.9 % (FLUSH) 0.9 %
10 SYRINGE (ML) INJECTION AS NEEDED
Status: DISCONTINUED | OUTPATIENT
Start: 2022-07-23 | End: 2022-07-24 | Stop reason: HOSPADM

## 2022-07-23 RX ORDER — ALBUTEROL SULFATE 2.5 MG/3ML
2.5 SOLUTION RESPIRATORY (INHALATION) EVERY 6 HOURS PRN
Status: DISCONTINUED | OUTPATIENT
Start: 2022-07-23 | End: 2022-07-24 | Stop reason: HOSPADM

## 2022-07-23 RX ORDER — SODIUM CHLORIDE 0.9 % (FLUSH) 0.9 %
10 SYRINGE (ML) INJECTION EVERY 12 HOURS SCHEDULED
Status: DISCONTINUED | OUTPATIENT
Start: 2022-07-23 | End: 2022-07-24 | Stop reason: HOSPADM

## 2022-07-23 RX ORDER — DEXTROSE MONOHYDRATE 25 G/50ML
25 INJECTION, SOLUTION INTRAVENOUS
Status: DISCONTINUED | OUTPATIENT
Start: 2022-07-23 | End: 2022-07-24 | Stop reason: HOSPADM

## 2022-07-23 RX ORDER — INSULIN LISPRO 100 [IU]/ML
12 INJECTION, SOLUTION INTRAVENOUS; SUBCUTANEOUS ONCE
Status: COMPLETED | OUTPATIENT
Start: 2022-07-23 | End: 2022-07-23

## 2022-07-23 RX ORDER — INSULIN LISPRO 100 [IU]/ML
0-9 INJECTION, SOLUTION INTRAVENOUS; SUBCUTANEOUS
Status: DISCONTINUED | OUTPATIENT
Start: 2022-07-23 | End: 2022-07-24 | Stop reason: HOSPADM

## 2022-07-23 RX ORDER — INSULIN LISPRO 100 [IU]/ML
0-9 INJECTION, SOLUTION INTRAVENOUS; SUBCUTANEOUS AS NEEDED
Status: DISCONTINUED | OUTPATIENT
Start: 2022-07-23 | End: 2022-07-24 | Stop reason: HOSPADM

## 2022-07-23 RX ORDER — PANTOPRAZOLE SODIUM 40 MG/1
40 TABLET, DELAYED RELEASE ORAL EVERY MORNING
Status: DISCONTINUED | OUTPATIENT
Start: 2022-07-23 | End: 2022-07-24 | Stop reason: HOSPADM

## 2022-07-23 RX ORDER — IPRATROPIUM BROMIDE AND ALBUTEROL SULFATE 2.5; .5 MG/3ML; MG/3ML
3 SOLUTION RESPIRATORY (INHALATION)
Status: DISCONTINUED | OUTPATIENT
Start: 2022-07-23 | End: 2022-07-24 | Stop reason: HOSPADM

## 2022-07-23 RX ORDER — ACETAMINOPHEN 325 MG/1
650 TABLET ORAL EVERY 4 HOURS PRN
Status: DISCONTINUED | OUTPATIENT
Start: 2022-07-23 | End: 2022-07-24 | Stop reason: HOSPADM

## 2022-07-23 RX ADMIN — SODIUM CHLORIDE 1000 ML: 9 INJECTION, SOLUTION INTRAVENOUS at 22:01

## 2022-07-23 RX ADMIN — METHYLPREDNISOLONE SODIUM SUCCINATE 40 MG: 40 INJECTION, POWDER, FOR SOLUTION INTRAMUSCULAR; INTRAVENOUS at 08:42

## 2022-07-23 RX ADMIN — IPRATROPIUM BROMIDE AND ALBUTEROL SULFATE 3 ML: .5; 3 SOLUTION RESPIRATORY (INHALATION) at 20:45

## 2022-07-23 RX ADMIN — Medication 10 ML: at 01:45

## 2022-07-23 RX ADMIN — INSULIN LISPRO 8 UNITS: 100 INJECTION, SOLUTION INTRAVENOUS; SUBCUTANEOUS at 19:42

## 2022-07-23 RX ADMIN — ATORVASTATIN CALCIUM 40 MG: 40 TABLET, FILM COATED ORAL at 08:42

## 2022-07-23 RX ADMIN — Medication 5 MG: at 01:44

## 2022-07-23 RX ADMIN — INSULIN LISPRO 7 UNITS: 100 INJECTION, SOLUTION INTRAVENOUS; SUBCUTANEOUS at 11:53

## 2022-07-23 RX ADMIN — CHOLESTYRAMINE 4 G: 4 POWDER, FOR SUSPENSION ORAL at 08:42

## 2022-07-23 RX ADMIN — GUAIFENESIN 600 MG: 600 TABLET, EXTENDED RELEASE ORAL at 08:42

## 2022-07-23 RX ADMIN — GUAIFENESIN 600 MG: 600 TABLET, EXTENDED RELEASE ORAL at 22:04

## 2022-07-23 RX ADMIN — Medication 10 ML: at 08:41

## 2022-07-23 RX ADMIN — INSULIN LISPRO 9 UNITS: 100 INJECTION, SOLUTION INTRAVENOUS; SUBCUTANEOUS at 17:24

## 2022-07-23 RX ADMIN — Medication 10 ML: at 22:02

## 2022-07-23 RX ADMIN — SODIUM CHLORIDE 125 ML/HR: 9 INJECTION, SOLUTION INTRAVENOUS at 01:45

## 2022-07-23 RX ADMIN — Medication 5 MG: at 22:03

## 2022-07-23 RX ADMIN — METHYLPREDNISOLONE SODIUM SUCCINATE 40 MG: 40 INJECTION, POWDER, FOR SOLUTION INTRAMUSCULAR; INTRAVENOUS at 22:03

## 2022-07-23 RX ADMIN — INSULIN LISPRO 12 UNITS: 100 INJECTION, SOLUTION INTRAVENOUS; SUBCUTANEOUS at 22:04

## 2022-07-23 RX ADMIN — RIVAROXABAN 20 MG: 20 TABLET, FILM COATED ORAL at 19:32

## 2022-07-23 RX ADMIN — ATENOLOL 25 MG: 25 TABLET ORAL at 15:44

## 2022-07-23 RX ADMIN — PANTOPRAZOLE SODIUM 40 MG: 40 TABLET, DELAYED RELEASE ORAL at 08:42

## 2022-07-23 RX ADMIN — ACETAMINOPHEN 650 MG: 325 TABLET, FILM COATED ORAL at 01:44

## 2022-07-23 NOTE — PLAN OF CARE
Problem: Adult Inpatient Plan of Care  Goal: Absence of Hospital-Acquired Illness or Injury  Intervention: Identify and Manage Fall Risk  Recent Flowsheet Documentation  Taken 7/23/2022 0600 by Carlyn Raines RN  Safety Promotion/Fall Prevention:   safety round/check completed   clutter free environment maintained   assistive device/personal items within reach   nonskid shoes/slippers when out of bed  Taken 7/23/2022 0400 by Carlyn Raines RN  Safety Promotion/Fall Prevention:   safety round/check completed   nonskid shoes/slippers when out of bed   fall prevention program maintained  Taken 7/23/2022 0200 by Carlyn Raines RN  Safety Promotion/Fall Prevention:   safety round/check completed   nonskid shoes/slippers when out of bed   fall prevention program maintained  Taken 7/23/2022 0030 by Carlyn Raines RN  Safety Promotion/Fall Prevention:   safety round/check completed   clutter free environment maintained   assistive device/personal items within reach   Goal Outcome Evaluation:              Outcome Evaluation: Pt resting on and off through the shift. Pt still complains of some dizziness when standing.

## 2022-07-23 NOTE — ED NOTES
Pt from home with C/O generalized weakness/fatigue and diarrhea. Pt tested positive for COVID approx 10 days ago. Pt has had 5 loose to watery stools today with increased urgency. Pt arrived with stool in his  Pants due to not getting to the bathroom quick enough. Pt denies any B/B stools.

## 2022-07-23 NOTE — H&P
[facility] Observation Unit H&P    Patient Name: Cisco Frank  : 1940  MRN: [id number]  Primary Care Physician: Nuno Patton MD  Date of admission: 2022     Patient Care Team:  Nuno Patton MD as PCP - General (Family Medicine)  Kailash Garsia MD as Consulting Physician (Cardiology)          Subjective   History Present Illness     Chief Complaint:   Chief Complaint   Patient presents with   • Hypotension     Hypotension    Mr. Frank is a 82 y.o.  presents to [facility] complaining of hypotension       History of Present Illness    ED 22: 82-year-old male presents with general weakness.  Patient states he tested positive for COVID about 10 days ago on a home test.  He took a steroid and antibiotic that was called in by his primary doctor.  He states he had a cough but that is starting to improve.  He states he is feeling generally weak and fatigued.  He went to an urgent care center today and they did orthostatic vital signs and his blood pressure was low when he stood up.  He feels like he is dehydrated.  He denies any vomiting or diarrhea.  He has had no chest pain or shortness of breath.  He denies any other alleviating or exacerbating factors.    OBS 22: Patient is an 82-year-old male presented to the hospital generalized weakness.  Patient was tested positive for COVID about 11 days ago with a home test.  Patient states primary care doctor gave him steroids and antibiotics.  He states he has had some improvement but still continue to cough so patient proceeded to urgent care for follow-up.  At urgent care patient had orthostatic hypotension along with atrial fibrillation.  He was advised to proceed to ED.  Patient denies chest pain dyspnea, fever, chills, abdominal pain, edema, palpitations, syncope, dizziness, or diaphoresis.  Patient does report productive cough with yellow sputum.  Patient reports increased weakness since having COVID.    Review of Systems    Constitutional: Positive for malaise/fatigue.   HENT: Negative.    Eyes: Negative.    Cardiovascular: Negative.    Respiratory: Positive for cough and sputum production.    Endocrine: Negative.    Hematologic/Lymphatic: Negative.    Skin: Negative.    Musculoskeletal: Negative.    Gastrointestinal: Negative.    Genitourinary: Negative.    Neurological: Negative.    Psychiatric/Behavioral: Negative.    Allergic/Immunologic: Negative.            Personal History     Past Medical History:   Past Medical History:   Diagnosis Date   • Atrial fibrillation (HCC)    • Cancer (HCC)    • Coronary artery disease    • History of bladder cancer    • Hyperlipidemia        Surgical History:      Past Surgical History:   Procedure Laterality Date   • BLADDER SURGERY  10/26/2019    [facility] Dr. [facility]   • BRONCHOSCOPY N/A 6/30/2022    Procedure: BRONCHOSCOPY with bilateral lung wash;  Surgeon: Ayush Velarde MD;  Location: [facility] ENDOSCOPY;  Service: Pulmonary;  Laterality: N/A;  normal bronch   • CARDIAC CATHETERIZATION     • COLONOSCOPY     • COLONOSCOPY W/ POLYPECTOMY  02/23/2021   • SKIN CANCER EXCISION      right temple BX   • UPPER ENDOSCOPIC ULTRASOUND W/ FNA N/A 3/12/2021    Procedure: ESOPHAGOGASTRODUODENOSCOPY WITH endoscopic mucosal resection, biopsy x 1 area, and endoscopic ULTRASOUND;  Surgeon: Abner Infante MD;  Location: [facility] ENDOSCOPY;  Service: Gastroenterology;  Laterality: N/A;  post op: hiatal hernia, duodenal polyp           Family History: family history includes Heart attack in his father; Heart disease in his mother; No Known Problems in his brother, maternal aunt, maternal grandfather, maternal grandmother, maternal uncle, paternal aunt, paternal grandfather, paternal grandmother, paternal uncle, sister, and another family member. Otherwise pertinent FHx was reviewed and unremarkable.     Social History:  reports that he quit smoking about 42 years ago. He has never used smokeless tobacco. He reports previous  alcohol use. He reports that he does not use drugs.      Medications:  Prior to Admission medications    Medication Sig Start Date End Date Taking? Authorizing Provider   atenolol (TENORMIN) 25 MG tablet Take 1 tablet by mouth Daily. 6/18/21  Yes Hudson Smith MD   atorvastatin (LIPITOR) 40 MG tablet Take 1 tablet by mouth every night at bedtime. 7/5/22  Yes Kailash Garisa MD   azithromycin (Zithromax Z-Johny) 250 MG tablet Take 2 tablets the first day, then 1 tablet daily for 4 days. 7/16/22  Yes Naila Dawkins MD   colestipol (COLESTID) 1 g tablet Take 1 g by mouth 2 (Two) Times a Day.   Yes ProviderSerenity MD   glimepiride (AMARYL) 2 MG tablet TAKE 3 TABLETS EVERY MORNING BEFORE BREAKFAST 2/17/22  Yes Nuno Patton MD   linagliptin (TRADJENTA) 5 MG tablet tablet Take 1 tablet by mouth Daily. 7/15/22  Yes Emergency, Nurse Ramone, RN   omeprazole-sodium bicarbonate (ZEGERID)  MG per capsule Take 1 capsule by mouth Daily. prn 10/16/19  Yes ProviderSerenity MD   predniSONE (DELTASONE) 20 MG tablet  7/16/22  Yes Emergency, Nurse Ramone, RN   rivaroxaban (XARELTO) 20 MG tablet Take 1 tablet by mouth Daily With Dinner for 30 days. Indications: Atrial Fibrillation 7/8/22 8/7/22 Yes Marty Corona MD   azithromycin (ZITHROMAX) 250 MG tablet  7/16/22   Emergency, Nurse Ramone, RN   linagliptin (TRADJENTA) 5 MG tablet tablet Take 1 tablet by mouth Daily for 30 days. 6/16/22 7/16/22  Nuno Patton MD   vitamin C (ASCORBIC ACID) 500 MG tablet Take 500 mg by mouth Daily.    Provider, MD Serenity       Allergies:  No Known Allergies    Objective   Objective     Vital Signs  Temp:  [97.3 °F (36.3 °C)-98 °F (36.7 °C)] 97.6 °F (36.4 °C)  Heart Rate:  [] 114  Resp:  [16-20] 16  BP: ()/(50-85) 108/70  SpO2:  [93 %-97 %] 95 %  on   ;   Device (Oxygen Therapy): room air  Body mass index is 29.14 kg/m².    Physical Exam  Vitals and nursing note reviewed.   Constitutional:        Appearance: Normal appearance.   HENT:      Head: Normocephalic and atraumatic.      Right Ear: External ear normal.      Left Ear: External ear normal.      Nose: Nose normal.      Mouth/Throat:      Mouth: Mucous membranes are moist.      Pharynx: Oropharynx is clear.   Eyes:      Conjunctiva/sclera: Conjunctivae normal.      Pupils: Pupils are equal, round, and reactive to light.   Cardiovascular:      Rate and Rhythm: Tachycardia present. Rhythm irregular.      Pulses: Normal pulses.   Pulmonary:      Effort: Pulmonary effort is normal.      Breath sounds: Normal breath sounds.   Abdominal:      General: Bowel sounds are normal.      Palpations: Abdomen is soft.   Musculoskeletal:      Cervical back: Normal range of motion.      Right lower leg: Edema present.      Left lower leg: Edema present.   Skin:     General: Skin is warm.      Capillary Refill: Capillary refill takes less than 2 seconds.   Neurological:      General: No focal deficit present.      Mental Status: He is alert and oriented to person, place, and time.      Motor: Weakness present.   Psychiatric:         Mood and Affect: Mood normal.         Behavior: Behavior normal.         Thought Content: Thought content normal.         Judgment: Judgment normal.           Results Review:  I have personally reviewed most recent cardiac tracings, lab results, microbiology results and radiology images and interpretations and agree with findings, most notably: Troponin, BNP, CBC, CMP, respiratory panel, EKG, chest x-ray    Results from last 7 days   Lab Units 07/23/22  1043   WBC 10*3/mm3 8.60   HEMOGLOBIN g/dL 12.9*   HEMATOCRIT % 39.5   PLATELETS 10*3/mm3 141     Results from last 7 days   Lab Units 07/23/22  1043 07/23/22  0112 07/22/22 2010   SODIUM mmol/L 134* 138 135*   POTASSIUM mmol/L 5.4* 4.2 4.5   CHLORIDE mmol/L 99 100 98   CO2 mmol/L 25.0 27.0 25.0   BUN mg/dL 35* 38* 41*   CREATININE mg/dL 1.34* 1.29* 1.60*   GLUCOSE mg/dL 277* 74 125*    CALCIUM mg/dL 7.9* 8.5* 8.7   TROPONIN T ng/mL <0.010  --  <0.010   PROBNP pg/mL  --  2,368.0*  --      Estimated Creatinine Clearance: 48.6 mL/min (A) (by C-G formula based on SCr of 1.34 mg/dL (H)).  Brief Urine Lab Results  (Last result in the past 365 days)      Color   Clarity   Blood   Leuk Est   Nitrite   Protein   CREAT   Urine HCG        06/16/22 1106 Yellow   Clear   Trace   Trace   Negative   100 mg/dL (2+)                 Microbiology Results (last 10 days)     Procedure Component Value - Date/Time    COVID-19,CEPHEID/SERA,COR/LUIS ALBERTO/PAD/JOSE RAMON IN-HOUSE(OR EMERGENT/ADD-ON),NP SWAB IN TRANSPORT MEDIA 3-4 HR TAT, RT-PCR - Swab, Nasopharynx [[id number]]  (Abnormal) Collected: 07/22/22 2015    Lab Status: Final result Specimen: Swab from Nasopharynx Updated: 07/22/22 2124     COVID19 Detected    Narrative:      Fact sheet for providers: https://www.fda.gov/media/488349/download     Fact sheet for patients: https://www.fda.gov/media/137269/download  Fact sheet for providers: https://www.fda.gov/media/600737/download    Fact sheet for patients: https://www.fda.gov/media/865929/download    Test performed by PCR.          ECG/EMG Results (most recent)     Procedure Component Value Units Date/Time    ECG 12 Lead [[id number]] Collected: 07/22/22 1955     Updated: 07/22/22 1957     QT Interval 328 ms     Narrative:      HEART RATE= 102  bpm  RR Interval= 586  ms  NH Interval=   ms  P Horizontal Axis=   deg  P Front Axis=   deg  QRSD Interval= 89  ms  QT Interval= 328  ms  QRS Axis= -1  deg  T Wave Axis= 19  deg  - ABNORMAL ECG -  Atrial fibrillation  Low voltage, precordial leads  Electronically Signed By:   Date and Time of Study: 2022-07-22 19:55:51                  XR Chest 2 View    Result Date: 7/22/2022   1. No radiographic findings of pneumonia 2. Cardiomegaly with no evidence of edema 3. Hiatal hernia  Electronically Signed By-Baljit Hickey On:7/22/2022 6:22 PM This report was finalized on [id number] by   Baljit Hickey, .        Estimated Creatinine Clearance: 48.6 mL/min (A) (by C-G formula based on SCr of 1.34 mg/dL (H)).    Assessment & Plan   Assessment/Plan       Active Hospital Problems    Diagnosis  POA   • COVID-19 [U07.1]  Yes   • Acute kidney injury (HCC) [N17.9]  Yes   • Dehydration [E86.0]  Yes   • Orthostatic hypotension [I95.1]  Yes      Resolved Hospital Problems   No resolved problems to display.       COVID-19  -Continuous cardiac monitoring  -Given by primary care azithromycin and prednisone  -Respiratory panel  -Continue to monitor for signs and symptoms of COVID-19    Acute kidney injury  -IVF  -BUN 35 creatinine 1.34, potassium 4.2, sodium 134, GFR 53.6  -History of chronic kidney disease and diabetes    Dehydration   -IVF fluids  - BUN 35 creatinine 1.34, potassium 4.2, sodium 134, GFR 53.6    Orthostatic hypotension   -Continuous cardiac monitoring  -Orthostatics lying 125/80, sitting 121/79, standing 123/78    Proximal atrial fibrillation  -Continue Xarelto and atenolol  -Orthostatic vitals  -EKG showed atrial fibrillation which was similar to EKG on 7/1/2022  -Heart rate 90s to 130s  -Echocardiogram  -Cardiology consult    Diabetes mellitus type 2 with hyperglycemia  -Hold home diabetic medication  -Current glucose 277  - Start SSI  - Monitor glucose AC & HS    GERD  -Continue PPI    VTE Prophylaxis -   Mechanical Order History:      Ordered        07/22/22 2133  Place Sequential Compression Device  Once            07/22/22 2133  Maintain Sequential Compression Device  Continuous                    Pharmalogical Order History:      Ordered     Dose Route Frequency Stop    07/23/22 1150  rivaroxaban (XARELTO) tablet 20 mg         20 mg PO Daily With Dinner --                CODE STATUS:    Code Status and Medical Interventions:   Ordered at: 07/23/22 9893     Level Of Support Discussed With:    Patient     Code Status (Patient has no pulse and is not breathing):    CPR (Attempt to  Resuscitate)     Medical Interventions (Patient has pulse or is breathing):    Full Support       This patient has been examined wearing personal protective equipment.     I discussed the patient's findings and my recommendations with patient, family, nursing staff, primary care team and consulting provider.      Signature:Electronically signed by RIAN Painting, 07/23/22, 3:15 PM EDT.

## 2022-07-23 NOTE — PLAN OF CARE
Goal Outcome Evaluation:  Plan of Care Reviewed With: patient, spouse           Outcome Evaluation: Patient to have echo, cardiology consult and repeat blood work to recheck kidney function.

## 2022-07-24 ENCOUNTER — READMISSION MANAGEMENT (OUTPATIENT)
Dept: CALL CENTER | Facility: HOSPITAL | Age: 82
End: 2022-07-24

## 2022-07-24 ENCOUNTER — APPOINTMENT (OUTPATIENT)
Dept: CARDIOLOGY | Facility: HOSPITAL | Age: 82
End: 2022-07-24

## 2022-07-24 VITALS
TEMPERATURE: 97.4 F | BODY MASS INDEX: 28.11 KG/M2 | SYSTOLIC BLOOD PRESSURE: 142 MMHG | OXYGEN SATURATION: 91 % | DIASTOLIC BLOOD PRESSURE: 90 MMHG | RESPIRATION RATE: 15 BRPM | HEART RATE: 103 BPM | WEIGHT: 189.82 LBS | HEIGHT: 69 IN

## 2022-07-24 LAB
ANION GAP SERPL CALCULATED.3IONS-SCNC: 10 MMOL/L (ref 5–15)
BASOPHILS # BLD AUTO: 0 10*3/MM3 (ref 0–0.2)
BASOPHILS NFR BLD AUTO: 0.1 % (ref 0–1.5)
BUN SERPL-MCNC: 31 MG/DL (ref 8–23)
BUN/CREAT SERPL: 26.7 (ref 7–25)
CALCIUM SPEC-SCNC: 9 MG/DL (ref 8.6–10.5)
CHLORIDE SERPL-SCNC: 101 MMOL/L (ref 98–107)
CO2 SERPL-SCNC: 22 MMOL/L (ref 22–29)
CREAT SERPL-MCNC: 1.16 MG/DL (ref 0.76–1.27)
DEPRECATED RDW RBC AUTO: 45.1 FL (ref 37–54)
EGFRCR SERPLBLD CKD-EPI 2021: 63.7 ML/MIN/1.73
EOSINOPHIL # BLD AUTO: 0 10*3/MM3 (ref 0–0.4)
EOSINOPHIL NFR BLD AUTO: 0.1 % (ref 0.3–6.2)
ERYTHROCYTE [DISTWIDTH] IN BLOOD BY AUTOMATED COUNT: 14.7 % (ref 12.3–15.4)
GLUCOSE BLDC GLUCOMTR-MCNC: 268 MG/DL (ref 70–105)
GLUCOSE BLDC GLUCOMTR-MCNC: 308 MG/DL (ref 70–105)
GLUCOSE SERPL-MCNC: 276 MG/DL (ref 65–99)
HCT VFR BLD AUTO: 38.2 % (ref 37.5–51)
HGB BLD-MCNC: 13.3 G/DL (ref 13–17.7)
LYMPHOCYTES # BLD AUTO: 0.4 10*3/MM3 (ref 0.7–3.1)
LYMPHOCYTES NFR BLD AUTO: 4.3 % (ref 19.6–45.3)
MCH RBC QN AUTO: 30.1 PG (ref 26.6–33)
MCHC RBC AUTO-ENTMCNC: 34.8 G/DL (ref 31.5–35.7)
MCV RBC AUTO: 86.5 FL (ref 79–97)
MONOCYTES # BLD AUTO: 0.1 10*3/MM3 (ref 0.1–0.9)
MONOCYTES NFR BLD AUTO: 1.3 % (ref 5–12)
NEUTROPHILS NFR BLD AUTO: 8.9 10*3/MM3 (ref 1.7–7)
NEUTROPHILS NFR BLD AUTO: 94.2 % (ref 42.7–76)
NRBC BLD AUTO-RTO: 0 /100 WBC (ref 0–0.2)
PLATELET # BLD AUTO: 150 10*3/MM3 (ref 140–450)
PMV BLD AUTO: 7.6 FL (ref 6–12)
POTASSIUM SERPL-SCNC: 4.8 MMOL/L (ref 3.5–5.2)
RBC # BLD AUTO: 4.41 10*6/MM3 (ref 4.14–5.8)
SODIUM SERPL-SCNC: 133 MMOL/L (ref 136–145)
WBC NRBC COR # BLD: 9.5 10*3/MM3 (ref 3.4–10.8)

## 2022-07-24 PROCEDURE — 93308 TTE F-UP OR LMTD: CPT

## 2022-07-24 PROCEDURE — 93325 DOPPLER ECHO COLOR FLOW MAPG: CPT | Performed by: INTERNAL MEDICINE

## 2022-07-24 PROCEDURE — 93308 TTE F-UP OR LMTD: CPT | Performed by: INTERNAL MEDICINE

## 2022-07-24 PROCEDURE — 97161 PT EVAL LOW COMPLEX 20 MIN: CPT

## 2022-07-24 PROCEDURE — 63710000001 INSULIN LISPRO (HUMAN) PER 5 UNITS: Performed by: NURSE PRACTITIONER

## 2022-07-24 PROCEDURE — G0378 HOSPITAL OBSERVATION PER HR: HCPCS

## 2022-07-24 PROCEDURE — 80048 BASIC METABOLIC PNL TOTAL CA: CPT | Performed by: NURSE PRACTITIONER

## 2022-07-24 PROCEDURE — 85025 COMPLETE CBC W/AUTO DIFF WBC: CPT | Performed by: NURSE PRACTITIONER

## 2022-07-24 PROCEDURE — 82962 GLUCOSE BLOOD TEST: CPT

## 2022-07-24 PROCEDURE — 99214 OFFICE O/P EST MOD 30 MIN: CPT | Performed by: INTERNAL MEDICINE

## 2022-07-24 PROCEDURE — 93325 DOPPLER ECHO COLOR FLOW MAPG: CPT

## 2022-07-24 RX ADMIN — INSULIN LISPRO 7 UNITS: 100 INJECTION, SOLUTION INTRAVENOUS; SUBCUTANEOUS at 08:19

## 2022-07-24 RX ADMIN — ATENOLOL 25 MG: 25 TABLET ORAL at 08:19

## 2022-07-24 RX ADMIN — ATORVASTATIN CALCIUM 40 MG: 40 TABLET, FILM COATED ORAL at 08:19

## 2022-07-24 RX ADMIN — PANTOPRAZOLE SODIUM 40 MG: 40 TABLET, DELAYED RELEASE ORAL at 06:18

## 2022-07-24 NOTE — PLAN OF CARE
Goal Outcome Evaluation:               Patient to discharge and follow up with Dr. Garsia in 1 month.

## 2022-07-24 NOTE — DISCHARGE SUMMARY
[facility]    Nuno Patton MD    CHIEF COMPLAINT:     COVID-19, orthostatic hypotension    HISTORY OF PRESENT ILLNESS:    [facility] [place]    ED 7/22/22: 82-year-old male presents with general weakness.  Patient states he tested positive for COVID about 10 days ago on a home test.  He took a steroid and antibiotic that was called in by his primary doctor.  He states he had a cough but that is starting to improve.  He states he is feeling generally weak and fatigued.  He went to an urgent care center today and they did orthostatic vital signs and his blood pressure was low when he stood up.  He feels like he is dehydrated.  He denies any vomiting or diarrhea.  He has had no chest pain or shortness of breath.  He denies any other alleviating or exacerbating factors.     OBS 7/23/22: Patient is an 82-year-old male presented to the hospital generalized weakness.  Patient was tested positive for COVID about 11 days ago with a home test.  Patient states primary care doctor gave him steroids and antibiotics.  He states he has had some improvement but still continue to cough so patient proceeded to urgent care for follow-up.  At urgent care patient had orthostatic hypotension along with atrial fibrillation.  He was advised to proceed to ED.  Patient denies chest pain dyspnea, fever, chills, abdominal pain, edema, palpitations, syncope, dizziness, or diaphoresis.  Patient does report productive cough with yellow sputum.  Patient reports increased weakness since having COVID.    Past Medical History:   Diagnosis Date   • Atrial fibrillation (HCC)    • Cancer (HCC)    • Coronary artery disease    • History of bladder cancer    • Hyperlipidemia      Past Surgical History:   Procedure Laterality Date   • BLADDER SURGERY  10/26/2019    [facility] [address]. [facility]   • BRONCHOSCOPY N/A 6/30/2022    Procedure: BRONCHOSCOPY with bilateral lung wash;  Surgeon: Ayush Velarde MD;  Location: [facility] ENDOSCOPY;  Service: Pulmonary;   Laterality: N/A;  normal bronch   • CARDIAC CATHETERIZATION     • COLONOSCOPY     • COLONOSCOPY W/ POLYPECTOMY  2021   • SKIN CANCER EXCISION      right temple BX   • UPPER ENDOSCOPIC ULTRASOUND W/ FNA N/A 3/12/2021    Procedure: ESOPHAGOGASTRODUODENOSCOPY WITH endoscopic mucosal resection, biopsy x 1 area, and endoscopic ULTRASOUND;  Surgeon: Abner Infante MD;  Location: [facility] ENDOSCOPY;  Service: Gastroenterology;  Laterality: N/A;  post op: hiatal hernia, duodenal polyp     Family History   Problem Relation Age of Onset   • Heart disease Mother    • Heart attack Father    • No Known Problems Sister    • No Known Problems Brother    • No Known Problems Maternal Aunt    • No Known Problems Maternal Uncle    • No Known Problems Paternal Aunt    • No Known Problems Paternal Uncle    • No Known Problems Maternal Grandmother    • No Known Problems Maternal Grandfather    • No Known Problems Paternal Grandmother    • No Known Problems Paternal Grandfather    • No Known Problems Other    • Anemia Neg Hx    • Arrhythmia Neg Hx    • Asthma Neg Hx    • Clotting disorder Neg Hx    • Fainting Neg Hx    • Heart failure Neg Hx    • Hyperlipidemia Neg Hx    • Hypertension Neg Hx      Social History     Tobacco Use   • Smoking status: Former Smoker     Quit date: 1980     Years since quittin.5   • Smokeless tobacco: Never Used   Vaping Use   • Vaping Use: Never used   Substance Use Topics   • Alcohol use: Not Currently   • Drug use: Never     Medications Prior to Admission   Medication Sig Dispense Refill Last Dose   • atenolol (TENORMIN) 25 MG tablet Take 1 tablet by mouth Daily. 90 tablet 3 Past Week at Unknown time   • atorvastatin (LIPITOR) 40 MG tablet Take 1 tablet by mouth every night at bedtime. 90 tablet 3 Past Week at Unknown time   • azithromycin (Zithromax Z-Johny) 250 MG tablet Take 2 tablets the first day, then 1 tablet daily for 4 days. 6 tablet 0 Past Week at Unknown time   • colestipol (COLESTID)  1 g tablet Take 1 g by mouth 2 (Two) Times a Day.   Past Week at Unknown time   • glimepiride (AMARYL) 2 MG tablet TAKE 3 TABLETS EVERY MORNING BEFORE BREAKFAST 270 tablet 2 7/22/2022 at Unknown time   • linagliptin (TRADJENTA) 5 MG tablet tablet Take 1 tablet by mouth Daily.   Past Week at Unknown time   • omeprazole-sodium bicarbonate (ZEGERID)  MG per capsule Take 1 capsule by mouth Daily. prn   7/22/2022 at Unknown time   • predniSONE (DELTASONE) 20 MG tablet    7/22/2022 at Unknown time   • rivaroxaban (XARELTO) 20 MG tablet Take 1 tablet by mouth Daily With Dinner for 30 days. Indications: Atrial Fibrillation 30 tablet 0 Past Week at Unknown time   • azithromycin (ZITHROMAX) 250 MG tablet       • linagliptin (TRADJENTA) 5 MG tablet tablet Take 1 tablet by mouth Daily for 30 days. 30 tablet     • vitamin C (ASCORBIC ACID) 500 MG tablet Take 500 mg by mouth Daily.        Allergies:  Patient has no known allergies.    Immunization History   Administered Date(s) Administered   • COVID-19 (PFIZER) PURPLE CAP 12/29/2020, 01/19/2021, 09/09/2021   • Fluzone High Dose =>65 Years ([employer] ONLY) 10/10/2015, 09/16/2016, 10/14/2017, 09/22/2018, 11/05/2019, 10/05/2020   • Fluzone High-Dose 65+yrs 10/08/2021   • Hepatitis A 04/23/2018, 12/20/2018   • Pneumococcal Conjugate 13-Valent (PCV13) 11/05/2020   • Pneumococcal Polysaccharide (PPSV23) 01/10/2013, 02/01/2021           REVIEW OF SYSTEMS:    Review of Systems   Constitutional: Negative.   HENT: Negative.    Eyes: Negative.    Cardiovascular: Negative.    Respiratory: Positive for cough.    Endocrine: Negative.    Hematologic/Lymphatic: Negative.    Skin: Negative.    Musculoskeletal: Negative.    Gastrointestinal: Negative.    Genitourinary: Negative.    Neurological: Negative.    Psychiatric/Behavioral: Negative.    Allergic/Immunologic: Negative.        Vital Signs  Temp:  [97.4 °F (36.3 °C)-98.4 °F (36.9 °C)] 97.4 °F (36.3 °C)  Heart Rate:  [107-131] 109  Resp:   [15-20] 15  BP: (110-142)/(68-93) 142/90          Physical Exam:  Physical Exam  Vitals and nursing note reviewed.   Constitutional:       Appearance: Normal appearance.   HENT:      Head: Normocephalic and atraumatic.      Right Ear: External ear normal.      Left Ear: External ear normal.      Nose: Nose normal.      Mouth/Throat:      Mouth: Mucous membranes are moist.      Pharynx: Oropharynx is clear.   Eyes:      Extraocular Movements: Extraocular movements intact.      Conjunctiva/sclera: Conjunctivae normal.      Pupils: Pupils are equal, round, and reactive to light.   Cardiovascular:      Rate and Rhythm: Normal rate. Rhythm irregular.      Pulses: Normal pulses.      Heart sounds: Normal heart sounds.   Pulmonary:      Effort: Pulmonary effort is normal.      Breath sounds: Normal breath sounds.   Abdominal:      General: Bowel sounds are normal.      Palpations: Abdomen is soft.   Musculoskeletal:         General: Normal range of motion.      Cervical back: Normal range of motion and neck supple.   Skin:     General: Skin is warm.      Capillary Refill: Capillary refill takes less than 2 seconds.   Neurological:      General: No focal deficit present.      Mental Status: He is alert and oriented to person, place, and time. Mental status is at baseline.   Psychiatric:         Mood and Affect: Mood normal.         Behavior: Behavior normal.         Thought Content: Thought content normal.         Emotional Behavior:    WNL   Debilities:   none  Results Review:    I reviewed the patient's new clinical results.  Lab Results (most recent)     Procedure Component Value Units Date/Time    POC Glucose Once [[id number]]  (Abnormal) Collected: 07/24/22 0738    Specimen: Blood Updated: 07/24/22 0739     Glucose 308 mg/dL      Comment: Serial Number: [id number]:  [id number]       Basic Metabolic Panel [[id number]]  (Abnormal) Collected: 07/24/22 0433    Specimen: Blood Updated: 07/24/22 0523     Glucose 276  mg/dL      BUN 31 mg/dL      Creatinine 1.16 mg/dL      Sodium 133 mmol/L      Potassium 4.8 mmol/L      Chloride 101 mmol/L      CO2 22.0 mmol/L      Calcium 9.0 mg/dL      BUN/Creatinine Ratio 26.7     Anion Gap 10.0 mmol/L      eGFR 63.7 mL/min/1.73      Comment: National Kidney Foundation and American Society of Nephrology (ASN) Task Force recommended calculation based on the Chronic Kidney Disease Epidemiology Collaboration (CKD-EPI) equation refit without adjustment for race.       Narrative:      GFR Normal >60  Chronic Kidney Disease <60  Kidney Failure <15      CBC & Differential [[id number]]  (Abnormal) Collected: 07/24/22 0433    Specimen: Blood Updated: 07/24/22 0509    Narrative:      The following orders were created for panel order CBC & Differential.  Procedure                               Abnormality         Status                     ---------                               -----------         ------                     CBC Auto Differential[[id number]]        Abnormal            Final result                 Please view results for these tests on the individual orders.    CBC Auto Differential [[id number]]  (Abnormal) Collected: 07/24/22 0433    Specimen: Blood Updated: 07/24/22 0509     WBC 9.50 10*3/mm3      RBC 4.41 10*6/mm3      Hemoglobin 13.3 g/dL      Hematocrit 38.2 %      MCV 86.5 fL      MCH 30.1 pg      MCHC 34.8 g/dL      RDW 14.7 %      RDW-SD 45.1 fl      MPV 7.6 fL      Platelets 150 10*3/mm3      Neutrophil % 94.2 %      Lymphocyte % 4.3 %      Monocyte % 1.3 %      Eosinophil % 0.1 %      Basophil % 0.1 %      Neutrophils, Absolute 8.90 10*3/mm3      Lymphocytes, Absolute 0.40 10*3/mm3      Monocytes, Absolute 0.10 10*3/mm3      Eosinophils, Absolute 0.00 10*3/mm3      Basophils, Absolute 0.00 10*3/mm3      nRBC 0.0 /100 WBC     POC Glucose Once [[id number]]  (Abnormal) Collected: 07/24/22 0009    Specimen: Blood Updated: 07/24/22 0011     Glucose 268 mg/dL      Comment: Serial Number:  [id number]:  [id number]       Comprehensive Metabolic Panel [[id number]]  (Abnormal) Collected: 07/23/22 2122    Specimen: Blood Updated: 07/23/22 2159     Glucose 429 mg/dL      BUN 35 mg/dL      Creatinine 1.53 mg/dL      Sodium 129 mmol/L      Potassium 5.1 mmol/L      Chloride 95 mmol/L      CO2 23.0 mmol/L      Calcium 8.6 mg/dL      Total Protein 6.3 g/dL      Albumin 3.40 g/dL      ALT (SGPT) 16 U/L      AST (SGOT) 8 U/L      Alkaline Phosphatase 118 U/L      Total Bilirubin 0.7 mg/dL      Globulin 2.9 gm/dL      A/G Ratio 1.2 g/dL      BUN/Creatinine Ratio 22.9     Anion Gap 11.0 mmol/L      eGFR 45.7 mL/min/1.73      Comment: National Kidney Foundation and American Society of Nephrology (ASN) Task Force recommended calculation based on the Chronic Kidney Disease Epidemiology Collaboration (CKD-EPI) equation refit without adjustment for race.       Narrative:      GFR Normal >60  Chronic Kidney Disease <60  Kidney Failure <15      Gastrointestinal Panel, PCR - Stool, Per Rectum [[id number]]  (Normal) Collected: 07/23/22 1506    Specimen: Stool from Per Rectum Updated: 07/23/22 1644     Campylobacter Not Detected     Plesiomonas shigelloides Not Detected     Salmonella Not Detected     Vibrio Not Detected     Vibrio cholerae Not Detected     Yersinia enterocolitica Not Detected     Enteroaggregative E. coli (EAEC) Not Detected     Enteropathogenic E. coli (EPEC) Not Detected     Enterotoxigenic E. coli (ETEC) lt/st Not Detected     Shiga-like toxin-producing E. coli (STEC) stx1/stx2 Not Detected     Shigella/Enteroinvasive E. coli (EIEC) Not Detected     Cryptosporidium Not Detected     Cyclospora cayetanensis Not Detected     Entamoeba histolytica Not Detected     Giardia lamblia Not Detected     Adenovirus F40/41 Not Detected     Astrovirus Not Detected     Norovirus GI/GII Not Detected     Rotavirus A Not Detected     Sapovirus (I, II, IV or V) Not Detected    Narrative:      If Aeromonas,  Staphylococcus aureus or Bacillus cereus are suspected, please order [id number]: Stool Culture, Aeromonas, S aureus, B Cereus.    Troponin [[id number]]  (Normal) Collected: 07/23/22 1043    Specimen: Blood Updated: 07/23/22 1206     Troponin T <0.010 ng/mL     Narrative:      Troponin T Reference Range:  <= 0.03 ng/mL-   Negative for AMI  >0.03 ng/mL-     Abnormal for myocardial necrosis.  Clinicians would have to utilize clinical acumen, EKG, Troponin and serial changes to determine if it is an Acute Myocardial Infarction or myocardial injury due to an underlying chronic condition.       Results may be falsely decreased if patient taking Biotin.      Basic Metabolic Panel [[id number]]  (Abnormal) Collected: 07/23/22 1043    Specimen: Blood Updated: 07/23/22 1132     Glucose 277 mg/dL      BUN 35 mg/dL      Creatinine 1.34 mg/dL      Sodium 134 mmol/L      Potassium 5.4 mmol/L      Comment: Slight hemolysis detected by analyzer. Results may be affected.        Chloride 99 mmol/L      CO2 25.0 mmol/L      Calcium 7.9 mg/dL      BUN/Creatinine Ratio 26.1     Anion Gap 10.0 mmol/L      eGFR 53.6 mL/min/1.73      Comment: National Kidney Foundation and American Society of Nephrology (ASN) Task Force recommended calculation based on the Chronic Kidney Disease Epidemiology Collaboration (CKD-EPI) equation refit without adjustment for race.       Narrative:      GFR Normal >60  Chronic Kidney Disease <60  Kidney Failure <15      CBC & Differential [[id number]]  (Abnormal) Collected: 07/23/22 1043    Specimen: Blood Updated: 07/23/22 1057    Narrative:      The following orders were created for panel order CBC & Differential.  Procedure                               Abnormality         Status                     ---------                               -----------         ------                     CBC Auto Differential[[id number]]        Abnormal            Final result                 Please view results for these tests on the  individual orders.    CBC Auto Differential [[id number]]  (Abnormal) Collected: 07/23/22 1043    Specimen: Blood Updated: 07/23/22 1057     WBC 8.60 10*3/mm3      RBC 4.44 10*6/mm3      Hemoglobin 12.9 g/dL      Hematocrit 39.5 %      MCV 88.9 fL      MCH 29.1 pg      MCHC 32.8 g/dL      RDW 15.0 %      RDW-SD 46.8 fl      MPV 7.9 fL      Platelets 141 10*3/mm3      Neutrophil % 86.3 %      Lymphocyte % 8.5 %      Monocyte % 3.2 %      Eosinophil % 1.8 %      Basophil % 0.2 %      Neutrophils, Absolute 7.40 10*3/mm3      Lymphocytes, Absolute 0.70 10*3/mm3      Monocytes, Absolute 0.30 10*3/mm3      Eosinophils, Absolute 0.20 10*3/mm3      Basophils, Absolute 0.00 10*3/mm3      nRBC 0.0 /100 WBC     BNP [[id number]]  (Abnormal) Collected: 07/23/22 0112    Specimen: Blood Updated: 07/23/22 0812     proBNP 2,368.0 pg/mL     Narrative:      Among patients with dyspnea, NT-proBNP is highly sensitive for the detection of acute congestive heart failure. In addition NT-proBNP of <300 pg/ml effectively rules out acute congestive heart failure with 99% negative predictive value.    Results may be falsely decreased if patient taking Biotin.      COVID-19,CEPHEID/SERA,COR/LUIS ALBERTO/PAD/JOSE RAMON IN-HOUSE(OR EMERGENT/ADD-ON),NP SWAB IN TRANSPORT MEDIA 3-4 HR TAT, RT-PCR - Swab, Nasopharynx [[id number]]  (Abnormal) Collected: 07/22/22 2015    Specimen: Swab from Nasopharynx Updated: 07/22/22 2124     COVID19 Detected    Narrative:      Fact sheet for providers: https://www.fda.gov/media/640365/download     Fact sheet for patients: https://www.fda.gov/media/383657/download  Fact sheet for providers: https://www.fda.gov/media/033902/download    Fact sheet for patients: https://www.fda.gov/media/170141/download    Test performed by PCR.    Troponin [[id number]]  (Normal) Collected: 07/22/22 2010    Specimen: Blood Updated: 07/22/22 2042     Troponin T <0.010 ng/mL     Narrative:      Troponin T Reference Range:  <= 0.03 ng/mL-   Negative for  AMI  >0.03 ng/mL-     Abnormal for myocardial necrosis.  Clinicians would have to utilize clinical acumen, EKG, Troponin and serial changes to determine if it is an Acute Myocardial Infarction or myocardial injury due to an underlying chronic condition.       Results may be falsely decreased if patient taking Biotin.            Imaging Results (Most Recent)     None        reviewed    ECG/EMG Results (most recent)     Procedure Component Value Units Date/Time    ECG 12 Lead [[id number]] Collected: 07/22/22 1955     Updated: 07/22/22 1957     QT Interval 328 ms     Narrative:      HEART RATE= 102  bpm  RR Interval= 586  ms  AL Interval=   ms  P Horizontal Axis=   deg  P Front Axis=   deg  QRSD Interval= 89  ms  QT Interval= 328  ms  QRS Axis= -1  deg  T Wave Axis= 19  deg  - ABNORMAL ECG -  Atrial fibrillation  Low voltage, precordial leads  Electronically Signed By:   Date and Time of Study: 2022-07-22 19:55:51    Adult Transthoracic Echo Limited W/ Cont if Necessary Per Protocol [[id number]] Resulted: 07/24/22 0937     Updated: 07/24/22 0940     Target HR (85%) 119 bpm      Max. Pred. HR (100%) 140 bpm         reviewed            Microbiology Results (last 10 days)     Procedure Component Value - Date/Time    Gastrointestinal Panel, PCR - Stool, Per Rectum [[id number]]  (Normal) Collected: 07/23/22 1506    Lab Status: Final result Specimen: Stool from Per Rectum Updated: 07/23/22 1644     Campylobacter Not Detected     Plesiomonas shigelloides Not Detected     Salmonella Not Detected     Vibrio Not Detected     Vibrio cholerae Not Detected     Yersinia enterocolitica Not Detected     Enteroaggregative E. coli (EAEC) Not Detected     Enteropathogenic E. coli (EPEC) Not Detected     Enterotoxigenic E. coli (ETEC) lt/st Not Detected     Shiga-like toxin-producing E. coli (STEC) stx1/stx2 Not Detected     Shigella/Enteroinvasive E. coli (EIEC) Not Detected     Cryptosporidium Not Detected     Cyclospora cayetanensis Not  Detected     Entamoeba histolytica Not Detected     Giardia lamblia Not Detected     Adenovirus F40/41 Not Detected     Astrovirus Not Detected     Norovirus GI/GII Not Detected     Rotavirus A Not Detected     Sapovirus (I, II, IV or V) Not Detected    Narrative:      If Aeromonas, Staphylococcus aureus or Bacillus cereus are suspected, please order [id number]: Stool Culture, Aeromonas, S aureus, B Cereus.    COVID-19,CEPHEID/SERA,COR/LUIS ALBERTO/PAD/JOSE RAMON IN-HOUSE(OR EMERGENT/ADD-ON),NP SWAB IN TRANSPORT MEDIA 3-4 HR TAT, RT-PCR - Swab, Nasopharynx [[id number]]  (Abnormal) Collected: 07/22/22 2015    Lab Status: Final result Specimen: Swab from Nasopharynx Updated: 07/22/22 2124     COVID19 Detected    Narrative:      Fact sheet for providers: https://www.fda.gov/media/972743/download     Fact sheet for patients: https://www.fda.gov/media/568095/download  Fact sheet for providers: https://www.fda.gov/media/501390/download    Fact sheet for patients: https://www.fda.gov/media/980488/download    Test performed by PCR.          Assessment & Plan     COVID-19    Acute kidney injury (HCC)    Dehydration    Orthostatic hypotension       COVID-19  -Continuous cardiac monitoring  -Given by primary care azithromycin and prednisone  -Respiratory panel  -Continue to monitor for signs and symptoms of COVID-19     Acute kidney injury  -IVF  -BUN 35 creatinine 1.34, potassium 4.2, sodium 134, GFR 53.6  -History of chronic kidney disease and diabetes     Dehydration   -IVF fluids  - BUN 35 creatinine 1.34, potassium 4.2, sodium 134, GFR 53.6     Orthostatic hypotension   -Continuous cardiac monitoring  -Orthostatics lying 125/80, sitting 121/79, standing 123/78     Proximal atrial fibrillation  -Continue Xarelto and atenolol  -Orthostatic vitals  -EKG showed atrial fibrillation which was similar to EKG on 7/1/2022  -Heart rate 90s to 130s  -Echocardiogram  -Cardiology consult     Diabetes mellitus type 2 with hyperglycemia  -Hold home  diabetic medication  -Current glucose 277  - Start SSI  - Monitor glucose AC & HS     GERD  -Continue PPI     VTE Prophylaxis -currently on blood thinner    I discussed the patients findings and my recommendations with patient and wife.     Discharge Diagnosis:      COVID-19    Acute kidney injury (HCC)    Dehydration    Orthostatic hypotension      Hospital Course  Patient is a 82 y.o. male presented with COVID-19 positive and dizziness.  Patient was tested positive for COVID about 11 days ago with a home test.  Patient states primary care doctor gave him steroids and antibiotics.  He states he has had some improvement but still continue to cough so patient proceeded to urgent care for follow-up.  At urgent care patient had orthostatic hypotension along with atrial fibrillation.  Patient is afebrile hemodynamically stable.  Patient given IVF fluids with improved kidney function BUN 31, creatinine 1.16.  Patient to monitor blood glucoses at home and follow-up with endocrinology to schedule appointment.  Chest x-ray showed cardiomegaly with no evidence of edema or pneumonia.  EKG showed A. fib without chest pain, dyspnea or palpitations.  Cardiology signed off and follow-up in 1 month.  Patient to follow-up with primary care for any COVID symptoms and hospital follow-up for continued care management.  If symptoms worsen patient to call 911 or nearest emergency room.  Patient to continue to monitor blood pressure twice daily at home.  Tests and recommendations reviewed with patient and wife and they agree to treatment plan.    Past Medical History:     Past Medical History:   Diagnosis Date   • Atrial fibrillation (HCC)    • Cancer (HCC)    • Coronary artery disease    • History of bladder cancer    • Hyperlipidemia        Past Surgical History:     Past Surgical History:   Procedure Laterality Date   • BLADDER SURGERY  10/26/2019    [facility] Dr. Cope   • BRONCHOSCOPY N/A 6/30/2022    Procedure: BRONCHOSCOPY with  bilateral lung wash;  Surgeon: Ayush Velarde MD;  Location: [facility] ENDOSCOPY;  Service: Pulmonary;  Laterality: N/A;  normal bronch   • CARDIAC CATHETERIZATION     • COLONOSCOPY     • COLONOSCOPY W/ POLYPECTOMY  2021   • SKIN CANCER EXCISION      right temple BX   • UPPER ENDOSCOPIC ULTRASOUND W/ FNA N/A 3/12/2021    Procedure: ESOPHAGOGASTRODUODENOSCOPY WITH endoscopic mucosal resection, biopsy x 1 area, and endoscopic ULTRASOUND;  Surgeon: Abner Infante MD;  Location: [facility] ENDOSCOPY;  Service: Gastroenterology;  Laterality: N/A;  post op: hiatal hernia, duodenal polyp       Social History:   Social History     Socioeconomic History   • Marital status:    Tobacco Use   • Smoking status: Former Smoker     Quit date: 1980     Years since quittin.5   • Smokeless tobacco: Never Used   Vaping Use   • Vaping Use: Never used   Substance and Sexual Activity   • Alcohol use: Not Currently   • Drug use: Never   • Sexual activity: Defer       Procedures Performed         Consults:   Consults     Date and Time Order Name Status Description    2022  3:04 PM Inpatient Cardiology Consult      2022  6:16 PM Inpatient Gastroenterology Consult Completed     2022  6:12 PM Inpatient Gastroenterology Consult Completed     2022  7:48 AM Inpatient Pulmonology Consult Completed           Condition on Discharge:     Stable    Discharge Disposition  Home or Self Care    Discharge Medications     Discharge Medications      Changes to Medications      Instructions Start Date   azithromycin 250 MG tablet  Commonly known as: ZITHROMAX  What changed: Another medication with the same name was removed. Continue taking this medication, and follow the directions you see here.   No dose, route, or frequency recorded.         Continue These Medications      Instructions Start Date   atenolol 25 MG tablet  Commonly known as: TENORMIN   25 mg, Oral, Daily      atorvastatin 40 MG tablet  Commonly known as:  LIPITOR   40 mg, Oral, Every Night at Bedtime      colestipol 1 g tablet  Commonly known as: COLESTID   1 g, Oral, 2 Times Daily      glimepiride 2 MG tablet  Commonly known as: AMARYL   TAKE 3 TABLETS EVERY MORNING BEFORE BREAKFAST      linagliptin 5 MG tablet tablet  Commonly known as: TRADJENTA   5 mg, Oral, Daily      linagliptin 5 MG tablet tablet  Commonly known as: TRADJENTA   1 tablet, Oral, Daily      omeprazole-sodium bicarbonate  MG per capsule  Commonly known as: ZEGERID   1 capsule, Oral, Daily, prn      predniSONE 20 MG tablet  Commonly known as: DELTASONE   No dose, route, or frequency recorded.      rivaroxaban 20 MG tablet  Commonly known as: XARELTO   20 mg, Oral, Daily With Dinner      vitamin C 500 MG tablet  Commonly known as: ASCORBIC ACID   500 mg, Oral, Daily             Discharge Diet:   Diet Instructions     Diet: Regular      Discharge Diet: Regular          Activity at Discharge:   Activity Instructions     Activity as Tolerated      Measure Blood Pressure            Follow-up Appointments  Future Appointments   Date Time Provider Department Center   12/21/2022 12:50 PM Kailash Garsia MD [facility]     Additional Instructions for the Follow-ups that You Need to Schedule     Discharge Follow-up with PCP   As directed       Currently Documented PCP:    Nuno Patton MD    PCP Phone Number:    [phone]     Follow Up Details: 7-10 days               Test Results Pending at Discharge       Risk for Readmission (LACE) Score: 10 (7/24/2022  6:00 AM)          RIAN Painting  07/24/22  11:17 EDT

## 2022-07-24 NOTE — PLAN OF CARE
Goal Outcome Evaluation:  Plan of Care Reviewed With: patient           Outcome Evaluation: 81 yo male admitted with weakness, fatigue, hypotension.  Pt tested + for COVID 10 days prior.  Pt is from home with his spouse and still works, drives.  Pt reports feeling much better today.  Pt is up ad osmany to bathroom in [place] upon arrival and does well with all mobility.  No further therapy needs, safe for d/c home.

## 2022-07-24 NOTE — NURSING NOTE
Per Dr. Cuellar- give Humalog 12 units sq x1 dose and give saline 1 liter over 1 hour, recheck blood sugar at midnight. Zachery LOCKE.

## 2022-07-24 NOTE — THERAPY EVALUATION
Patient Name: Cisco Frank  : 1940    MRN: [id number]                              Today's Date: 2022       Admit Date: 2022    Visit Dx:     ICD-10-CM ICD-9-CM   1. COVID-19  U07.1 079.89   2. Acute kidney injury (HCC)  N17.9 584.9   3. Dehydration  E86.0 276.51   4. Orthostatic hypotension  I95.1 458.0     Patient Active Problem List   Diagnosis   • Essential hypertension   • Type 2 diabetes mellitus with hyperglycemia, without long-term current use of insulin (HCC)   • Coronary artery disease   • Hyperlipidemia   • History of bladder cancer   • Chronic renal insufficiency   • Colon polyps   • Gastroesophageal reflux disease   • Myocardial infarction (HCC)   • Postviral fatigue syndrome   • Medicare annual wellness visit, subsequent   • Chest pain   • Overweight   • Schroeder's esophagus without dysplasia   • Esophageal polyp   • Irritant contact dermatitis due to detergent   • Bilateral pseudophakia   • 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 Cope   • BRONCHOSCOPY N/A 2022    Procedure: BRONCHOSCOPY with bilateral lung wash;  Surgeon: Ayush Velarde MD;  Location: [facility] ENDOSCOPY;  Service: Pulmonary;  Laterality: N/A;  normal bronch   • CARDIAC CATHETERIZATION     • COLONOSCOPY     • COLONOSCOPY W/ POLYPECTOMY  2021   • SKIN CANCER EXCISION      right temple BX   • UPPER ENDOSCOPIC ULTRASOUND W/ FNA N/A  3/12/2021    Procedure: ESOPHAGOGASTRODUODENOSCOPY WITH endoscopic mucosal resection, biopsy x 1 area, and endoscopic ULTRASOUND;  Surgeon: Abner Infante MD;  Location: [facility] ENDOSCOPY;  Service: Gastroenterology;  Laterality: N/A;  post op: hiatal hernia, duodenal polyp      General Information     Row Name 07/24/22 1146          Physical Therapy Time and Intention    Document Type evaluation  -     Mode of Treatment physical therapy  -     Row Name 07/24/22 1146          General Information    Patient Profile Reviewed yes  -     Prior Level of Function independent:;driving;work  -     Existing Precautions/Restrictions no known precautions/restrictions  -     Barriers to Rehab none identified  -     Row Name 07/24/22 1146          Living Environment    People in Home spouse  -     Row Name 07/24/22 1146          Home Main Entrance    Number of Stairs, Main Entrance none  -     Row Name 07/24/22 1146          Stairs Within Home, Primary    Number of Stairs, Within Home, Primary none  -     Row Name 07/24/22 1146          Cognition    Orientation Status (Cognition) oriented x 4  -           User Key  (r) = Recorded By, (t) = Taken By, (c) = Cosigned By    Initials Name Provider Type     Tila Hammonds, PT Physical Therapist               Mobility     Row Name 07/24/22 1147          Bed Mobility    Bed Mobility bed mobility (all) activities  -     All Activities, [place] (Bed Mobility) independent  -     Row Name 07/24/22 1147          Sit-Stand Transfer    Sit-Stand [place] (Transfers) independent  -     Row Name 07/24/22 1147          Gait/Stairs (Locomotion)    [place] Level (Gait) independent  -     Distance in Feet (Gait) up ad osmany in room and walking to bathroom in [place]  -           User Key  (r) = Recorded By, (t) = Taken By, (c) = Cosigned By    Initials Name Provider Type     Tila Hammonds, PT Physical Therapist               Obj/Interventions     Row Name  07/24/22 1147          Range of Motion Comprehensive    General Range of Motion no range of motion deficits identified  -JH     Row Name 07/24/22 1147          Strength Comprehensive (MMT)    General Manual Muscle Testing (MMT) Assessment no strength deficits identified  -JH     Row Name 07/24/22 1147          Balance    Balance Assessment standing static balance;standing dynamic balance  -     Static Standing Balance independent  -     Dynamic Standing Balance independent  -JH     Row Name 07/24/22 1147          Sensory Assessment (Somatosensory)    Sensory Assessment (Somatosensory) sensation intact  -           User Key  (r) = Recorded By, (t) = Taken By, (c) = Cosigned By    Initials Name Provider Type     Tila Hammonds, PT Physical Therapist               Goals/Plan    No documentation.                Clinical Impression     Row Name 07/24/22 1147          Pain    Pretreatment Pain Rating 0/10 - no pain  -     Posttreatment Pain Rating 0/10 - no pain  -JH     Row Name 07/24/22 1147          Plan of Care Review    Plan of Care Reviewed With patient  -     Outcome Evaluation 83 yo male admitted with weakness, fatigue, hypotension.  Pt tested + for COVID 10 days prior.  Pt is from home with his spouse and still works, drives.  Pt reports feeling much better today.  Pt is up ad osmany to bathroom in [place] upon arrival and does well with all mobility.  No further therapy needs, safe for d/c home.  -[facility] 07/24/22 1147          Therapy Assessment/Plan (PT)    Criteria for Skilled Interventions Met (PT) no;does not meet criteria for skilled intervention  -     Therapy Frequency (PT) evaluation only  -JH     Row Name 07/24/22 1147          Vital Signs    Posttreatment Heart Rate (beats/min) 109  -     O2 Delivery Pre Treatment room air  -     O2 Delivery Intra Treatment room air  -     Post SpO2 (%) 99  -     O2 Delivery Post Treatment room air  -[facility] 07/24/22 1147           Positioning and Restraints    Pre-Treatment Position standing in room  -     Post Treatment Position bed  -     In Bed sitting EOB;with other staff  -           User Key  (r) = Recorded By, (t) = Taken By, (c) = Cosigned By    Initials Name Provider Type    Tila Shafer PT Physical Therapist               Outcome Measures    No documentation.                                PT Recommendation and Plan     Plan of Care Reviewed With: patient  Outcome Evaluation: 83 yo male admitted with weakness, fatigue, hypotension.  Pt tested + for COVID 10 days prior.  Pt is from home with his spouse and still works, drives.  Pt reports feeling much better today.  Pt is up ad osmany to bathroom in [place] upon arrival and does well with all mobility.  No further therapy needs, safe for d/c home.     Time Calculation:    PT Charges     Row Name 07/24/22 1150             Time Calculation    Start Time 1030  -      Stop Time 1040  -      Time Calculation (min) 10 min  -      PT Received On 07/24/22  -              Time Calculation- PT    Total Timed Code Minutes- PT 0 minute(s)  -            User Key  (r) = Recorded By, (t) = Taken By, (c) = Cosigned By    Initials Name Provider Type    Tila Shafer PT Physical Therapist              Therapy Charges for Today     Code Description Service Date Service Provider Modifiers Qty    [id number] HC PT EVAL LOW COMPLEXITY 2 7/24/2022 Tila Hammonds PT GP 1               Tila Hammonds PT  7/24/2022

## 2022-07-24 NOTE — OUTREACH NOTE
Prep Survey    Flowsheet Row Responses   [facility] patient discharged from? [facility]   Is LACE score < 7 ? No   Emergency Room discharge w/ pulse ox? No   Eligibility [facility]   Date of Admission 07/22/22   Date of Discharge 07/24/22   Discharge Disposition Home or Self Care   Discharge diagnosis Pt was diagnosed with COVID about 10 days prior to admission on 7/22/22, orthostatic hypotension,  ALMA,  Dehydration   Does the patient have one of the following disease processes/diagnoses(primary or secondary)? Other   Does the patient have Home health ordered? No   Is there a DME ordered? No   Prep survey completed? Yes          COREEN MALONE - Registered Nurse

## 2022-07-24 NOTE — CONSULTS
Referring Provider: Hospitalist  Reason for Consultation: Weakness    Patient Care Team:  Nuno Patton MD as PCP - General (Family Medicine)  Kailash Garsia MD as Consulting Physician (Cardiology)    Chief complaint next    Subjective .     History of present illness:  Cisco Frank is a 82 y.o. male with history of atrial fibrillation coronary disease hyperlipidemia presented to hospital with complaints of weakness and cough.  Patient had a COVID test about 10 days ago which was positive and he took antibiotics and steroids compromise primary care doctor.  No complains of any chest pains.  No shortness of breath but no palpitation dizziness syncope or swelling of the feet.  He is taking his medications regularly at home.  He was admitted the hospital ruled out for MI by EKG and enzymes.    Review of Systems   Constitutional: Positive for malaise/fatigue. Negative for fever.   HENT: Negative for ear pain and nosebleeds.    Eyes: Negative for blurred vision and double vision.   Cardiovascular: Negative for chest pain, dyspnea on exertion and palpitations.   Respiratory: Negative for cough and shortness of breath.    Skin: Negative for rash.   Musculoskeletal: Negative for joint pain.   Gastrointestinal: Negative for abdominal pain, nausea and vomiting.   Neurological: Negative for focal weakness and headaches.   Psychiatric/Behavioral: Negative for depression. The patient is not nervous/anxious.    All other systems reviewed and are negative.      History  Past Medical History:   Diagnosis Date   • Atrial fibrillation (HCC)    • Cancer (HCC)    • Coronary artery disease    • History of bladder cancer    • Hyperlipidemia        Past Surgical History:   Procedure Laterality Date   • BLADDER SURGERY  10/26/2019    [facility] Dr. Cope   • BRONCHOSCOPY N/A 6/30/2022    Procedure: BRONCHOSCOPY with bilateral lung wash;  Surgeon: Ayush Velarde MD;  Location: [facility] ENDOSCOPY;  Service: Pulmonary;  Laterality: N/A;  normal  bronch   • CARDIAC CATHETERIZATION     • COLONOSCOPY     • COLONOSCOPY W/ POLYPECTOMY  2021   • SKIN CANCER EXCISION      right temple BX   • UPPER ENDOSCOPIC ULTRASOUND W/ FNA N/A 3/12/2021    Procedure: ESOPHAGOGASTRODUODENOSCOPY WITH endoscopic mucosal resection, biopsy x 1 area, and endoscopic ULTRASOUND;  Surgeon: Abner Infante MD;  Location: [facility] ENDOSCOPY;  Service: Gastroenterology;  Laterality: N/A;  post op: hiatal hernia, duodenal polyp       Family History   Problem Relation Age of Onset   • Heart disease Mother    • Heart attack Father    • No Known Problems Sister    • No Known Problems Brother    • No Known Problems Maternal Aunt    • No Known Problems Maternal Uncle    • No Known Problems Paternal Aunt    • No Known Problems Paternal Uncle    • No Known Problems Maternal Grandmother    • No Known Problems Maternal Grandfather    • No Known Problems Paternal Grandmother    • No Known Problems Paternal Grandfather    • No Known Problems Other    • Anemia Neg Hx    • Arrhythmia Neg Hx    • Asthma Neg Hx    • Clotting disorder Neg Hx    • Fainting Neg Hx    • Heart failure Neg Hx    • Hyperlipidemia Neg Hx    • Hypertension Neg Hx        Social History     Tobacco Use   • Smoking status: Former Smoker     Quit date: 1980     Years since quittin.5   • Smokeless tobacco: Never Used   Vaping Use   • Vaping Use: Never used   Substance Use Topics   • Alcohol use: Not Currently   • Drug use: Never        No medications prior to admission.         Patient has no known allergies.    Scheduled Meds:atenolol, 25 mg, Oral, Daily  atorvastatin, 40 mg, Oral, Daily  guaiFENesin, 600 mg, Oral, Q12H  insulin lispro, 0-9 Units, Subcutaneous, TID AC  ipratropium-albuterol, 3 mL, Nebulization, 4x Daily - RT  pantoprazole, 40 mg, Oral, QAM  rivaroxaban, 20 mg, Oral, Daily With Dinner  sodium chloride, 10 mL, Intravenous, Q12H  sodium chloride, 10 mL, Intravenous, Q12H      Continuous Infusions:   PRN  "Meds:.•  acetaminophen  •  albuterol  •  polyethylene glycol **AND** bisacodyl **AND** bisacodyl  •  dextrose  •  dextrose  •  glucagon (human recombinant)  •  insulin lispro **AND** insulin lispro  •  melatonin  •  ondansetron **OR** ondansetron  •  [COMPLETED] Insert peripheral IV **AND** sodium chloride  •  sodium chloride  •  sodium chloride    Objective     VITAL SIGNS  Vitals:    07/24/22 0525 07/24/22 0637 07/24/22 1038 07/24/22 1133   BP: 110/68  142/90    BP Location: Right arm  Right arm    Patient Position: Lying  Sitting    Pulse: 109 110 109 103   Resp: 18  15    Temp: 97.5 °F (36.4 °C)  97.4 °F (36.3 °C)    TempSrc: Oral  Oral    SpO2: 99%  91%    Weight: 86.1 kg (189 lb 13.1 oz)      Height:           Flowsheet Rows    Flowsheet Row First Filed Value   Admission Height 175.3 cm (69\") Documented at 07/22/2022 1922   Admission Weight 86.1 kg (189 lb 13.1 oz) Documented at 07/22/2022 1922           TELEMETRY: Atrial fibrillation with controlled ventricular response    Physical Exam:  Constitutional:       Appearance: Well-developed.   Eyes:      General: No scleral icterus.     Conjunctiva/sclera: Conjunctivae normal.   HENT:      Head: Normocephalic and atraumatic.   Neck:      Vascular: No carotid bruit or JVD.   Pulmonary:      Effort: Pulmonary effort is normal.      Breath sounds: Normal breath sounds. No wheezing. No rales.   Cardiovascular:      Normal rate. Regular rhythm.   Pulses:     Intact distal pulses.   Abdominal:      General: Bowel sounds are normal.      Palpations: Abdomen is soft.   Musculoskeletal:      Cervical back: Normal range of motion and neck supple. Skin:     General: Skin is warm and dry.      Findings: No rash.   Neurological:      Mental Status: Alert.          Results Review:   I reviewed the patient's new clinical results.  Lab Results (last 24 hours)     Procedure Component Value Units Date/Time    POC Glucose Once [[id number]]  (Abnormal) Collected: 07/24/22 0738    " Specimen: Blood Updated: 07/24/22 0739     Glucose 308 mg/dL      Comment: Serial Number: [id number]:  [id number]       Basic Metabolic Panel [[id number]]  (Abnormal) Collected: 07/24/22 0433    Specimen: Blood Updated: 07/24/22 0523     Glucose 276 mg/dL      BUN 31 mg/dL      Creatinine 1.16 mg/dL      Sodium 133 mmol/L      Potassium 4.8 mmol/L      Chloride 101 mmol/L      CO2 22.0 mmol/L      Calcium 9.0 mg/dL      BUN/Creatinine Ratio 26.7     Anion Gap 10.0 mmol/L      eGFR 63.7 mL/min/1.73      Comment: National Kidney Foundation and American Society of Nephrology (ASN) Task Force recommended calculation based on the Chronic Kidney Disease Epidemiology Collaboration (CKD-EPI) equation refit without adjustment for race.       Narrative:      GFR Normal >60  Chronic Kidney Disease <60  Kidney Failure <15      CBC & Differential [[id number]]  (Abnormal) Collected: 07/24/22 0433    Specimen: Blood Updated: 07/24/22 0509    Narrative:      The following orders were created for panel order CBC & Differential.  Procedure                               Abnormality         Status                     ---------                               -----------         ------                     CBC Auto Differential[[id number]]        Abnormal            Final result                 Please view results for these tests on the individual orders.    CBC Auto Differential [[id number]]  (Abnormal) Collected: 07/24/22 0433    Specimen: Blood Updated: 07/24/22 0509     WBC 9.50 10*3/mm3      RBC 4.41 10*6/mm3      Hemoglobin 13.3 g/dL      Hematocrit 38.2 %      MCV 86.5 fL      MCH 30.1 pg      MCHC 34.8 g/dL      RDW 14.7 %      RDW-SD 45.1 fl      MPV 7.6 fL      Platelets 150 10*3/mm3      Neutrophil % 94.2 %      Lymphocyte % 4.3 %      Monocyte % 1.3 %      Eosinophil % 0.1 %      Basophil % 0.1 %      Neutrophils, Absolute 8.90 10*3/mm3      Lymphocytes, Absolute 0.40 10*3/mm3      Monocytes, Absolute 0.10 10*3/mm3       Eosinophils, Absolute 0.00 10*3/mm3      Basophils, Absolute 0.00 10*3/mm3      nRBC 0.0 /100 WBC     POC Glucose Once [[id number]]  (Abnormal) Collected: 07/24/22 0009    Specimen: Blood Updated: 07/24/22 0011     Glucose 268 mg/dL      Comment: Serial Number: [id number]:  [id number]       Comprehensive Metabolic Panel [[id number]]  (Abnormal) Collected: 07/23/22 2122    Specimen: Blood Updated: 07/23/22 2159     Glucose 429 mg/dL      BUN 35 mg/dL      Creatinine 1.53 mg/dL      Sodium 129 mmol/L      Potassium 5.1 mmol/L      Chloride 95 mmol/L      CO2 23.0 mmol/L      Calcium 8.6 mg/dL      Total Protein 6.3 g/dL      Albumin 3.40 g/dL      ALT (SGPT) 16 U/L      AST (SGOT) 8 U/L      Alkaline Phosphatase 118 U/L      Total Bilirubin 0.7 mg/dL      Globulin 2.9 gm/dL      A/G Ratio 1.2 g/dL      BUN/Creatinine Ratio 22.9     Anion Gap 11.0 mmol/L      eGFR 45.7 mL/min/1.73      Comment: National Kidney Foundation and American Society of Nephrology (ASN) Task Force recommended calculation based on the Chronic Kidney Disease Epidemiology Collaboration (CKD-EPI) equation refit without adjustment for race.       Narrative:      GFR Normal >60  Chronic Kidney Disease <60  Kidney Failure <15      POC Glucose Once [[id number]]  (Abnormal) Collected: 07/23/22 2111    Specimen: Blood Updated: 07/23/22 2112     Glucose 415 mg/dL      Comment: Serial Number: [id number]:  [id number]       POC Glucose Once [[id number]]  (Abnormal) Collected: 07/23/22 1931    Specimen: Blood Updated: 07/23/22 1937     Glucose 361 mg/dL      Comment: Serial Number: [id number]:  [id number]       Gastrointestinal Panel, PCR - Stool, Per Rectum [[id number]]  (Normal) Collected: 07/23/22 1506    Specimen: Stool from Per Rectum Updated: 07/23/22 1644     Campylobacter Not Detected     Plesiomonas shigelloides Not Detected     Salmonella Not Detected     Vibrio Not Detected     Vibrio cholerae Not Detected     Yersinia enterocolitica  Not Detected     Enteroaggregative E. coli (EAEC) Not Detected     Enteropathogenic E. coli (EPEC) Not Detected     Enterotoxigenic E. coli (ETEC) lt/st Not Detected     Shiga-like toxin-producing E. coli (STEC) stx1/stx2 Not Detected     Shigella/Enteroinvasive E. coli (EIEC) Not Detected     Cryptosporidium Not Detected     Cyclospora cayetanensis Not Detected     Entamoeba histolytica Not Detected     Giardia lamblia Not Detected     Adenovirus F40/41 Not Detected     Astrovirus Not Detected     Norovirus GI/GII Not Detected     Rotavirus A Not Detected     Sapovirus (I, II, IV or V) Not Detected    Narrative:      If Aeromonas, Staphylococcus aureus or Bacillus cereus are suspected, please order [id number]: Stool Culture, Aeromonas, S aureus, B Cereus.    POC Glucose Once [[id number]]  (Abnormal) Collected: 07/23/22 1639    Specimen: Blood Updated: 07/23/22 1644     Glucose 419 mg/dL      Comment: Serial Number: [id number]:  [id number]       Troponin [[id number]]  (Normal) Collected: 07/23/22 1043    Specimen: Blood Updated: 07/23/22 1206     Troponin T <0.010 ng/mL     Narrative:      Troponin T Reference Range:  <= 0.03 ng/mL-   Negative for AMI  >0.03 ng/mL-     Abnormal for myocardial necrosis.  Clinicians would have to utilize clinical acumen, EKG, Troponin and serial changes to determine if it is an Acute Myocardial Infarction or myocardial injury due to an underlying chronic condition.       Results may be falsely decreased if patient taking Biotin.            Imaging Results (Last 24 Hours)     ** No results found for the last 24 hours. **          EKG      I personally viewed and interpreted the patient's EKG/Telemetry data:    ECHOCARDIOGRAM:      STRESS MYOVIEW:    CARDIAC CATHETERIZATION:    OTHER:         Assessment & Plan     Active Problems:    COVID-19    Acute kidney injury (HCC)    Dehydration    Orthostatic hypotension  History of atrial fibrillation  Diabetes    Patient presented with  fatigue and tiredness and had COVID infection in the past but he already finished his therapy  Patient has history of atrial fibrillation and is currently on atenolol and Xarelto for anticoagulation and stable  Patient sugar levels and lipid levels are followed with a primary care doctor  Patient has acute renal insufficiency which could be secondary to dehydration and he was given IV fluids and is stable now and will be followed as an outpatient.    I discussed the patients findings and my recommendations with patient and nurse    Kailash Garsia MD  07/24/22  12:44 EDT

## 2022-07-25 ENCOUNTER — TELEPHONE (OUTPATIENT)
Dept: FAMILY MEDICINE CLINIC | Facility: CLINIC | Age: 82
End: 2022-07-25

## 2022-07-25 ENCOUNTER — TRANSITIONAL CARE MANAGEMENT TELEPHONE ENCOUNTER (OUTPATIENT)
Dept: CALL CENTER | Facility: HOSPITAL | Age: 82
End: 2022-07-25

## 2022-07-25 LAB
MAXIMAL PREDICTED HEART RATE: 140 BPM
STRESS TARGET HR: 119 BPM

## 2022-07-25 NOTE — TELEPHONE ENCOUNTER
Gave message to Christine at 2:48pm and rescheduled the hospital follow up appt with PMJ for 8/11/2022 at 8am.

## 2022-07-25 NOTE — OUTREACH NOTE
Call Center TCM Note    Flowsheet Row Responses   [facility] patient discharged from? [facility]   Does the patient have one of the following disease processes/diagnoses(primary or secondary)? Other   TCM attempt successful? Yes   Call start time 1124   Call end time 1128   Discharge diagnosis Pt was diagnosed with COVID about 10 days prior to admission on 7/22/22, orthostatic hypotension,  ALMA,  Dehydration   Is patient permission given to speak with other caregiver? Yes   List who call center can speak with spouse- Christine   Person spoke with today (if not patient) and relationship spouse   Meds reviewed with patient/caregiver? Yes   Is the patient having any side effects they believe may be caused by any medication additions or changes? No   Does the patient have all medications ordered at discharge? Yes   Is the patient taking all medications as directed (includes completed medication regime)? Yes   Does the patient have a primary care provider?  Yes   Does the patient have an appointment with their PCP within 7 days of discharge? Greater than 7 days   Comments regarding PCP hospital f/u with PCP on 8/1/2022 with Dr Patton.    What is preventing the patient from scheduling follow up appointments within 7 days of discharge? Earlier appointment not available   Nursing Interventions Verified appointment date/time/provider   Has the patient kept scheduled appointments due by today? N/A   Has home health visited the patient within 72 hours of discharge? N/A   Psychosocial issues? No   Did the patient receive a copy of their discharge instructions? Yes   Nursing interventions Reviewed instructions with patient   What is the patient's perception of their health status since discharge? Improving   Is the patient/caregiver able to teach back signs and symptoms related to disease process for when to call PCP? Yes   Is the patient/caregiver able to teach back signs and symptoms related to disease process for when to call  911? Yes   Is the patient/caregiver able to teach back the hierarchy of who to call/visit for symptoms/problems? PCP, Specialist, Home health nurse, Urgent Care, ED, 911 Yes   If the patient is a current smoker, are they able to teach back resources for cessation? Not a smoker   TCM call completed? Yes   Wrap up additional comments Per spouse, patient is doing well, will see PCP on 8/1/2022. Patient is doing well. He returned to work today. He is out of quarantine           Nicolás Cleary RN    7/25/2022, 11:28 EDT

## 2022-07-25 NOTE — TELEPHONE ENCOUNTER
Gave message to Christine at 10:28am and scheduled patient a hospital follow up appt with PMJ on 8/1/2022 at 10:30am.

## 2022-07-25 NOTE — TELEPHONE ENCOUNTER
Caller: ALBIN ELIAS    Relationship to patient: Emergency Contact    Best call back number: [phone]    Chief complaint: FOLLOW UP ON FATIGUE, DEHYDRATION, ACUTE KIDNEY INJURY     Type of visit: HOSPITAL FOLLOW UP     Requested date: DISCHARGED FROM [facility] ON 07/24/22     If rescheduling, when is the original appointment: N/A     Additional notes:PATIENT'S WIFE CALLED AND SAID HE WAS ADMITTED TO THE HOSPITAL OVER THE WEEKEND AND WAS DISCHARGED ON Sunday      HE NEEDS TO SCHEDULE A HOSPITAL FOLLOW UP

## 2022-07-25 NOTE — CASE MANAGEMENT/SOCIAL WORK
Case Management Discharge Note      Final Note: Home     Discharged prior to CM assessment.     Selected Continued Care - Discharged on 7/24/2022 Admission date: 7/22/2022 - Discharge disposition: Home or Self Care            Transportation Services  Private: Car    Final Discharge Disposition Code: 01 - home or self-care

## 2022-07-25 NOTE — TELEPHONE ENCOUNTER
Hub staff attempted to follow warm transfer process and was unsuccessful     Caller: ALBIN ELIAS    Relationship to patient: Emergency Contact    Best call back number: [phone]    Patient is needing: NEEDS A DIFFERENT DAY FOR HIS HOSPITAL FOLLOW UP, STATED MONDAYS ARE TOO BUSY FOR HIM, WILL NEED A Wednesday, Thursday OR Friday, HE IS OFF ON Friday

## 2022-08-03 ENCOUNTER — READMISSION MANAGEMENT (OUTPATIENT)
Dept: CALL CENTER | Facility: HOSPITAL | Age: 82
End: 2022-08-03

## 2022-08-03 NOTE — OUTREACH NOTE
Medical Week 2 Survey    Flowsheet Row Responses   [facility] [place] facility patient discharged from? [facility]   Does the patient have one of the following disease processes/diagnoses(primary or secondary)? Other   Week 2 attempt successful? No   Unsuccessful attempts Attempt 1          GILMA FRANCO - Registered Nurse

## 2022-08-04 LAB — QT INTERVAL: 328 MS

## 2022-08-11 ENCOUNTER — OFFICE VISIT (OUTPATIENT)
Dept: FAMILY MEDICINE CLINIC | Facility: CLINIC | Age: 82
End: 2022-08-11

## 2022-08-11 VITALS
HEART RATE: 126 BPM | BODY MASS INDEX: 29.62 KG/M2 | HEIGHT: 69 IN | DIASTOLIC BLOOD PRESSURE: 84 MMHG | RESPIRATION RATE: 16 BRPM | OXYGEN SATURATION: 98 % | SYSTOLIC BLOOD PRESSURE: 120 MMHG | WEIGHT: 200 LBS

## 2022-08-11 DIAGNOSIS — E11.65 TYPE 2 DIABETES MELLITUS WITH HYPERGLYCEMIA, WITHOUT LONG-TERM CURRENT USE OF INSULIN: Chronic | ICD-10-CM

## 2022-08-11 DIAGNOSIS — N17.9 ACUTE KIDNEY INJURY: ICD-10-CM

## 2022-08-11 DIAGNOSIS — U07.1 COVID-19: Primary | ICD-10-CM

## 2022-08-11 LAB
MYCOBACTERIUM SPEC CULT: NORMAL
NIGHT BLUE STAIN TISS: NORMAL

## 2022-08-11 PROCEDURE — 99213 OFFICE O/P EST LOW 20 MIN: CPT | Performed by: FAMILY MEDICINE

## 2022-08-11 RX ORDER — ATORVASTATIN CALCIUM 40 MG/1
1 TABLET, FILM COATED ORAL DAILY
COMMUNITY
Start: 2022-07-15 | End: 2022-08-11 | Stop reason: SDUPTHER

## 2022-08-11 NOTE — PROGRESS NOTES
"Chief Complaint  Hypotension    Subjective      Cisco Frank presents to [facility]  For a follow up on from hospital stay in late July with covid. He could not come in for a TCM too busy with work.    The patient presents today for a hospital follow-up. He is accompanied by his wife.    The patient states that he has been in the hospital 3 times since his last visit. He notes that he has been doing well since his last discharge. The adult female states that the patient went to immediate care and was told to go to the emergency room. The patient states that he was diagnosed with COVID-19 and dehydration. He notes that he had a sore throat and a productive cough when he had COVID-19. The patient states that he is no longer taking prednisone. He notes that he is no longer using the nasal spray.    The patient states that his blood glucose levels have been elevated. He notes that his blood glucose was 146 mg/dL this morning. The patient states that he is taking glimepiride 2 mg, 2 tablets twice daily. He notes that he is also taking Tradjenta 5 mg daily at noon.  The patient states that he was on Rybelsus in the past, but he did not do well with it.    The patient states that he is not sleeping as much as he used to. He notes that he is still taking Xarelto.     The patient states that he feels good. He denies shortness of breath.    Objective   Vital Signs:  /84 (BP Location: Left arm)   Pulse (!) 126   Resp 16   Ht 175.3 cm (69\")   Wt 90.7 kg (200 lb)   SpO2 98%   BMI 29.53 kg/m²   Estimated body mass index is 29.53 kg/m² as calculated from the following:    Height as of this encounter: 175.3 cm (69\").    Weight as of this encounter: 90.7 kg (200 lb).    BMI is >= 25 and <30. (Overweight) The following options were offered after discussion;: exercise counseling/recommendations      Physical Exam  Vitals reviewed.   Constitutional:       Appearance: Normal appearance. He " is well-developed and normal weight.   HENT:      Head: Normocephalic and atraumatic.      Right Ear: Tympanic membrane, ear canal and external ear normal.      Left Ear: Tympanic membrane, ear canal and external ear normal.      Nose: Nose normal.      Mouth/Throat:      Mouth: Mucous membranes are moist.      Pharynx: Oropharynx is clear. No oropharyngeal exudate.   Eyes:      Extraocular Movements: Extraocular movements intact.      Conjunctiva/sclera: Conjunctivae normal.      Pupils: Pupils are equal, round, and reactive to light.   Cardiovascular:      Rate and Rhythm: Normal rate and regular rhythm.      Pulses: Normal pulses.      Heart sounds: Normal heart sounds.   Pulmonary:      Effort: Pulmonary effort is normal.      Breath sounds: Normal breath sounds.   Abdominal:      General: Bowel sounds are normal.      Palpations: Abdomen is soft.   Musculoskeletal:         General: Normal range of motion.      Cervical back: Normal range of motion and neck supple.   Skin:     General: Skin is warm and dry.   Neurological:      General: No focal deficit present.      Mental Status: He is alert and oriented to person, place, and time. Mental status is at baseline.   Psychiatric:         Mood and Affect: Mood normal.         Behavior: Behavior normal.         Thought Content: Thought content normal.         Judgment: Judgment normal.      Results Review:   I reviewed the patient's new clinical results.         Assessment and Plan     1  Recent Covid  - Patient was recently hospitalized for COVID-19.  He was in the hospital about 3 days.  He was treated for dehydration which the virus had caused to occur.  Once that was completed he felt better and was able to go home then for further rehab.  He is doing well now.    2. Acute kidney injury  - The patient's kidney function has improved.  When he was dehydrated he had acute on chronic kidney failure.  With rehydration his kidneys began to work well again.  They are  now back to their baseline normal level    2. Diabetes mellitus  -  We will add Jardiance to his regimen. He will continue his current medications. He will bring his blood glucose readings to his next visit.   Recent COVID through his sugars off along with causing him to be dehydrated.  He is getting his sugars back under control now and we will add Jardiance into his regiment.  I think he is going to do fine.  He is already back to work and feels much better.    He will follow up in 8 weeks.       I spent 21 minutes caring for Cisco on this date of service. This time includes time spent by me in the following activities:reviewing tests, obtaining and/or reviewing a separately obtained history, performing a medically appropriate examination and/or evaluation , counseling and educating the patient/family/caregiver, ordering medications, tests, or procedures, documenting information in the medical record and independently interpreting results and communicating that information with the patient/family/caregiver     Follow Up     Return in about 8 weeks (around 10/6/2022) for Recheck.  Patient was given instructions and counseling regarding his condition or for health maintenance advice. Please see specific information pulled into the AVS if appropriate.       Transcribed from ambient dictation for Nuno Patton MD by Shae Hirsch.  08/11/22   09:15 EDT    Patient verbalized consent to the visit recording.  I have personally performed the services described in this document as transcribed by the above individual, and it is both accurate and complete.  Nuno Patton MD  8/21/2022  08:18 EDT

## 2022-08-12 NOTE — TELEPHONE ENCOUNTER
Rx Refill Note  Requested Prescriptions     Pending Prescriptions Disp Refills   • rivaroxaban (XARELTO) 20 MG tablet 90 tablet 3     Sig: Take 1 tablet by mouth Daily With Dinner for 30 days. Indications: Atrial Fibrillation      Last office visit with prescribing clinician: 7/13/2022      Next office visit with prescribing clinician: 8/31/2022            Marlena Chris MA  08/12/22, 10:59 EDT

## 2022-08-12 NOTE — TELEPHONE ENCOUNTER
Medication requested (name and dose): XARELTO 20MG    [facility]     Additional details provided by patient: NONE  Best call back number: [phone]    Does the patient have less than a 3 day supply:  [] Yes  [] No    Gris Girard Rep  08/12/22, 10:49 EDT

## 2022-08-25 ENCOUNTER — OFFICE VISIT (OUTPATIENT)
Dept: CARDIOLOGY | Facility: CLINIC | Age: 82
End: 2022-08-25

## 2022-08-25 VITALS
HEIGHT: 69 IN | DIASTOLIC BLOOD PRESSURE: 73 MMHG | SYSTOLIC BLOOD PRESSURE: 133 MMHG | BODY MASS INDEX: 28.88 KG/M2 | WEIGHT: 195 LBS | HEART RATE: 89 BPM | OXYGEN SATURATION: 97 %

## 2022-08-25 DIAGNOSIS — I10 ESSENTIAL HYPERTENSION: Primary | ICD-10-CM

## 2022-08-25 DIAGNOSIS — I25.10 CORONARY ARTERY DISEASE INVOLVING NATIVE CORONARY ARTERY OF NATIVE HEART WITHOUT ANGINA PECTORIS: ICD-10-CM

## 2022-08-25 DIAGNOSIS — I48.0 PAROXYSMAL ATRIAL FIBRILLATION: ICD-10-CM

## 2022-08-25 DIAGNOSIS — E78.2 MIXED HYPERLIPIDEMIA: ICD-10-CM

## 2022-08-25 DIAGNOSIS — E11.65 TYPE 2 DIABETES MELLITUS WITH HYPERGLYCEMIA, WITHOUT LONG-TERM CURRENT USE OF INSULIN: ICD-10-CM

## 2022-08-25 PROCEDURE — 99214 OFFICE O/P EST MOD 30 MIN: CPT | Performed by: INTERNAL MEDICINE

## 2022-08-25 RX ORDER — ATENOLOL 25 MG/1
25 TABLET ORAL DAILY
Qty: 90 TABLET | Refills: 3 | Status: SHIPPED | OUTPATIENT
Start: 2022-08-25 | End: 2023-03-09 | Stop reason: SDUPTHER

## 2022-08-25 NOTE — PROGRESS NOTES
"    Subjective:     Encounter Date:08/25/2022      Patient ID: Cisco Frank is a 82 y.o. male.    Chief Complaint:  History of Present Illness 82-year-old white male with history of coronary disease hypertension hyperlipidemia and paroxysmal fibrillation presents to my office for follow-up.  Patient is currently stable without any symptoms of chest pain or shortness of breath at rest or exertion.  No complains any PND orthopnea.  No palpitation dizziness syncope or swelling of the feet.  Patient has been taking all medicines regularly.  Patient does not smoke.    The following portions of the patient's history were reviewed and updated as appropriate: allergies, current medications, past family history, past medical history, past social history, past surgical history and problem list.  Past Medical History:   Diagnosis Date   • Atrial fibrillation (HCC)    • Cancer (HCC)    • Coronary artery disease    • History of bladder cancer    • Hyperlipidemia      Past Surgical History:   Procedure Laterality Date   • BLADDER SURGERY  10/26/2019    [facility] Dr. Cope   • BRONCHOSCOPY N/A 6/30/2022    Procedure: BRONCHOSCOPY with bilateral lung wash;  Surgeon: Ayush Velarde MD;  Location: [facility] ENDOSCOPY;  Service: Pulmonary;  Laterality: N/A;  normal bronch   • CARDIAC CATHETERIZATION     • COLONOSCOPY     • COLONOSCOPY W/ POLYPECTOMY  02/23/2021   • SKIN CANCER EXCISION      right temple BX   • UPPER ENDOSCOPIC ULTRASOUND W/ FNA N/A 3/12/2021    Procedure: ESOPHAGOGASTRODUODENOSCOPY WITH endoscopic mucosal resection, biopsy x 1 area, and endoscopic ULTRASOUND;  Surgeon: Abner Infante MD;  Location: [facility] ENDOSCOPY;  Service: Gastroenterology;  Laterality: N/A;  post op: hiatal hernia, duodenal polyp     /73 (BP Location: Left arm, Patient Position: Sitting)   Pulse 89   Ht 175.3 cm (69\")   Wt 88.5 kg (195 lb)   SpO2 97%   BMI 28.80 kg/m²   Family History   Problem Relation Age of Onset   • Heart disease Mother  "   • Heart attack Father    • No Known Problems Sister    • No Known Problems Brother    • No Known Problems Maternal Aunt    • No Known Problems Maternal Uncle    • No Known Problems Paternal Aunt    • No Known Problems Paternal Uncle    • No Known Problems Maternal Grandmother    • No Known Problems Maternal Grandfather    • No Known Problems Paternal Grandmother    • No Known Problems Paternal Grandfather    • No Known Problems Other    • Anemia Neg Hx    • Arrhythmia Neg Hx    • Asthma Neg Hx    • Clotting disorder Neg Hx    • Fainting Neg Hx    • Heart failure Neg Hx    • Hyperlipidemia Neg Hx    • Hypertension Neg Hx        Current Outpatient Medications:   •  atenolol (TENORMIN) 25 MG tablet, Take 1 tablet by mouth Daily., Disp: 90 tablet, Rfl: 3  •  atorvastatin (LIPITOR) 40 MG tablet, Take 1 tablet by mouth every night at bedtime., Disp: 90 tablet, Rfl: 3  •  colestipol (COLESTID) 1 g tablet, Take 1 g by mouth 2 (Two) Times a Day., Disp: , Rfl:   •  glimepiride (AMARYL) 2 MG tablet, TAKE 3 TABLETS EVERY MORNING BEFORE BREAKFAST, Disp: 270 tablet, Rfl: 2  •  linagliptin (TRADJENTA) 5 MG tablet tablet, Take 1 tablet by mouth Daily., Disp: , Rfl:   •  omeprazole-sodium bicarbonate (ZEGERID)  MG per capsule, Take 1 capsule by mouth Daily. prn, Disp: , Rfl:   •  rivaroxaban (XARELTO) 20 MG tablet, Take 1 tablet by mouth Daily With Dinner for 30 days. Indications: Atrial Fibrillation, Disp: 90 tablet, Rfl: 3  •  vitamin C (ASCORBIC ACID) 500 MG tablet, Take 500 mg by mouth Daily., Disp: , Rfl:   No Known Allergies  Social History     Socioeconomic History   • Marital status:    Tobacco Use   • Smoking status: Former Smoker     Quit date: 1980     Years since quittin.6   • Smokeless tobacco: Never Used   Vaping Use   • Vaping Use: Never used   Substance and Sexual Activity   • Alcohol use: Not Currently   • Drug use: Never   • Sexual activity: Defer     Review of Systems   Constitutional:  Negative for fever and malaise/fatigue.   Cardiovascular: Negative for chest pain, dyspnea on exertion and palpitations.   Respiratory: Negative for cough and shortness of breath.    Skin: Negative for rash.   Gastrointestinal: Negative for abdominal pain, nausea and vomiting.   Neurological: Negative for focal weakness and headaches.   All other systems reviewed and are negative.             Objective:     Constitutional:       Appearance: Well-developed.   Eyes:      General: No scleral icterus.     Conjunctiva/sclera: Conjunctivae normal.   HENT:      Head: Normocephalic and atraumatic.   Neck:      Vascular: No carotid bruit or JVD.   Pulmonary:      Effort: Pulmonary effort is normal.      Breath sounds: Normal breath sounds. No wheezing. No rales.   Cardiovascular:      Normal rate. Regular rhythm.   Pulses:     Intact distal pulses.   Abdominal:      General: Bowel sounds are normal.      Palpations: Abdomen is soft.   Musculoskeletal:      Cervical back: Normal range of motion and neck supple. Skin:     General: Skin is warm and dry.      Findings: No rash.   Neurological:      Mental Status: Alert.       Procedures    Lab Review:         MDM  1.  Coronary disease  Patient has nonobstructive disease now and is currently stable on medications with normal V systolic function  2.  Hypertension  Patient blood pressure currently stable on atenolol  3.  Diabetes  Patient is on oral medicines and followed by the primary care doctor  4.  Hyperlipidemia  Patient is on atorvastatin the lipid levels are well within normal limits  5.  Atrial fibrillation  Patient has paroxysmal atrial fibrillation and is currently stable on medications including Xarelto      Patient's previous medical records, labs, and EKG were reviewed and discussed with the patient at today's visit.

## 2022-09-22 RX ORDER — GLIMEPIRIDE 2 MG/1
TABLET ORAL
Qty: 270 TABLET | Refills: 2 | Status: SHIPPED | OUTPATIENT
Start: 2022-09-22

## 2022-10-12 ENCOUNTER — OFFICE VISIT (OUTPATIENT)
Dept: FAMILY MEDICINE CLINIC | Facility: CLINIC | Age: 82
End: 2022-10-12

## 2022-10-12 VITALS
SYSTOLIC BLOOD PRESSURE: 106 MMHG | BODY MASS INDEX: 28.73 KG/M2 | RESPIRATION RATE: 16 BRPM | HEIGHT: 69 IN | WEIGHT: 194 LBS | DIASTOLIC BLOOD PRESSURE: 80 MMHG | OXYGEN SATURATION: 98 % | HEART RATE: 88 BPM

## 2022-10-12 DIAGNOSIS — I25.10 CORONARY ARTERY DISEASE INVOLVING NATIVE CORONARY ARTERY OF NATIVE HEART WITHOUT ANGINA PECTORIS: Chronic | ICD-10-CM

## 2022-10-12 DIAGNOSIS — I48.21 PERMANENT ATRIAL FIBRILLATION: Primary | Chronic | ICD-10-CM

## 2022-10-12 DIAGNOSIS — E11.65 TYPE 2 DIABETES MELLITUS WITH HYPERGLYCEMIA, WITHOUT LONG-TERM CURRENT USE OF INSULIN: Chronic | ICD-10-CM

## 2022-10-12 PROCEDURE — 99214 OFFICE O/P EST MOD 30 MIN: CPT | Performed by: FAMILY MEDICINE

## 2022-10-12 NOTE — PROGRESS NOTES
"Chief Complaint  Diabetes    Subjective      Cisco Frank presents to [facility]  History of Present Illness  For follow up on DM. He has been taking Jadiance 10mg. When he saw [facility] took it out of med list they are not sure why,but he is still taking it.  Diabetes    The patient presents today for a follow-up. He is accompanied by his wife.    The patient's wife states that the patient was recently started on Jardiance. He denies any side effects from the medication. The patient states that his blood glucose levels have been doing well. He notes that he has been trying to monitor his diet. The patient's wife states that he is still taking his blood thinner and glimepiride.    The patient states that he is feeling well. He notes that he walks every day and he is very active. The patient's wife states that his breathing has improved since he had COVID-19.    The patient states that his appetite is good.    The patient states that he has an appointment with his surgeon on 10/18/2022 regarding his ostomy. He notes that his bowel movements have been black. He states that he used a dilator and his stool was larger in size. The patient denies taking Pepto-Bismol or iron tablets.    The patient has received his influenza and COVID-19 vaccines. He has also received the Prevnar 13 vaccine in 2020 and Pneumovax 23 in 2013.    Objective   Vital Signs:  /80   Pulse 88   Resp 16   Ht 175.3 cm (69\")   Wt 88 kg (194 lb)   SpO2 98%   BMI 28.65 kg/m²   Estimated body mass index is 28.65 kg/m² as calculated from the following:    Height as of this encounter: 175.3 cm (69\").    Weight as of this encounter: 88 kg (194 lb).    BMI is >= 25 and <30. (Overweight) The following options were offered after discussion;: exercise counseling/recommendations      Physical Exam  Constitutional:       Appearance: Normal appearance. He is well-developed and normal weight.   HENT:      Head: " Normocephalic and atraumatic.      Right Ear: Tympanic membrane, ear canal and external ear normal.      Left Ear: Tympanic membrane, ear canal and external ear normal.      Nose: Nose normal.      Mouth/Throat:      Mouth: Mucous membranes are moist.      Pharynx: Oropharynx is clear. No oropharyngeal exudate.   Eyes:      Extraocular Movements: Extraocular movements intact.      Conjunctiva/sclera: Conjunctivae normal.      Pupils: Pupils are equal, round, and reactive to light.   Cardiovascular:      Rate and Rhythm: Normal rate and regular rhythm.      Pulses: Normal pulses.      Heart sounds: Normal heart sounds.   Pulmonary:      Effort: Pulmonary effort is normal.      Breath sounds: Normal breath sounds.   Abdominal:      General: Bowel sounds are normal.      Palpations: Abdomen is soft.   Musculoskeletal:         General: Normal range of motion.      Cervical back: Normal range of motion and neck supple.   Skin:     General: Skin is warm and dry.   Neurological:      General: No focal deficit present.      Mental Status: He is alert and oriented to person, place, and time. Mental status is at baseline.   Psychiatric:         Mood and Affect: Mood normal.         Behavior: Behavior normal.         Thought Content: Thought content normal.         Judgment: Judgment normal.        Assessment and Plan     1. Permanent atrial fibrillation  - The patient came in today to bring me some blood pressure readings and some blood sugar readings. Blood pressure has been running excellent with averaging about 105 to 125 mmHg systolic and diastolics are in the 70s and 80s mmHg. He is in permanent atrial fibrillation, but his rate is well controlled. Blood pressure is excellent. He feels well. He does not really notice when he is in the atrial fibrillation, although his wife states that he does pant when he breathes, especially when he exerts himself like steps or does something physically exertional. 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 Overall, he feels well. He gets out, walks every day and they enjoy an active lifestyle.    2. Coronary artery disease involving native coronary arteries  - He is not having any angina. He feels well and will continue his current medications.    3. Type 2 diabetes with hyperglycemia  - He brought in some blood glucose readings and they seem to be getting better and better. His last A1c was 8.2 percent and that was down over the past year from 9 percent. I am going to have him repeat this in 12/2022, so he is going to come in and do an A1c in 12/2022 and we will see him at that time. I really think he is doing excellent in this area considering his age and activity level. He is going to stay on the Jardiance, the Amaryl, and the Tradjenta. He takes Xarelto for the atrial fibrillation prophylaxis. I will see him back for a physical in maybe 04/2023. He will be coming in in 12/2022 for an A1c.       I spent 35 minutes caring for Cisco on this date of service. This time includes time spent by me in the following activities:preparing for the visit, reviewing tests, obtaining and/or reviewing a separately obtained history, performing a medically appropriate examination and/or evaluation , counseling and educating the patient/family/caregiver, ordering medications, tests, or procedures, referring and communicating with other health care professionals , documenting information in the medical record, independently interpreting results and communicating that information with the patient/family/caregiver and care coordination     Follow Up     Return in about 3 months (around 1/12/2023), or if symptoms worsen or fail to improve, for Recheck--labs in Dec.   A1c especially.  Patient was given instructions and counseling regarding his condition or for health maintenance advice. Please see specific information pulled into the  AVS if appropriate.     Transcribed from ambient dictation for Nuno Patton MD by Vesna Keller.  10/12/22   10:36 EDT    Patient or patient representative verbalized consent to the visit recording.  I have personally performed the services described in this document as transcribed by the above individual, and it is both accurate and complete.  Nuno Patton MD  10/28/2022  20:17 EDT

## 2022-10-24 NOTE — TELEPHONE ENCOUNTER
Rx Refill Note  Requested Prescriptions     Pending Prescriptions Disp Refills   • rivaroxaban (XARELTO) 20 MG tablet 90 tablet 3     Sig: Take 1 tablet by mouth Daily With Dinner for 30 days. Indications: Atrial Fibrillation      Last office visit with prescribing clinician: 8/25/2022      Next office visit with prescribing clinician: 3/9/2023            Marlena Chris MA  10/24/22, 12:02 EDT  
yes

## 2022-11-17 RX ORDER — LINAGLIPTIN 5 MG/1
TABLET, FILM COATED ORAL
Qty: 90 TABLET | Refills: 0 | Status: SHIPPED | OUTPATIENT
Start: 2022-11-17

## 2022-12-16 ENCOUNTER — LAB (OUTPATIENT)
Dept: FAMILY MEDICINE CLINIC | Facility: CLINIC | Age: 82
End: 2022-12-16

## 2022-12-16 DIAGNOSIS — E11.65 TYPE 2 DIABETES MELLITUS WITH HYPERGLYCEMIA, WITHOUT LONG-TERM CURRENT USE OF INSULIN: Primary | ICD-10-CM

## 2022-12-16 LAB — HBA1C MFR BLD: 8.8 % (ref 3.5–5.6)

## 2022-12-16 PROCEDURE — 36415 COLL VENOUS BLD VENIPUNCTURE: CPT

## 2022-12-16 PROCEDURE — 83036 HEMOGLOBIN GLYCOSYLATED A1C: CPT | Performed by: FAMILY MEDICINE

## 2023-02-13 ENCOUNTER — OFFICE (AMBULATORY)
Dept: URBAN - METROPOLITAN AREA CLINIC 64 | Facility: CLINIC | Age: 83
End: 2023-02-13

## 2023-02-13 VITALS
HEIGHT: 70 IN | WEIGHT: 191 LBS | SYSTOLIC BLOOD PRESSURE: 125 MMHG | DIASTOLIC BLOOD PRESSURE: 91 MMHG | HEART RATE: 92 BPM

## 2023-02-13 DIAGNOSIS — K59.1 FUNCTIONAL DIARRHEA: ICD-10-CM

## 2023-02-13 DIAGNOSIS — K22.70 BARRETT'S ESOPHAGUS WITHOUT DYSPLASIA: ICD-10-CM

## 2023-02-13 DIAGNOSIS — Z79.01 LONG TERM (CURRENT) USE OF ANTICOAGULANTS: ICD-10-CM

## 2023-02-13 DIAGNOSIS — I48.91 UNSPECIFIED ATRIAL FIBRILLATION: ICD-10-CM

## 2023-02-13 DIAGNOSIS — Z12.11 ENCOUNTER FOR SCREENING FOR MALIGNANT NEOPLASM OF COLON: ICD-10-CM

## 2023-02-13 PROCEDURE — 99213 OFFICE O/P EST LOW 20 MIN: CPT

## 2023-02-13 RX ORDER — OMEPRAZOLE 40 MG/1
40 CAPSULE, DELAYED RELEASE ORAL
Qty: 90 | Refills: 3 | Status: ACTIVE
Start: 2023-02-13

## 2023-02-13 RX ORDER — COLESTIPOL HYDROCHLORIDE 1 G/1
TABLET, FILM COATED ORAL
Qty: 180 | Refills: 6 | Status: ACTIVE

## 2023-02-25 ENCOUNTER — APPOINTMENT (OUTPATIENT)
Dept: GENERAL RADIOLOGY | Facility: HOSPITAL | Age: 83
End: 2023-02-25
Payer: MEDICARE

## 2023-02-25 ENCOUNTER — HOSPITAL ENCOUNTER (EMERGENCY)
Facility: HOSPITAL | Age: 83
Discharge: HOME | End: 2023-02-25
Admitting: EMERGENCY MEDICINE
Payer: MEDICARE

## 2023-02-25 VITALS
HEART RATE: 88 BPM | TEMPERATURE: 98.4 F | SYSTOLIC BLOOD PRESSURE: 119 MMHG | WEIGHT: 192.68 LBS | BODY MASS INDEX: 27.58 KG/M2 | HEIGHT: 70 IN | DIASTOLIC BLOOD PRESSURE: 80 MMHG | RESPIRATION RATE: 17 BRPM | OXYGEN SATURATION: 93 %

## 2023-02-25 DIAGNOSIS — Z20.822 LAB TEST NEGATIVE FOR COVID-19 VIRUS: ICD-10-CM

## 2023-02-25 DIAGNOSIS — J06.9 UPPER RESPIRATORY TRACT INFECTION, UNSPECIFIED TYPE: Primary | ICD-10-CM

## 2023-02-25 DIAGNOSIS — B34.8 RHINOVIRUS: ICD-10-CM

## 2023-02-25 LAB
ALBUMIN SERPL-MCNC: 3.7 G/DL (ref 3.5–5.2)
ALBUMIN/GLOB SERPL: 1 G/DL
ALP SERPL-CCNC: 139 U/L (ref 39–117)
ALT SERPL W P-5'-P-CCNC: 9 U/L (ref 1–41)
ANION GAP SERPL CALCULATED.3IONS-SCNC: 10 MMOL/L (ref 5–15)
AST SERPL-CCNC: 11 U/L (ref 1–40)
B PARAPERT DNA SPEC QL NAA+PROBE: NOT DETECTED
B PERT DNA SPEC QL NAA+PROBE: NOT DETECTED
BASOPHILS # BLD AUTO: 0 10*3/MM3 (ref 0–0.2)
BASOPHILS NFR BLD AUTO: 0.5 % (ref 0–1.5)
BILIRUB SERPL-MCNC: 0.8 MG/DL (ref 0–1.2)
BUN SERPL-MCNC: 25 MG/DL (ref 8–23)
BUN/CREAT SERPL: 20 (ref 7–25)
C PNEUM DNA NPH QL NAA+NON-PROBE: NOT DETECTED
CALCIUM SPEC-SCNC: 9.1 MG/DL (ref 8.6–10.5)
CHLORIDE SERPL-SCNC: 100 MMOL/L (ref 98–107)
CO2 SERPL-SCNC: 27 MMOL/L (ref 22–29)
CREAT SERPL-MCNC: 1.25 MG/DL (ref 0.76–1.27)
D-LACTATE SERPL-SCNC: 1.5 MMOL/L (ref 0.3–2)
DEPRECATED RDW RBC AUTO: 48.1 FL (ref 37–54)
EGFRCR SERPLBLD CKD-EPI 2021: 58.2 ML/MIN/1.73
EOSINOPHIL # BLD AUTO: 0.3 10*3/MM3 (ref 0–0.4)
EOSINOPHIL NFR BLD AUTO: 5 % (ref 0.3–6.2)
ERYTHROCYTE [DISTWIDTH] IN BLOOD BY AUTOMATED COUNT: 14.5 % (ref 12.3–15.4)
FLUAV SUBTYP SPEC NAA+PROBE: NOT DETECTED
FLUBV RNA ISLT QL NAA+PROBE: NOT DETECTED
GLOBULIN UR ELPH-MCNC: 3.7 GM/DL
GLUCOSE SERPL-MCNC: 320 MG/DL (ref 65–99)
HADV DNA SPEC NAA+PROBE: NOT DETECTED
HCOV 229E RNA SPEC QL NAA+PROBE: NOT DETECTED
HCOV HKU1 RNA SPEC QL NAA+PROBE: NOT DETECTED
HCOV NL63 RNA SPEC QL NAA+PROBE: NOT DETECTED
HCOV OC43 RNA SPEC QL NAA+PROBE: NOT DETECTED
HCT VFR BLD AUTO: 43 % (ref 37.5–51)
HGB BLD-MCNC: 14.4 G/DL (ref 13–17.7)
HMPV RNA NPH QL NAA+NON-PROBE: NOT DETECTED
HOLD SPECIMEN: NORMAL
HOLD SPECIMEN: NORMAL
HPIV1 RNA ISLT QL NAA+PROBE: NOT DETECTED
HPIV2 RNA SPEC QL NAA+PROBE: NOT DETECTED
HPIV3 RNA NPH QL NAA+PROBE: NOT DETECTED
HPIV4 P GENE NPH QL NAA+PROBE: NOT DETECTED
LYMPHOCYTES # BLD AUTO: 0.9 10*3/MM3 (ref 0.7–3.1)
LYMPHOCYTES NFR BLD AUTO: 14.2 % (ref 19.6–45.3)
M PNEUMO IGG SER IA-ACNC: NOT DETECTED
MAGNESIUM SERPL-MCNC: 1.8 MG/DL (ref 1.6–2.4)
MCH RBC QN AUTO: 29.9 PG (ref 26.6–33)
MCHC RBC AUTO-ENTMCNC: 33.4 G/DL (ref 31.5–35.7)
MCV RBC AUTO: 89.4 FL (ref 79–97)
MONOCYTES # BLD AUTO: 0.5 10*3/MM3 (ref 0.1–0.9)
MONOCYTES NFR BLD AUTO: 8.1 % (ref 5–12)
NEUTROPHILS NFR BLD AUTO: 4.6 10*3/MM3 (ref 1.7–7)
NEUTROPHILS NFR BLD AUTO: 72.2 % (ref 42.7–76)
NRBC BLD AUTO-RTO: 0.2 /100 WBC (ref 0–0.2)
NT-PROBNP SERPL-MCNC: 3352 PG/ML (ref 0–1800)
PLATELET # BLD AUTO: 126 10*3/MM3 (ref 140–450)
PMV BLD AUTO: 7.9 FL (ref 6–12)
POTASSIUM SERPL-SCNC: 4.8 MMOL/L (ref 3.5–5.2)
PROT SERPL-MCNC: 7.4 G/DL (ref 6–8.5)
RBC # BLD AUTO: 4.81 10*6/MM3 (ref 4.14–5.8)
RHINOVIRUS RNA SPEC NAA+PROBE: DETECTED
RSV RNA NPH QL NAA+NON-PROBE: NOT DETECTED
SARS-COV-2 RNA NPH QL NAA+NON-PROBE: NOT DETECTED
SODIUM SERPL-SCNC: 137 MMOL/L (ref 136–145)
TSH SERPL DL<=0.05 MIU/L-ACNC: 3.33 UIU/ML (ref 0.27–4.2)
WBC NRBC COR # BLD: 6.4 10*3/MM3 (ref 3.4–10.8)
WHOLE BLOOD HOLD COAG: NORMAL
WHOLE BLOOD HOLD SPECIMEN: NORMAL

## 2023-02-25 PROCEDURE — 83880 ASSAY OF NATRIURETIC PEPTIDE: CPT | Performed by: NURSE PRACTITIONER

## 2023-02-25 PROCEDURE — 87040 BLOOD CULTURE FOR BACTERIA: CPT

## 2023-02-25 PROCEDURE — 36415 COLL VENOUS BLD VENIPUNCTURE: CPT

## 2023-02-25 PROCEDURE — 84443 ASSAY THYROID STIM HORMONE: CPT | Performed by: NURSE PRACTITIONER

## 2023-02-25 PROCEDURE — 71045 X-RAY EXAM CHEST 1 VIEW: CPT

## 2023-02-25 PROCEDURE — 80053 COMPREHEN METABOLIC PANEL: CPT

## 2023-02-25 PROCEDURE — 83605 ASSAY OF LACTIC ACID: CPT

## 2023-02-25 PROCEDURE — 85025 COMPLETE CBC W/AUTO DIFF WBC: CPT

## 2023-02-25 PROCEDURE — 83735 ASSAY OF MAGNESIUM: CPT | Performed by: NURSE PRACTITIONER

## 2023-02-25 PROCEDURE — 0202U NFCT DS 22 TRGT SARS-COV-2: CPT | Performed by: NURSE PRACTITIONER

## 2023-02-25 PROCEDURE — 99284 EMERGENCY DEPT VISIT MOD MDM: CPT

## 2023-02-25 PROCEDURE — 93005 ELECTROCARDIOGRAM TRACING: CPT | Performed by: NURSE PRACTITIONER

## 2023-02-25 RX ORDER — FLUTICASONE PROPIONATE 50 MCG
2 SPRAY, SUSPENSION (ML) NASAL DAILY
Qty: 9.9 ML | Refills: 0 | Status: SHIPPED | OUTPATIENT
Start: 2023-02-25

## 2023-02-25 RX ORDER — BENZONATATE 100 MG/1
100 CAPSULE ORAL 3 TIMES DAILY PRN
Qty: 30 CAPSULE | Refills: 0 | Status: SHIPPED | OUTPATIENT
Start: 2023-02-25

## 2023-02-25 RX ORDER — SODIUM CHLORIDE 0.9 % (FLUSH) 0.9 %
10 SYRINGE (ML) INJECTION AS NEEDED
Status: DISCONTINUED | OUTPATIENT
Start: 2023-02-25 | End: 2023-02-25 | Stop reason: HOSPADM

## 2023-02-27 LAB — QT INTERVAL: 368 MS

## 2023-03-02 LAB
BACTERIA SPEC AEROBE CULT: NORMAL
BACTERIA SPEC AEROBE CULT: NORMAL

## 2023-03-09 ENCOUNTER — OFFICE VISIT (OUTPATIENT)
Dept: CARDIOLOGY | Facility: CLINIC | Age: 83
End: 2023-03-09
Payer: MEDICARE

## 2023-03-09 VITALS
HEIGHT: 70 IN | DIASTOLIC BLOOD PRESSURE: 86 MMHG | OXYGEN SATURATION: 98 % | HEART RATE: 92 BPM | WEIGHT: 192 LBS | SYSTOLIC BLOOD PRESSURE: 126 MMHG | BODY MASS INDEX: 27.49 KG/M2

## 2023-03-09 DIAGNOSIS — I25.10 CORONARY ARTERY DISEASE INVOLVING NATIVE CORONARY ARTERY OF NATIVE HEART WITHOUT ANGINA PECTORIS: ICD-10-CM

## 2023-03-09 DIAGNOSIS — I48.0 PAROXYSMAL ATRIAL FIBRILLATION: ICD-10-CM

## 2023-03-09 DIAGNOSIS — I10 ESSENTIAL HYPERTENSION: Primary | ICD-10-CM

## 2023-03-09 DIAGNOSIS — E78.2 MIXED HYPERLIPIDEMIA: ICD-10-CM

## 2023-03-09 DIAGNOSIS — E11.65 TYPE 2 DIABETES MELLITUS WITH HYPERGLYCEMIA, WITHOUT LONG-TERM CURRENT USE OF INSULIN: ICD-10-CM

## 2023-03-09 PROCEDURE — 1159F MED LIST DOCD IN RCRD: CPT | Performed by: INTERNAL MEDICINE

## 2023-03-09 PROCEDURE — 1160F RVW MEDS BY RX/DR IN RCRD: CPT | Performed by: INTERNAL MEDICINE

## 2023-03-09 PROCEDURE — 3074F SYST BP LT 130 MM HG: CPT | Performed by: INTERNAL MEDICINE

## 2023-03-09 PROCEDURE — 3079F DIAST BP 80-89 MM HG: CPT | Performed by: INTERNAL MEDICINE

## 2023-03-09 PROCEDURE — 99214 OFFICE O/P EST MOD 30 MIN: CPT | Performed by: INTERNAL MEDICINE

## 2023-03-09 RX ORDER — ATENOLOL 25 MG/1
25 TABLET ORAL DAILY
Qty: 90 TABLET | Refills: 3 | Status: SHIPPED | OUTPATIENT
Start: 2023-03-09 | End: 2023-04-04 | Stop reason: HOSPADM

## 2023-03-09 RX ORDER — ATORVASTATIN CALCIUM 40 MG/1
40 TABLET, FILM COATED ORAL
Qty: 90 TABLET | Refills: 3 | Status: SHIPPED | OUTPATIENT
Start: 2023-03-09

## 2023-03-09 NOTE — PROGRESS NOTES
"    Subjective:     Encounter Date:03/09/2023      Patient ID: Cisco Frank is a 82 y.o. male.    Chief Complaint:  History of Present Illness 82-year-old white male with history of coronary disease hypertension hyperlipidemia diabetes and paroxysmal fibrillation presents to the office for follow-up.  Patient is currently stable without any signs of chest pain but has some shortness of breath with exertion but no complaint of any PND orthopnea.  No palpitation dizziness syncope or swelling of the feet but is taking his medicines regularly.  He does not smoke    The following portions of the patient's history were reviewed and updated as appropriate: allergies, current medications, past family history, past medical history, past social history, past surgical history and problem list.  Past Medical History:   Diagnosis Date   • Atrial fibrillation (HCC)    • Cancer (HCC)    • Coronary artery disease    • History of bladder cancer    • Hyperlipidemia      Past Surgical History:   Procedure Laterality Date   • BLADDER SURGERY  10/26/2019    [facility] Dr. Cope   • BRONCHOSCOPY N/A 6/30/2022    Procedure: BRONCHOSCOPY with bilateral lung wash;  Surgeon: Ayush Velarde MD;  Location: [facility] ENDOSCOPY;  Service: Pulmonary;  Laterality: N/A;  normal bronch   • CARDIAC CATHETERIZATION     • COLONOSCOPY     • COLONOSCOPY W/ POLYPECTOMY  02/23/2021   • SKIN CANCER EXCISION      right temple BX   • UPPER ENDOSCOPIC ULTRASOUND W/ FNA N/A 3/12/2021    Procedure: ESOPHAGOGASTRODUODENOSCOPY WITH endoscopic mucosal resection, biopsy x 1 area, and endoscopic ULTRASOUND;  Surgeon: Abner Infante MD;  Location: [facility] ENDOSCOPY;  Service: Gastroenterology;  Laterality: N/A;  post op: hiatal hernia, duodenal polyp     /86   Pulse 92   Ht 177.8 cm (70\")   Wt 87.1 kg (192 lb)   SpO2 98%   BMI 27.55 kg/m²   Family History   Problem Relation Age of Onset   • Heart disease Mother    • Heart attack Father    • No Known Problems Sister  "   • No Known Problems Brother    • No Known Problems Maternal Aunt    • No Known Problems Maternal Uncle    • No Known Problems Paternal Aunt    • No Known Problems Paternal Uncle    • No Known Problems Maternal Grandmother    • No Known Problems Maternal Grandfather    • No Known Problems Paternal Grandmother    • No Known Problems Paternal Grandfather    • No Known Problems Other    • Anemia Neg Hx    • Arrhythmia Neg Hx    • Asthma Neg Hx    • Clotting disorder Neg Hx    • Fainting Neg Hx    • Heart failure Neg Hx    • Hyperlipidemia Neg Hx    • Hypertension Neg Hx        Current Outpatient Medications:   •  atenolol (TENORMIN) 25 MG tablet, Take 1 tablet by mouth Daily., Disp: 90 tablet, Rfl: 3  •  atorvastatin (LIPITOR) 40 MG tablet, Take 1 tablet by mouth every night at bedtime., Disp: 90 tablet, Rfl: 3  •  benzonatate (TESSALON) 100 MG capsule, Take 1 capsule by mouth 3 (Three) Times a Day As Needed for Cough., Disp: 30 capsule, Rfl: 0  •  colestipol (COLESTID) 1 g tablet, Take 1 tablet by mouth 2 (Two) Times a Day., Disp: , Rfl:   •  fluticasone (FLONASE) 50 MCG/ACT nasal spray, 2 sprays into the nostril(s) as directed by provider Daily., Disp: 9.9 mL, Rfl: 0  •  glimepiride (AMARYL) 2 MG tablet, TAKE 3 TABLETS EVERY MORNING BEFORE BREAKFAST, Disp: 270 tablet, Rfl: 2  •  mupirocin (BACTROBAN) 2 % ointment, Apply 1 application topically to the appropriate area as directed 3 (Three) Times a Day., Disp: 22 g, Rfl: 0  •  omeprazole-sodium bicarbonate (ZEGERID)  MG per capsule, Take 1 capsule by mouth Daily. prn, Disp: , Rfl:   •  rivaroxaban (XARELTO) 20 MG tablet, Take 1 tablet by mouth Daily With Dinner for 30 days. Indications: Atrial Fibrillation, Disp: 90 tablet, Rfl: 3  •  Tradjenta 5 MG tablet tablet, TAKE 1 TABLET BY MOUTH DAILY FOR 30 DAYS., Disp: 90 tablet, Rfl: 0  •  vitamin C (ASCORBIC ACID) 500 MG tablet, Take 1 tablet by mouth Daily., Disp: , Rfl:   No Known Allergies  Social History      Socioeconomic History   • Marital status:    Tobacco Use   • Smoking status: Former     Types: Cigarettes     Quit date: 1980     Years since quittin.2   • Smokeless tobacco: Never   Vaping Use   • Vaping Use: Never used   Substance and Sexual Activity   • Alcohol use: Not Currently   • Drug use: Never   • Sexual activity: Defer     Review of Systems   Constitutional: Negative for malaise/fatigue.   Cardiovascular: Negative for chest pain, dyspnea on exertion, leg swelling and palpitations.   Respiratory: Positive for shortness of breath. Negative for cough.    Gastrointestinal: Negative for abdominal pain, nausea and vomiting.   Neurological: Negative for dizziness, focal weakness, headaches, light-headedness and numbness.   All other systems reviewed and are negative.             Objective:     Constitutional:       Appearance: Well-developed.   Eyes:      General: No scleral icterus.     Conjunctiva/sclera: Conjunctivae normal.   HENT:      Head: Normocephalic and atraumatic.   Neck:      Vascular: No carotid bruit or JVD.   Pulmonary:      Effort: Pulmonary effort is normal.      Breath sounds: Normal breath sounds. No wheezing. No rales.   Cardiovascular:      Normal rate. Irregularly irregular rhythm.   Pulses:     Intact distal pulses.   Abdominal:      General: Bowel sounds are normal.      Palpations: Abdomen is soft.   Musculoskeletal:      Cervical back: Normal range of motion and neck supple. Skin:     General: Skin is warm and dry.      Findings: No rash.   Neurological:      Mental Status: Alert.       Procedures    Lab Review:         MDM  1.  Coronary disease  Patient has nonobstructive disease and is currently stable on medications with normal V function  2.  Hypertension  Patient blood pressure currently stable on atenolol  3.  Atrial fibrillation  Patient has persistent afibrillation is currently on atenolol for rate control and Xarelto for anticoagulation  4.   Hyperlipidemia  Patient on atorvastatin the lipid levels are well within normal limits  5.  Diabetes  Patient is on oral medicines and followed by the primary care doctor.    Patient's previous medical records, labs, and EKG were reviewed and discussed with the patient at today's visit.

## 2023-03-31 ENCOUNTER — APPOINTMENT (OUTPATIENT)
Dept: GENERAL RADIOLOGY | Facility: HOSPITAL | Age: 83
End: 2023-03-31
Payer: MEDICARE

## 2023-03-31 ENCOUNTER — APPOINTMENT (OUTPATIENT)
Dept: CT IMAGING | Facility: HOSPITAL | Age: 83
End: 2023-03-31
Payer: MEDICARE

## 2023-03-31 ENCOUNTER — HOSPITAL ENCOUNTER (EMERGENCY)
Facility: HOSPITAL | Age: 83
Discharge: HOME | End: 2023-03-31
Attending: EMERGENCY MEDICINE | Admitting: EMERGENCY MEDICINE
Payer: MEDICARE

## 2023-03-31 VITALS
HEART RATE: 103 BPM | BODY MASS INDEX: 27.46 KG/M2 | OXYGEN SATURATION: 95 % | HEIGHT: 70 IN | RESPIRATION RATE: 17 BRPM | WEIGHT: 191.8 LBS | SYSTOLIC BLOOD PRESSURE: 136 MMHG | DIASTOLIC BLOOD PRESSURE: 87 MMHG | TEMPERATURE: 98.1 F
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
## 2023-03-31 DIAGNOSIS — R07.81 RIB PAIN ON RIGHT SIDE: ICD-10-CM

## 2023-03-31 DIAGNOSIS — S09.90XA MINOR CLOSED HEAD INJURY: ICD-10-CM

## 2023-03-31 PROCEDURE — 71101 X-RAY EXAM UNILAT RIBS/CHEST: CPT

## 2023-03-31 PROCEDURE — 99283 EMERGENCY DEPT VISIT LOW MDM: CPT

## 2023-03-31 PROCEDURE — 63710000001 ONDANSETRON ODT 4 MG TABLET DISPERSIBLE: Performed by: NURSE PRACTITIONER

## 2023-03-31 PROCEDURE — 70450 CT HEAD/BRAIN W/O DYE: CPT

## 2023-03-31 RX ORDER — HYDROCODONE BITARTRATE AND ACETAMINOPHEN 5; 325 MG/1; MG/1
1 TABLET ORAL EVERY 6 HOURS PRN
Qty: 12 TABLET | Refills: 0 | Status: SHIPPED | OUTPATIENT
Start: 2023-03-31 | End: 2023-04-12 | Stop reason: SDUPTHER

## 2023-03-31 RX ORDER — ONDANSETRON 4 MG/1
4 TABLET, ORALLY DISINTEGRATING ORAL 4 TIMES DAILY PRN
Qty: 10 TABLET | Refills: 0 | Status: SHIPPED | OUTPATIENT
Start: 2023-03-31 | End: 2023-04-12 | Stop reason: SDUPTHER

## 2023-03-31 RX ORDER — HYDROCODONE BITARTRATE AND ACETAMINOPHEN 5; 325 MG/1; MG/1
1 TABLET ORAL ONCE
Status: COMPLETED | OUTPATIENT
Start: 2023-03-31 | End: 2023-03-31

## 2023-03-31 RX ORDER — ONDANSETRON 4 MG/1
4 TABLET, ORALLY DISINTEGRATING ORAL ONCE
Status: COMPLETED | OUTPATIENT
Start: 2023-03-31 | End: 2023-03-31

## 2023-03-31 RX ADMIN — ONDANSETRON 4 MG: 4 TABLET, ORALLY DISINTEGRATING ORAL at 14:05

## 2023-03-31 RX ADMIN — HYDROCODONE BITARTRATE AND ACETAMINOPHEN 1 TABLET: 5; 325 TABLET ORAL at 14:05

## 2023-03-31 NOTE — ED PROVIDER NOTES
Subjective   History of Present Illness  Patient is an 82-year-old male who was up on a stepladder at home cleaning a fan for his wife when he slipped and fell on his way down and struck the left side of his head on the stove he did not lose consciousness he does take blood thinners and is worried he also complains of some right rib pain he states he did not hit the right side of his body.  He rates his pain a 7/10 he states its worse with movement he has no hemoptysis no inappropriate repeating of questions he denies any headache.        Review of Systems   Constitutional: Negative for chills, fatigue and fever.   HENT: Negative for congestion, tinnitus and trouble swallowing.         Contusion to the left side of the scalp   Eyes: Negative for photophobia, discharge and redness.   Respiratory: Negative for cough and shortness of breath.    Cardiovascular: Negative for chest pain and palpitations.   Gastrointestinal: Negative for abdominal pain, diarrhea, nausea and vomiting.   Genitourinary: Negative for dysuria, frequency and urgency.   Musculoskeletal: Negative for back pain, joint swelling and myalgias.        Right rib pain   Skin: Negative for rash.   Neurological: Negative for dizziness and headaches.   Psychiatric/Behavioral: Negative for confusion.   All other systems reviewed and are negative.      Past Medical History:   Diagnosis Date   • Atrial fibrillation (HCC)    • Cancer (HCC)    • Coronary artery disease    • History of bladder cancer    • Hyperlipidemia        No Known Allergies    Past Surgical History:   Procedure Laterality Date   • BLADDER SURGERY  10/26/2019    [facility] Dr. Cope   • BRONCHOSCOPY N/A 6/30/2022    Procedure: BRONCHOSCOPY with bilateral lung wash;  Surgeon: Ayush Velarde MD;  Location: [facility] ENDOSCOPY;  Service: Pulmonary;  Laterality: N/A;  normal bronch   • CARDIAC CATHETERIZATION     • COLONOSCOPY     • COLONOSCOPY W/ POLYPECTOMY  02/23/2021   • SKIN CANCER EXCISION      right  temple BX   • UPPER ENDOSCOPIC ULTRASOUND W/ FNA N/A 3/12/2021    Procedure: ESOPHAGOGASTRODUODENOSCOPY WITH endoscopic mucosal resection, biopsy x 1 area, and endoscopic ULTRASOUND;  Surgeon: Abner Infante MD;  Location: [facility] ENDOSCOPY;  Service: Gastroenterology;  Laterality: N/A;  post op: hiatal hernia, duodenal polyp       Family History   Problem Relation Age of Onset   • Heart disease Mother    • Heart attack Father    • No Known Problems Sister    • No Known Problems Brother    • No Known Problems Maternal Aunt    • No Known Problems Maternal Uncle    • No Known Problems Paternal Aunt    • No Known Problems Paternal Uncle    • No Known Problems Maternal Grandmother    • No Known Problems Maternal Grandfather    • No Known Problems Paternal Grandmother    • No Known Problems Paternal Grandfather    • No Known Problems Other    • Anemia Neg Hx    • Arrhythmia Neg Hx    • Asthma Neg Hx    • Clotting disorder Neg Hx    • Fainting Neg Hx    • Heart failure Neg Hx    • Hyperlipidemia Neg Hx    • Hypertension Neg Hx        Social History     Socioeconomic History   • Marital status:    Tobacco Use   • Smoking status: Former     Types: Cigarettes     Quit date: 1980     Years since quittin.2   • Smokeless tobacco: Never   Vaping Use   • Vaping Use: Never used   Substance and Sexual Activity   • Alcohol use: Not Currently   • Drug use: Never   • Sexual activity: Defer           Objective   Physical Exam  Vitals reviewed.   Constitutional:       Appearance: Normal appearance. He is well-developed and normal weight.   HENT:      Head: Normocephalic and atraumatic.        Comments: Contusion left parietal     Right Ear: Tympanic membrane and external ear normal.      Left Ear: Tympanic membrane and external ear normal.      Nose: Nose normal.      Mouth/Throat:      Mouth: Mucous membranes are moist.   Eyes:      Conjunctiva/sclera: Conjunctivae normal.      Pupils: Pupils are equal, round, and  "reactive to light.   Cardiovascular:      Rate and Rhythm: Normal rate and regular rhythm.      Heart sounds: Normal heart sounds.   Pulmonary:      Effort: Pulmonary effort is normal.      Breath sounds: Normal breath sounds.   Chest:      Chest wall: Tenderness present.          Comments: Right lower posterior rib pain without crepitus or subcutaneous emphysema no step-off no signs of trauma  Abdominal:      General: Bowel sounds are normal.      Palpations: Abdomen is soft.   Musculoskeletal:         General: Normal range of motion.      Cervical back: Normal, normal range of motion and neck supple.      Thoracic back: Normal.      Lumbar back: Normal.   Skin:     General: Skin is warm and dry.      Capillary Refill: Capillary refill takes less than 2 seconds.   Neurological:      General: No focal deficit present.      Mental Status: He is alert and oriented to person, place, and time.      GCS: GCS eye subscore is 4. GCS verbal subscore is 5. GCS motor subscore is 6.   Psychiatric:         Attention and Perception: Attention normal.         Mood and Affect: Mood normal.         Speech: Speech normal.         Behavior: Behavior normal. Behavior is cooperative.         Procedures           ED Course  ED Course as of 03/31/23 1440   Fri Mar 31, 2023   1402 CT shows no acute finding [KW]   1409 Waiting for xray to be read [KW]      ED Course User Index  [KW] Luba Funes, APRN      /87   Pulse 103   Temp 98.1 °F (36.7 °C) (Oral)   Resp 17   Ht 177.8 cm (70\")   Wt 87 kg (191 lb 12.8 oz)   SpO2 95%   BMI 27.52 kg/m²   Labs Reviewed - No data to display  Medications   HYDROcodone-acetaminophen (NORCO) 5-325 MG per tablet 1 tablet (1 tablet Oral Given 3/31/23 1405)   ondansetron ODT (ZOFRAN-ODT) disintegrating tablet 4 mg (4 mg Oral Given 3/31/23 1405)     XR Ribs Right With PA Chest    Result Date: 3/31/2023  Impression: 1. No acute displaced right rib fracture is identified. 2. Stable cardiac " sundar. Electronically Signed: Latha Strong  3/31/2023 2:11 PM EDT  Workstation ID: [id number]    CT Head Without Contrast    Result Date: 3/31/2023  Impression: 1. Generalized atrophy with mild periventricular ischemic changes. No acute intracranial findings. Electronically Signed: Alexandru Jeong  3/31/2023 12:55 PM EDT  Workstation ID: [id number]                                         Medical Decision Making  Patient is an 82-year-old presents after acute fall with minor closed head injury and rib pain worrisome for intracranial hemorrhage rib fracture or pneumothorax interabdominal hemorrhage  Patient had exam which showed no abdominal tenderness I did consider a CT but felt it was inappropriate with the physical exam findings  The patient had a CT of his head which was reviewed by radiology and read by myself as within normal limits.  The patient also had a right rib x-ray and was also read and interpreted by myself as no acute fractures    On reevaluation the patient states he feels better after pain    He was treated with pain with Norco and Zofran he was advised of the CT and x-ray results and the need to follow-up with primary care for any further problems and to return to the emergency room if worse he verbalized understood head injury precautions his wife will drive    Fall, initial encounter: complicated acute illness or injury  Minor closed head injury: complicated acute illness or injury  Rib pain on right side: complicated acute illness or injury  Amount and/or Complexity of Data Reviewed  External Data Reviewed:      Details: No notes to review in comparison to today's visit  Radiology: ordered and independent interpretation performed. Decision-making details documented in ED Course.  ECG/medicine tests: ordered and independent interpretation performed. Decision-making details documented in ED Course.      Risk  OTC drugs.  Prescription drug management.          Final diagnoses:   Fall, initial encounter    Minor closed head injury   Rib pain on right side       ED Disposition  ED Disposition     ED Disposition   Discharge    Condition   Stable    Comment   --             Nuno Patton MD  [address]  [place] [address] [phone]    In 3 days  If symptoms worsen, As needed         Medication List      New Prescriptions    HYDROcodone-acetaminophen 5-325 MG per tablet  Commonly known as: Norco  Take 1 tablet by mouth Every 6 (Six) Hours As Needed for Moderate Pain.     ondansetron ODT 4 MG disintegrating tablet  Commonly known as: ZOFRAN-ODT  Place 1 tablet on the tongue 4 (Four) Times a Day As Needed for Nausea or Vomiting.           Where to Get Your Medications      These medications were sent to [facility] [id number] - [place], [place] - [address] - [phone]  - [phone] FX  [address] [place] [address]    Phone: [phone]   · HYDROcodone-acetaminophen 5-325 MG per tablet  · ondansetron ODT 4 MG disintegrating tablet          Luba Funes, APRN  03/31/23 1449

## 2023-03-31 NOTE — DISCHARGE INSTRUCTIONS
Use Norco as needed for pain  Do not drive or mix with alcohol  Use Zofran for nausea    Follow-up with your primary care provider for recheck in 3 to 5 days if not improving or return to the ER for any shortness of breath or worsening

## 2023-04-03 ENCOUNTER — APPOINTMENT (OUTPATIENT)
Dept: CT IMAGING | Facility: HOSPITAL | Age: 83
End: 2023-04-03
Payer: MEDICARE

## 2023-04-03 ENCOUNTER — HOSPITAL ENCOUNTER (OUTPATIENT)
Facility: HOSPITAL | Age: 83
Setting detail: OBSERVATION
Discharge: HOME | End: 2023-04-04
Attending: EMERGENCY MEDICINE | Admitting: INTERNAL MEDICINE
Payer: MEDICARE

## 2023-04-03 DIAGNOSIS — S22.31XA CLOSED FRACTURE OF ONE RIB OF RIGHT SIDE, INITIAL ENCOUNTER: Primary | ICD-10-CM

## 2023-04-03 DIAGNOSIS — I31.39 PERICARDIAL EFFUSION: ICD-10-CM

## 2023-04-03 DIAGNOSIS — R52 INTRACTABLE PAIN: ICD-10-CM

## 2023-04-03 LAB
ALBUMIN SERPL-MCNC: 3.9 G/DL (ref 3.5–5.2)
ALBUMIN/GLOB SERPL: 1.1 G/DL
ALP SERPL-CCNC: 143 U/L (ref 39–117)
ALT SERPL W P-5'-P-CCNC: 10 U/L (ref 1–41)
ANION GAP SERPL CALCULATED.3IONS-SCNC: 10 MMOL/L (ref 5–15)
AST SERPL-CCNC: 16 U/L (ref 1–40)
BACTERIA UR QL AUTO: ABNORMAL /HPF
BASOPHILS # BLD AUTO: 0.1 10*3/MM3 (ref 0–0.2)
BASOPHILS NFR BLD AUTO: 0.9 % (ref 0–1.5)
BILIRUB SERPL-MCNC: 1.1 MG/DL (ref 0–1.2)
BILIRUB UR QL STRIP: NEGATIVE
BUN SERPL-MCNC: 22 MG/DL (ref 8–23)
BUN/CREAT SERPL: 18.6 (ref 7–25)
CALCIUM SPEC-SCNC: 9.5 MG/DL (ref 8.6–10.5)
CHLORIDE SERPL-SCNC: 98 MMOL/L (ref 98–107)
CHOLEST SERPL-MCNC: 91 MG/DL (ref 0–200)
CLARITY UR: CLEAR
CO2 SERPL-SCNC: 29 MMOL/L (ref 22–29)
COLOR UR: YELLOW
CREAT SERPL-MCNC: 1.18 MG/DL (ref 0.76–1.27)
DEPRECATED RDW RBC AUTO: 47.7 FL (ref 37–54)
EGFRCR SERPLBLD CKD-EPI 2021: 62.4 ML/MIN/1.73
EOSINOPHIL # BLD AUTO: 0.1 10*3/MM3 (ref 0–0.4)
EOSINOPHIL NFR BLD AUTO: 1.4 % (ref 0.3–6.2)
ERYTHROCYTE [DISTWIDTH] IN BLOOD BY AUTOMATED COUNT: 14.5 % (ref 12.3–15.4)
GLOBULIN UR ELPH-MCNC: 3.7 GM/DL
GLUCOSE BLDC GLUCOMTR-MCNC: 155 MG/DL (ref 70–105)
GLUCOSE BLDC GLUCOMTR-MCNC: 182 MG/DL (ref 70–105)
GLUCOSE SERPL-MCNC: 150 MG/DL (ref 65–99)
GLUCOSE UR STRIP-MCNC: ABNORMAL MG/DL
HBA1C MFR BLD: 8.6 % (ref 4.8–5.6)
HCT VFR BLD AUTO: 42.4 % (ref 37.5–51)
HDLC SERPL-MCNC: 34 MG/DL (ref 40–60)
HGB BLD-MCNC: 14 G/DL (ref 13–17.7)
HGB UR QL STRIP.AUTO: ABNORMAL
HYALINE CASTS UR QL AUTO: ABNORMAL /LPF
INR PPP: 1.06 (ref 0.93–1.1)
KETONES UR QL STRIP: NEGATIVE
LDLC SERPL CALC-MCNC: 34 MG/DL (ref 0–100)
LDLC/HDLC SERPL: 0.93 {RATIO}
LEUKOCYTE ESTERASE UR QL STRIP.AUTO: NEGATIVE
LIPASE SERPL-CCNC: 22 U/L (ref 13–60)
LYMPHOCYTES # BLD AUTO: 0.8 10*3/MM3 (ref 0.7–3.1)
LYMPHOCYTES NFR BLD AUTO: 13.5 % (ref 19.6–45.3)
MCH RBC QN AUTO: 29.7 PG (ref 26.6–33)
MCHC RBC AUTO-ENTMCNC: 33.1 G/DL (ref 31.5–35.7)
MCV RBC AUTO: 89.6 FL (ref 79–97)
MONOCYTES # BLD AUTO: 0.5 10*3/MM3 (ref 0.1–0.9)
MONOCYTES NFR BLD AUTO: 8.5 % (ref 5–12)
NEUTROPHILS NFR BLD AUTO: 4.6 10*3/MM3 (ref 1.7–7)
NEUTROPHILS NFR BLD AUTO: 75.7 % (ref 42.7–76)
NITRITE UR QL STRIP: NEGATIVE
NRBC BLD AUTO-RTO: 0.1 /100 WBC (ref 0–0.2)
NT-PROBNP SERPL-MCNC: 2774 PG/ML (ref 0–1800)
PH UR STRIP.AUTO: 6.5 [PH] (ref 5–8)
PLATELET # BLD AUTO: 134 10*3/MM3 (ref 140–450)
PMV BLD AUTO: 8.4 FL (ref 6–12)
POTASSIUM SERPL-SCNC: 4.8 MMOL/L (ref 3.5–5.2)
PROT SERPL-MCNC: 7.6 G/DL (ref 6–8.5)
PROT UR QL STRIP: ABNORMAL
PROTHROMBIN TIME: 10.9 SECONDS (ref 9.6–11.7)
RBC # BLD AUTO: 4.73 10*6/MM3 (ref 4.14–5.8)
RBC # UR STRIP: ABNORMAL /HPF
REF LAB TEST METHOD: ABNORMAL
SODIUM SERPL-SCNC: 137 MMOL/L (ref 136–145)
SP GR UR STRIP: 1.01 (ref 1–1.03)
SQUAMOUS #/AREA URNS HPF: ABNORMAL /HPF
TRIGL SERPL-MCNC: 127 MG/DL (ref 0–150)
UROBILINOGEN UR QL STRIP: ABNORMAL
VIT B12 BLD-MCNC: 298 PG/ML (ref 211–946)
VLDLC SERPL-MCNC: 23 MG/DL (ref 5–40)
WBC # UR STRIP: ABNORMAL /HPF
WBC NRBC COR # BLD: 6.1 10*3/MM3 (ref 3.4–10.8)

## 2023-04-03 PROCEDURE — 25010000002 MORPHINE PER 10 MG: Performed by: EMERGENCY MEDICINE

## 2023-04-03 PROCEDURE — 99284 EMERGENCY DEPT VISIT MOD MDM: CPT

## 2023-04-03 PROCEDURE — 96374 THER/PROPH/DIAG INJ IV PUSH: CPT

## 2023-04-03 PROCEDURE — 74176 CT ABD & PELVIS W/O CONTRAST: CPT

## 2023-04-03 PROCEDURE — G0378 HOSPITAL OBSERVATION PER HR: HCPCS

## 2023-04-03 PROCEDURE — 83880 ASSAY OF NATRIURETIC PEPTIDE: CPT

## 2023-04-03 PROCEDURE — 80053 COMPREHEN METABOLIC PANEL: CPT | Performed by: EMERGENCY MEDICINE

## 2023-04-03 PROCEDURE — 82962 GLUCOSE BLOOD TEST: CPT

## 2023-04-03 PROCEDURE — 63710000001 INSULIN LISPRO (HUMAN) PER 5 UNITS

## 2023-04-03 PROCEDURE — 71250 CT THORAX DX C-: CPT

## 2023-04-03 PROCEDURE — 83036 HEMOGLOBIN GLYCOSYLATED A1C: CPT

## 2023-04-03 PROCEDURE — 99285 EMERGENCY DEPT VISIT HI MDM: CPT

## 2023-04-03 PROCEDURE — 80061 LIPID PANEL: CPT

## 2023-04-03 PROCEDURE — 81001 URINALYSIS AUTO W/SCOPE: CPT | Performed by: EMERGENCY MEDICINE

## 2023-04-03 PROCEDURE — 83690 ASSAY OF LIPASE: CPT | Performed by: EMERGENCY MEDICINE

## 2023-04-03 PROCEDURE — 97165 OT EVAL LOW COMPLEX 30 MIN: CPT

## 2023-04-03 PROCEDURE — 96375 TX/PRO/DX INJ NEW DRUG ADDON: CPT

## 2023-04-03 PROCEDURE — 85025 COMPLETE CBC W/AUTO DIFF WBC: CPT | Performed by: EMERGENCY MEDICINE

## 2023-04-03 PROCEDURE — 97162 PT EVAL MOD COMPLEX 30 MIN: CPT

## 2023-04-03 PROCEDURE — 85610 PROTHROMBIN TIME: CPT

## 2023-04-03 PROCEDURE — 25010000002 ONDANSETRON PER 1 MG: Performed by: EMERGENCY MEDICINE

## 2023-04-03 PROCEDURE — 82607 VITAMIN B-12: CPT

## 2023-04-03 RX ORDER — POLYETHYLENE GLYCOL 3350 17 G/17G
17 POWDER, FOR SOLUTION ORAL DAILY
Status: DISCONTINUED | OUTPATIENT
Start: 2023-04-03 | End: 2023-04-04 | Stop reason: HOSPADM

## 2023-04-03 RX ORDER — SODIUM CHLORIDE 9 MG/ML
40 INJECTION, SOLUTION INTRAVENOUS AS NEEDED
Status: DISCONTINUED | OUTPATIENT
Start: 2023-04-03 | End: 2023-04-04 | Stop reason: HOSPADM

## 2023-04-03 RX ORDER — DEXTROSE MONOHYDRATE 25 G/50ML
25 INJECTION, SOLUTION INTRAVENOUS
Status: DISCONTINUED | OUTPATIENT
Start: 2023-04-03 | End: 2023-04-04 | Stop reason: HOSPADM

## 2023-04-03 RX ORDER — ONDANSETRON 2 MG/ML
4 INJECTION INTRAMUSCULAR; INTRAVENOUS EVERY 6 HOURS PRN
Status: DISCONTINUED | OUTPATIENT
Start: 2023-04-03 | End: 2023-04-04 | Stop reason: HOSPADM

## 2023-04-03 RX ORDER — ACETAMINOPHEN 650 MG/1
650 SUPPOSITORY RECTAL EVERY 4 HOURS PRN
Status: DISCONTINUED | OUTPATIENT
Start: 2023-04-03 | End: 2023-04-04 | Stop reason: HOSPADM

## 2023-04-03 RX ORDER — BENZONATATE 100 MG/1
100 CAPSULE ORAL 3 TIMES DAILY PRN
Status: DISCONTINUED | OUTPATIENT
Start: 2023-04-03 | End: 2023-04-04 | Stop reason: HOSPADM

## 2023-04-03 RX ORDER — ACETAMINOPHEN 325 MG/1
650 TABLET ORAL EVERY 4 HOURS PRN
Status: DISCONTINUED | OUTPATIENT
Start: 2023-04-03 | End: 2023-04-04 | Stop reason: HOSPADM

## 2023-04-03 RX ORDER — DOCUSATE SODIUM 100 MG/1
100 CAPSULE, LIQUID FILLED ORAL 2 TIMES DAILY
Status: DISCONTINUED | OUTPATIENT
Start: 2023-04-03 | End: 2023-04-04 | Stop reason: HOSPADM

## 2023-04-03 RX ORDER — HYDROCODONE BITARTRATE AND ACETAMINOPHEN 7.5; 325 MG/1; MG/1
1 TABLET ORAL EVERY 6 HOURS PRN
Status: DISCONTINUED | OUTPATIENT
Start: 2023-04-03 | End: 2023-04-04 | Stop reason: HOSPADM

## 2023-04-03 RX ORDER — ATORVASTATIN CALCIUM 40 MG/1
40 TABLET, FILM COATED ORAL NIGHTLY
Status: DISCONTINUED | OUTPATIENT
Start: 2023-04-03 | End: 2023-04-04 | Stop reason: HOSPADM

## 2023-04-03 RX ORDER — ASCORBIC ACID 500 MG
500 TABLET ORAL DAILY
Status: DISCONTINUED | OUTPATIENT
Start: 2023-04-03 | End: 2023-04-04 | Stop reason: HOSPADM

## 2023-04-03 RX ORDER — ACETAMINOPHEN 160 MG/5ML
650 SOLUTION ORAL EVERY 4 HOURS PRN
Status: DISCONTINUED | OUTPATIENT
Start: 2023-04-03 | End: 2023-04-04 | Stop reason: HOSPADM

## 2023-04-03 RX ORDER — SODIUM CHLORIDE 0.9 % (FLUSH) 0.9 %
10 SYRINGE (ML) INJECTION EVERY 12 HOURS SCHEDULED
Status: DISCONTINUED | OUTPATIENT
Start: 2023-04-03 | End: 2023-04-04 | Stop reason: HOSPADM

## 2023-04-03 RX ORDER — NICOTINE POLACRILEX 4 MG
15 LOZENGE BUCCAL
Status: DISCONTINUED | OUTPATIENT
Start: 2023-04-03 | End: 2023-04-04 | Stop reason: HOSPADM

## 2023-04-03 RX ORDER — INSULIN LISPRO 100 [IU]/ML
2-9 INJECTION, SOLUTION INTRAVENOUS; SUBCUTANEOUS
Status: DISCONTINUED | OUTPATIENT
Start: 2023-04-03 | End: 2023-04-04 | Stop reason: HOSPADM

## 2023-04-03 RX ORDER — SODIUM CHLORIDE 0.9 % (FLUSH) 0.9 %
10 SYRINGE (ML) INJECTION AS NEEDED
Status: DISCONTINUED | OUTPATIENT
Start: 2023-04-03 | End: 2023-04-04 | Stop reason: HOSPADM

## 2023-04-03 RX ORDER — ATENOLOL 25 MG/1
25 TABLET ORAL DAILY
Status: DISCONTINUED | OUTPATIENT
Start: 2023-04-03 | End: 2023-04-04

## 2023-04-03 RX ORDER — OLANZAPINE 10 MG/2ML
1 INJECTION, POWDER, LYOPHILIZED, FOR SOLUTION INTRAMUSCULAR
Status: DISCONTINUED | OUTPATIENT
Start: 2023-04-03 | End: 2023-04-03

## 2023-04-03 RX ORDER — ONDANSETRON 2 MG/ML
4 INJECTION INTRAMUSCULAR; INTRAVENOUS ONCE
Status: COMPLETED | OUTPATIENT
Start: 2023-04-03 | End: 2023-04-03

## 2023-04-03 RX ORDER — MORPHINE SULFATE 2 MG/ML
2 INJECTION, SOLUTION INTRAMUSCULAR; INTRAVENOUS EVERY 4 HOURS PRN
Status: DISCONTINUED | OUTPATIENT
Start: 2023-04-03 | End: 2023-04-04 | Stop reason: HOSPADM

## 2023-04-03 RX ORDER — CHOLECALCIFEROL (VITAMIN D3) 125 MCG
5 CAPSULE ORAL NIGHTLY PRN
Status: DISCONTINUED | OUTPATIENT
Start: 2023-04-03 | End: 2023-04-04 | Stop reason: HOSPADM

## 2023-04-03 RX ORDER — PANTOPRAZOLE SODIUM 40 MG/1
40 TABLET, DELAYED RELEASE ORAL
Status: DISCONTINUED | OUTPATIENT
Start: 2023-04-03 | End: 2023-04-04 | Stop reason: HOSPADM

## 2023-04-03 RX ORDER — ONDANSETRON 4 MG/1
4 TABLET, FILM COATED ORAL EVERY 6 HOURS PRN
Status: DISCONTINUED | OUTPATIENT
Start: 2023-04-03 | End: 2023-04-04 | Stop reason: HOSPADM

## 2023-04-03 RX ORDER — IBUPROFEN 600 MG/1
1 TABLET ORAL
Status: DISCONTINUED | OUTPATIENT
Start: 2023-04-03 | End: 2023-04-04 | Stop reason: HOSPADM

## 2023-04-03 RX ORDER — ALUMINA, MAGNESIA, AND SIMETHICONE 2400; 2400; 240 MG/30ML; MG/30ML; MG/30ML
15 SUSPENSION ORAL EVERY 6 HOURS PRN
Status: DISCONTINUED | OUTPATIENT
Start: 2023-04-03 | End: 2023-04-04 | Stop reason: HOSPADM

## 2023-04-03 RX ADMIN — ONDANSETRON 4 MG: 2 INJECTION INTRAMUSCULAR; INTRAVENOUS at 04:47

## 2023-04-03 RX ADMIN — Medication 10 ML: at 20:56

## 2023-04-03 RX ADMIN — Medication 10 ML: at 09:22

## 2023-04-03 RX ADMIN — DOCUSATE SODIUM 100 MG: 100 CAPSULE, LIQUID FILLED ORAL at 09:22

## 2023-04-03 RX ADMIN — OXYCODONE HYDROCHLORIDE AND ACETAMINOPHEN 500 MG: 500 TABLET ORAL at 09:21

## 2023-04-03 RX ADMIN — INSULIN LISPRO 2 UNITS: 100 INJECTION, SOLUTION INTRAVENOUS; SUBCUTANEOUS at 20:56

## 2023-04-03 RX ADMIN — MORPHINE SULFATE 4 MG: 4 INJECTION INTRAVENOUS at 04:47

## 2023-04-03 RX ADMIN — PANTOPRAZOLE SODIUM 40 MG: 40 TABLET, DELAYED RELEASE ORAL at 09:21

## 2023-04-03 RX ADMIN — POLYETHYLENE GLYCOL 3350 17 G: 17 POWDER, FOR SOLUTION ORAL at 09:22

## 2023-04-03 RX ADMIN — INSULIN LISPRO 2 UNITS: 100 INJECTION, SOLUTION INTRAVENOUS; SUBCUTANEOUS at 12:12

## 2023-04-03 RX ADMIN — ATENOLOL 25 MG: 25 TABLET ORAL at 09:21

## 2023-04-03 RX ADMIN — HYDROCODONE BITARTRATE AND ACETAMINOPHEN 1 TABLET: 7.5; 325 TABLET ORAL at 20:55

## 2023-04-03 RX ADMIN — DOCUSATE SODIUM 100 MG: 100 CAPSULE, LIQUID FILLED ORAL at 20:55

## 2023-04-03 RX ADMIN — RIVAROXABAN 20 MG: 20 TABLET, FILM COATED ORAL at 18:29

## 2023-04-03 RX ADMIN — ATORVASTATIN CALCIUM 40 MG: 40 TABLET, FILM COATED ORAL at 20:55

## 2023-04-03 NOTE — THERAPY EVALUATION
Patient Name: Cisco Frank  : 1940    MRN: [id number]                              Today's Date: 4/3/2023       Admit Date: 4/3/2023    Visit Dx:     ICD-10-CM ICD-9-CM   1. Closed fracture of one rib of right side, initial encounter  S22.31XA 807.01   2. Intractable pain  R52 780.96   3. Pericardial effusion  I31.39 423.9     Patient Active Problem List   Diagnosis   • Essential hypertension   • Type 2 diabetes mellitus with hyperglycemia, without long-term current use of insulin (HCC)   • Coronary artery disease   • Hyperlipidemia   • History of bladder cancer   • Chronic renal insufficiency   • Colon polyps   • Gastroesophageal reflux disease   • Myocardial infarction   • Postviral fatigue syndrome   • Medicare annual wellness visit, subsequent   • Chest pain   • Overweight   • Schroeder's esophagus without dysplasia   • Esophageal polyp   • Irritant contact dermatitis due to detergent   • Bilateral pseudophakia   • 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   • Posterior vitreous detachment   • Persistent vomiting   • Gastroenteritis   • Hemoptysis   • Pericardial effusion   • History of prostate cancer   • Atrial fibrillation   • COVID-19   • Acute kidney injury   • Dehydration   • Orthostatic hypotension   • Closed fracture of one rib of right side, initial encounter     Past Medical History:   Diagnosis Date   • Atrial fibrillation    • Cancer    • Coronary artery disease    • History of bladder cancer    • Hyperlipidemia      Past Surgical History:   Procedure Laterality Date   • BLADDER SURGERY  10/26/2019    [facility] Dr. [facility]   • BRONCHOSCOPY N/A 2022    Procedure: BRONCHOSCOPY with bilateral lung wash;  Surgeon: Ayush Velarde MD;  Location: [facility];  Service: Pulmonary;  Laterality: N/A;  normal bronch   • CARDIAC CATHETERIZATION     • COLONOSCOPY     • COLONOSCOPY W/ POLYPECTOMY  2021   • SKIN CANCER EXCISION      right temple BX   • UPPER  ENDOSCOPIC ULTRASOUND W/ FNA N/A 3/12/2021    Procedure: ESOPHAGOGASTRODUODENOSCOPY WITH endoscopic mucosal resection, biopsy x 1 area, and endoscopic ULTRASOUND;  Surgeon: Abner Infante MD;  Location: [facility];  Service: Gastroenterology;  Laterality: N/A;  post op: hiatal hernia, duodenal polyp      General Information     Row Name 04/03/23 1551          OT Time and Intention    Document Type evaluation  -ES     Mode of Treatment occupational therapy  -ES     Row Name 04/03/23 1551          General Information    Patient Profile Reviewed yes  -ES     Prior Level of Function independent:  -ES     Existing Precautions/Restrictions fall  -ES     Row Name 04/03/23 1551          Occupational Profile    Reason for Services/Referral (Occupational Profile) Pt is an 83 y/o male returning to ED on 4/3/23 c/o uncontrolled rib pain following a fall from second step of a ladder. He was initially evaluated in the ED on 3/31/23 after the fall, chest x-ray showing no acute displaced R rib fx and stable cardiomegaly and was sent home. Upon return home, he reports uncontrolled pain to R flank/rib and difficulty getting around the house. PMHx includes CAD, type 2 diabetes, hyperlipidemia, a-fib on Xarelto. CT of chest showed minimally displaced posterior right 11th rib fracture, small right and trace left pleural effusions, moderate sized pericardial effusion, moderate hiatal hernia. At baseline, pt lives with his spouse in a single level home with flat entry and states that he is typically independent with occassional use of cane.  -ES     Row Name 04/03/23 1551          Living Environment    People in Home spouse  -ES     Row Name 04/03/23 1551          Home Main Entrance    Number of Stairs, Main Entrance none  -ES     Row Name 04/03/23 1551          Stairs Within Home, Primary    Number of Stairs, Within Home, Primary none  -ES     Row Name 04/03/23 1551          Cognition    Orientation Status (Cognition) oriented x  4  Short Blessed Test: 4/28  -     Row Name 04/03/23 1551          Safety Issues, Functional Mobility    Impairments Affecting Function (Mobility) balance;pain  -ES           User Key  (r) = Recorded By, (t) = Taken By, (c) = Cosigned By    Initials Name Provider Type    ES [place] OT Occupational Therapist                 Mobility/ADL's     Row Name 04/03/23 1553          Bed Mobility    Comment, (Bed Mobility) Up in chair upon OT arrival  -     Row Name 04/03/23 1553          Sit-Stand Transfer    Sit-Stand [place] (Transfers) contact guard  -     Row Name 04/03/23 1553          Activities of Daily Living    BADL Assessment/Intervention upper body dressing;lower body dressing;toileting  -     Row Name 04/03/23 1553          Upper Body Dressing Assessment/Training    [place] Level (Upper Body Dressing) supervision  -     Row Name 04/03/23 1553          Lower Body Dressing Assessment/Training    [place] Level (Lower Body Dressing) contact guard assist  -     Row Name 04/03/23 1553          Toileting Assessment/Training    [place] Level (Toileting) set up  -ES           User Key  (r) = Recorded By, (t) = Taken By, (c) = Cosigned By    Initials Name Provider Type    ES [place] OT Occupational Therapist               Obj/Interventions     Row Name 04/03/23 1554          Sensory Assessment (Somatosensory)    Sensory Assessment (Somatosensory) sensation intact  -     Row Name 04/03/23 1554          Vision Assessment/Intervention    Visual Impairment/Limitations corrective lenses for reading  -     Row Name 04/03/23 1554          Range of Motion Comprehensive    General Range of Motion no range of motion deficits identified  -     Row Name 04/03/23 1554          Strength Comprehensive (MMT)    Comment, General Manual Muscle Testing (MMT) Assessment BUE grossly 4/5, limited by Rib pain.  -ES     Row Name 04/03/23 1554          Balance    Balance Assessment  sitting static balance;sitting dynamic balance;standing static balance;standing dynamic balance  -ES     Static Sitting Balance independent  -ES     Dynamic Sitting Balance independent  -ES     Static Standing Balance standby assist  -ES     Dynamic Standing Balance contact guard  -ES     Balance Interventions sitting;standing;static;dynamic  -ES           User Key  (r) = Recorded By, (t) = Taken By, (c) = Cosigned By    Initials Name Provider Type    [place] OT Occupational Therapist               Goals/Plan     Row Name 04/03/23 1602          Bathing Goal 1 (OT)    Activity/Device (Bathing Goal 1, OT) bathing skills, all  -ES     [place] Level/Cues Needed (Bathing Goal 1, OT) modified independence  -ES     Time Frame (Bathing Goal 1, OT) 2 weeks  -ES     Row Name 04/03/23 1602          Toileting Goal 1 (OT)    Activity/Device (Toileting Goal 1, OT) toileting skills, all  -ES     [place] Level/Cues Needed (Toileting Goal 1, OT) independent  -ES     Time Frame (Toileting Goal 1, OT) 2 weeks  -ES     Row Name 04/03/23 1602          Problem Specific Goal 1 (OT)    Problem Specific Goal 1 (OT) Standing activity tolerance > 10 minutes without SpO2 drop  -ES     Time Frame (Problem Specific Goal 1, OT) 2 weeks  -ES     Row Name 04/03/23 1602          Therapy Assessment/Plan (OT)    Planned Therapy Interventions (OT) activity tolerance training;functional balance retraining;BADL retraining;occupation/activity based interventions;patient/caregiver education/training  -ES           User Key  (r) = Recorded By, (t) = Taken By, (c) = Cosigned By    Initials Name Provider Type    Spring [place] OT Occupational Therapist               Clinical Impression     Row Name 04/03/23 1555          Pain Assessment    Pretreatment Pain Rating 3/10  -ES     Posttreatment Pain Rating 3/10  -ES     Pain Location - flank  -ES     Row Name 04/03/23 1557          Plan of Care Review    Plan of Care Reviewed  With patient  -ES     Outcome Evaluation Pt is an 83 y/o male returning to ED on 4/3/23 c/o uncontrolled rib pain following a fall from second step of a ladder. He was initially evaluated in the ED on 3/31/23 after the fall, chest x-ray showing no acute displaced R rib fx and stable cardiomegaly and was sent home. Upon return home, he reports uncontrolled pain to R flank/rib and difficulty getting around the house. PMHx includes CAD, type 2 diabetes, hyperlipidemia, a-fib on Xarelto. CT of chest showed minimally displaced posterior right 11th rib fracture, small right and trace left pleural effusions, moderate sized pericardial effusion, moderate hiatal hernia. At baseline, pt lives with his spouse in a single level home with flat entry and states that he is typically independent with occassional use of cane. Pt up in chair upon OT arrival. Mildly [place], but oriented x4. Scores 4/28 on Short Blessed Test, indicating normal cognition. He demos good ROM, though painful in R ribs with overhead extension. Ambulating in hallway and within room with supervision. Requires CGA for LB ADLs due to pain and impaired balance. Pt appears near baseline, though OT has concerns for activity tolerance.  Will follow up 1x to ensure patient is appropriate for home.  He may benefit from [facility] services at [place].  -ES     Row Name 04/03/23 4748          Therapy Assessment/Plan (OT)    Criteria for Skilled Therapeutic Interventions Met (OT) yes;skilled treatment is necessary  -ES     Therapy Frequency (OT) 3 times/wk  -ES     Predicted Duration of Therapy Intervention (OT) until [place]  -     Row Name 04/03/23 3753          Therapy Plan Review/Discharge Plan (OT)    Anticipated Discharge Disposition (OT) home with home health  -ES     Row Name 04/03/23 0094          Vital Signs    O2 Delivery Pre Treatment room air  -ES     O2 Delivery Intra Treatment room air  -ES     O2 Delivery Post Treatment room air  -ES     Pre Patient Position Sitting  -ES      Intra Patient Position Standing  -ES     Post Patient Position Sitting  -ES     Row Name 04/03/23 1555          Positioning and Restraints    Pre-Treatment Position sitting in chair/recliner  -ES     In Chair notified nsg;sitting;call light within reach;encouraged to call for assist  -ES           User Key  (r) = Recorded By, (t) = Taken By, (c) = Cosigned By    Initials Name Provider Type    [place] OT Occupational Therapist               Outcome Measures     Row Name 04/03/23 1603          How much help from another is currently needed...    Putting on and taking off regular lower body clothing? 3  -ES     Bathing (including washing, rinsing, and drying) 3  -ES     Toileting (which includes using toilet bed pan or urinal) 4  -ES     Putting on and taking off regular upper body clothing 4  -ES     Taking care of personal grooming (such as brushing teeth) 4  -ES     Eating meals 4  -ES     AM-PAC 6 Clicks Score (OT) 22  -ES     Row Name 04/03/23 1037          How much help from another person do you currently need...    Turning from your back to your side while in flat bed without using bedrails? 3  -NS     Moving from lying on back to sitting on the side of a flat bed without bedrails? 3  -NS     Moving to and from a bed to a chair (including a wheelchair)? 3  -NS     Standing up from a chair using your arms (e.g., wheelchair, bedside chair)? 3  -NS     Climbing 3-5 steps with a railing? 3  -NS     To walk in hospital room? 3  -NS     AM-PAC 6 Clicks Score (PT) 18  -NS     Highest level of mobility 6 --> Walked 10 steps or more  -NS     Row Name 04/03/23 1603 04/03/23 [facility]       Functional Assessment    Outcome Measure Options AM-PAC 6 Clicks Daily Activity (OT)  -ES AM-PAC 6 Clicks Basic Mobility (PT)  -NS          User Key  (r) = Recorded By, (t) = Taken By, (c) = Cosigned By    Initials Name Provider Type    [place] OT Occupational Therapist    Stephanie Colby, PT Physical  Therapist                Occupational Therapy Education     Title: PT OT SLP Therapies (In Progress)     Topic: Occupational Therapy (Done)     Point: ADL training (Done)     Description:   Instruct learner(s) on proper safety adaptation and remediation techniques during self care or transfers.   Instruct in proper use of assistive devices.              Learning Progress Summary           Patient Acceptance, E,TB, VU by  at 4/3/2023 1605                   Point: Home exercise program (Done)     Description:   Instruct learner(s) on appropriate technique for monitoring, assisting and/or progressing therapeutic exercises/activities.              Learning Progress Summary           Patient Acceptance, E,TB, VU by  at 4/3/2023 1605                   Point: Precautions (Done)     Description:   Instruct learner(s) on prescribed precautions during self-care and functional transfers.              Learning Progress Summary           Patient Acceptance, E,TB, VU by  at 4/3/2023 1605                   Point: Body mechanics (Done)     Description:   Instruct learner(s) on proper positioning and spine alignment during self-care, functional mobility activities and/or exercises.              Learning Progress Summary           Patient Acceptance, E,TB, VU by  at 4/3/2023 1605                               User Key     Initials Effective Dates Name Provider Type Discipline     06/16/21 -  [place] OT Occupational Therapist OT              OT Recommendation and Plan  Planned Therapy Interventions (OT): activity tolerance training, functional balance retraining, BADL retraining, occupation/activity based interventions, patient/caregiver education/training  Therapy Frequency (OT): 3 times/wk  Plan of Care Review  Plan of Care Reviewed With: patient  Outcome Evaluation: Pt is an 81 y/o male returning to ED on 4/3/23 c/o uncontrolled rib pain following a fall from second step of a ladder. He was initially evaluated  in the ED on 3/31/23 after the fall, chest x-ray showing no acute displaced R rib fx and stable cardiomegaly and was sent home. Upon return home, he reports uncontrolled pain to R flank/rib and difficulty getting around the house. PMHx includes CAD, type 2 diabetes, hyperlipidemia, a-fib on Xarelto. CT of chest showed minimally displaced posterior right 11th rib fracture, small right and trace left pleural effusions, moderate sized pericardial effusion, moderate hiatal hernia. At baseline, pt lives with his spouse in a single level home with flat entry and states that he is typically independent with occassional use of cane. Pt up in chair upon OT arrival. Mildly [place], but oriented x4. Scores 4/28 on Short Blessed Test, indicating normal cognition. He demos good ROM, though painful in R ribs with overhead extension. Ambulating in hallway and within room with supervision. Requires CGA for LB ADLs due to pain and impaired balance. Pt appears near baseline, though OT has concerns for activity tolerance.  Will follow up 1x to ensure patient is appropriate for home.  He may benefit from [facility] services at [place].     Time Calculation:    Time Calculation- OT     Row Name 04/03/23 1604             Time Calculation- OT    OT Start Time 1540  -ES      OT Stop Time 1602  -ES      OT Time Calculation (min) 22 min  -ES      Total Timed Code Minutes- OT 0 minute(s)  -ES      OT Received On 04/03/23  -ES      OT - Next Appointment 04/05/23  -ES      OT Goal Re-Cert Due Date 04/17/23  -ES            User Key  (r) = Recorded By, (t) = Taken By, (c) = Cosigned By    Initials Name Provider Type    ES [place] OT Occupational Therapist              Therapy Charges for Today     Code Description Service Date Service Provider Modifiers Qty    [id number] HC OT EVAL LOW COMPLEXITY 3 4/3/2023 Spring [place] OT GO 1               [place] OT  4/3/2023

## 2023-04-03 NOTE — ED NOTES
Nursing report ED to floor  Cisco Frank  82 y.o.  male    HPI:   Chief Complaint   Patient presents with    Flank Pain       Admitting doctor:   Aman Milton MD    Admitting diagnosis:   The primary encounter diagnosis was Closed fracture of one rib of right side, initial encounter. Diagnoses of Intractable pain and Pericardial effusion were also pertinent to this visit.    Code status:   Current Code Status       Date Active Code Status Order ID Comments User Context       Prior            Allergies:   Patient has no known allergies.    Isolation:  No active isolations     Fall Risk:  Fall Risk Assessment was completed, and patient is at high risk for falls.   Predictive Model Details         15 (Low) Factor Value    Calculated 4/3/2023 06:08 Age 82    Risk of Fall Model Musculoskeletal Assessment WDL     Active Peripheral IV Present     Imaging order in this encounter Present     Respiratory Rate 18     Skin Assessment WDL     Financial Class Other     Magnesium not on file     Drug Use No     Tobacco Use Quit     Jm Scale not on file     Number of Distinct Medication Classes administered 2     Total Bilirubin 1.1 mg/dL     Chloride 98 mmol/L     Peripheral Vascular Assessment WDL     Cardiac Assessment X     Clinically Relevant Sex Not Female     Albumin 3.9 g/dL     Gastrointestinal Assessment WDL     Number of administrations of Analgesic Narcotics 1     Diastolic BP 92     Creatinine 1.18 mg/dL     Potassium 4.8 mmol/L     ALT 10 U/L     Days after Admission 0.099     Calcium 9.5 mg/dL         Weight:       04/03/23  0339   Weight: 87 kg (191 lb 12.8 oz)       Intake and Output  No intake or output data in the 24 hours ending 04/03/23 0722    Diet:        Most recent vitals:   Vitals:    04/03/23 0448 04/03/23 0531 04/03/23 0601 04/03/23 0716   BP: 116/69 131/89 132/92 113/76   BP Location:       Patient Position:       Pulse: 101 96 96 100   Resp: 18 18 18    Temp:       TempSrc:       SpO2: 94% 99%  92% 92%   Weight:       Height:           Active LDAs/IV Access:   Lines, Drains & Airways       Active LDAs       Name Placement date Placement time Site Days    Peripheral IV 04/03/23 0446 Right Antecubital 04/03/23 0446  Antecubital  less than 1                    Skin Condition:   Skin Assessments (last day)       None             Labs (abnormal labs have a star):   Labs Reviewed   COMPREHENSIVE METABOLIC PANEL - Abnormal; Notable for the following components:       Result Value    Glucose 150 (*)     Alkaline Phosphatase 143 (*)     All other components within normal limits    Narrative:     GFR Normal >60  Chronic Kidney Disease <60  Kidney Failure <15    The GFR formula is only valid for adults with stable renal function between ages 18 and 70.   URINALYSIS W/ MICROSCOPIC IF INDICATED (NO CULTURE) - Abnormal; Notable for the following components:    Glucose, UA >=1000 mg/dL (3+) (*)     Blood, UA Trace (*)     Protein, UA 30 mg/dL (1+) (*)     All other components within normal limits   CBC WITH AUTO DIFFERENTIAL - Abnormal; Notable for the following components:    Platelets 134 (*)     Lymphocyte % 13.5 (*)     All other components within normal limits   URINALYSIS, MICROSCOPIC ONLY - Abnormal; Notable for the following components:    RBC, UA 3-5 (*)     WBC, UA 0-2 (*)     All other components within normal limits   LIPASE - Normal   CBC AND DIFFERENTIAL    Narrative:     The following orders were created for panel order CBC & Differential.  Procedure                               Abnormality         Status                     ---------                               -----------         ------                     CBC Auto Differential[[id number]]        Abnormal            Final result                 Please view results for these tests on the individual orders.       LOC: Person, Place, Time, and Situation    Telemetry:  Med/Surg    Cardiac Monitoring Ordered: yes    EKG:   No orders to display        Medications Given in the ED:   Medications   sodium chloride 0.9 % flush 10 mL (has no administration in time range)   morphine injection 4 mg (4 mg Intravenous Given 4/3/23 0447)   ondansetron (ZOFRAN) injection 4 mg (4 mg Intravenous Given 4/3/23 0447)       Imaging results:  CT Abdomen Pelvis Without Contrast    Result Date: 4/3/2023  1.  There is extensive sigmoid diverticulosis without diverticulitis. 2.  No bowel, bowel thickening, appendicitis, obstructive uropathy, pancreatitis, free fluid, fluid collection, focal intraperitoneal inflammatory change, soft tissue mass or lymphadenopathy is demonstrated. 3.  There is a large sliding-type hiatal hernia with the stomach partially position within the posterior mediastinum. 4.  There is moderate amount of pericardial fluid with fluid density measuring near water. 5.  There is a small to moderate right-sided pleural effusion with fluid density measuring near water. 6.  There is associated compressive atelectasis at the right posterior lung base. Thank you for the referral of this patient. This exam was interpreted by an American Board of Radiology certified radiologist with subspecialty fellowship training. If there are any questions regarding this exam please feel free to contact a radiologist directly at [phone]. Slot 70 Electronically signed by:  Nathaniel Skinner M.D.  4/3/2023 3:51 AM Mountain Time    CT Chest Without Contrast Diagnostic    Result Date: 4/3/2023  1. Minimally displaced posterior right 11th rib fracture. 2. Small right and trace left pleural effusions. 3. Moderate-sized pericardial effusion. 4. Moderate hiatal hernia. Electronically signed by:  Kalin Sharp M.D.  4/3/2023 3:43 AM Mountain Time     Social issues:   Social History     Socioeconomic History    Marital status:    Tobacco Use    Smoking status: Former     Types: Cigarettes     Quit date: 1980     Years since quittin.2    Smokeless tobacco: Never   Vaping  Use    Vaping Use: Never used   Substance and Sexual Activity    Alcohol use: Not Currently    Drug use: Never    Sexual activity: Defer       NIH Stroke Scale:  Interval: (not recorded)  1a. Level of Consciousness: (not recorded)  1b. LOC Questions: (not recorded)  1c. LOC Commands: (not recorded)  2. Best Gaze: (not recorded)  3. Visual: (not recorded)  4. Facial Palsy: (not recorded)  5a. Motor Arm, Left: (not recorded)  5b. Motor Arm, Right: (not recorded)  6a. Motor Leg, Left: (not recorded)  6b. Motor Leg, Right: (not recorded)  7. Limb Ataxia: (not recorded)  8. Sensory: (not recorded)  9. Best Language: (not recorded)  10. Dysarthria: (not recorded)  11. Extinction and Inattention (formerly Neglect): (not recorded)    Total (NIH Stroke Scale): (not recorded)     Additional notable assessment information:     Nursing report ED to floor:  Alexis Krishnamurthy LPN   04/03/23 07:22 EDT

## 2023-04-03 NOTE — ED PROVIDER NOTES
Subjective   History of Present Illness  82-year-old male here for right flank pain.  Had fall and seen on Friday.  Had negative rib x-rays and negative CT head.  Was sent home with [place].  State is not controlling his pain.  Been able to move.  Not been able to get around.  States he needs something stronger and to figure out what is going on.  Hurts when he takes a deep breath.    Review of Systems  Positive for right flank and rib pain.  Past Medical History:   Diagnosis Date   • Atrial fibrillation    • Cancer    • Coronary artery disease    • History of bladder cancer    • Hyperlipidemia        No Known Allergies    Past Surgical History:   Procedure Laterality Date   • BLADDER SURGERY  10/26/2019    [facility] Dr. [facility]ope   • BRONCHOSCOPY N/A 6/30/2022    Procedure: BRONCHOSCOPY with bilateral lung wash;  Surgeon: Ayush Velarde MD;  Location: [facility] ENDOSCOPY;  Service: Pulmonary;  Laterality: N/A;  normal bronch   • CARDIAC CATHETERIZATION     • COLONOSCOPY     • COLONOSCOPY W/ POLYPECTOMY  02/23/2021   • SKIN CANCER EXCISION      right temple BX   • UPPER ENDOSCOPIC ULTRASOUND W/ FNA N/A 3/12/2021    Procedure: ESOPHAGOGASTRODUODENOSCOPY WITH endoscopic mucosal resection, biopsy x 1 area, and endoscopic ULTRASOUND;  Surgeon: Abner Infante MD;  Location: [facility] ENDOSCOPY;  Service: Gastroenterology;  Laterality: N/A;  post op: hiatal hernia, duodenal polyp       Family History   Problem Relation Age of Onset   • Heart disease Mother    • Heart attack Father    • No Known Problems Sister    • No Known Problems Brother    • No Known Problems Maternal Aunt    • No Known Problems Maternal Uncle    • No Known Problems Paternal Aunt    • No Known Problems Paternal Uncle    • No Known Problems Maternal Grandmother    • No Known Problems Maternal Grandfather    • No Known Problems Paternal Grandmother    • No Known Problems Paternal Grandfather    • No Known Problems Other    • Anemia Neg Hx    • Arrhythmia Neg Hx    •  "Asthma Neg Hx    • Clotting disorder Neg Hx    • Fainting Neg Hx    • Heart failure Neg Hx    • Hyperlipidemia Neg Hx    • Hypertension Neg Hx        Social History     Socioeconomic History   • Marital status:    Tobacco Use   • Smoking status: Former     Types: Cigarettes     Quit date: 1980     Years since quittin.2   • Smokeless tobacco: Never   Vaping Use   • Vaping Use: Never used   Substance and Sexual Activity   • Alcohol use: Not Currently   • Drug use: Never   • Sexual activity: Defer           Objective   Physical Exam  Constitutional:  No acute distress.  Head:  Atraumatic.  Normocephalic.   Eyes:  No scleral icterus. Normal conjunctivae  ENT:  Moist mucosa.  No nasal discharge present.  Cardiovascular:  Well perfused.  Equal pulses.  Regular rhythm and normal rate.  Normal capillary refill.    Pulmonary/Chest: Mildly diminished breath sounds bilaterally.  Mild splinting.  No tachypnea.  Abdominal:  Nondistended. Nontender.  Ecchymosis over right flank with tenderness to palpation in this area.  Extremities: Bilateral lower extremity edema.  No Deformity  Skin:  Warm, dry  Neurological:  Alert, awake, and appropriate.  Normal speech.      Procedures           ED Course      /92   Pulse 96   Temp 97.3 °F (36.3 °C)   Resp 18   Ht 177.8 cm (70\")   Wt 87 kg (191 lb 12.8 oz)   SpO2 92%   BMI 27.52 kg/m²   Labs Reviewed   COMPREHENSIVE METABOLIC PANEL - Abnormal; Notable for the following components:       Result Value    Glucose 150 (*)     Alkaline Phosphatase 143 (*)     All other components within normal limits    Narrative:     GFR Normal >60  Chronic Kidney Disease <60  Kidney Failure <15    The GFR formula is only valid for adults with stable renal function between ages 18 and 70.   URINALYSIS W/ MICROSCOPIC IF INDICATED (NO CULTURE) - Abnormal; Notable for the following components:    Glucose, UA >=1000 mg/dL (3+) (*)     Blood, UA Trace (*)     Protein, UA 30 mg/dL (1+) " (*)     All other components within normal limits   CBC WITH AUTO DIFFERENTIAL - Abnormal; Notable for the following components:    Platelets 134 (*)     Lymphocyte % 13.5 (*)     All other components within normal limits   URINALYSIS, MICROSCOPIC ONLY - Abnormal; Notable for the following components:    RBC, UA 3-5 (*)     WBC, UA 0-2 (*)     All other components within normal limits   LIPASE - Normal   CBC AND DIFFERENTIAL    Narrative:     The following orders were created for panel order CBC & Differential.  Procedure                               Abnormality         Status                     ---------                               -----------         ------                     CBC Auto Differential[[id number]]        Abnormal            Final result                 Please view results for these tests on the individual orders.     Medications   sodium chloride 0.9 % flush 10 mL (has no administration in time range)   morphine injection 4 mg (4 mg Intravenous Given 4/3/23 0447)   ondansetron (ZOFRAN) injection 4 mg (4 mg Intravenous Given 4/3/23 0447)     CT Abdomen Pelvis Without Contrast    Result Date: 4/3/2023  1.  There is extensive sigmoid diverticulosis without diverticulitis. 2.  No bowel, bowel thickening, appendicitis, obstructive uropathy, pancreatitis, free fluid, fluid collection, focal intraperitoneal inflammatory change, soft tissue mass or lymphadenopathy is demonstrated. 3.  There is a large sliding-type hiatal hernia with the stomach partially position within the posterior mediastinum. 4.  There is moderate amount of pericardial fluid with fluid density measuring near water. 5.  There is a small to moderate right-sided pleural effusion with fluid density measuring near water. 6.  There is associated compressive atelectasis at the right posterior lung base. Thank you for the referral of this patient. This exam was interpreted by an American Board of Radiology certified radiologist with  subspecialty fellowship training. If there are any questions regarding this exam please feel free to contact a radiologist directly at [phone]. Slot 70 Electronically signed by:  Nathaniel Skinner M.D.  4/3/2023 3:51 AM Mountain Time    CT Chest Without Contrast Diagnostic    Result Date: 4/3/2023  1. Minimally displaced posterior right 11th rib fracture. 2. Small right and trace left pleural effusions. 3. Moderate-sized pericardial effusion. 4. Moderate hiatal hernia. Electronically signed by:  Kalin Sharp M.D.  4/3/2023 3:43 AM Mountain Time                                         MDM  My interpretation of CT chest concerning for right 11th rib fracture.  See above for radiology interpretation.    CT with reasonable size pericardial effusion.  Patient states he has been unable to function at home.  When given dose of morphine here patient became hypoxic and had to be placed on oxygen.  Do not believe he would tolerate higher doses of opiates.  Likely needs PT OT and possible placement.  Also given increase in size of pericardial effusion would likely benefit from urgent echocardiogram given the recent fall.      Final diagnoses:   Closed fracture of one rib of right side, initial encounter   Intractable pain   Pericardial effusion       ED Disposition  ED Disposition     ED Disposition   Decision to Admit    Condition   --    Comment   Level of Care: Med/Surg [1]   Admitting Physician: AUDRA ANTOINE [[id number]]   Attending Physician: AUDRA ANTOINE [[id number]]               No follow-up provider specified.       Medication List      No changes were made to your prescriptions during this visit.          Peewee Schmitz MD  04/03/23 0157

## 2023-04-03 NOTE — PLAN OF CARE
Goal Outcome Evaluation:  Plan of Care Reviewed With: patient           Outcome Evaluation: Pt is an 81 y/o male returning to ED on 4/3/23 c/o uncontrolled rib pain following a fall from second step of a ladder. He was initially evaluated in the ED on 3/31/23 after the fall, chest x-ray showing no acute displaced R rib fx and stable cardiomegaly and was sent home. Upon return home, he reports uncontrolled pain to R flank/rib and difficulty getting around the house. PMHx includes CAD, type 2 diabetes, hyperlipidemia, a-fib on Xarelto. CT of chest showed minimally displaced posterior right 11th rib fracture, small right and trace left pleural effusions, moderate sized pericardial effusion, moderate hiatal hernia. At baseline, pt lives with his spouse in a single level home with flat entry and states that he is typically independent with occassional use of cane. Pt up in chair upon OT arrival. Mildly [place], but oriented x4. Scores 4/28 on Short Blessed Test, indicating normal cognition. He demos good ROM, though painful in R ribs with overhead extension. Ambulating in hallway and within room with supervision. Requires CGA for LB ADLs due to pain and impaired balance. Pt appears near baseline, though OT has concerns for activity tolerance.  Will follow up 1x to ensure patient is appropriate for home.  He may benefit from [facility] services at [place].

## 2023-04-03 NOTE — THERAPY EVALUATION
Patient Name: Cisco Farnk  : 1940    MRN: [id number]                              Today's Date: 4/3/2023       Admit Date: 4/3/2023    Visit Dx:     ICD-10-CM ICD-9-CM   1. Closed fracture of one rib of right side, initial encounter  S22.31XA 807.01   2. Intractable pain  R52 780.96   3. Pericardial effusion  I31.39 423.9     Patient Active Problem List   Diagnosis   • Essential hypertension   • Type 2 diabetes mellitus with hyperglycemia, without long-term current use of insulin (HCC)   • Coronary artery disease   • Hyperlipidemia   • History of bladder cancer   • Chronic renal insufficiency   • Colon polyps   • Gastroesophageal reflux disease   • Myocardial infarction   • Postviral fatigue syndrome   • Medicare annual wellness visit, subsequent   • Chest pain   • Overweight   • Schroeder's esophagus without dysplasia   • Esophageal polyp   • Irritant contact dermatitis due to detergent   • Bilateral pseudophakia   • 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 Dr. Cope   • BRONCHOSCOPY N/A 2022    Procedure: BRONCHOSCOPY with bilateral lung wash;  Surgeon: Ayush Velarde MD;  Location: [facility];  Service: Pulmonary;  Laterality: N/A;  normal bronch   • CARDIAC CATHETERIZATION     • COLONOSCOPY     • COLONOSCOPY W/ POLYPECTOMY  2021   • SKIN CANCER EXCISION      right temple BX   • UPPER  ENDOSCOPIC ULTRASOUND W/ FNA N/A 3/12/2021    Procedure: ESOPHAGOGASTRODUODENOSCOPY WITH endoscopic mucosal resection, biopsy x 1 area, and endoscopic ULTRASOUND;  Surgeon: Abner Infante MD;  Location: [facility] ENDOSCOPY;  Service: Gastroenterology;  Laterality: N/A;  post op: hiatal hernia, duodenal polyp      General Information     Row Name 04/03/23 1027          Physical Therapy Time and Intention    Document Type evaluation  -NS     Mode of Treatment individual therapy;physical therapy  -NS     Row Name 04/03/23 1027          General Information    Patient Profile Reviewed yes  -NS     Prior Level of Function independent:  occassional use of cane  -NS     Existing Precautions/Restrictions fall  -NS     Barriers to Rehab none identified  -NS     Row Name 04/03/23 1027          Living Environment    People in Home spouse  -NS     Row Name 04/03/23 1027          Home Main Entrance    Number of Stairs, Main Entrance none  -NS     Row Name 04/03/23 1027          Stairs Within Home, Primary    Number of Stairs, Within Home, Primary none  -NS     Row Name 04/03/23 1027          Cognition    Orientation Status (Cognition) oriented x 4  -NS     Row Name 04/03/23 1027          Safety Issues, Functional Mobility    Impairments Affecting Function (Mobility) balance;pain  -NS           User Key  (r) = Recorded By, (t) = Taken By, (c) = Cosigned By    Initials Name Provider Type    NS Stephanie Cassidy, PT Physical Therapist               Mobility     Row Name 04/03/23 1027          Bed Mobility    Bed Mobility supine-sit  -NS     Supine-Sit [place] (Bed Mobility) minimum assist (75% patient effort)  -NS     Assistive Device (Bed Mobility) bed rails;head of bed elevated  -NS     Row Name 04/03/23 1027          Sit-Stand Transfer    Sit-Stand [place] (Transfers) minimum assist (75% patient effort)  -NS     Row Name 04/03/23 1027          Gait/Stairs (Locomotion)    [place] Level (Gait) standby assist  -NS      Distance in Feet (Gait) 200  -NS           User Key  (r) = Recorded By, (t) = Taken By, (c) = Cosigned By    Initials Name Provider Type    Stephanie Colby PT Physical Therapist               Obj/Interventions     Row Name 04/03/23 1028          Range of Motion Comprehensive    General Range of Motion no range of motion deficits identified  -NS     Row Name 04/03/23 1028          Strength Comprehensive (MMT)    Comment, General Manual Muscle Testing (MMT) Assessment BLE grossly 4/5  -NS     Row Name 04/03/23 1028          Balance    Balance Assessment sitting static balance;sitting dynamic balance;sit to stand dynamic balance;standing static balance;standing dynamic balance  -NS     Static Sitting Balance independent  -NS     Dynamic Sitting Balance independent  -NS     Position, Sitting Balance unsupported;sitting edge of bed  -NS     Sit to Stand Dynamic Balance minimal assist  -NS     Static Standing Balance standby assist  -NS     Dynamic Standing Balance standby assist  -NS     Position/Device Used, Standing Balance unsupported  -NS           User Key  (r) = Recorded By, (t) = Taken By, (c) = Cosigned By    Initials Name Provider Type    Stephanie Colby PT Physical Therapist               Goals/Plan     Row Name 04/03/23 1036          Bed Mobility Goal 1 (PT)    Activity/Assistive Device (Bed Mobility Goal 1, PT) bed mobility activities, all  -NS     [place] Level/Cues Needed (Bed Mobility Goal 1, PT) modified independence  -NS     Time Frame (Bed Mobility Goal 1, PT) long term goal (LTG);2 weeks  -NS     Row Name 04/03/23 1036          Transfer Goal 1 (PT)    Activity/Assistive Device (Transfer Goal 1, PT) transfers, all;cane, straight  -NS     [place] Level/Cues Needed (Transfer Goal 1, PT) modified independence  -NS     Time Frame (Transfer Goal 1, PT) long term goal (LTG);2 weeks  -NS     Row Name 04/03/23 1036          Gait Training Goal 1 (PT)    Activity/Assistive Device (Gait  Training Goal 1, PT) gait (walking locomotion);cane, straight  -NS     [place] Level (Gait Training Goal 1, PT) modified independence  -NS     Distance (Gait Training Goal 1, PT) 300  -NS     Time Frame (Gait Training Goal 1, PT) long term goal (LTG);2 weeks  -NS     Row Name 04/03/23 1036          Therapy Assessment/Plan (PT)    Planned Therapy Interventions (PT) balance training;bed mobility training;gait training;home exercise program;patient/family education;strengthening;transfer training  -NS           User Key  (r) = Recorded By, (t) = Taken By, (c) = Cosigned By    Initials Name Provider Type    NS Stephanie Cassidy, PT Physical Therapist               Clinical Impression     Row Name 04/03/23 1029          Pain    Pretreatment Pain Rating 3/10  -NS     Posttreatment Pain Rating 4/10  -NS     Pain Location - Side/Orientation Right  -NS     Pain Location - flank  -NS     Pain Intervention(s) Distraction;Repositioned;Ambulation/increased activity  -NS     Row Name 04/03/23 1029          Plan of Care Review    Plan of Care Reviewed With patient  -NS     Outcome Evaluation Pt is an 83 y/o male who presents with c/o uncontrolled rib pain following a fall from second step of a ladder. He was initially evaluated in the ED on Friday after the fall, chest x-ray showing no acute displaced R rib fx and stable cardiomegaly and was sent home. Upon return home, he reports uncontrolled pain to R flank/rib and difficulty getting around the house. PMHx includes CAD, type 2 diabetes, hyperlipidemia, a-fib on Xarelto. CT of chest showed minimally displaced posterior right 11th rib fracture, small right and trace left pleural effusions, moderate sized pericardial effusion, moderate hiatal hernia. At baseline, pt lives with his spouse in a single level home with flat entry and states that he is typically independent with occassional use of cane. Pt currently appears to be functioning slightly below baseline secondary to  increased R flank pain with transitional movements. He requires min A during supine to sit transition and min A for sit to stand transfer. Pt ambulates approx 200 ft with SBA. He demonstrates good strength and activity tolerance. He may benefit from continued skilled PT while in current setting. Recommend d/c home with assist when medically appropriate.  -NS     Row Name 04/03/23 1029          Therapy Assessment/Plan (PT)    Criteria for Skilled Interventions Met (PT) yes;meets criteria;skilled treatment is necessary  -NS     Therapy Frequency (PT) 3 times/wk  -NS     Predicted Duration of Therapy Intervention (PT) until d/c  -NS     Row Name 04/03/23 1029          Vital Signs    Pre Patient Position Supine  -NS     Post Patient Position Sitting  -NS     Row Name 04/03/23 1029          Positioning and Restraints    Pre-Treatment Position in bed  -NS     Post Treatment Position chair  -NS     In Chair notified nsg;reclined;sitting;legs elevated;call light within reach;encouraged to call for assist  -NS           User Key  (r) = Recorded By, (t) = Taken By, (c) = Cosigned By    Initials Name Provider Type    Stephanie Colby, PT Physical Therapist               Outcome Measures     Row Name 04/03/23 1037          How much help from another person do you currently need...    Turning from your back to your side while in flat bed without using bedrails? 3  -NS     Moving from lying on back to sitting on the side of a flat bed without bedrails? 3  -NS     Moving to and from a bed to a chair (including a wheelchair)? 3  -NS     Standing up from a chair using your arms (e.g., wheelchair, bedside chair)? 3  -NS     Climbing 3-5 steps with a railing? 3  -NS     To walk in hospital room? 3  -NS     AM-PAC 6 Clicks Score (PT) 18  -NS     Highest level of mobility 6 --> Walked 10 steps or more  -NS     Row Name 04/03/23 1037          Functional Assessment    Outcome Measure Options AM-PAC 6 Clicks Basic Mobility (PT)  -NS            User Key  (r) = Recorded By, (t) = Taken By, (c) = Cosigned By    Initials Name Provider Type    Stephanie Colby PT Physical Therapist                             Physical Therapy Education     Title: PT OT SLP Therapies (In Progress)     Topic: Physical Therapy (In Progress)     Point: Mobility training (Done)     Learning Progress Summary           Patient Acceptance, E, VU by NS at 4/3/2023 1037                   Point: Home exercise program (Not Started)     Learner Progress:  Not documented in this visit.          Point: Body mechanics (Not Started)     Learner Progress:  Not documented in this visit.          Point: Precautions (Not Started)     Learner Progress:  Not documented in this visit.                      User Key     Initials Effective Dates Name Provider Type Discipline    NS 06/30/22 -  Stephanie Cassidy PT Physical Therapist PT              PT Recommendation and Plan  Planned Therapy Interventions (PT): balance training, bed mobility training, gait training, home exercise program, patient/family education, strengthening, transfer training  Plan of Care Reviewed With: patient  Outcome Evaluation: Pt is an 83 y/o male who presents with c/o uncontrolled rib pain following a fall from second step of a ladder. He was initially evaluated in the ED on Friday after the fall, chest x-ray showing no acute displaced R rib fx and stable cardiomegaly and was sent home. Upon return home, he reports uncontrolled pain to R flank/rib and difficulty getting around the house. PMHx includes CAD, type 2 diabetes, hyperlipidemia, a-fib on Xarelto. CT of chest showed minimally displaced posterior right 11th rib fracture, small right and trace left pleural effusions, moderate sized pericardial effusion, moderate hiatal hernia. At baseline, pt lives with his spouse in a single level home with flat entry and states that he is typically independent with occassional use of cane. Pt currently appears to be  functioning slightly below baseline secondary to increased R flank pain with transitional movements. He requires min A during supine to sit transition and min A for sit to stand transfer. Pt ambulates approx 200 ft with SBA. He demonstrates good strength and activity tolerance. He may benefit from continued skilled PT while in current setting. Recommend d/c home with assist when medically appropriate.     Time Calculation:    PT Charges     Row Name 04/03/23 1038             Time Calculation    Start Time 0930  -NS      Stop Time 0953  -NS      Time Calculation (min) 23 min  -NS      PT Received On 04/03/23  -NS      PT - Next Appointment 04/05/23  -NS      PT Goal Re-Cert Due Date 04/17/23  -NS         Time Calculation- PT    Total Timed Code Minutes- PT 0 minute(s)  -NS            User Key  (r) = Recorded By, (t) = Taken By, (c) = Cosigned By    Initials Name Provider Type    Stephanie Colby, PT Physical Therapist              Therapy Charges for Today     Code Description Service Date Service Provider Modifiers Qty    [id number] HC PT EVAL MOD COMPLEXITY 4 4/3/2023 Stephanie Cassidy, PT GP 1          PT G-Codes  Outcome Measure Options: AM-PAC 6 Clicks Basic Mobility (PT)  AM-PAC 6 Clicks Score (PT): 18  PT Discharge Summary  Anticipated Discharge Disposition (PT): home with assist    Stephanie Cassidy PT  4/3/2023

## 2023-04-03 NOTE — Clinical Note
Level of Care: Med/Surg [1]   Admitting Physician: AUDRA ANTOINE [[id number]]   Attending Physician: AUDRA ANTOINE [[id number]]

## 2023-04-03 NOTE — H&P
[facility] Medicine Services  History & Physical    Patient Name: Cisco Frank  : 1940  MRN: [id number]  Primary Care Physician:  Nuno Patton MD  Date of admission: 4/3/2023  Date and Time of Service: 2023    Subjective      Chief Complaint: Rib pain     History of Present Illness: Cisco Frank is a 82 y.o. male with history of CAD, type 2 diabetes, hyperlipidemia, A-fib currently on Xarelto who presented to [facility] on 4/3/2023 complaining of uncontrolled rib pain.  Patient reports fall from second step of ladder on Friday due to losing his footing.  He reports seeing his head on the stove door but denies loss of consciousness.  Patient was initially evaluated in the ED on Friday after the fall.  A chest x-ray was completed and showed no acute displaced right rib fracture and stable cardiomegaly.  He also had a CT of the head that showed generalized atrophy with mild periventricular ischemic changes.  No acute intracranial findings.  Patient was subsequently discharged home with prescription for hydrocodone and Zofran.  Today patient reports right-sided rib and flank pain uncontrolled with medications at home.  He states pain is worse with movement and deep breathing.  He is having difficulty getting around the house even when using his cane.  Additionally he reports constipation since starting the pain medication on Friday.  Wife reports that he has been taking MiraLAX at home without relief but believes he may have not been taking enough of the medicine.     In the ED, CT of chest w/o showed minimally displaced posterior right 11th rib fracture.  Small right and trace left pleural effusions.  Moderate sized pericardial effusion.  Moderate hiatal hernia.  CT abdomen/pelvis showed, extensive sigmoid diverticulosis without diverticulitis.  No bowel, bowel thickening, appendicitis, obstructive uropathy, pancreatitis, free fluid, fluid collection, focal intraperitoneal  inflammatory changes, soft tissue mass or lymphadenopathy is demonstrated.  There is a large sliding type hiatal hernia within the stomach partially position within the posterior menaced diam.  There is moderate amount of pericardial fluid with fluid density measuring near water.  There is a small to moderate right-sided pleural effusion with fluid density measuring near water.  There is associated compression atelectasis at the right posterior lung base.  Vital signs on admission temp 97.3 pulse 96, respirations 18, /92, SPO2 94% on room air. All labs unremarkable except glucose 150, alkaline phosphate 143, platelets 134.  UA showed +3 glucose, trace blood, +1 protein, 3-5 RBC. Patient received IV morphine and Zofran in ED. ED provider reports after patient was given IV morphine he became hypoxic and was requiring supplemental O2 via nasal cannula.  Hospitalist was contacted to admit patient for further care and management.       12 point ROS reviewed and negative except as mentioned above.      Personal History     Past Medical History:   Diagnosis Date   • Atrial fibrillation    • Cancer    • Coronary artery disease    • History of bladder cancer    • Hyperlipidemia        Past Surgical History:   Procedure Laterality Date   • BLADDER SURGERY  10/26/2019    [facility] Dr[facility]   • BRONCHOSCOPY N/A 6/30/2022    Procedure: BRONCHOSCOPY with bilateral lung wash;  Surgeon: Ayush Velarde MD;  Location: [facility] ENDOSCOPY;  Service: Pulmonary;  Laterality: N/A;  normal bronch   • CARDIAC CATHETERIZATION     • COLONOSCOPY     • COLONOSCOPY W/ POLYPECTOMY  02/23/2021   • SKIN CANCER EXCISION      right temple BX   • UPPER ENDOSCOPIC ULTRASOUND W/ FNA N/A 3/12/2021    Procedure: ESOPHAGOGASTRODUODENOSCOPY WITH endoscopic mucosal resection, biopsy x 1 area, and endoscopic ULTRASOUND;  Surgeon: Abner Infante MD;  Location: [facility] ENDOSCOPY;  Service: Gastroenterology;  Laterality: N/A;  post op: hiatal hernia, duodenal  polyp       Family History: family history includes Heart attack in his father; Heart disease in his mother; No Known Problems in his brother, maternal aunt, maternal grandfather, maternal grandmother, maternal uncle, paternal aunt, paternal grandfather, paternal grandmother, paternal uncle, sister, and another family member. Otherwise pertinent FHx was reviewed and not pertinent to current issue.    Social History:  reports that he quit smoking about 43 years ago. His smoking use included cigarettes. He has never used smokeless tobacco. He reports that he does not currently use alcohol. He reports that he does not use drugs.    Home Medications:  Prior to Admission Medications     Prescriptions Last Dose Informant Patient Reported? Taking?    atenolol (TENORMIN) 25 MG tablet   No No    Take 1 tablet by mouth Daily.    atorvastatin (LIPITOR) 40 MG tablet   No No    Take 1 tablet by mouth every night at bedtime.    benzonatate (TESSALON) 100 MG capsule   No No    Take 1 capsule by mouth 3 (Three) Times a Day As Needed for Cough.    colestipol (COLESTID) 1 g tablet   Yes No    Take 1 tablet by mouth 2 (Two) Times a Day.    fluticasone (FLONASE) 50 MCG/ACT nasal spray   No No    2 sprays into the nostril(s) as directed by provider Daily.    glimepiride (AMARYL) 2 MG tablet   No No    TAKE 3 TABLETS EVERY MORNING BEFORE BREAKFAST    HYDROcodone-acetaminophen (Norco) 5-325 MG per tablet   No No    Take 1 tablet by mouth Every 6 (Six) Hours As Needed for Moderate Pain.    mupirocin (BACTROBAN) 2 % ointment   No No    Apply 1 application topically to the appropriate area as directed 3 (Three) Times a Day.    omeprazole-sodium bicarbonate (ZEGERID)  MG per capsule  Self Yes No    Take 1 capsule by mouth Daily. prn    ondansetron ODT (ZOFRAN-ODT) 4 MG disintegrating tablet   No No    Place 1 tablet on the tongue 4 (Four) Times a Day As Needed for Nausea or Vomiting.    rivaroxaban (XARELTO) 20 MG tablet   No No     Take 1 tablet by mouth Daily With Dinner for 30 days. Indications: Atrial Fibrillation    Tradjenta 5 MG tablet tablet   No No    TAKE 1 TABLET BY MOUTH DAILY FOR 30 DAYS.    vitamin C (ASCORBIC ACID) 500 MG tablet  Self Yes No    Take 1 tablet by mouth Daily.            Allergies:  No Known Allergies    Objective      Vitals:   Temp:  [97.3 °F (36.3 °C)] 97.3 °F (36.3 °C)  Heart Rate:  [] 100  Resp:  [17-18] 18  BP: (113-132)/(69-97) 113/76    Physical Exam  Vitals reviewed.   Constitutional:       Appearance: Normal appearance. He is obese.   HENT:      Head: Normocephalic and atraumatic.      Nose: Nose normal.      Mouth/Throat:      Mouth: Mucous membranes are moist.      Pharynx: Oropharynx is clear.   Eyes:      Extraocular Movements: Extraocular movements intact.      Conjunctiva/sclera: Conjunctivae normal.      Pupils: Pupils are equal, round, and reactive to light.   Cardiovascular:      Rate and Rhythm: Normal rate and regular rhythm.      Pulses: Normal pulses.      Heart sounds: Normal heart sounds.   Pulmonary:      Effort: Pulmonary effort is normal. No respiratory distress.      Breath sounds: Normal breath sounds.   Abdominal:      General: Abdomen is flat. Bowel sounds are normal.      Palpations: Abdomen is soft.   Musculoskeletal:         General: Normal range of motion.      Cervical back: Normal range of motion and neck supple.      Right lower leg: No edema.      Left lower leg: No edema.   Skin:     General: Skin is warm and dry.      Capillary Refill: Capillary refill takes less than 2 seconds.      Findings: Bruising present.             Comments: Bruising    Neurological:      General: No focal deficit present.      Mental Status: He is alert and oriented to person, place, and time.   Psychiatric:         Mood and Affect: Mood normal.         Behavior: Behavior normal.          Result Review    Result Review:  I have personally reviewed the results from the time of this admission to  4/3/2023 07:30 EDT and agree with these findings:  [x]  Laboratory  [x]  Microbiology  [x]  Radiology  [x]  EKG/Telemetry   [x]  Cardiology/Vascular   []  Pathology  [x]  Old records  []  Other:  Most notable findings include:  In the ED, CT of chest w/o showed minimally displaced posterior right 11th rib fracture.  Small right and trace left pleural effusions.  Moderate sized pericardial effusion.  Moderate hiatal hernia.  CT abdomen/pelvis showed, extensive sigmoid diverticulosis without diverticulitis.  No bowel, bowel thickening, appendicitis, obstructive uropathy, pancreatitis, free fluid, fluid collection, focal intraperitoneal inflammatory changes, soft tissue mass or lymphadenopathy is demonstrated.  There is a large sliding type hiatal hernia within the stomach partially position within the posterior menaced diam.  There is moderate amount of pericardial fluid with fluid density measuring near water.  There is a small to moderate right-sided pleural effusion with fluid density measuring near water.  There is associated compression atelectasis at the right posterior lung base.  Vital signs on admission temp 97.3 pulse 96, respirations 18, /92, SPO2 94% on room air. All labs unremarkable except glucose 150, alkaline phosphate 143, platelets 134.  UA showed +3 glucose, trace blood, +1 protein, 3-5 RBC. Patient received IV morphine and Zofran in ED. Hospitalist was contacted to admit patient for further care and management.     CBC:      Lab 04/03/23  0447   WBC 6.10   HEMOGLOBIN 14.0   HEMATOCRIT 42.4   PLATELETS 134*   NEUTROS ABS 4.60   LYMPHS ABS 0.80   MONOS ABS 0.50   EOS ABS 0.10   MCV 89.6     CMP:        Lab 04/03/23  0447   SODIUM 137   POTASSIUM 4.8   CHLORIDE 98   CO2 29.0   ANION GAP 10.0   BUN 22   CREATININE 1.18   EGFR 62.4   GLUCOSE 150*   CALCIUM 9.5   TOTAL PROTEIN 7.6   ALBUMIN 3.9   GLOBULIN 3.7   ALT (SGPT) 10   AST (SGOT) 16   BILIRUBIN 1.1   ALK PHOS 143*   LIPASE 22        Assessment & Plan        Active Hospital Problems:  Active Hospital Problems    Diagnosis    • **Closed fracture of one rib of right side, initial encounter      Plan:     Minimally displaced posterior right 11th rib fracture  -CT showed a minimally displaced posterior right 11th rib fracture.  Small right and trace left pleural effusions.  -Pain not well controlled at home  -Pain and antiemetic medication as needed  -Tessalon as needed  -IS    Acute hypoxia  -Secondary to administration of pain medication >> resume pain medication at lower dosing monitor for any adverse side effects.   -Monitor SPO2 continuously  -Titrate supplemental O2 to maintain SPO2 92% to 95%    Pericardial effusion  -CT showed moderate-sized pericardial effusion >> 2D echo (7/24/2022) showed a small less than 1 cm pericardial effusion and EF of 56 - 60%  -Echo ordered  -proBNP ordered  -Consider cardiology consult pending results of echo  -Cardiac monitoring    Acute constipation  -CT abdomen pelvis showed extensive sigmoid diverticulosis without diverticulitis  -Likely secondary to pain medication  -Colace 2 times a day and MiraLAX daily    Thrombocytopenia  -Platelets 134 (platelets 126 on 2/25/2023)  -PT/INR, vitamin B12 ordered  -Consider ultrasound of liver and spleen  -Monitor platelet levels  -Continue Xarelto as benefits outweigh risk at this time    Essential hypertension  -Continue atenolol  -Monitor BP    Type 2 diabetes  -Glucose 150 on admission  -Hemoglobin A1c ordered  -Hold home medication  -Start SSI  -Glucose checks ACHS     CAD  Hyperlipidemia  -Lipid panel ordered  -Continue statin    Paroxysmal atrial fibrillation  -Continue Xarelto    DVT prophylaxis: Xarelto  No DVT prophylaxis order currently exists.    CODE STATUS:       Admission Status:  I believe this patient meets observation status.    I discussed the patient's findings and my recommendations with patient.      Signature: Electronically signed by Priti GENTILE  RIAN Blackmon, 04/03/23, 07:30 EDT.  [facility]ist Team

## 2023-04-03 NOTE — CASE MANAGEMENT/SOCIAL WORK
Discharge Planning Assessment  [facility]     Patient Name: Cisco Frank  MRN: [id number]  Today's Date: 4/3/2023    Admit Date: 4/3/2023    Plan: Home with family.  Wife can transport at dc . pending clinical course.   Discharge Needs Assessment     Row Name 04/03/23 1300       Living Environment    People in Home spouse    Name(s) of People in Home luis King    Current Living Arrangements home    Primary Care Provided by self    Provides Primary Care For no one    Family Caregiver if Needed spouse    Family Caregiver Names luis King    Quality of Family Relationships supportive;involved;helpful    Able to Return to Prior Arrangements yes       Resource/Environmental Concerns    Resource/Environmental Concerns none    Transportation Concerns none       Transition Planning    Patient/Family Anticipates Transition to home with family    Patient/Family Anticipated Services at Transition none    Transportation Anticipated family or friend will provide       Discharge Needs Assessment    Readmission Within the Last 30 Days no previous admission in last 30 days    Equipment Currently Used at Home glucometer    Concerns to be Addressed care coordination/care conferences;discharge planning    Anticipated Changes Related to Illness none    Equipment Needed After Discharge none    Provided Post Acute Provider List? N/A    N/A Provider List Comment denies dc needs               Discharge Plan     Row Name 04/03/23 1300       Plan    Plan Home with family.  Wife can transport at dc . pending clinical course.    Patient/Family in Agreement with Plan yes    Plan Comments Met with patient at bedside.  Confirmed PCP and pharmacy.  Lives at home with spouse, who is able to transport patient at discharge.   Denies dc needs at this time.  Dc barriers: ECHO, PT and OT eval.              Continued Care and Services - Admitted Since 4/3/2023    Coordination has not been started for this encounter.       Expected Discharge Date and Time      Expected Discharge Date Expected Discharge Time    Apr 4, 2023          Demographic Summary     Row Name 04/03/23 1259       General Information    Admission Type observation    Arrived From emergency department    Referral Source admission list    Reason for Consult discharge planning    Preferred Language English       Contact Information    Permission Granted to Share Info With                Functional Status     Row Name 04/03/23 1259       Functional Status    Usual Activity Tolerance good    Current Activity Tolerance good       Functional Status, IADL    Medications independent    Meal Preparation independent    Housekeeping independent    Laundry independent    Shopping independent       Mental Status    General Appearance WDL WDL       Mental Status Summary    Recent Changes in Mental Status/Cognitive Functioning no changes               Spring Mcmahon, CHUCKY   Met with patient in room wearing PPE: mask, face shield/goggles, gloves, gown.      Maintained distance greater than six feet and spent less than 15 minutes in the room.

## 2023-04-03 NOTE — PLAN OF CARE
Goal Outcome Evaluation:  Plan of Care Reviewed With: patient           Outcome Evaluation: Pt is an 83 y/o male who presents with c/o uncontrolled rib pain following a fall from second step of a ladder. He was initially evaluated in the ED on Friday after the fall, chest x-ray showing no acute displaced R rib fx and stable cardiomegaly and was sent home. Upon return home, he reports uncontrolled pain to R flank/rib and difficulty getting around the house. PMHx includes CAD, type 2 diabetes, hyperlipidemia, a-fib on Xarelto. CT of chest showed minimally displaced posterior right 11th rib fracture, small right and trace left pleural effusions, moderate sized pericardial effusion, moderate hiatal hernia. At baseline, pt lives with his spouse in a single level home with flat entry and states that he is typically independent with occassional use of cane. Pt currently appears to be functioning slightly below baseline secondary to increased R flank pain with transitional movements. He requires min A during supine to sit transition and min A for sit to stand transfer. Pt ambulates approx 200 ft with SBA. He demonstrates good strength and activity tolerance. He may benefit from continued skilled PT while in current setting. Recommend d/c home with assist when medically appropriate.

## 2023-04-04 ENCOUNTER — READMISSION MANAGEMENT (OUTPATIENT)
Dept: CALL CENTER | Facility: HOSPITAL | Age: 83
End: 2023-04-04
Payer: MEDICARE

## 2023-04-04 ENCOUNTER — APPOINTMENT (OUTPATIENT)
Dept: CARDIOLOGY | Facility: HOSPITAL | Age: 83
End: 2023-04-04
Payer: MEDICARE

## 2023-04-04 VITALS
DIASTOLIC BLOOD PRESSURE: 76 MMHG | BODY MASS INDEX: 25.62 KG/M2 | WEIGHT: 179 LBS | TEMPERATURE: 97.6 F | HEIGHT: 70 IN | HEART RATE: 79 BPM | SYSTOLIC BLOOD PRESSURE: 110 MMHG | OXYGEN SATURATION: 98 % | RESPIRATION RATE: 15 BRPM

## 2023-04-04 LAB
ANION GAP SERPL CALCULATED.3IONS-SCNC: 9 MMOL/L (ref 5–15)
BASOPHILS # BLD AUTO: 0 10*3/MM3 (ref 0–0.2)
BASOPHILS NFR BLD AUTO: 0.7 % (ref 0–1.5)
BH CV ECHO MEAS - ACS: 1.81 CM
BH CV ECHO MEAS - AO MAX PG: 3.2 MMHG
BH CV ECHO MEAS - AO MEAN PG: 1.73 MMHG
BH CV ECHO MEAS - AO ROOT DIAM: 3.2 CM
BH CV ECHO MEAS - AO V2 MAX: 89.4 CM/SEC
BH CV ECHO MEAS - AO V2 VTI: 17.3 CM
BH CV ECHO MEAS - AVA(I,D): 2.28 CM2
BH CV ECHO MEAS - EDV(CUBED): 100 ML
BH CV ECHO MEAS - EDV(MOD-SP4): 70.8 ML
BH CV ECHO MEAS - EF(MOD-SP4): 46.5 %
BH CV ECHO MEAS - ESV(CUBED): 44.9 ML
BH CV ECHO MEAS - ESV(MOD-SP4): 37.8 ML
BH CV ECHO MEAS - FS: 23.4 %
BH CV ECHO MEAS - IVS/LVPW: 1.13 CM
BH CV ECHO MEAS - IVSD: 1.07 CM
BH CV ECHO MEAS - LA DIMENSION: 4.3 CM
BH CV ECHO MEAS - LV DIASTOLIC VOL/BSA (35-75): 35.5 CM2
BH CV ECHO MEAS - LV MASS(C)D: 163 GRAMS
BH CV ECHO MEAS - LV MAX PG: 2.03 MMHG
BH CV ECHO MEAS - LV MEAN PG: 1.15 MMHG
BH CV ECHO MEAS - LV SYSTOLIC VOL/BSA (12-30): 19 CM2
BH CV ECHO MEAS - LV V1 MAX: 71.2 CM/SEC
BH CV ECHO MEAS - LV V1 VTI: 11.4 CM
BH CV ECHO MEAS - LVIDD: 4.6 CM
BH CV ECHO MEAS - LVIDS: 3.6 CM
BH CV ECHO MEAS - LVOT AREA: 3.5 CM2
BH CV ECHO MEAS - LVOT DIAM: 2.1 CM
BH CV ECHO MEAS - LVPWD: 0.95 CM
BH CV ECHO MEAS - MV A MAX VEL: 0.44 CM/SEC
BH CV ECHO MEAS - MV DEC SLOPE: 587.3 CM/SEC2
BH CV ECHO MEAS - MV DEC TIME: 0.13 MSEC
BH CV ECHO MEAS - MV E MAX VEL: 78.8 CM/SEC
BH CV ECHO MEAS - MV E/A: 180.3
BH CV ECHO MEAS - MV MAX PG: 2.39 MMHG
BH CV ECHO MEAS - MV MEAN PG: 1.04 MMHG
BH CV ECHO MEAS - MV V2 VTI: 13 CM
BH CV ECHO MEAS - MVA(VTI): 3 CM2
BH CV ECHO MEAS - PA ACC TIME: 0.08 SEC
BH CV ECHO MEAS - PA PR(ACCEL): 43.2 MMHG
BH CV ECHO MEAS - PA V2 MAX: 76.8 CM/SEC
BH CV ECHO MEAS - RAP SYSTOLE: 8 MMHG
BH CV ECHO MEAS - RV MAX PG: 0.88 MMHG
BH CV ECHO MEAS - RV V1 MAX: 46.8 CM/SEC
BH CV ECHO MEAS - RV V1 VTI: 7.3 CM
BH CV ECHO MEAS - RVDD: 2.9 CM
BH CV ECHO MEAS - RVSP: 41.2 MMHG
BH CV ECHO MEAS - SI(MOD-SP4): 16.5 ML/M2
BH CV ECHO MEAS - SV(LVOT): 39.4 ML
BH CV ECHO MEAS - SV(MOD-SP4): 32.9 ML
BH CV ECHO MEAS - TR MAX PG: 33.2 MMHG
BH CV ECHO MEAS - TR MAX VEL: 288.2 CM/SEC
BUN SERPL-MCNC: 25 MG/DL (ref 8–23)
BUN/CREAT SERPL: 21.4 (ref 7–25)
CALCIUM SPEC-SCNC: 9 MG/DL (ref 8.6–10.5)
CHLORIDE SERPL-SCNC: 101 MMOL/L (ref 98–107)
CO2 SERPL-SCNC: 28 MMOL/L (ref 22–29)
CREAT SERPL-MCNC: 1.17 MG/DL (ref 0.76–1.27)
DEPRECATED RDW RBC AUTO: 48.6 FL (ref 37–54)
EGFRCR SERPLBLD CKD-EPI 2021: 63 ML/MIN/1.73
EOSINOPHIL # BLD AUTO: 0.1 10*3/MM3 (ref 0–0.4)
EOSINOPHIL NFR BLD AUTO: 1.9 % (ref 0.3–6.2)
ERYTHROCYTE [DISTWIDTH] IN BLOOD BY AUTOMATED COUNT: 14.7 % (ref 12.3–15.4)
GLUCOSE BLDC GLUCOMTR-MCNC: 119 MG/DL (ref 70–105)
GLUCOSE BLDC GLUCOMTR-MCNC: 161 MG/DL (ref 70–105)
GLUCOSE BLDC GLUCOMTR-MCNC: 183 MG/DL (ref 70–105)
GLUCOSE SERPL-MCNC: 77 MG/DL (ref 65–99)
HCT VFR BLD AUTO: 40 % (ref 37.5–51)
HGB BLD-MCNC: 13.3 G/DL (ref 13–17.7)
LYMPHOCYTES # BLD AUTO: 1 10*3/MM3 (ref 0.7–3.1)
LYMPHOCYTES NFR BLD AUTO: 22.2 % (ref 19.6–45.3)
MAXIMAL PREDICTED HEART RATE: 140 BPM
MCH RBC QN AUTO: 30.1 PG (ref 26.6–33)
MCHC RBC AUTO-ENTMCNC: 33.3 G/DL (ref 31.5–35.7)
MCV RBC AUTO: 90.4 FL (ref 79–97)
MONOCYTES # BLD AUTO: 0.5 10*3/MM3 (ref 0.1–0.9)
MONOCYTES NFR BLD AUTO: 9.8 % (ref 5–12)
NEUTROPHILS NFR BLD AUTO: 3 10*3/MM3 (ref 1.7–7)
NEUTROPHILS NFR BLD AUTO: 65.4 % (ref 42.7–76)
NRBC BLD AUTO-RTO: 0.1 /100 WBC (ref 0–0.2)
PLATELET # BLD AUTO: 133 10*3/MM3 (ref 140–450)
PMV BLD AUTO: 8.4 FL (ref 6–12)
POTASSIUM SERPL-SCNC: 4.2 MMOL/L (ref 3.5–5.2)
RBC # BLD AUTO: 4.42 10*6/MM3 (ref 4.14–5.8)
SODIUM SERPL-SCNC: 138 MMOL/L (ref 136–145)
STRESS TARGET HR: 119 BPM
WBC NRBC COR # BLD: 4.6 10*3/MM3 (ref 3.4–10.8)

## 2023-04-04 PROCEDURE — 63710000001 INSULIN LISPRO (HUMAN) PER 5 UNITS

## 2023-04-04 PROCEDURE — 93306 TTE W/DOPPLER COMPLETE: CPT

## 2023-04-04 PROCEDURE — 99222 1ST HOSP IP/OBS MODERATE 55: CPT

## 2023-04-04 PROCEDURE — 80048 BASIC METABOLIC PNL TOTAL CA: CPT

## 2023-04-04 PROCEDURE — 85025 COMPLETE CBC W/AUTO DIFF WBC: CPT

## 2023-04-04 PROCEDURE — 82962 GLUCOSE BLOOD TEST: CPT

## 2023-04-04 PROCEDURE — G0378 HOSPITAL OBSERVATION PER HR: HCPCS

## 2023-04-04 PROCEDURE — 93306 TTE W/DOPPLER COMPLETE: CPT | Performed by: INTERNAL MEDICINE

## 2023-04-04 RX ORDER — POLYETHYLENE GLYCOL 3350 17 G/17G
17 POWDER, FOR SOLUTION ORAL DAILY
Qty: 30 EACH | Refills: 0 | Status: SHIPPED | OUTPATIENT
Start: 2023-04-05

## 2023-04-04 RX ADMIN — DOCUSATE SODIUM 100 MG: 100 CAPSULE, LIQUID FILLED ORAL at 09:53

## 2023-04-04 RX ADMIN — ACETAMINOPHEN 650 MG: 325 TABLET, FILM COATED ORAL at 07:36

## 2023-04-04 RX ADMIN — ATENOLOL 25 MG: 25 TABLET ORAL at 09:53

## 2023-04-04 RX ADMIN — POLYETHYLENE GLYCOL 3350 17 G: 17 POWDER, FOR SOLUTION ORAL at 09:53

## 2023-04-04 RX ADMIN — HYDROCODONE BITARTRATE AND ACETAMINOPHEN 1 TABLET: 7.5; 325 TABLET ORAL at 09:53

## 2023-04-04 RX ADMIN — Medication 10 ML: at 09:53

## 2023-04-04 RX ADMIN — PANTOPRAZOLE SODIUM 40 MG: 40 TABLET, DELAYED RELEASE ORAL at 05:09

## 2023-04-04 RX ADMIN — INSULIN LISPRO 2 UNITS: 100 INJECTION, SOLUTION INTRAVENOUS; SUBCUTANEOUS at 11:43

## 2023-04-04 RX ADMIN — RIVAROXABAN 20 MG: 20 TABLET, FILM COATED ORAL at 17:59

## 2023-04-04 RX ADMIN — OXYCODONE HYDROCHLORIDE AND ACETAMINOPHEN 500 MG: 500 TABLET ORAL at 09:53

## 2023-04-04 RX ADMIN — INSULIN LISPRO 2 UNITS: 100 INJECTION, SOLUTION INTRAVENOUS; SUBCUTANEOUS at 17:59

## 2023-04-04 NOTE — PLAN OF CARE
Goal Outcome Evaluation:  Plan of Care Reviewed With: patient        Progress: improving  Outcome Evaluation: Patient is stable. minimal complaints of pain. Patient has ambulated well on his own. Patient hopeful to discharge home tomorrow. Safety measures in place. call light within reach. patient able to make needs known. Will continue to monitor.

## 2023-04-04 NOTE — CASE MANAGEMENT/SOCIAL WORK
Continued Stay Note   [facility]     Patient Name: Cisco Frank  MRN: [id number]  Today's Date: 4/4/2023    Admit Date: 4/3/2023    Plan: Home with family. Wife can transport at dc . pending clinical course.   Discharge Plan     Row Name 04/04/23 1601       Plan    Plan Home with family. Wife can transport at dc . pending clinical course.    Plan Comments Dc barriers: pending ECHO               Spring Mcmahon RN   Phone communication or documentation only - no physical contact with patient or family.

## 2023-04-04 NOTE — PLAN OF CARE
Goal Outcome Evaluation:               Pt with less complaints of pain this shift, ambulated to end of all and back with patient.

## 2023-04-04 NOTE — OUTREACH NOTE
Prep Survey    Flowsheet Row Responses   [facility] patient discharged from? [facility]   Is LACE score < 7 ? No   Eligibility [facility]   Date of Admission 04/03/23   Date of Discharge 04/04/23   Discharge Disposition Home or Self Care   Discharge diagnosis Closed fracture of one rib of right side   Does the patient have one of the following disease processes/diagnoses(primary or secondary)? Other   Does the patient have Home health ordered? No   Is there a DME ordered? No   Prep survey completed? Yes          Anahy MARIA - Registered Nurse

## 2023-04-04 NOTE — PROGRESS NOTES
[facility] Medicine Services Daily Progress Note    Patient Name: Cisco Frank  : 1940  MRN: [id number]  Primary Care Physician:  Nuno Patton MD  Date of admission: 4/3/2023      Subjective      Chief Complaint: Rib pain      History of Present Illness: Cisco Frank is a 82 y.o. male with history of CAD, type 2 diabetes, hyperlipidemia, A-fib currently on Xarelto who presented to [facility] on 4/3/2023 complaining of uncontrolled rib pain.  Patient reports fall from second step of ladder on Friday due to losing his footing.  He reports seeing his head on the stove door but denies loss of consciousness.  Patient was initially evaluated in the ED on Friday after the fall.  A chest x-ray was completed and showed no acute displaced right rib fracture and stable cardiomegaly.  He also had a CT of the head that showed generalized atrophy with mild periventricular ischemic changes.  No acute intracranial findings.  Patient was subsequently discharged home with prescription for hydrocodone and Zofran.  Today patient reports right-sided rib and flank pain uncontrolled with medications at home.  He states pain is worse with movement and deep breathing.  He is having difficulty getting around the house even when using his cane.  Additionally he reports constipation since starting the pain medication on Friday.  Wife reports that he has been taking MiraLAX at home without relief but believes he may have not been taking enough of the medicine.      In the ED, CT of chest w/o showed minimally displaced posterior right 11th rib fracture.  Small right and trace left pleural effusions.  Moderate sized pericardial effusion.  Moderate hiatal hernia.  CT abdomen/pelvis showed, extensive sigmoid diverticulosis without diverticulitis.  No bowel, bowel thickening, appendicitis, obstructive uropathy, pancreatitis, free fluid, fluid collection, focal intraperitoneal inflammatory changes,  soft tissue mass or lymphadenopathy is demonstrated.  There is a large sliding type hiatal hernia within the stomach partially position within the posterior menaced diam.  There is moderate amount of pericardial fluid with fluid density measuring near water.  There is a small to moderate right-sided pleural effusion with fluid density measuring near water.  There is associated compression atelectasis at the right posterior lung base.  Vital signs on admission temp 97.3 pulse 96, respirations 18, /92, SPO2 94% on room air. All labs unremarkable except glucose 150, alkaline phosphate 143, platelets 134.  UA showed +3 glucose, trace blood, +1 protein, 3-5 RBC. Patient received IV morphine and Zofran in ED. ED provider reports after patient was given IV morphine he became hypoxic and was requiring supplemental O2 via nasal cannula.  Hospitalist was contacted to admit patient for further care and management.       4/4/23 patient seen and examined in medical distress, vital signs stable, still complaining of rib pain.  Discussed with RN.  To obtain echocardiogram today.    ROS 12 point ROS reviewed and negative except as mentioned above.    Objective      Vitals:   Temp:  [96.9 °F (36.1 °C)-97.8 °F (36.6 °C)] 97.6 °F (36.4 °C)  Heart Rate:  [] 79  Resp:  [15-20] 15  BP: (106-125)/(74-79) 110/76  Flow (L/min):  [2] 2    Physical Exam Constitutional:       Appearance: Normal appearance. He is obese.   HENT:      Head: Normocephalic and atraumatic.      Nose: Nose normal.      Mouth/Throat:      Mouth: Mucous membranes are moist.      Pharynx: Oropharynx is clear.   Eyes:      Extraocular Movements: Extraocular movements intact.      Conjunctiva/sclera: Conjunctivae normal.      Pupils: Pupils are equal, round, and reactive to light.   Cardiovascular:      Rate and Rhythm: Normal rate and regular rhythm.      Pulses: Normal pulses.      Heart sounds: Normal heart sounds.   Pulmonary:      Effort: Pulmonary effort  is normal. No respiratory distress.      Breath sounds: Normal breath sounds.   Abdominal:      General: Abdomen is flat. Bowel sounds are normal.      Palpations: Abdomen is soft.   Musculoskeletal:         General: Normal range of motion.      Cervical back: Normal range of motion and neck supple.      Right lower leg: No edema.      Left lower leg: No edema.   Skin:     General: Skin is warm and dry.      Capillary Refill: Capillary refill takes less than 2 seconds.      Findings: Bruising present.             Comments: Bruising    Neurological:      General: No focal deficit present.      Mental Status: He is alert and oriented to person, place, and time.   Psychiatric:         Mood and Affect: Mood normal.         Behavior: Behavior normal.                Result Review    Result Review:  I have personally reviewed the results from the time of this admission to 4/4/2023 12:56 EDT and agree with these findings:  []  Laboratory  []  Microbiology  []  Radiology  []  EKG/Telemetry   []  Cardiology/Vascular   []  Pathology  []  Old records  []  Other:  Most notable findings include:    Wounds (last 24 hours)     LDA Wound     Row Name 04/04/23 1200 04/04/23 0800 04/04/23 0400       Wound 04/03/23 0800 Right midline gluteal Abrasion    Wound - Properties Group Placement Date: 04/03/23  - Placement Time: 0800  -EH Present on Hospital Admission: Y  -EH Side: Right  -EH Orientation: midline  -EH Location: gluteal  -EH Primary Wound Type: Abrasion  -EH Additional Comments: 2 scabs of right cheek, patinet states from his wallet rubbing  -EH    Dressing Appearance open to air  -EH open to air  -EH --    Closure Open to air  -EH Open to air  -EH Open to air  -KB    Base scab  -EH scab  -EH scab  -KB    Retired Wound - Properties Group Placement Date: 04/03/23  - Placement Time: 0800  -EH Present on Hospital Admission: Y  -EH Side: Right  -EH Orientation: midline  -EH Location: gluteal  -EH Primary Wound Type: Abrasion   -EH Additional Comments: 2 scabs of right cheek, patinet states from his wallet rubbing  -EH    Retired Wound - Properties Group Date first assessed: 04/03/23  - Time first assessed: 0800  -EH Present on Hospital Admission: Y  -EH Side: Right  -EH Location: gluteal  -EH Primary Wound Type: Abrasion  -EH Additional Comments: 2 scabs of right cheek, patinet states from his wallet rubbing  -EH    Row Name 04/04/23 0100 04/03/23 2031          Wound 04/03/23 0800 Right midline gluteal Abrasion    Wound - Properties Group Placement Date: 04/03/23  - Placement Time: 0800  -EH Present on Hospital Admission: Y  -EH Side: Right  -EH Orientation: midline  -EH Location: gluteal  -EH Primary Wound Type: Abrasion  -EH Additional Comments: 2 scabs of right cheek, patinet states from his wallet rubbing  -EH    Dressing Appearance open to air  -KB open to air  -AH     Closure Open to air  -KB Open to air  -AH     Base scab  -KB scab  -AH     Retired Wound - Properties Group Placement Date: 04/03/23  - Placement Time: 0800  -EH Present on Hospital Admission: Y  -EH Side: Right  -EH Orientation: midline  -EH Location: gluteal  -EH Primary Wound Type: Abrasion  -EH Additional Comments: 2 scabs of right cheek, patinet states from his wallet rubbing  -EH    Retired Wound - Properties Group Date first assessed: 04/03/23  - Time first assessed: 0800  -EH Present on Hospital Admission: Y  -EH Side: Right  -EH Location: gluteal  -EH Primary Wound Type: Abrasion  -EH Additional Comments: 2 scabs of right cheek, patinet states from his wallet rubbing  -EH          User Key  (r) = Recorded By, (t) = Taken By, (c) = Cosigned By    Initials Name Provider Type    [address] LPN Licensed Nurse    Alexis Murray, RN Registered Nurse    Gifty Doherty, RN Registered Nurse                CBC:      Lab 04/04/23  0316 04/03/23  0447   WBC 4.60 6.10   HEMOGLOBIN 13.3 14.0   HEMATOCRIT 40.0 42.4   PLATELETS 133* 134*   NEUTROS ABS  3.00 4.60   LYMPHS ABS 1.00 0.80   MONOS ABS 0.50 0.50   EOS ABS 0.10 0.10   MCV 90.4 89.6     CMP:      Lab 04/04/23  0316 04/03/23  0447   SODIUM 138 137   POTASSIUM 4.2 4.8   CHLORIDE 101 98   CO2 28.0 29.0   ANION GAP 9.0 10.0   BUN 25* 22   CREATININE 1.17 1.18   EGFR 63.0 62.4   GLUCOSE 77 150*   CALCIUM 9.0 9.5   TOTAL PROTEIN  --  7.6   ALBUMIN  --  3.9   GLOBULIN  --  3.7   ALT (SGPT)  --  10   AST (SGOT)  --  16   BILIRUBIN  --  1.1   ALK PHOS  --  143*   LIPASE  --  22     No results found for: ACANTHNAEG, AFBCX, BPERTUSSISCX, BLOODCX  No results found for: BCIDPCR, CXREFLEX, CSFCX, CULTURETIS  No results found for: CULTURES, HSVCX, URCX  No results found for: EYECULTURE, GCCX, HSVCULTURE, LABHSV  No results found for: LEGIONELLA, MRSACX, MUMPSCX, MYCOPLASCX  No results found for: NOCARDIACX, STOOLCX  No results found for: THROATCX, UNSTIMCULT, URINECX, CULTURE, VZVCULTUR  No results found for: VIRALCULTU, WOUNDCX      Assessment & Plan      Brief Patient Summary:  Cisco Frank is a 82 y.o. male who       vitamin C, 500 mg, Oral, Daily  atorvastatin, 40 mg, Oral, Nightly  docusate sodium, 100 mg, Oral, BID  insulin lispro, 2-9 Units, Subcutaneous, TID With Meals  [START ON 4/5/2023] metoprolol tartrate, 12.5 mg, Oral, Q12H  pantoprazole, 40 mg, Oral, Q AM  polyethylene glycol, 17 g, Oral, Daily  rivaroxaban, 20 mg, Oral, Daily With Dinner  sodium chloride, 10 mL, Intravenous, Q12H             Active Hospital Problems:  Active Hospital Problems    Diagnosis    • **Closed fracture of one rib of right side, initial encounter    • Coronary artery disease    • Hyperlipidemia    • Essential hypertension    • Type 2 diabetes mellitus with hyperglycemia, without long-term current use of insulin (HCC)      Minimally displaced posterior right 11th rib fracture  -CT showed a minimally displaced posterior right 11th rib fracture.  Small right and trace left pleural effusions.  -Pain not well controlled at  home  -Pain and antiemetic medication as needed  -Tessalon as needed  -IS     Acute hypoxia-Secondary to administration of pain medication   >> resume pain medication at lower dosing monitor for any adverse side effects.   -Monitor SPO2 continuously  -Titrate supplemental O2 to maintain SPO2 92% to 95%     Pericardial effusion  -CT showed moderate-sized pericardial effusion >> 2D echo (7/24/2022) showed a small less than 1 cm pericardial effusion and EF of 56 - 60%  -Echo ordered  -proBNP 2774  -Cardiology consult pending results of echo  -Cardiac monitoring     Acute constipation  -CT abdomen pelvis showed extensive sigmoid diverticulosis without diverticulitis  -Likely secondary to pain medication  -Colace 2 times a day and MiraLAX daily     Thrombocytopenia  -Platelets 134 (platelets 126 on 2/25/2023)  -PT/INR, vitamin B12 ordered  -Consider ultrasound of liver and spleen  -Monitor platelet levels  -Continue Xarelto as benefits outweigh risk at this time     Essential hypertension  -dc atenolol, toprol bid  -Monitor BP     Type 2 diabetes  -Glucose 150 on admission  -Hemoglobin A1c 8.6  -Hold home medication  -Start SSI  -Glucose checks ACHS      CAD  Hyperlipidemia  -Lipid panel ordered  -Continue statin     Paroxysmal atrial fibrillation  -Continue Xarelto     DVT prophylaxis: Xarelto  No DVT prophylaxis order currently exists      DVT prophylaxis:  Medical DVT prophylaxis orders are present.    CODE STATUS:    Level Of Support Discussed With: Patient  Code Status (Patient has no pulse and is not breathing): CPR (Attempt to Resuscitate)  Medical Interventions (Patient has pulse or is breathing): Full Support  Release to patient: Routine Release      Disposition:  I expect patient to be discharged home.    I have utilized all available, immediate resources to obtain, update, or review the patient's current medications including all prescriptions, over-the-counter products, herbals, cannabis/cannabidiol products,  and vitamin.mineral/dietary (nutritional) supplements.      Level Of Support Discussed With: Patient  Code Status (Patient has no pulse and is not breathing): CPR (Attempt to Resuscitate)  Medical Interventions (Patient has pulse or is breathing): Full Support  Release to patient: Routine Release    Next of kin of Power of :       Admission Status:  I believe this patient meets admit* status.    Hospital Medicine Quality Measures for Heart Failure:  The patient has a history of heart transplant or LVAD. no          This patient has been examined wearing appropriate Personal Protective Equipment and discussed with rn. 04/04/23      Electronically signed by Marty Corona MD, 04/04/23, 12:56 EDT.  [facility] Hospitalist Team

## 2023-04-04 NOTE — CONSULTS
[facility]   CONSULT NOTE    Referring Provider: Dr. Corona   Reason for Consultation: pericardial effusion     Patient Care Team:  Nuno Patton MD as PCP - General (Family Medicine)  Kailash Garsia MD as Consulting Physician (Cardiology)    Chief complaint right flank pain       History of present illness:  Cisco Frank is a 82 y.o. male with past medical history of hyperlipidemia, hypertension, CAD, diabetes, persistent atrial fibrillation, GERD who presented to the ED with right-sided flank pain on 4/3/2023.  Patient states he fell off a ladder on Friday and had been having right-sided pain ever since. Patient denies experiencing lightheadedness, dizziness, syncope upon fall. Patient presented to ED on day of accident and x-ray was negative for rib fracture and CT of head negative for acute abnormalities.  However, patient presented due to the ED a second time due to uncontrolled pain and there CT of chest showed a moderate-sized pericardial effusion.   Cardiology consulted for evaluation of pericardial effusion.  Patient has history of nonobstructive coronary artery disease with normal LV function.  Patient also has a history of persistent atrial fibrillation and is stable on atenolol and Xarelto for anticoagulation.  Most recent echocardiogram from July 2022 shows preserved EF of 56 to 60% with a moderately dilated RV and a small pericardial effusion with no tamponade noted. Patient currently denies chest pain, shortness of breath, palpitations, lightheadedness, dizziness, syncope, edema.      Review of Systems   Constitutional: Negative for chills, fever and malaise/fatigue.   Cardiovascular: Negative for chest pain, dyspnea on exertion, leg swelling, palpitations and syncope.   Respiratory: Negative for cough and shortness of breath.    Musculoskeletal:        Right-sided flank pain   Gastrointestinal: Negative for abdominal pain, nausea and vomiting.    Neurological: Negative for dizziness, headaches, light-headedness and weakness.       History  Past Medical History:   Diagnosis Date   • Atrial fibrillation    • Cancer    • Coronary artery disease    • History of bladder cancer    • Hyperlipidemia        Past Surgical History:   Procedure Laterality Date   • BLADDER SURGERY  10/26/2019    [facility] Dr. Cope   • BRONCHOSCOPY N/A 2022    Procedure: BRONCHOSCOPY with bilateral lung wash;  Surgeon: Ayush Velarde MD;  Location: [facility] ENDOSCOPY;  Service: Pulmonary;  Laterality: N/A;  normal bronch   • CARDIAC CATHETERIZATION     • COLONOSCOPY     • COLONOSCOPY W/ POLYPECTOMY  2021   • SKIN CANCER EXCISION      right temple BX   • UPPER ENDOSCOPIC ULTRASOUND W/ FNA N/A 3/12/2021    Procedure: ESOPHAGOGASTRODUODENOSCOPY WITH endoscopic mucosal resection, biopsy x 1 area, and endoscopic ULTRASOUND;  Surgeon: Abner Infante MD;  Location: [facility] ENDOSCOPY;  Service: Gastroenterology;  Laterality: N/A;  post op: hiatal hernia, duodenal polyp       Family History   Problem Relation Age of Onset   • Heart disease Mother    • Heart attack Father    • No Known Problems Sister    • No Known Problems Brother    • No Known Problems Maternal Aunt    • No Known Problems Maternal Uncle    • No Known Problems Paternal Aunt    • No Known Problems Paternal Uncle    • No Known Problems Maternal Grandmother    • No Known Problems Maternal Grandfather    • No Known Problems Paternal Grandmother    • No Known Problems Paternal Grandfather    • No Known Problems Other    • Anemia Neg Hx    • Arrhythmia Neg Hx    • Asthma Neg Hx    • Clotting disorder Neg Hx    • Fainting Neg Hx    • Heart failure Neg Hx    • Hyperlipidemia Neg Hx    • Hypertension Neg Hx        Social History     Tobacco Use   • Smoking status: Former     Types: Cigarettes     Quit date: 1980     Years since quittin.2   • Smokeless tobacco: Never   Vaping Use   • Vaping Use: Never used   Substance Use  Topics   • Alcohol use: Not Currently   • Drug use: Never        Medications Prior to Admission   Medication Sig Dispense Refill Last Dose   • atenolol (TENORMIN) 25 MG tablet Take 1 tablet by mouth Daily. 90 tablet 3 4/2/2023   • atorvastatin (LIPITOR) 40 MG tablet Take 1 tablet by mouth every night at bedtime. 90 tablet 3 4/2/2023   • colestipol (COLESTID) 1 g tablet Take 1 tablet by mouth 2 (Two) Times a Day.   4/2/2023   • fluticasone (FLONASE) 50 MCG/ACT nasal spray 2 sprays into the nostril(s) as directed by provider Daily. 9.9 mL 0 Past Month   • glimepiride (AMARYL) 2 MG tablet TAKE 3 TABLETS EVERY MORNING BEFORE BREAKFAST 270 tablet 2 4/2/2023   • HYDROcodone-acetaminophen (Norco) 5-325 MG per tablet Take 1 tablet by mouth Every 6 (Six) Hours As Needed for Moderate Pain. 12 tablet 0 Past Week   • omeprazole-sodium bicarbonate (ZEGERID)  MG per capsule Take 1 capsule by mouth Daily. prn   Past Week   • ondansetron ODT (ZOFRAN-ODT) 4 MG disintegrating tablet Place 1 tablet on the tongue 4 (Four) Times a Day As Needed for Nausea or Vomiting. 10 tablet 0 Past Week   • rivaroxaban (XARELTO) 20 MG tablet Take 1 tablet by mouth Daily With Dinner for 30 days. Indications: Atrial Fibrillation 90 tablet 3 4/2/2023   • Tradjenta 5 MG tablet tablet TAKE 1 TABLET BY MOUTH DAILY FOR 30 DAYS. 90 tablet 0 4/2/2023   • vitamin C (ASCORBIC ACID) 500 MG tablet Take 1 tablet by mouth Daily.   Past Week   • benzonatate (TESSALON) 100 MG capsule Take 1 capsule by mouth 3 (Three) Times a Day As Needed for Cough. 30 capsule 0 More than a month   • mupirocin (BACTROBAN) 2 % ointment Apply 1 application topically to the appropriate area as directed 3 (Three) Times a Day. 22 g 0 More than a month         Patient has no known allergies.    Scheduled Meds:vitamin C, 500 mg, Oral, Daily  atorvastatin, 40 mg, Oral, Nightly  docusate sodium, 100 mg, Oral, BID  insulin lispro, 2-9 Units, Subcutaneous, TID With Meals  [START ON  "4/5/2023] metoprolol tartrate, 12.5 mg, Oral, Q12H  pantoprazole, 40 mg, Oral, Q AM  polyethylene glycol, 17 g, Oral, Daily  rivaroxaban, 20 mg, Oral, Daily With Dinner  sodium chloride, 10 mL, Intravenous, Q12H      Continuous Infusions:   PRN Meds:.•  acetaminophen **OR** acetaminophen **OR** acetaminophen  •  aluminum-magnesium hydroxide-simethicone  •  benzonatate  •  dextrose  •  dextrose  •  glucagon (human recombinant)  •  HYDROcodone-acetaminophen  •  melatonin  •  Morphine  •  ondansetron **OR** ondansetron  •  [COMPLETED] Insert Peripheral IV **AND** sodium chloride  •  sodium chloride  •  sodium chloride        VITAL SIGNS  Vitals:    04/04/23 0438 04/04/23 0500 04/04/23 0700 04/04/23 1100   BP: 125/79  112/79 110/76   BP Location: Left arm  Left arm Left arm   Patient Position: Lying  Lying Lying   Pulse: 92  96 79   Resp: 18  18 15   Temp: 97.8 °F (36.6 °C)  97.2 °F (36.2 °C) 97.6 °F (36.4 °C)   TempSrc: Oral  Oral Oral   SpO2: 99%  99% 98%   Weight:  81.6 kg (179 lb 14.3 oz)     Height:           Flowsheet Rows    Flowsheet Row First Filed Value   Admission Height 177.8 cm (70\") Documented at 04/03/2023 0339   Admission Weight 87 kg (191 lb 12.8 oz) Documented at 04/03/2023 0339           TELEMETRY: Atrial fibrillation with controlled ventricular response    Physical Exam:  Vitals reviewed.   Constitutional:       Appearance: Not in distress.   Eyes:      Pupils: Pupils are equal, round, and reactive to light.   HENT:      Nose: Nose normal.    Mouth/Throat:      Pharynx: Oropharynx is clear.   Pulmonary:      Effort: Pulmonary effort is normal.      Breath sounds: Normal breath sounds.   Cardiovascular:      Tachycardia present. Irregularly irregular rhythm.   Pulses:     Intact distal pulses.   Edema:     Peripheral edema absent.   Abdominal:      General: Bowel sounds are normal.   Musculoskeletal: Normal range of motion.      Cervical back: Normal range of motion and neck supple. Skin:     General: " Skin is warm.   Neurological:      Mental Status: Alert and oriented to person, place and time.           LAB RESULTS (LAST 7 DAYS)    CBC  Results from last 7 days   Lab Units 04/04/23  0316 04/03/23  0447   WBC 10*3/mm3 4.60 6.10   RBC 10*6/mm3 4.42 4.73   HEMOGLOBIN g/dL 13.3 14.0   HEMATOCRIT % 40.0 42.4   MCV fL 90.4 89.6   PLATELETS 10*3/mm3 133* 134*       BMP  Results from last 7 days   Lab Units 04/04/23  0316 04/03/23  0447   SODIUM mmol/L 138 137   POTASSIUM mmol/L 4.2 4.8   CHLORIDE mmol/L 101 98   CO2 mmol/L 28.0 29.0   BUN mg/dL 25* 22   CREATININE mg/dL 1.17 1.18   GLUCOSE mg/dL 77 150*       CMP   Results from last 7 days   Lab Units 04/04/23  0316 04/03/23  0447   SODIUM mmol/L 138 137   POTASSIUM mmol/L 4.2 4.8   CHLORIDE mmol/L 101 98   CO2 mmol/L 28.0 29.0   BUN mg/dL 25* 22   CREATININE mg/dL 1.17 1.18   GLUCOSE mg/dL 77 150*   ALBUMIN g/dL  --  3.9   BILIRUBIN mg/dL  --  1.1   ALK PHOS U/L  --  143*   AST (SGOT) U/L  --  16   ALT (SGPT) U/L  --  10   LIPASE U/L  --  22         proBNP 2774        TROPONIN        CoAg  Results from last 7 days   Lab Units 04/03/23  1554   INR  1.06       Creatinine Clearance  Estimated Creatinine Clearance: 58.1 mL/min (by C-G formula based on SCr of 1.17 mg/dL).    ABG        Radiology  CT Abdomen Pelvis Without Contrast    Result Date: 4/3/2023  1.  There is extensive sigmoid diverticulosis without diverticulitis. 2.  No bowel, bowel thickening, appendicitis, obstructive uropathy, pancreatitis, free fluid, fluid collection, focal intraperitoneal inflammatory change, soft tissue mass or lymphadenopathy is demonstrated. 3.  There is a large sliding-type hiatal hernia with the stomach partially position within the posterior mediastinum. 4.  There is moderate amount of pericardial fluid with fluid density measuring near water. 5.  There is a small to moderate right-sided pleural effusion with fluid density measuring near water. 6.  There is associated compressive  atelectasis at the right posterior lung base. Thank you for the referral of this patient. This exam was interpreted by an American Board of Radiology certified radiologist with subspecialty fellowship training. If there are any questions regarding this exam please feel free to contact a radiologist directly at [phone]. Slot 70 Electronically signed by:  Nathaniel Skinner M.D.  4/3/2023 3:51 AM Mountain Time    CT Chest Without Contrast Diagnostic    Result Date: 4/3/2023  1. Minimally displaced posterior right 11th rib fracture. 2. Small right and trace left pleural effusions. 3. Moderate-sized pericardial effusion. 4. Moderate hiatal hernia. Electronically signed by:  Kalin Sharp M.D.  4/3/2023 3:43 AM Mountain Time          EKG    I personally viewed and interpreted the patient's EKG/Telemetry data:    ECHOCARDIOGRAM:    Results for orders placed during the hospital encounter of 07/22/22    Adult Transthoracic Echo Limited W/ Cont if Necessary Per Protocol    Interpretation Summary  · Left ventricular ejection fraction appears to be 56 - 60%.  · The right ventricular cavity is moderately dilated.  · There is a small (<1cm) pericardial effusion.  · No tamponade noted      STRESS MYOVIEW:  Results for orders placed during the hospital encounter of 10/26/20    Stress Test With Myocardial Perfusion One Day    Interpretation Summary  · Findings consistent with a normal ECG stress test.  · Left ventricular ejection fraction is normal. (Calculated EF = 70%).  · Impressions are consistent with a low risk study.  · Inferolateral reverse redistribution without any ischemia EF 70%.       CARDIAC CATHETERIZATION:  No results found for this or any previous visit.       OTHER:         Assessment & Plan       Closed fracture of one rib of right side, initial encounter    Essential hypertension    Type 2 diabetes mellitus with hyperglycemia, without long-term current use of insulin (HCC)    Coronary artery disease     Hyperlipidemia      Cisco Frank is an 82 year old male who presented to [facility] with right sided flank pain after recent fall  BNP slightly elevated at 2,774  CT of chest wo contrast showed moderate-sized pericardial effusion   Patient has a history of persistent a-fib and is anticoagulated with Xarelto  Most recent echocardiogram from July 2022 shows normal EF of 56 to 60% with moderately dilated RV and small pericardial effusion with no tamponade noted  Echocardiogram ordered yesterday but has yet to be completed for further evaluation of pericardial effusion  Patient's blood pressure and heart rate stable  Patient is currently on atenolol 25 mg, Lipitor 40 mg, Xarelto 20 mg  Further cardiac care based on echocardiogram results      I discussed the patients findings and my recommendations with patient and nurse      RIAN Salazar  04/04/23  11:51 EDT   Electronically signed by RIAN Salazar, 04/04/23, 11:48 AM EDT.

## 2023-04-05 ENCOUNTER — HOSPITAL ENCOUNTER (EMERGENCY)
Facility: HOSPITAL | Age: 83
Discharge: HOME | End: 2023-04-05
Attending: EMERGENCY MEDICINE | Admitting: EMERGENCY MEDICINE
Payer: MEDICARE

## 2023-04-05 ENCOUNTER — TRANSITIONAL CARE MANAGEMENT TELEPHONE ENCOUNTER (OUTPATIENT)
Dept: CALL CENTER | Facility: HOSPITAL | Age: 83
End: 2023-04-05
Payer: MEDICARE

## 2023-04-05 VITALS
OXYGEN SATURATION: 92 % | TEMPERATURE: 97.5 F | HEIGHT: 70 IN | HEART RATE: 116 BPM | DIASTOLIC BLOOD PRESSURE: 79 MMHG | SYSTOLIC BLOOD PRESSURE: 117 MMHG | WEIGHT: 179 LBS | BODY MASS INDEX: 25.62 KG/M2 | RESPIRATION RATE: 16 BRPM

## 2023-04-05 DIAGNOSIS — S22.31XA CLOSED FRACTURE OF ONE RIB OF RIGHT SIDE, INITIAL ENCOUNTER: Primary | ICD-10-CM

## 2023-04-05 PROCEDURE — 99283 EMERGENCY DEPT VISIT LOW MDM: CPT

## 2023-04-05 RX ORDER — HYDROCODONE BITARTRATE AND ACETAMINOPHEN 7.5; 325 MG/1; MG/1
1 TABLET ORAL ONCE
Status: COMPLETED | OUTPATIENT
Start: 2023-04-05 | End: 2023-04-05

## 2023-04-05 RX ADMIN — HYDROCODONE BITARTRATE AND ACETAMINOPHEN 1 TABLET: 7.5; 325 TABLET ORAL at 05:42

## 2023-04-05 NOTE — ED PROVIDER NOTES
Subjective   History of Present Illness  Chief complaint: Right rib pain    82-year-old male presents with right rib pain.  Patient had a fall on Friday.  He was evaluated at that time and had x-rays that were negative.  He returned on Monday and had a CT of the chest, abdomen, pelvis.  This showed a right 11th rib fracture.  He was admitted at that time for pain control and PT/OT evaluation.  He was discharged yesterday as PT and OT both felt patient was appropriate for discharge home.  Patient states his pain was not controlled tonight so he came back to the emergency room.  However patient states he has only taken Tylenol at around 8 or 9 PM.  He has hydrocodone prescribed but he did not take it.  He states he is concerned about being constipated so he did not want to make that worse.  He denies any new injuries or falls.    History provided by:  Patient      Review of Systems   Constitutional: Negative for fever.   HENT: Negative for congestion.    Respiratory: Negative for cough and shortness of breath.    Cardiovascular: Negative for chest pain.   Gastrointestinal: Negative for diarrhea and vomiting.   Musculoskeletal: Positive for back pain.   Skin: Negative for wound.   Neurological: Negative for headaches.       Past Medical History:   Diagnosis Date   • Atrial fibrillation    • Cancer    • Coronary artery disease    • History of bladder cancer    • Hyperlipidemia        No Known Allergies    Past Surgical History:   Procedure Laterality Date   • BLADDER SURGERY  10/26/2019    [facility] Dr. Cope   • BRONCHOSCOPY N/A 6/30/2022    Procedure: BRONCHOSCOPY with bilateral lung wash;  Surgeon: Ayush Velarde MD;  Location: [facility] ENDOSCOPY;  Service: Pulmonary;  Laterality: N/A;  normal bronch   • CARDIAC CATHETERIZATION     • COLONOSCOPY     • COLONOSCOPY W/ POLYPECTOMY  02/23/2021   • SKIN CANCER EXCISION      right temple BX   • UPPER ENDOSCOPIC ULTRASOUND W/ FNA N/A 3/12/2021    Procedure: ESOPHAGOGASTRODUODENOSCOPY  "WITH endoscopic mucosal resection, biopsy x 1 area, and endoscopic ULTRASOUND;  Surgeon: Abner Infante MD;  Location: [facility] ENDOSCOPY;  Service: Gastroenterology;  Laterality: N/A;  post op: hiatal hernia, duodenal polyp       Family History   Problem Relation Age of Onset   • Heart disease Mother    • Heart attack Father    • No Known Problems Sister    • No Known Problems Brother    • No Known Problems Maternal Aunt    • No Known Problems Maternal Uncle    • No Known Problems Paternal Aunt    • No Known Problems Paternal Uncle    • No Known Problems Maternal Grandmother    • No Known Problems Maternal Grandfather    • No Known Problems Paternal Grandmother    • No Known Problems Paternal Grandfather    • No Known Problems Other    • Anemia Neg Hx    • Arrhythmia Neg Hx    • Asthma Neg Hx    • Clotting disorder Neg Hx    • Fainting Neg Hx    • Heart failure Neg Hx    • Hyperlipidemia Neg Hx    • Hypertension Neg Hx        Social History     Socioeconomic History   • Marital status:    Tobacco Use   • Smoking status: Former     Types: Cigarettes     Quit date: 1980     Years since quittin.2   • Smokeless tobacco: Never   Vaping Use   • Vaping Use: Never used   Substance and Sexual Activity   • Alcohol use: Not Currently   • Drug use: Never   • Sexual activity: Defer       /79   Pulse 116   Temp 97.5 °F (36.4 °C)   Resp 16   Ht 177.8 cm (70\")   Wt 81.2 kg (179 lb)   SpO2 92%   BMI 25.68 kg/m²       Objective   Physical Exam  Vitals and nursing note reviewed.   Constitutional:       Appearance: Normal appearance.   HENT:      Head: Normocephalic and atraumatic.      Mouth/Throat:      Mouth: Mucous membranes are moist.   Cardiovascular:      Rate and Rhythm: Normal rate and regular rhythm.      Heart sounds: Normal heart sounds.   Pulmonary:      Effort: Pulmonary effort is normal. No respiratory distress.      Breath sounds: Normal breath sounds.   Abdominal:      Palpations: Abdomen " is soft.      Tenderness: There is no abdominal tenderness.   Musculoskeletal:      Comments: Tender to palpation to the right flank area in the area of the posterior lower ribs.  There is some ecchymosis in this area.  No crepitus or subcutaneous air.   Skin:     General: Skin is warm and dry.   Neurological:      Mental Status: He is alert and oriented to person, place, and time.         Procedures           ED Course                                           Medical Decision Making  Closed fracture of one rib of right side, initial encounter: acute illness or injury  Risk  Prescription drug management.        Patient was given [place] in the emergency room.  He is feeling much better.  We discussed the need to take his Norco as prescribed if his pain is not controlled with over-the-counter analgesics.  He is taking MiraLAX for his constipation.  I do not see any benefit from readmission at this time.  Patient will be discharged to follow-up with his primary doctor.      Final diagnoses:   Closed fracture of one rib of right side, initial encounter       ED Disposition  ED Disposition     ED Disposition   Discharge    Condition   Stable    Comment   --             Nuno Patton MD  [address]  [place] [address]  [phone]    Call in 2 days           Medication List      No changes were made to your prescriptions during this visit.          Angel Arriaga MD  04/05/23 [id number]

## 2023-04-05 NOTE — OUTREACH NOTE
Call Center TCM Note    Flowsheet Row Responses   [facility] patient discharged from? [facility]   Does the patient have one of the following disease processes/diagnoses(primary or secondary)? Other   TCM attempt successful? No   Unsuccessful attempts Attempt 1          Leigha Iraheta MA    4/5/2023, 13:49 EDT

## 2023-04-05 NOTE — OUTREACH NOTE
Call Center TCM Note    Flowsheet Row Responses   [facility] patient discharged from? [facility]   Does the patient have one of the following disease processes/diagnoses(primary or secondary)? Other   TCM attempt successful? No   Unsuccessful attempts Attempt 2  [no bh verbal release- note patient also went to ER today for pain control]          Idania Elizondo RN    4/5/2023, 16:10 EDT

## 2023-04-05 NOTE — DISCHARGE INSTRUCTIONS
Follow-up with your primary doctor.  Return to the emergency room for any new or worsening symptoms or if you have any other questions or concerns.  Take your pain medicine as previously prescribed.

## 2023-04-05 NOTE — CASE MANAGEMENT/SOCIAL WORK
Case Management Discharge Note      Final Note: home    Provided Post Acute Provider List?: N/A  N/A Provider List Comment: denies dc needs    Selected Continued Care - Discharged on 4/4/2023 Admission date: 4/3/2023 - Discharge disposition: Home or Self Care            Transportation Services  Private: Car    Final Discharge Disposition Code: 01 - home or self-care

## 2023-04-06 ENCOUNTER — TRANSITIONAL CARE MANAGEMENT TELEPHONE ENCOUNTER (OUTPATIENT)
Dept: CALL CENTER | Facility: HOSPITAL | Age: 83
End: 2023-04-06
Payer: MEDICARE

## 2023-04-06 NOTE — OUTREACH NOTE
Call Center TCM Note    Flowsheet Row Responses   [facility]   Does the patient have one of the following disease processes/diagnoses(primary or secondary)? Other   TCM attempt successful? No   Unsuccessful attempts Attempt 3          Quin Pedroza LPN    4/6/2023, 15:28 EDT

## 2023-04-08 NOTE — DISCHARGE SUMMARY
[facility] Medicine Services  DISCHARGE SUMMARY    Patient Name: Cisco Frank  : 1940  MRN: [id number]    Date of Admission: 4/3/2023  Discharge Diagnosis: Minimally displaced posterior right 11th rib fracture  Date of Discharge:  23  Primary Care Physician: Nuno Patton MD      Presenting Problem:   Pericardial effusion [I31.39]  Intractable pain [R52]  Closed fracture of one rib of right side, initial encounter [S22.31XA]    Active and Resolved Hospital Problems:  Active Hospital Problems    Diagnosis POA   • **Closed fracture of one rib of right side, initial encounter [S22.31XA] Yes   • Coronary artery disease [I25.10] Yes   • Hyperlipidemia [E78.5] Yes   • Essential hypertension [I10] Yes   • Type 2 diabetes mellitus with hyperglycemia, without long-term current use of insulin (HCC) [E11.65] Yes      Resolved Hospital Problems   No resolved problems to display.     Minimally displaced posterior right 11th rib fracture  -CT showed a minimally displaced posterior right 11th rib fracture.  Small right and trace left pleural effusions.  -Pain not well controlled at home  -Pain and antiemetic medication as needed  -Tessalon as needed  -IS     Acute hypoxia-Secondary to administration of pain medication   >> resume pain medication at lower dosing monitor for any adverse side effects.   -Monitor SPO2 continuously  -Titrate supplemental O2 to maintain SPO2 92% to 95%     Pericardial effusion  -CT showed moderate-sized pericardial effusion >> 2D echo (2022) showed a small less than 1 cm pericardial effusion and EF of 56 - 60%  -Echo Left ventricular ejection fraction appears to be 56 - 60%.  -proBNP 2774  -Cardiology consulted  -Cardiac monitoring     Acute constipation  -CT abdomen pelvis showed extensive sigmoid diverticulosis without diverticulitis  -Likely secondary to pain medication  -Colace 2 times a day and MiraLAX daily     Thrombocytopenia  -Platelets  134 (platelets 126 on 2/25/2023)  -PT/INR, vitamin B12 ordered  -Consider ultrasound of liver and spleen  -Monitor platelet levels  -Continue Xarelto as benefits outweigh risk at this time     Essential hypertension  -dc atenolol, toprol bid  -Monitor BP     Type 2 diabetes  -Glucose 150 on admission  -Hemoglobin A1c 8.6  -Hold home medication  -Start SSI  -Glucose checks ACHS      CAD  Hyperlipidemia  -Lipid panel ordered  -Continue statin     Paroxysmal atrial fibrillation  -Continue Xarelto    Hospital Course     History of Present Illness: Cisco Frank is a 82 y.o. male with history of CAD, type 2 diabetes, hyperlipidemia, A-fib currently on Xarelto who presented to [facility] on 4/3/2023 complaining of uncontrolled rib pain.  Patient reports fall from second step of ladder on Friday due to losing his footing.  He reports seeing his head on the stove door but denies loss of consciousness.  Patient was initially evaluated in the ED on Friday after the fall.  A chest x-ray was completed and showed no acute displaced right rib fracture and stable cardiomegaly.  He also had a CT of the head that showed generalized atrophy with mild periventricular ischemic changes.  No acute intracranial findings.  Patient was subsequently discharged home with prescription for hydrocodone and Zofran.  Today patient reports right-sided rib and flank pain uncontrolled with medications at home.  He states pain is worse with movement and deep breathing.  He is having difficulty getting around the house even when using his cane.  Additionally he reports constipation since starting the pain medication on Friday.  Wife reports that he has been taking MiraLAX at home without relief but believes he may have not been taking enough of the medicine.      In the ED, CT of chest w/o showed minimally displaced posterior right 11th rib fracture.  Small right and trace left pleural effusions.  Moderate sized pericardial effusion.   Moderate hiatal hernia.  CT abdomen/pelvis showed, extensive sigmoid diverticulosis without diverticulitis.  No bowel, bowel thickening, appendicitis, obstructive uropathy, pancreatitis, free fluid, fluid collection, focal intraperitoneal inflammatory changes, soft tissue mass or lymphadenopathy is demonstrated.  There is a large sliding type hiatal hernia within the stomach partially position within the posterior menaced diam.  There is moderate amount of pericardial fluid with fluid density measuring near water.  There is a small to moderate right-sided pleural effusion with fluid density measuring near water.  There is associated compression atelectasis at the right posterior lung base.  Vital signs on admission temp 97.3 pulse 96, respirations 18, /92, SPO2 94% on room air. All labs unremarkable except glucose 150, alkaline phosphate 143, platelets 134.  UA showed +3 glucose, trace blood, +1 protein, 3-5 RBC. Patient received IV morphine and Zofran in ED. ED provider reports after patient was given IV morphine he became hypoxic and was requiring supplemental O2 via nasal cannula.  Hospitalist was contacted to admit patient for further care and management.       4/4/23 patient seen and examined in medical distress, vital signs stable, still complaining of rib pain.  Discussed with RN.  will dc home, condition on dc stable.      DISCHARGE Follow Up Recommendations for labs and diagnostics: Follow-up with PCP in a week      Reasons For Change In Medications and Indications for New Medications:      Day of Discharge     Vital Signs:       Physical Exam *Constitutional:       Appearance: Normal appearance. He is obese.   HENT:      Head: Normocephalic and atraumatic.      Nose: Nose normal.      Mouth/Throat:      Mouth: Mucous membranes are moist.      Pharynx: Oropharynx is clear.   Eyes:      Extraocular Movements: Extraocular movements intact.      Conjunctiva/sclera: Conjunctivae normal.      Pupils:  Pupils are equal, round, and reactive to light.   Cardiovascular:      Rate and Rhythm: Normal rate and regular rhythm.      Pulses: Normal pulses.      Heart sounds: Normal heart sounds.   Pulmonary:      Effort: Pulmonary effort is normal. No respiratory distress.      Breath sounds: Normal breath sounds.   Abdominal:      General: Abdomen is flat. Bowel sounds are normal.      Palpations: Abdomen is soft.   Musculoskeletal:         General: Normal range of motion.      Cervical back: Normal range of motion and neck supple.      Right lower leg: No edema.      Left lower leg: No edema.   Skin:     General: Skin is warm and dry.      Capillary Refill: Capillary refill takes less than 2 seconds.      Findings: Bruising present.             Comments: Bruising    Neurological:      General: No focal deficit present.      Mental Status: He is alert and oriented to person, place, and time.   Psychiatric:         Mood and Affect: Mood normal.         Behavior: Behavior normal.                                Pertinent  and/or Most Recent Results     LAB RESULTS:      Lab 04/04/23 0316 04/03/23  1554 04/03/23 0447   WBC 4.60  --  6.10   HEMOGLOBIN 13.3  --  14.0   HEMATOCRIT 40.0  --  42.4   PLATELETS 133*  --  134*   NEUTROS ABS 3.00  --  4.60   LYMPHS ABS 1.00  --  0.80   MONOS ABS 0.50  --  0.50   EOS ABS 0.10  --  0.10   MCV 90.4  --  89.6   PROTIME  --  10.9  --          Lab 04/04/23 0316 04/03/23  1554 04/03/23  0447   SODIUM 138  --  137   POTASSIUM 4.2  --  4.8   CHLORIDE 101  --  98   CO2 28.0  --  29.0   ANION GAP 9.0  --  10.0   BUN 25*  --  22   CREATININE 1.17  --  1.18   EGFR 63.0  --  62.4   GLUCOSE 77  --  150*   CALCIUM 9.0  --  9.5   HEMOGLOBIN A1C  --  8.60*  --          Lab 04/03/23 0447   TOTAL PROTEIN 7.6   ALBUMIN 3.9   GLOBULIN 3.7   ALT (SGPT) 10   AST (SGOT) 16   BILIRUBIN 1.1   ALK PHOS 143*   LIPASE 22         Lab 04/03/23  1554   PROBNP 2,774.0*   PROTIME 10.9   INR 1.06         Lab  04/03/23  1554   CHOLESTEROL 91   LDL CHOL 34   HDL CHOL 34*   TRIGLYCERIDES 127         Lab 04/03/23  1554   VITAMIN B 12 298         Brief Urine Lab Results  (Last result in the past 365 days)      Color   Clarity   Blood   Leuk Est   Nitrite   Protein   CREAT   Urine HCG        04/03/23 0435 Yellow   Clear   Trace   Negative   Negative   30 mg/dL (1+)               Microbiology Results (last 10 days)     ** No results found for the last 240 hours. **          CT Abdomen Pelvis Without Contrast    Result Date: 4/3/2023  Impression: 1.  There is extensive sigmoid diverticulosis without diverticulitis. 2.  No bowel, bowel thickening, appendicitis, obstructive uropathy, pancreatitis, free fluid, fluid collection, focal intraperitoneal inflammatory change, soft tissue mass or lymphadenopathy is demonstrated. 3.  There is a large sliding-type hiatal hernia with the stomach partially position within the posterior mediastinum. 4.  There is moderate amount of pericardial fluid with fluid density measuring near water. 5.  There is a small to moderate right-sided pleural effusion with fluid density measuring near water. 6.  There is associated compressive atelectasis at the right posterior lung base. Thank you for the referral of this patient. This exam was interpreted by an American Board of Rad[employer] certified radiologist with subspecialty fellowship training. If there are any questions regarding this exam please feel free to contact a radiologist directly at [phone]. Slot 70 Electronically signed by:  Nathaniel Skinner M.D.  4/3/2023 3:51 AM Mountain Time    XR Ribs Right With PA Chest    Result Date: 3/31/2023  Impression: Impression: 1. No acute displaced right rib fracture is identified. 2. Stable cardiac megaly. Electronically Signed: Latha Strong  3/31/2023 2:11 PM EDT  Workstation ID: [id number]    CT Head Without Contrast    Result Date: 3/31/2023  Impression: Impression: 1. Generalized atrophy with mild  periventricular ischemic changes. No acute intracranial findings. Electronically Signed: Alexandru BARGER Jean-Paul  3/31/2023 12:55 PM EDT  Workstation ID: [id number]    CT Chest Without Contrast Diagnostic    Result Date: 4/3/2023  Impression: 1. Minimally displaced posterior right 11th rib fracture. 2. Small right and trace left pleural effusions. 3. Moderate-sized pericardial effusion. 4. Moderate hiatal hernia. Electronically signed by:  Kalin Sharp M.D.  4/3/2023 3:43 AM Mountain Time              Results for orders placed during the hospital encounter of 04/03/23    Adult Transthoracic Echo Complete W/ Cont if Necessary Per Protocol    Interpretation Summary  •  Left ventricular ejection fraction appears to be 56 - 60%.  •  The right ventricular cavity is mildly dilated.  •  Estimated right ventricular systolic pressure from tricuspid regurgitation is mildly elevated (35-45 mmHg).  •  There is a moderate (1-2cm) pericardial effusion. There is no evidence of cardiac tamponade.      Labs Pending at Discharge:      Procedures Performed           Consults:   Consults     Date and Time Order Name Status Description    4/3/2023  3:30 PM Inpatient Cardiology Consult Completed       Assessment & Plan    Closed fracture of one rib of right side, initial encounter    Essential hypertension    Type 2 diabetes mellitus with hyperglycemia, without long-term current use of insulin (HCC)    Coronary artery disease    Hyperlipidemia        Cisco Frank is an 82 year old male who presented to [facility] with right sided flank pain after recent fall  BNP slightly elevated at 2,774  CT of chest wo contrast showed moderate-sized pericardial effusion   Patient has a history of persistent a-fib and is anticoagulated with Xarelto  Most recent echocardiogram from July 2022 shows normal EF of 56 to 60% with moderately dilated RV and small pericardial effusion with no tamponade noted  Echocardiogram ordered yesterday but has yet to be completed for further  evaluation of pericardial effusion  Patient's blood pressure and heart rate stable  Patient is currently on atenolol 25 mg, Lipitor 40 mg, Xarelto 20 mg  Further cardiac care based on echocardiogram results        I discussed the patients findings and my recommendations with patient and nurse        RIAN Salazar  04/04/23  11:51 EDT   Electronically signed by RIAN Salazar, 04/04/23, 11:48 AM EDT      Discharge Details        Discharge Medications      New Medications      Instructions Start Date   metoprolol tartrate 25 MG tablet  Commonly known as: LOPRESSOR   12.5 mg, Oral, Every 12 Hours Scheduled      polyethylene glycol 17 g packet  Commonly known as: MIRALAX   17 g, Oral, Daily         Continue These Medications      Instructions Start Date   atorvastatin 40 MG tablet  Commonly known as: LIPITOR   40 mg, Oral, Every Night at Bedtime      benzonatate 100 MG capsule  Commonly known as: TESSALON   100 mg, Oral, 3 Times Daily PRN      colestipol 1 g tablet  Commonly known as: COLESTID   1 g, Oral, 2 Times Daily      fluticasone 50 MCG/ACT nasal spray  Commonly known as: FLONASE   2 sprays, Nasal, Daily      glimepiride 2 MG tablet  Commonly known as: AMARYL   TAKE 3 TABLETS EVERY MORNING BEFORE BREAKFAST      HYDROcodone-acetaminophen 5-325 MG per tablet  Commonly known as: Norco   1 tablet, Oral, Every 6 Hours PRN      mupirocin 2 % ointment  Commonly known as: BACTROBAN   1 application, Topical, 3 Times Daily      omeprazole-sodium bicarbonate  MG per capsule  Commonly known as: ZEGERID   1 capsule, Oral, Daily, prn      ondansetron ODT 4 MG disintegrating tablet  Commonly known as: ZOFRAN-ODT   4 mg, Translingual, 4 Times Daily PRN      rivaroxaban 20 MG tablet  Commonly known as: XARELTO   20 mg, Oral, Daily With Dinner      Tradjenta 5 MG tablet tablet  Generic drug: linagliptin   TAKE 1 TABLET BY MOUTH DAILY FOR 30 DAYS.      vitamin C 500 MG tablet  Commonly known as: ASCORBIC ACID   500  mg, Oral, Daily         Stop These Medications    atenolol 25 MG tablet  Commonly known as: TENORMIN            No Known Allergies      Discharge Disposition:  Home or Self Care    Diet:  Hospital:No active diet order        Discharge Activity:   Activity Instructions     Gradually Increase Activity Until at Pre-Hospitalization Level              CODE STATUS:  Code Status and Medical Interventions:   Ordered at: 04/03/23 0812     Level Of Support Discussed With:    Patient     Code Status (Patient has no pulse and is not breathing):    CPR (Attempt to Resuscitate)     Medical Interventions (Patient has pulse or is breathing):    Full Support     Release to patient:    Routine Release     I have utilized all available, immediate resources to obtain, update, or review the patient's current medications including all prescriptions, over-the-counter products, herbals, cannabis/cannabidiol products, and vitamin.mineral/dietary (nutritional) supplements.      Level Of Support Discussed With: Patient  Code Status (Patient has no pulse and is not breathing): CPR (Attempt to Resuscitate)  Medical Interventions (Patient has pulse or is breathing): Full Support  Release to patient: Routine Release    Next of kin of Power of :       Admission Status:  I believe this patient meets obs status.    Hospital Medicine Quality Measures for Heart Failure:  The patient has a history of heart transplant or LVAD. no        Future Appointments   Date Time Provider Department Center   4/12/2023  8:30 AM Nuno Patton MD [facility]   9/28/2023  1:40 PM Kailash Garsia MD [facility]       Additional Instructions for the Follow-ups that You Need to Schedule     Discharge Follow-up with PCP   As directed       Currently Documented PCP:    Nuno Patton MD    PCP Phone Number:    [phone]     Follow Up Details: 1 week               Time spent on Discharge including face to face service: 34 minutes    This  patient has been examined wearing appropriate Personal Protective Equipment and discussed with rn. 04/08/23      Signature: Electronically signed by Marty Corona MD, 04/08/23, 2:03 PM EDT.

## 2023-04-12 ENCOUNTER — OFFICE VISIT (OUTPATIENT)
Dept: FAMILY MEDICINE CLINIC | Facility: CLINIC | Age: 83
End: 2023-04-12
Payer: MEDICARE

## 2023-04-12 ENCOUNTER — TELEPHONE (OUTPATIENT)
Dept: FAMILY MEDICINE CLINIC | Facility: CLINIC | Age: 83
End: 2023-04-12

## 2023-04-12 VITALS
RESPIRATION RATE: 16 BRPM | HEART RATE: 104 BPM | WEIGHT: 192 LBS | HEIGHT: 70 IN | BODY MASS INDEX: 27.49 KG/M2 | DIASTOLIC BLOOD PRESSURE: 80 MMHG | OXYGEN SATURATION: 98 % | SYSTOLIC BLOOD PRESSURE: 130 MMHG

## 2023-04-12 DIAGNOSIS — I48.21 PERMANENT ATRIAL FIBRILLATION: Chronic | ICD-10-CM

## 2023-04-12 DIAGNOSIS — S09.90XA MINOR CLOSED HEAD INJURY: ICD-10-CM
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
## 2023-04-12 DIAGNOSIS — S22.31XA CLOSED FRACTURE OF ONE RIB OF RIGHT SIDE, INITIAL ENCOUNTER: ICD-10-CM
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
## 2023-04-12 RX ORDER — HYDROCODONE BITARTRATE AND ACETAMINOPHEN 5; 325 MG/1; MG/1
1-2 TABLET ORAL EVERY 6 HOURS PRN
Qty: 30 TABLET | Refills: 0 | Status: SHIPPED | OUTPATIENT
Start: 2023-04-12 | End: 2023-04-27

## 2023-04-12 RX ORDER — EMPAGLIFLOZIN 10 MG/1
10 TABLET, FILM COATED ORAL DAILY
COMMUNITY
Start: 2023-03-13

## 2023-04-12 RX ORDER — ONDANSETRON 4 MG/1
4 TABLET, ORALLY DISINTEGRATING ORAL 4 TIMES DAILY PRN
Qty: 15 TABLET | Refills: 2 | Status: SHIPPED | OUTPATIENT
Start: 2023-04-12

## 2023-04-12 NOTE — TELEPHONE ENCOUNTER
Caller: ABLIN ELIAS    Relationship: Emergency Contact    Best call back number: *  ALBIN ELIAS () [phone] (Home)       What was the call regarding: PATIENT IS WANTING TO KNOW IF HE IS SUPPOSED TO TAKE THE TRADJENTA AND THE JARDIANCE OR IS ONE OF THEM DISCONTINUED     Do you require a callback: YES PLEASE

## 2023-04-12 NOTE — PROGRESS NOTES
Transitional Care Follow Up Visit  Subjective     Cisco Frank is a 82 y.o. male who presents for a transitional care management visit.    Within 48 business hours after discharge our office contacted him via telephone to coordinate his care and needs.      I reviewed and discussed the details of that call along with the discharge summary, hospital problems, inpatient lab results, inpatient diagnostic studies, and consultation reports with Cisco.     Current outpatient and discharge medications have been reconciled for the patient.  Reviewed by: Nuno Patton MD          4/4/2023     7:27 PM   Date of TCM Phone Call   [facility]   Date of Admission 4/3/2023   Date of Discharge 4/4/2023   Discharge Disposition Home or Self Care     Risk for Readmission (LACE) Score: 17 (4/23/2023  6:00 AM)      History of Present Illness   Course During Hospital Stay:  2 days    The patient presents today for a follow-up. He is accompanied by an adult female.    The patient fell at home on 03/31/2023.  He was admitted over night at the hospital and ended up returning the day he was released. The adult female states that the patient was cleaning the fan over the kitchen table when his ladder gave away and he fell. She adds the patient had a CT scan and x-rays performed at the hospital. The patient reports he has 3 sores on his buttocks. The adult female believes it is from his billfold. The patient denies any pain in the area. The adult female notes that she placed something on the area but cannot remember what it was. He was given pain medication at the hospital and is taking it in the evening so he can rest. He took his last pain medication last night on 4/11/2023. The patient admits he fractured a rib. He states he had a CT scan of his head due to hitting his head on the stove during the incident. He drinks a lot of water and urinates regularly with no issues. The patient admits his blood glucose levels  were around 90 mg/dL and 200 mg/dL. The adult female adds that the patient's blood glucose levels usually run between 120-140 mg/dL. The patient was given hydrocodone and nausea medication.    The adult female reports the patient has fluid around his heart and his medication was changed.     Presenting Problem:   Pericardial effusion [I31.39]  Intractable pain [R52]  Closed fracture of one rib of right side, initial encounter [S22.31XA]     Active and Resolved Hospital Problems:       Active Hospital Problems     Diagnosis POA   • **Closed fracture of one rib of right side, initial encounter [S22.31XA] Yes   • Coronary artery disease [I25.10] Yes   • Hyperlipidemia [E78.5] Yes   • Essential hypertension [I10] Yes   • Type 2 diabetes mellitus with hyperglycemia, without long-term current use of insulin (HCC) [E11.65] Yes       Resolved Hospital Problems   No resolved problems to display.      Minimally displaced posterior right 11th rib fracture  -CT showed a minimally displaced posterior right 11th rib fracture.  Small right and trace left pleural effusions.  -Pain not well controlled at home  -Pain and antiemetic medication as needed  -Tessalon as needed  -IS     Acute hypoxia-Secondary to administration of pain medication   >> resume pain medication at lower dosing monitor for any adverse side effects.   -Monitor SPO2 continuously  -Titrate supplemental O2 to maintain SPO2 92% to 95%     Pericardial effusion  -CT showed moderate-sized pericardial effusion >> 2D echo (7/24/2022) showed a small less than 1 cm pericardial effusion and EF of 56 - 60%  -Echo Left ventricular ejection fraction appears to be 56 - 60%.  -proBNP 2774  -Cardiology consulted  -Cardiac monitoring     Acute constipation  -CT abdomen pelvis showed extensive sigmoid diverticulosis without diverticulitis  -Likely secondary to pain medication  -Colace 2 times a day and MiraLAX daily     Thrombocytopenia  -Platelets 134 (platelets 126 on  2/25/2023)  -PT/INR, vitamin B12 ordered  -Consider ultrasound of liver and spleen  -Monitor platelet levels  -Continue Xarelto as benefits outweigh risk at this time     Essential hypertension  -dc atenolol, toprol bid  -Monitor BP     Type 2 diabetes  -Glucose 150 on admission  -Hemoglobin A1c 8.6  -Hold home medication  -Start SSI  -Glucose checks ACHS      CAD  Hyperlipidemia  -Lipid panel ordered  -Continue statin     Paroxysmal atrial fibrillation  -Continue Xarelto    History of Present Illness: Cisco Frank is a 82 y.o. male with history of CAD, type 2 diabetes, hyperlipidemia, A-fib currently on Xarelto who presented to [facility] on 4/3/2023 complaining of uncontrolled rib pain.  Patient reports fall from second step of ladder on Friday due to losing his footing.  He reports seeing his head on the stove door but denies loss of consciousness.  Patient was initially evaluated in the ED on Friday after the fall.  A chest x-ray was completed and showed no acute displaced right rib fracture and stable cardiomegaly.  He also had a CT of the head that showed generalized atrophy with mild periventricular ischemic changes.  No acute intracranial findings.  Patient was subsequently discharged home with prescription for hydrocodone and Zofran.  Today patient reports right-sided rib and flank pain uncontrolled with medications at home.  He states pain is worse with movement and deep breathing.  He is having difficulty getting around the house even when using his cane.  Additionally he reports constipation since starting the pain medication on Friday.  Wife reports that he has been taking MiraLAX at home without relief but believes he may have not been taking enough of the medicine.      In the ED, CT of chest w/o showed minimally displaced posterior right 11th rib fracture.  Small right and trace left pleural effusions.  Moderate sized pericardial effusion.  Moderate hiatal hernia.  CT abdomen/pelvis  showed, extensive sigmoid diverticulosis without diverticulitis.  No bowel, bowel thickening, appendicitis, obstructive uropathy, pancreatitis, free fluid, fluid collection, focal intraperitoneal inflammatory changes, soft tissue mass or lymphadenopathy is demonstrated.  There is a large sliding type hiatal hernia within the stomach partially position within the posterior menaced diam.  There is moderate amount of pericardial fluid with fluid density measuring near water.  There is a small to moderate right-sided pleural effusion with fluid density measuring near water.  There is associated compression atelectasis at the right posterior lung base.  Vital signs on admission temp 97.3 pulse 96, respirations 18, /92, SPO2 94% on room air. All labs unremarkable except glucose 150, alkaline phosphate 143, platelets 134.  UA showed +3 glucose, trace blood, +1 protein, 3-5 RBC. Patient received IV morphine and Zofran in ED. ED provider reports after patient was given IV morphine he became hypoxic and was requiring supplemental O2 via nasal cannula.  Hospitalist was contacted to admit patient for further care and management.      The following portions of the patient's history were reviewed and updated as appropriate: allergies, current medications, past family history, past medical history, past social history, past surgical history and problem list.    Review of Systems   Constitutional: Positive for fatigue. Negative for diaphoresis and fever.   HENT: Negative.  Negative for congestion, ear pain, hearing loss, postnasal drip, sinus pressure, sore throat, trouble swallowing and voice change.    Eyes: Negative.  Negative for pain, redness and visual disturbance.   Respiratory: Positive for chest tightness. Negative for cough and shortness of breath.    Cardiovascular: Positive for chest pain. Negative for palpitations and leg swelling.   Gastrointestinal: Negative.  Negative for abdominal pain, blood in stool,  constipation and diarrhea.   Endocrine: Negative.  Negative for cold intolerance and heat intolerance.   Genitourinary: Negative.  Negative for difficulty urinating, enuresis, flank pain, frequency and urgency.   Musculoskeletal: Positive for back pain. Negative for arthralgias, myalgias, neck pain and neck stiffness.   Skin: Negative.  Negative for color change and rash.   Allergic/Immunologic: Negative.  Negative for environmental allergies.   Neurological: Negative.  Negative for syncope, weakness and headaches.   Hematological: Negative.  Does not bruise/bleed easily.   Psychiatric/Behavioral: Negative.  Negative for behavioral problems, decreased concentration, dysphoric mood and suicidal ideas. The patient is not nervous/anxious.        Objective   Physical Exam  Constitutional:       Appearance: Normal appearance. He is well-developed and normal weight.   HENT:      Head: Normocephalic and atraumatic.      Right Ear: Tympanic membrane, ear canal and external ear normal.      Left Ear: Tympanic membrane, ear canal and external ear normal.      Nose: Nose normal.      Mouth/Throat:      Mouth: Mucous membranes are moist.      Pharynx: Oropharynx is clear. No oropharyngeal exudate.   Eyes:      Extraocular Movements: Extraocular movements intact.      Conjunctiva/sclera: Conjunctivae normal.      Pupils: Pupils are equal, round, and reactive to light.   Cardiovascular:      Rate and Rhythm: Normal rate and regular rhythm.      Pulses: Normal pulses.      Heart sounds: Normal heart sounds.   Pulmonary:      Effort: Pulmonary effort is normal.      Breath sounds: Normal breath sounds.      Comments: Bruised right flank.   tender  Abdominal:      General: Bowel sounds are normal.      Palpations: Abdomen is soft.   Musculoskeletal:         General: Tenderness and deformity present. Normal range of motion.      Cervical back: Normal range of motion and neck supple.      Comments: Joint pain --ribs / neck / knees /  hips/ back   Skin:     General: Skin is warm and dry.   Neurological:      General: No focal deficit present.      Mental Status: He is alert and oriented to person, place, and time. Mental status is at baseline.   Psychiatric:         Mood and Affect: Mood normal.         Behavior: Behavior normal.         Thought Content: Thought content normal.         Judgment: Judgment normal.             Assessment & Plan     1. Fall from ladder  Cisco is an 82-year-old white male who got up on a ladder to fix a light in his kitchen and the ladder gave way and he went falling into a table. He had immediate pain in his right flank and went to the emergency room for evaluation. The evaluation found a right posterior 11th rib fracture. He had a pleural effusion that had developed, and he was admitted for overnight observation. During his observation, he had a CT scan of the chest and abdomen, and he underwent an echocardiogram. Everything returned showing no other injuries other than the fractured 11th rib, an effusion that was pleural and a pericardial fluid that was going to resolve on its own. He did not need any further treatment. He is being treated for discomfort with hydrocodone 5 to 10 mg every 4 to 6 hours. He has a rib belt to use at night. He gets up and moves around fine. His lungs are clear. No fever. He is ready just to free up his activity. I just told him to be careful and he just does not need to be doing any of the ladder work any further any longer.    2. Closed rib fracture on the right side  This rib fracture will heal. It is not displaced. He does have a large ecchymosis over his right flank.    3. Permanent atrial fibrillation  The patient is in permanent atrial fibrillation, but his rate is well controlled.    4. Minor closed head injury  When the patient fell the other day, he did bang his head a couple of places, but he has been oriented since then and overall doing well.      Return in about 1 year  (around 4/12/2024), or if symptoms worsen or fail to improve, for Medicare Wellness.      Transcribed from ambient dictation for Nuno Patton MD by Amber Ashford.  04/12/23   11:03 EDT    Patient or patient representative verbalized consent to the visit recording.  I have personally performed the services described in this document as transcribed by the above individual, and it is both accurate and complete.  Nuno Patton MD  4/23/2023  07:52 EDT

## 2023-04-19 ENCOUNTER — APPOINTMENT (OUTPATIENT)
Dept: CT IMAGING | Facility: HOSPITAL | Age: 83
DRG: 378 | End: 2023-04-19
Payer: MEDICARE

## 2023-04-19 ENCOUNTER — INPATIENT HOSPITAL (AMBULATORY)
Dept: URBAN - METROPOLITAN AREA HOSPITAL 84 | Facility: HOSPITAL | Age: 83
End: 2023-04-19

## 2023-04-19 ENCOUNTER — HOSPITAL ENCOUNTER (INPATIENT)
Facility: HOSPITAL | Age: 83
LOS: 4 days | Discharge: HOME | DRG: 378 | End: 2023-04-23
Attending: EMERGENCY MEDICINE | Admitting: INTERNAL MEDICINE
Payer: MEDICARE

## 2023-04-19 DIAGNOSIS — Z79.01 LONG TERM (CURRENT) USE OF ANTICOAGULANTS: ICD-10-CM

## 2023-04-19 DIAGNOSIS — R51.9 HEADACHE, UNSPECIFIED: ICD-10-CM

## 2023-04-19 DIAGNOSIS — K92.0 HEMATEMESIS: ICD-10-CM

## 2023-04-19 DIAGNOSIS — I48.91 ATRIAL FIBRILLATION WITH RVR: ICD-10-CM

## 2023-04-19 DIAGNOSIS — I48.91 UNSPECIFIED ATRIAL FIBRILLATION: ICD-10-CM

## 2023-04-19 DIAGNOSIS — K59.00 CONSTIPATION, UNSPECIFIED: ICD-10-CM

## 2023-04-19 DIAGNOSIS — Z85.46 PERSONAL HISTORY OF MALIGNANT NEOPLASM OF PROSTATE: ICD-10-CM

## 2023-04-19 DIAGNOSIS — K92.2 GASTROINTESTINAL HEMORRHAGE, UNSPECIFIED GASTROINTESTINAL HEMORRHAGE TYPE: ICD-10-CM

## 2023-04-19 DIAGNOSIS — R51.9 INTRACTABLE HEADACHE, UNSPECIFIED CHRONICITY PATTERN, UNSPECIFIED HEADACHE TYPE: Primary | ICD-10-CM

## 2023-04-19 DIAGNOSIS — R10.9 ABDOMINAL PAIN, UNSPECIFIED ABDOMINAL LOCATION: ICD-10-CM

## 2023-04-19 DIAGNOSIS — K92.0 HEMATEMESIS WITH NAUSEA: ICD-10-CM

## 2023-04-19 DIAGNOSIS — R74.8 ABNORMAL LEVELS OF OTHER SERUM ENZYMES: ICD-10-CM

## 2023-04-19 LAB
ABO GROUP BLD: NORMAL
ALBUMIN SERPL-MCNC: 3.9 G/DL (ref 3.5–5.2)
ALBUMIN/GLOB SERPL: 1.1 G/DL
ALP SERPL-CCNC: 174 U/L (ref 39–117)
ALT SERPL W P-5'-P-CCNC: 12 U/L (ref 1–41)
ANION GAP SERPL CALCULATED.3IONS-SCNC: 11 MMOL/L (ref 5–15)
APTT PPP: 29.2 SECONDS (ref 61–76.5)
AST SERPL-CCNC: 12 U/L (ref 1–40)
BACTERIA UR QL AUTO: ABNORMAL /HPF
BASOPHILS # BLD AUTO: 0 10*3/MM3 (ref 0–0.2)
BASOPHILS NFR BLD AUTO: 0.5 % (ref 0–1.5)
BILIRUB SERPL-MCNC: 0.8 MG/DL (ref 0–1.2)
BILIRUB UR QL STRIP: NEGATIVE
BLD GP AB SCN SERPL QL: NEGATIVE
BUN SERPL-MCNC: 23 MG/DL (ref 8–23)
BUN/CREAT SERPL: 20.7 (ref 7–25)
CALCIUM SPEC-SCNC: 9.4 MG/DL (ref 8.6–10.5)
CHLORIDE SERPL-SCNC: 97 MMOL/L (ref 98–107)
CLARITY UR: CLEAR
CO2 SERPL-SCNC: 27 MMOL/L (ref 22–29)
COLOR UR: YELLOW
CREAT SERPL-MCNC: 1.11 MG/DL (ref 0.76–1.27)
DEPRECATED RDW RBC AUTO: 47.3 FL (ref 37–54)
EGFRCR SERPLBLD CKD-EPI 2021: 67.1 ML/MIN/1.73
EOSINOPHIL # BLD AUTO: 0 10*3/MM3 (ref 0–0.4)
EOSINOPHIL NFR BLD AUTO: 0.2 % (ref 0.3–6.2)
ERYTHROCYTE [DISTWIDTH] IN BLOOD BY AUTOMATED COUNT: 14.9 % (ref 12.3–15.4)
GEN 5 2HR TROPONIN T REFLEX: 32 NG/L
GGT SERPL-CCNC: 21 U/L (ref 8–61)
GLOBULIN UR ELPH-MCNC: 3.6 GM/DL
GLUCOSE BLDC GLUCOMTR-MCNC: 177 MG/DL (ref 70–105)
GLUCOSE BLDC GLUCOMTR-MCNC: 204 MG/DL (ref 70–105)
GLUCOSE BLDC GLUCOMTR-MCNC: 261 MG/DL (ref 70–105)
GLUCOSE SERPL-MCNC: 253 MG/DL (ref 65–99)
GLUCOSE UR STRIP-MCNC: ABNORMAL MG/DL
HCT VFR BLD AUTO: 45.1 % (ref 37.5–51)
HGB BLD-MCNC: 14.4 G/DL (ref 13–17.7)
HGB UR QL STRIP.AUTO: ABNORMAL
HYALINE CASTS UR QL AUTO: ABNORMAL /LPF
INR PPP: 1.15 (ref 0.93–1.1)
KETONES UR QL STRIP: ABNORMAL
LEUKOCYTE ESTERASE UR QL STRIP.AUTO: NEGATIVE
LIPASE SERPL-CCNC: 29 U/L (ref 13–60)
LYMPHOCYTES # BLD AUTO: 0.9 10*3/MM3 (ref 0.7–3.1)
LYMPHOCYTES NFR BLD AUTO: 10.2 % (ref 19.6–45.3)
MAGNESIUM SERPL-MCNC: 1.9 MG/DL (ref 1.6–2.4)
MCH RBC QN AUTO: 29.1 PG (ref 26.6–33)
MCHC RBC AUTO-ENTMCNC: 32 G/DL (ref 31.5–35.7)
MCV RBC AUTO: 90.9 FL (ref 79–97)
MONOCYTES # BLD AUTO: 0.4 10*3/MM3 (ref 0.1–0.9)
MONOCYTES NFR BLD AUTO: 4.5 % (ref 5–12)
NEUTROPHILS NFR BLD AUTO: 7.5 10*3/MM3 (ref 1.7–7)
NEUTROPHILS NFR BLD AUTO: 84.6 % (ref 42.7–76)
NITRITE UR QL STRIP: NEGATIVE
NRBC BLD AUTO-RTO: 0.1 /100 WBC (ref 0–0.2)
PH UR STRIP.AUTO: 7 [PH] (ref 5–8)
PLATELET # BLD AUTO: 170 10*3/MM3 (ref 140–450)
PMV BLD AUTO: 8.2 FL (ref 6–12)
POTASSIUM SERPL-SCNC: 5 MMOL/L (ref 3.5–5.2)
PROT SERPL-MCNC: 7.5 G/DL (ref 6–8.5)
PROT UR QL STRIP: ABNORMAL
PROTHROMBIN TIME: 12.2 SECONDS (ref 9.6–11.7)
RBC # BLD AUTO: 4.97 10*6/MM3 (ref 4.14–5.8)
RBC # UR STRIP: ABNORMAL /HPF
REF LAB TEST METHOD: ABNORMAL
RH BLD: POSITIVE
SODIUM SERPL-SCNC: 135 MMOL/L (ref 136–145)
SP GR UR STRIP: 1.03 (ref 1–1.03)
SQUAMOUS #/AREA URNS HPF: ABNORMAL /HPF
T&S EXPIRATION DATE: NORMAL
TROPONIN T DELTA: -5 NG/L
TROPONIN T SERPL HS-MCNC: 37 NG/L
UROBILINOGEN UR QL STRIP: ABNORMAL
WBC # UR STRIP: ABNORMAL /HPF
WBC NRBC COR # BLD: 8.9 10*3/MM3 (ref 3.4–10.8)

## 2023-04-19 PROCEDURE — 83735 ASSAY OF MAGNESIUM: CPT

## 2023-04-19 PROCEDURE — 63710000001 INSULIN LISPRO (HUMAN) PER 5 UNITS: Performed by: PHYSICIAN ASSISTANT

## 2023-04-19 PROCEDURE — 99285 EMERGENCY DEPT VISIT HI MDM: CPT

## 2023-04-19 PROCEDURE — 36415 COLL VENOUS BLD VENIPUNCTURE: CPT | Performed by: EMERGENCY MEDICINE

## 2023-04-19 PROCEDURE — 25010000002 ONDANSETRON PER 1 MG: Performed by: EMERGENCY MEDICINE

## 2023-04-19 PROCEDURE — 85025 COMPLETE CBC W/AUTO DIFF WBC: CPT | Performed by: EMERGENCY MEDICINE

## 2023-04-19 PROCEDURE — 99222 1ST HOSP IP/OBS MODERATE 55: CPT | Performed by: NURSE PRACTITIONER

## 2023-04-19 PROCEDURE — 70498 CT ANGIOGRAPHY NECK: CPT

## 2023-04-19 PROCEDURE — 82977 ASSAY OF GGT: CPT | Performed by: NURSE PRACTITIONER

## 2023-04-19 PROCEDURE — 81001 URINALYSIS AUTO W/SCOPE: CPT | Performed by: EMERGENCY MEDICINE

## 2023-04-19 PROCEDURE — 70496 CT ANGIOGRAPHY HEAD: CPT

## 2023-04-19 PROCEDURE — 85610 PROTHROMBIN TIME: CPT | Performed by: EMERGENCY MEDICINE

## 2023-04-19 PROCEDURE — 84484 ASSAY OF TROPONIN QUANT: CPT | Performed by: EMERGENCY MEDICINE

## 2023-04-19 PROCEDURE — 85730 THROMBOPLASTIN TIME PARTIAL: CPT | Performed by: EMERGENCY MEDICINE

## 2023-04-19 PROCEDURE — 86850 RBC ANTIBODY SCREEN: CPT | Performed by: EMERGENCY MEDICINE

## 2023-04-19 PROCEDURE — 25010000002 ONDANSETRON PER 1 MG: Performed by: NURSE PRACTITIONER

## 2023-04-19 PROCEDURE — 80053 COMPREHEN METABOLIC PANEL: CPT | Performed by: EMERGENCY MEDICINE

## 2023-04-19 PROCEDURE — 74176 CT ABD & PELVIS W/O CONTRAST: CPT

## 2023-04-19 PROCEDURE — 86901 BLOOD TYPING SEROLOGIC RH(D): CPT | Performed by: EMERGENCY MEDICINE

## 2023-04-19 PROCEDURE — 25010000002 MORPHINE PER 10 MG: Performed by: EMERGENCY MEDICINE

## 2023-04-19 PROCEDURE — 82962 GLUCOSE BLOOD TEST: CPT

## 2023-04-19 PROCEDURE — 93005 ELECTROCARDIOGRAM TRACING: CPT | Performed by: EMERGENCY MEDICINE

## 2023-04-19 PROCEDURE — 25510000001 IOPAMIDOL PER 1 ML: Performed by: INTERNAL MEDICINE

## 2023-04-19 PROCEDURE — 70450 CT HEAD/BRAIN W/O DYE: CPT

## 2023-04-19 PROCEDURE — 86900 BLOOD TYPING SEROLOGIC ABO: CPT | Performed by: EMERGENCY MEDICINE

## 2023-04-19 PROCEDURE — 99222 1ST HOSP IP/OBS MODERATE 55: CPT

## 2023-04-19 PROCEDURE — 83690 ASSAY OF LIPASE: CPT | Performed by: EMERGENCY MEDICINE

## 2023-04-19 RX ORDER — SODIUM CHLORIDE 0.9 % (FLUSH) 0.9 %
10 SYRINGE (ML) INJECTION AS NEEDED
Status: DISCONTINUED | OUTPATIENT
Start: 2023-04-19 | End: 2023-04-23 | Stop reason: HOSPADM

## 2023-04-19 RX ORDER — DILTIAZEM HYDROCHLORIDE 120 MG/1
120 CAPSULE, COATED, EXTENDED RELEASE ORAL
Status: DISCONTINUED | OUTPATIENT
Start: 2023-04-19 | End: 2023-04-23 | Stop reason: HOSPADM

## 2023-04-19 RX ORDER — INSULIN LISPRO 100 [IU]/ML
2-7 INJECTION, SOLUTION INTRAVENOUS; SUBCUTANEOUS
Status: DISCONTINUED | OUTPATIENT
Start: 2023-04-19 | End: 2023-04-23 | Stop reason: HOSPADM

## 2023-04-19 RX ORDER — ONDANSETRON 2 MG/ML
4 INJECTION INTRAMUSCULAR; INTRAVENOUS EVERY 6 HOURS PRN
Status: DISCONTINUED | OUTPATIENT
Start: 2023-04-19 | End: 2023-04-20 | Stop reason: SDUPTHER

## 2023-04-19 RX ORDER — HYDROCODONE BITARTRATE AND ACETAMINOPHEN 5; 325 MG/1; MG/1
1 TABLET ORAL EVERY 6 HOURS PRN
Status: DISPENSED | OUTPATIENT
Start: 2023-04-19 | End: 2023-04-20

## 2023-04-19 RX ORDER — POTASSIUM CHLORIDE 1.5 G/1.77G
40 POWDER, FOR SOLUTION ORAL AS NEEDED
Status: DISCONTINUED | OUTPATIENT
Start: 2023-04-19 | End: 2023-04-23 | Stop reason: HOSPADM

## 2023-04-19 RX ORDER — PANTOPRAZOLE SODIUM 40 MG/10ML
40 INJECTION, POWDER, LYOPHILIZED, FOR SOLUTION INTRAVENOUS ONCE
Status: COMPLETED | OUTPATIENT
Start: 2023-04-19 | End: 2023-04-19

## 2023-04-19 RX ORDER — ONDANSETRON 4 MG/1
4 TABLET, FILM COATED ORAL EVERY 6 HOURS PRN
Status: DISCONTINUED | OUTPATIENT
Start: 2023-04-19 | End: 2023-04-23 | Stop reason: HOSPADM

## 2023-04-19 RX ORDER — POTASSIUM CHLORIDE 20 MEQ/1
40 TABLET, EXTENDED RELEASE ORAL AS NEEDED
Status: DISCONTINUED | OUTPATIENT
Start: 2023-04-19 | End: 2023-04-19 | Stop reason: SDUPTHER

## 2023-04-19 RX ORDER — IBUPROFEN 600 MG/1
1 TABLET ORAL
Status: DISCONTINUED | OUTPATIENT
Start: 2023-04-19 | End: 2023-04-23 | Stop reason: HOSPADM

## 2023-04-19 RX ORDER — POTASSIUM CHLORIDE 1.5 G/1.77G
40 POWDER, FOR SOLUTION ORAL AS NEEDED
Status: DISCONTINUED | OUTPATIENT
Start: 2023-04-19 | End: 2023-04-19 | Stop reason: SDUPTHER

## 2023-04-19 RX ORDER — BISACODYL 5 MG/1
5 TABLET, DELAYED RELEASE ORAL DAILY PRN
Status: DISCONTINUED | OUTPATIENT
Start: 2023-04-19 | End: 2023-04-23 | Stop reason: HOSPADM

## 2023-04-19 RX ORDER — ACETAMINOPHEN 160 MG/5ML
650 SOLUTION ORAL EVERY 4 HOURS PRN
Status: DISCONTINUED | OUTPATIENT
Start: 2023-04-19 | End: 2023-04-23 | Stop reason: HOSPADM

## 2023-04-19 RX ORDER — ONDANSETRON 2 MG/ML
4 INJECTION INTRAMUSCULAR; INTRAVENOUS EVERY 6 HOURS PRN
Status: DISCONTINUED | OUTPATIENT
Start: 2023-04-19 | End: 2023-04-23 | Stop reason: HOSPADM

## 2023-04-19 RX ORDER — SODIUM CHLORIDE 9 MG/ML
40 INJECTION, SOLUTION INTRAVENOUS AS NEEDED
Status: DISCONTINUED | OUTPATIENT
Start: 2023-04-19 | End: 2023-04-23 | Stop reason: HOSPADM

## 2023-04-19 RX ORDER — ONDANSETRON 2 MG/ML
4 INJECTION INTRAMUSCULAR; INTRAVENOUS ONCE
Status: COMPLETED | OUTPATIENT
Start: 2023-04-19 | End: 2023-04-19

## 2023-04-19 RX ORDER — POTASSIUM CHLORIDE 7.45 MG/ML
10 INJECTION INTRAVENOUS
Status: DISCONTINUED | OUTPATIENT
Start: 2023-04-19 | End: 2023-04-23 | Stop reason: HOSPADM

## 2023-04-19 RX ORDER — ACETAMINOPHEN 325 MG/1
650 TABLET ORAL EVERY 4 HOURS PRN
Status: DISCONTINUED | OUTPATIENT
Start: 2023-04-19 | End: 2023-04-23 | Stop reason: HOSPADM

## 2023-04-19 RX ORDER — NICOTINE POLACRILEX 4 MG
15 LOZENGE BUCCAL
Status: DISCONTINUED | OUTPATIENT
Start: 2023-04-19 | End: 2023-04-23 | Stop reason: HOSPADM

## 2023-04-19 RX ORDER — ALUMINA, MAGNESIA, AND SIMETHICONE 2400; 2400; 240 MG/30ML; MG/30ML; MG/30ML
15 SUSPENSION ORAL EVERY 6 HOURS PRN
Status: DISCONTINUED | OUTPATIENT
Start: 2023-04-19 | End: 2023-04-23 | Stop reason: HOSPADM

## 2023-04-19 RX ORDER — BISACODYL 10 MG
10 SUPPOSITORY, RECTAL RECTAL DAILY PRN
Status: DISCONTINUED | OUTPATIENT
Start: 2023-04-19 | End: 2023-04-23 | Stop reason: HOSPADM

## 2023-04-19 RX ORDER — MAGNESIUM SULFATE HEPTAHYDRATE 40 MG/ML
2 INJECTION, SOLUTION INTRAVENOUS AS NEEDED
Status: DISCONTINUED | OUTPATIENT
Start: 2023-04-19 | End: 2023-04-23 | Stop reason: HOSPADM

## 2023-04-19 RX ORDER — PANTOPRAZOLE SODIUM 40 MG/1
40 TABLET, DELAYED RELEASE ORAL
Status: DISCONTINUED | OUTPATIENT
Start: 2023-04-19 | End: 2023-04-23 | Stop reason: HOSPADM

## 2023-04-19 RX ORDER — POLYETHYLENE GLYCOL 3350 17 G/17G
17 POWDER, FOR SOLUTION ORAL DAILY PRN
Status: DISCONTINUED | OUTPATIENT
Start: 2023-04-19 | End: 2023-04-23 | Stop reason: HOSPADM

## 2023-04-19 RX ORDER — ACETAMINOPHEN 650 MG/1
650 SUPPOSITORY RECTAL EVERY 4 HOURS PRN
Status: DISCONTINUED | OUTPATIENT
Start: 2023-04-19 | End: 2023-04-23 | Stop reason: HOSPADM

## 2023-04-19 RX ORDER — MAGNESIUM SULFATE HEPTAHYDRATE 40 MG/ML
4 INJECTION, SOLUTION INTRAVENOUS AS NEEDED
Status: DISCONTINUED | OUTPATIENT
Start: 2023-04-19 | End: 2023-04-23 | Stop reason: HOSPADM

## 2023-04-19 RX ORDER — SODIUM CHLORIDE 0.9 % (FLUSH) 0.9 %
10 SYRINGE (ML) INJECTION EVERY 12 HOURS SCHEDULED
Status: DISCONTINUED | OUTPATIENT
Start: 2023-04-19 | End: 2023-04-23 | Stop reason: HOSPADM

## 2023-04-19 RX ORDER — MAGNESIUM SULFATE HEPTAHYDRATE 40 MG/ML
4 INJECTION, SOLUTION INTRAVENOUS AS NEEDED
Status: DISCONTINUED | OUTPATIENT
Start: 2023-04-19 | End: 2023-04-19 | Stop reason: SDUPTHER

## 2023-04-19 RX ORDER — POTASSIUM CHLORIDE 20 MEQ/1
40 TABLET, EXTENDED RELEASE ORAL AS NEEDED
Status: DISCONTINUED | OUTPATIENT
Start: 2023-04-19 | End: 2023-04-23 | Stop reason: HOSPADM

## 2023-04-19 RX ORDER — AMOXICILLIN 250 MG
2 CAPSULE ORAL 2 TIMES DAILY
Status: DISCONTINUED | OUTPATIENT
Start: 2023-04-19 | End: 2023-04-23 | Stop reason: HOSPADM

## 2023-04-19 RX ORDER — DEXTROSE MONOHYDRATE 25 G/50ML
25 INJECTION, SOLUTION INTRAVENOUS
Status: DISCONTINUED | OUTPATIENT
Start: 2023-04-19 | End: 2023-04-23 | Stop reason: HOSPADM

## 2023-04-19 RX ORDER — CHOLECALCIFEROL (VITAMIN D3) 125 MCG
5 CAPSULE ORAL NIGHTLY PRN
Status: DISCONTINUED | OUTPATIENT
Start: 2023-04-19 | End: 2023-04-23 | Stop reason: HOSPADM

## 2023-04-19 RX ORDER — DILTIAZEM HCL/D5W 125 MG/125
5-15 PLASTIC BAG, INJECTION (ML) INTRAVENOUS CONTINUOUS
Status: DISPENSED | OUTPATIENT
Start: 2023-04-19 | End: 2023-04-19

## 2023-04-19 RX ORDER — MAGNESIUM SULFATE HEPTAHYDRATE 40 MG/ML
2 INJECTION, SOLUTION INTRAVENOUS AS NEEDED
Status: DISCONTINUED | OUTPATIENT
Start: 2023-04-19 | End: 2023-04-19 | Stop reason: SDUPTHER

## 2023-04-19 RX ADMIN — HYDROCODONE BITARTRATE AND ACETAMINOPHEN 1 TABLET: 5; 325 TABLET ORAL at 19:31

## 2023-04-19 RX ADMIN — PANTOPRAZOLE SODIUM 40 MG: 40 INJECTION, POWDER, FOR SOLUTION INTRAVENOUS at 06:07

## 2023-04-19 RX ADMIN — IOPAMIDOL 100 ML: 755 INJECTION, SOLUTION INTRAVENOUS at 08:44

## 2023-04-19 RX ADMIN — Medication 10 ML: at 11:30

## 2023-04-19 RX ADMIN — SODIUM CHLORIDE 500 ML: 9 INJECTION, SOLUTION INTRAVENOUS at 04:55

## 2023-04-19 RX ADMIN — MORPHINE SULFATE 4 MG: 4 INJECTION INTRAVENOUS at 05:27

## 2023-04-19 RX ADMIN — PANTOPRAZOLE SODIUM 40 MG: 40 INJECTION, POWDER, FOR SOLUTION INTRAVENOUS at 04:56

## 2023-04-19 RX ADMIN — ONDANSETRON 4 MG: 2 INJECTION INTRAMUSCULAR; INTRAVENOUS at 04:56

## 2023-04-19 RX ADMIN — ONDANSETRON 4 MG: 2 INJECTION INTRAMUSCULAR; INTRAVENOUS at 11:29

## 2023-04-19 RX ADMIN — INSULIN LISPRO 3 UNITS: 100 INJECTION, SOLUTION INTRAVENOUS; SUBCUTANEOUS at 17:14

## 2023-04-19 RX ADMIN — ACETAMINOPHEN 650 MG: 325 TABLET, FILM COATED ORAL at 11:29

## 2023-04-19 RX ADMIN — Medication 5 MG/HR: at 06:03

## 2023-04-19 RX ADMIN — DILTIAZEM HYDROCHLORIDE 120 MG: 120 CAPSULE, COATED, EXTENDED RELEASE ORAL at 16:04

## 2023-04-19 RX ADMIN — ONDANSETRON 4 MG: 2 INJECTION INTRAMUSCULAR; INTRAVENOUS at 06:07

## 2023-04-19 RX ADMIN — PANTOPRAZOLE SODIUM 40 MG: 40 TABLET, DELAYED RELEASE ORAL at 17:14

## 2023-04-19 RX ADMIN — HYDROCODONE BITARTRATE AND ACETAMINOPHEN 1 TABLET: 5; 325 TABLET ORAL at 13:30

## 2023-04-19 RX ADMIN — INSULIN LISPRO 4 UNITS: 100 INJECTION, SOLUTION INTRAVENOUS; SUBCUTANEOUS at 13:46

## 2023-04-19 RX ADMIN — PANTOPRAZOLE SODIUM 40 MG: 40 TABLET, DELAYED RELEASE ORAL at 08:50

## 2023-04-19 RX ADMIN — ONDANSETRON 4 MG: 2 INJECTION INTRAMUSCULAR; INTRAVENOUS at 19:31

## 2023-04-19 RX ADMIN — Medication 10 ML: at 20:24

## 2023-04-19 RX ADMIN — Medication 10 MG/HR: at 16:01

## 2023-04-19 RX ADMIN — SODIUM CHLORIDE 500 ML: 9 INJECTION, SOLUTION INTRAVENOUS at 06:15

## 2023-04-19 NOTE — ED PROVIDER NOTES
Subjective   History of Present Illness  Chief complaint: Patient is a pleasant 82-year-old.  He presents to the emergency department a with headache and vomiting.  He has some abdominal pain and diarrhea as well.  He states about 1 PM yesterday he developed a headache.  Its a diffuse headache.  He cannot really describe the headache.  He has had no stroke weakness or numbness.  He is on Xarelto chronically for A-fib.  He fell 2 weeks ago.  He hit his head and had a negative CT then.  He also broke a rib and was admitted to the hospital for couple of days.  He states he went back to work on Monday.  However yesterday developed a headache.  Then he developed nausea vomiting and abdominal discomfort through the night.  Presents in severe pain at this point time for evaluation.  He has not had a fever.    Context:    Duration:    Timing: As above    Severity: Severe    Associated Symptoms:        PCP:  LMP:        Review of Systems   Constitutional: Positive for fatigue. Negative for fever.   Respiratory: Negative.    Cardiovascular: Negative.    Gastrointestinal: Positive for diarrhea, nausea and vomiting.   Musculoskeletal: Negative.    Neurological: Positive for headaches. Negative for speech difficulty, weakness and numbness.       Past Medical History:   Diagnosis Date   • Atrial fibrillation    • Cancer    • Coronary artery disease    • History of bladder cancer    • Hyperlipidemia        No Known Allergies    Past Surgical History:   Procedure Laterality Date   • BLADDER SURGERY  10/26/2019    [facility] Dr. Cope   • BRONCHOSCOPY N/A 6/30/2022    Procedure: BRONCHOSCOPY with bilateral lung wash;  Surgeon: Ayush Velarde MD;  Location: [facility] ENDOSCOPY;  Service: Pulmonary;  Laterality: N/A;  normal bronch   • CARDIAC CATHETERIZATION     • COLONOSCOPY     • COLONOSCOPY W/ POLYPECTOMY  02/23/2021   • SKIN CANCER EXCISION      right temple BX   • UPPER ENDOSCOPIC ULTRASOUND W/ FNA N/A 3/12/2021    Procedure:  ESOPHAGOGASTRODUODENOSCOPY WITH endoscopic mucosal resection, biopsy x 1 area, and endoscopic ULTRASOUND;  Surgeon: Abner Infante MD;  Location: [facility] ENDOSCOPY;  Service: Gastroenterology;  Laterality: N/A;  post op: hiatal hernia, duodenal polyp       Family History   Problem Relation Age of Onset   • Heart disease Mother    • Heart attack Father    • No Known Problems Sister    • No Known Problems Brother    • No Known Problems Maternal Aunt    • No Known Problems Maternal Uncle    • No Known Problems Paternal Aunt    • No Known Problems Paternal Uncle    • No Known Problems Maternal Grandmother    • No Known Problems Maternal Grandfather    • No Known Problems Paternal Grandmother    • No Known Problems Paternal Grandfather    • No Known Problems Other    • Anemia Neg Hx    • Arrhythmia Neg Hx    • Asthma Neg Hx    • Clotting disorder Neg Hx    • Fainting Neg Hx    • Heart failure Neg Hx    • Hyperlipidemia Neg Hx    • Hypertension Neg Hx        Social History     Socioeconomic History   • Marital status:    Tobacco Use   • Smoking status: Former     Types: Cigarettes     Quit date: 1980     Years since quittin.3   • Smokeless tobacco: Never   Vaping Use   • Vaping Use: Never used   Substance and Sexual Activity   • Alcohol use: Not Currently   • Drug use: Never   • Sexual activity: Defer           Objective   Physical Exam  Vitals and nursing note reviewed.   HENT:      Head: Normocephalic and atraumatic.   Eyes:      Extraocular Movements: Extraocular movements intact.      Pupils: Pupils are equal, round, and reactive to light.   Cardiovascular:      Rate and Rhythm: Tachycardia present. Rhythm irregular.   Pulmonary:      Effort: Pulmonary effort is normal.   Abdominal:      Tenderness: There is no abdominal tenderness.   Musculoskeletal:      Cervical back: Neck supple.   Skin:     General: Skin is warm and dry.   Neurological:      General: No focal deficit present.      Mental Status:  He is alert and oriented to person, place, and time.      Cranial Nerves: No cranial nerve deficit.      Motor: No weakness.      Coordination: Coordination normal.   Psychiatric:         Thought Content: Thought content normal.         Procedures           ED Course      Results for orders placed or performed during the hospital encounter of 04/19/23   Comprehensive Metabolic Panel    Specimen: Blood   Result Value Ref Range    Glucose 253 (H) 65 - 99 mg/dL    BUN 23 8 - 23 mg/dL    Creatinine 1.11 0.76 - 1.27 mg/dL    Sodium 135 (L) 136 - 145 mmol/L    Potassium 5.0 3.5 - 5.2 mmol/L    Chloride 97 (L) 98 - 107 mmol/L    CO2 27.0 22.0 - 29.0 mmol/L    Calcium 9.4 8.6 - 10.5 mg/dL    Total Protein 7.5 6.0 - 8.5 g/dL    Albumin 3.9 3.5 - 5.2 g/dL    ALT (SGPT) 12 1 - 41 U/L    AST (SGOT) 12 1 - 40 U/L    Alkaline Phosphatase 174 (H) 39 - 117 U/L    Total Bilirubin 0.8 0.0 - 1.2 mg/dL    Globulin 3.6 gm/dL    A/G Ratio 1.1 g/dL    BUN/Creatinine Ratio 20.7 7.0 - 25.0    Anion Gap 11.0 5.0 - 15.0 mmol/L    eGFR 67.1 >60.0 mL/min/1.73   Protime-INR    Specimen: Blood   Result Value Ref Range    Protime 12.2 (H) 9.6 - 11.7 Seconds    INR 1.15 (H) 0.93 - 1.10   aPTT    Specimen: Blood   Result Value Ref Range    PTT 29.2 (L) 61.0 - 76.5 seconds   Lipase    Specimen: Blood   Result Value Ref Range    Lipase 29 13 - 60 U/L   High Sensitivity Troponin T    Specimen: Blood   Result Value Ref Range    HS Troponin T 37 (H) <15 ng/L   CBC Auto Differential    Specimen: Blood   Result Value Ref Range    WBC 8.90 3.40 - 10.80 10*3/mm3    RBC 4.97 4.14 - 5.80 10*6/mm3    Hemoglobin 14.4 13.0 - 17.7 g/dL    Hematocrit 45.1 37.5 - 51.0 %    MCV 90.9 79.0 - 97.0 fL    MCH 29.1 26.6 - 33.0 pg    MCHC 32.0 31.5 - 35.7 g/dL    RDW 14.9 12.3 - 15.4 %    RDW-SD 47.3 37.0 - 54.0 fl    MPV 8.2 6.0 - 12.0 fL    Platelets 170 140 - 450 10*3/mm3    Neutrophil % 84.6 (H) 42.7 - 76.0 %    Lymphocyte % 10.2 (L) 19.6 - 45.3 %    Monocyte % 4.5 (L)  5.0 - 12.0 %    Eosinophil % 0.2 (L) 0.3 - 6.2 %    Basophil % 0.5 0.0 - 1.5 %    Neutrophils, Absolute 7.50 (H) 1.70 - 7.00 10*3/mm3    Lymphocytes, Absolute 0.90 0.70 - 3.10 10*3/mm3    Monocytes, Absolute 0.40 0.10 - 0.90 10*3/mm3    Eosinophils, Absolute 0.00 0.00 - 0.40 10*3/mm3    Basophils, Absolute 0.00 0.00 - 0.20 10*3/mm3    nRBC 0.1 0.0 - 0.2 /100 WBC   High Sensitivity Troponin T 2Hr    Specimen: Blood   Result Value Ref Range    HS Troponin T 32 (H) <15 ng/L    Troponin T Delta -5 (L) >=-4 - <+4 ng/L   ECG 12 Lead Tachycardia   Result Value Ref Range    QT Interval 321 ms   Type & Screen    Specimen: Blood   Result Value Ref Range    ABO Type O     RH type Positive     Antibody Screen Negative     T&S Expiration Date 4/22/2023 11:59:59 PM               CT Abdomen Pelvis Without Contrast    Result Date: 4/19/2023  Impression: 1. No acute abnormality seen in the abdomen or pelvis. Stable intra-abdominal findings as above. 2. Increased, moderate pericardial effusion. Stable small right pleural effusion. 3. Hiatal hernia. Electronically signed by:  Kalin Sharp M.D.  4/19/2023 4:12 AM Mountain Time    CT Head Without Contrast    Result Date: 4/19/2023  1. No acute intracranial abnormality. 2. Volume loss and mild chronic small vessel ischemic changes. Electronically signed by:  Jose M Galindo M.D.  4/19/2023 4:05 AM Mountain Time                                 Medical Decision Making  Patient was seen evaluated for the above problems    Differential diagnosis includes but is not limited to subdural hematoma, epidural hematoma, subarachnoid hemorrhage, small bowel obstruction, peptic ulcer disease, hemorrhagic gastritis, variceal bleed,    Critical care was 43 minutes    Patient presented in A-fib with RVR.  Heart rate is down to 110 on Cardizem drip.  He hit his head a couple weeks ago.  I thought maybe he had delayed subdural hematoma.  This but his CT scan shows no acute process.  CT abdomen no acute  process.  He did vomit up some black here in the emergency department today.  It was Gastroccult positive.  Consult has been placed to GI.  He is received 80 mg of Protonix as well for his GI bleed.  But no perforation.  Could be peptic ulcer, hemorrhagic gastritis.  He does not have a history of varices at this point.  Discussed with Mandy on-call for Dr. Frank who agrees to admit.  As this was a sudden onset headache yesterday and has been severe and has been vomiting with it I did order CT angiogram and discussed with Mandy who verbalizes understanding.            Abdominal pain, unspecified abdominal location: acute illness or injury  Atrial fibrillation with RVR: acute illness or injury  Gastrointestinal hemorrhage, unspecified gastrointestinal hemorrhage type: acute illness or injury  Intractable headache, unspecified chronicity pattern, unspecified headache type: acute illness or injury  Amount and/or Complexity of Data Reviewed  Labs: ordered. Decision-making details documented in ED Course.     Details: Hemoglobin is stable.  Troponin 37.  Lipase is normal.  White count 8.9.  Laboratory results interpreted by myself.  Radiology: ordered and independent interpretation performed.     Details: CT head shows no intracerebral hemorrhage.  Reviewed by myself.  CT abdomen pelvis.  No bowel perforation.  No small bowel obstruction.  Reviewed by myself.  ECG/medicine tests: ordered and independent interpretation performed.     Details: EKG interpreted by myself shows A-fib with RVR rate of 126.  This is compared to EKG dated 2/25/2023 showing A-fib rate of 86.      Risk  Prescription drug management.  Drug therapy requiring intensive monitoring for toxicity.  Decision regarding hospitalization.      Critical Care  Total time providing critical care: 43 minutes      Final diagnoses:   None   Headache  A-fib with RVR  GI bleed  Abdominal pain    ED Disposition  ED Disposition     None          No follow-up provider  specified.       Medication List      No changes were made to your prescriptions during this visit.          Andrew Márquez,   04/19/23 07

## 2023-04-19 NOTE — ED NOTES
Pt returned from radiology nauseated and vomiting. Emesis appears dark in color. MD notified. New orders placed.

## 2023-04-19 NOTE — PLAN OF CARE
Goal Outcome Evaluation:               Patient remains on room air. No gtts. AOx4, follows commands. Urinated around 1500cc, attempted to stool without success.     Plan to move to [facility] - [facility]109.

## 2023-04-19 NOTE — ED NOTES
Patient states a few weeks ago he had a fall and was cleared to go back to work on Monday. Since he has returned back to work he has had increasingly worse headaches and vomiting that began tonight.

## 2023-04-19 NOTE — CASE MANAGEMENT/SOCIAL WORK
Discharge Planning Assessment  [facility]     Patient Name: Cisco Frank  MRN: [id number]  Today's Date: 4/19/2023    Admit Date: 4/19/2023    Plan: DC Plan: Return home with spouse, consider home health if home bound.   Discharge Needs Assessment     Row Name 04/19/23 1606       Living Environment    People in Home spouse    Name(s) of People in Home Christine    Current Living Arrangements home    Primary Care Provided by self;spouse/significant other    Provides Primary Care For no one    Family Caregiver if Needed spouse    Family Caregiver Names Only other relatives are distant cousins.    Able to Return to Prior Arrangements yes       Resource/Environmental Concerns    Resource/Environmental Concerns none    Transportation Concerns none       Transition Planning    Patient/Family Anticipates Transition to home with family    Patient/Family Anticipated Services at Transition     Transportation Anticipated family or friend will provide       Discharge Needs Assessment    Readmission Within the Last 30 Days no previous admission in last 30 days    Equipment Currently Used at Home cane, straight;walker, rolling  Binder for fx ribs in place.    Concerns to be Addressed discharge planning    Discharge Coordination/Progress DC Plan: Return home with spouse pending clinical progress.               Discharge Plan     Row Name 04/19/23 1610       Plan    Plan DC Plan: Return home with spouse, consider home health if home bound.    Plan Comments Cardizem gtt DCed for afib, RVR. Cardizem po. Xarelto at home. Clear liquids. Confirmed insurance and PCP. Pharmacy [facility] to Beds.              Continued Care and Services - Admitted Since 4/19/2023    Coordination has not been started for this encounter.          Demographic Summary     Row Name 04/19/23 1609       General Information    Admission Type inpatient    Arrived From home    Required Notices Provided Important Message from Medicare     Referral Source admission list    Reason for Consult discharge planning    Preferred Language English    General Information Comments Spoke with pt and spouse Christine at bedside.               Functional Status     Row Name 04/19/23 6451       Functional Status    Usual Activity Tolerance moderate    Current Activity Tolerance fair       Functional Status, IADL    Medications independent    Meal Preparation assistive person    Housekeeping assistive person    Laundry assistive person    Shopping assistive person    IADL Comments Pt and spouse share home chores.       Mental Status    General Appearance WDL WDL       Mental Status Summary    Recent Changes in Mental Status/Cognitive Functioning no changes              Met with patient and spouse.in room.               Dain Saucedo, RN

## 2023-04-19 NOTE — CONSULTS
"GI CONSULT  NOTE:    Referring Provider:  Dr. Jimenez    Chief complaint: Nausea with vomiting    Subjective . \"I had a headache then started vomiting\"    History of present illness: Cisco Frank is a 82 y.o. male who has a history of GERD, Schroeder's esophagus, CAD/MI, A-fib on Xarelto, prostate cancer with previous radiation therapy and diabetes.  He presents with acute onset headache that started yesterday afternoon which was followed by nausea and vomiting after dinner.  He fell approximately 2 weeks ago and reports some rib fractures but had been doing okay.  He took Tylenol x2 and hydrocodone without relief of his headache.  He does question some hematemesis or coffee-ground emesis and some mild nausea this morning.  Denies abdominal pain.  Typically has regular bowel habits on MiraLAX and had a brown bowel movement today.  He denies any vision changes hearing changes or numbness.  No known history of CVA.  He is accompanied by his wife today who reports recent changes in his medications and eluding his metoprolol    Endo History:  2/2022 EGD by Dr. Urban -Schroeder's esophagus without dysplasia, medium hiatal hernia, gastric polyp  3/2021 EGD/EUS -blurred GE junction with biopsies consistent with Schroeder's esophagus, negative for dysplasia, few inflammatory appearing polyps, large hiatal hernia, large duodenal polyp s/p endoscopic mucosal resection, EUS revealed 5.6 mm x 3.8 mm capsule location in the ampulla adjacent to the pancreatic duct, CBD 6 mm, duodenal polyp appears to be all muscularis mucosa, biopsies benign  1/2018 -hemorrhoids, random colon biopsies benign    Past Medical History:  Past Medical History:   Diagnosis Date   • Atrial fibrillation    • Cancer    • Coronary artery disease    • History of bladder cancer    • Hyperlipidemia        Past Surgical History:  Past Surgical History:   Procedure Laterality Date   • BLADDER SURGERY  10/26/2019    [facility] Dr. Cope   • BRONCHOSCOPY N/A 6/30/2022    " Procedure: BRONCHOSCOPY with bilateral lung wash;  Surgeon: Ayush Velarde MD;  Location: [facility] ENDOSCOPY;  Service: Pulmonary;  Laterality: N/A;  normal bronch   • CARDIAC CATHETERIZATION     • COLONOSCOPY     • COLONOSCOPY W/ POLYPECTOMY  2021   • SKIN CANCER EXCISION      right temple BX   • UPPER ENDOSCOPIC ULTRASOUND W/ FNA N/A 3/12/2021    Procedure: ESOPHAGOGASTRODUODENOSCOPY WITH endoscopic mucosal resection, biopsy x 1 area, and endoscopic ULTRASOUND;  Surgeon: Abner Infante MD;  Location: [facility] ENDOSCOPY;  Service: Gastroenterology;  Laterality: N/A;  post op: hiatal hernia, duodenal polyp       Social History:  Social History     Tobacco Use   • Smoking status: Former     Types: Cigarettes     Quit date: 1980     Years since quittin.3   • Smokeless tobacco: Never   Vaping Use   • Vaping Use: Never used   Substance Use Topics   • Alcohol use: Not Currently   • Drug use: Never       Family History:  Family History   Problem Relation Age of Onset   • Heart disease Mother    • Heart attack Father    • No Known Problems Sister    • No Known Problems Brother    • No Known Problems Maternal Aunt    • No Known Problems Maternal Uncle    • No Known Problems Paternal Aunt    • No Known Problems Paternal Uncle    • No Known Problems Maternal Grandmother    • No Known Problems Maternal Grandfather    • No Known Problems Paternal Grandmother    • No Known Problems Paternal Grandfather    • No Known Problems Other    • Anemia Neg Hx    • Arrhythmia Neg Hx    • Asthma Neg Hx    • Clotting disorder Neg Hx    • Fainting Neg Hx    • Heart failure Neg Hx    • Hyperlipidemia Neg Hx    • Hypertension Neg Hx    No family history of GI malignancy    Medications:  (Not in a hospital admission)      Scheduled Meds:pantoprazole, 40 mg, Oral, BID AC      Continuous Infusions:dilTIAZem, 5-15 mg/hr, Last Rate: 10 mg/hr (23 0851)      PRN Meds:.•  ondansetron  •  [COMPLETED] Insert Peripheral IV **AND** sodium  chloride    ALLERGIES:  Patient has no known allergies.    ROS:  The following systems were reviewed   Constitution:  No fevers, chills, no unintentional weight loss  Skin: no rash, no jaundice  Eyes:  No blurry vision, no eye pain  HENT:  No change in hearing or smell  Resp:  No dyspnea or cough  CV:  No chest pain or palpitations  :  No dysuria, hematuria  Musculoskeletal:  No leg cramps or arthralgias, recent fall with rib fracture  Neuro:  No tremor, no numbness, headache  Psych:  No depression or confusion    Objective Resting in bed, ill-appearing but no acute distress, family at bedside    Vital Signs:   Vitals:    04/19/23 0745 04/19/23 0746 04/19/23 0801 04/19/23 0900   BP:  118/77 124/69 134/84   BP Location:  Left arm Left arm Left arm   Patient Position:  Lying Lying Lying   Pulse: 116 105 108 (!) 125   Resp: 18 18 18 18   Temp:       TempSrc:       SpO2: 92% 93% 96% 90%   Weight:       Height:           Physical Exam:     General Appearance:    Awake and alert, in no acute distress   Head:    Normocephalic, without obvious abnormality, atraumatic   Throat:   No oral lesions, no thrush, oral mucosa moist   Lungs:     Respirations regular, even and unlabored   Chest Wall:    No abnormalities observed   Abdomen:     Soft, non-tender, nondistended   Rectal:     Deferred   Extremities:   Moves all extremities, no cyanosis       Skin:   No rash, no jaundice, normal palpation       Neurologic:   Cranial nerves 2 - 12 grossly intact       Results Review:   I reviewed the patient's labs and imaging.  CBC    Results from last 7 days   Lab Units 04/19/23  0448   WBC 10*3/mm3 8.90   HEMOGLOBIN g/dL 14.4   PLATELETS 10*3/mm3 170     CMP   Results from last 7 days   Lab Units 04/19/23  0448   SODIUM mmol/L 135*   POTASSIUM mmol/L 5.0   CHLORIDE mmol/L 97*   CO2 mmol/L 27.0   BUN mg/dL 23   CREATININE mg/dL 1.11   GLUCOSE mg/dL 253*   ALBUMIN g/dL 3.9   BILIRUBIN mg/dL 0.8   ALK PHOS U/L 174*   AST (SGOT) U/L 12    ALT (SGPT) U/L 12   LIPASE U/L 29     Cr Clearance Estimated Creatinine Clearance: 63.2 mL/min (by C-G formula based on SCr of 1.11 mg/dL).  Coag   Results from last 7 days   Lab Units 04/19/23  0448   INR  1.15*   APTT seconds 29.2*     HbA1C   Lab Results   Component Value Date    HGBA1C 8.60 (H) 04/03/2023    HGBA1C 8.8 (H) 12/16/2022    HGBA1C 8.7 (H) 06/17/2022     Blood Glucose No results found for: POCGLU  Infection     UA    Results from last 7 days   Lab Units 04/19/23  0840   NITRITE UA  Negative   WBC UA /HPF 0-2*   BACTERIA UA /HPF None Seen   SQUAM EPITHEL UA /HPF None Seen     Radiology(recent) CT Abdomen Pelvis Without Contrast    Result Date: 4/19/2023  Impression: 1. No acute abnormality seen in the abdomen or pelvis. Stable intra-abdominal findings as above. 2. Increased, moderate pericardial effusion. Stable small right pleural effusion. 3. Hiatal hernia. Electronically signed by:  Kalin Sharp M.D.  4/19/2023 4:12 AM Mountain Time    CT Head Without Contrast    Result Date: 4/19/2023  1. No acute intracranial abnormality. 2. Volume loss and mild chronic small vessel ischemic changes. Electronically signed by:  Jose M Galindo M.D.  4/19/2023 4:05 AM Mountain Time    CT Angiogram Neck    Result Date: 4/19/2023  Impression: Essentially normal CT angiogram of the head and neck as above. There is no evidence of flow-limiting stenosis, large vessel occlusion or aneurysmal dilatation. Electronically Signed: Monroe Kline  4/19/2023 8:59 AM EDT  Workstation ID: [id number]    CT Angiogram Head    Result Date: 4/19/2023  Impression: Essentially normal CT angiogram of the head and neck as above. There is no evidence of flow-limiting stenosis, large vessel occlusion or aneurysmal dilatation. Electronically Signed: Monroe Kline  4/19/2023 8:59 AM EDT  Workstation ID: [id number]         ASSESSMENT:  Acute nausea with vomiting and possible hematemesis  Acute headache  Elevated alkaline phosphatase  A-fib with  RVR -on Xarelto  DMII  History of GERD/Schroeder's esophagus  History of CAD/MI  History of prostate cancer with radiation    PLAN:  Patient is an 82-year-old male with a history of GERD and Schroeder's esophagus who presents with acute headache followed by nausea and vomiting and reported hematemesis.  Hemoglobin 14.4 with possible esophagitis or small Lizeth-Gray tear with retching in the setting of Xarelto.  Start twice daily PPI and antiemetics as needed.  Could consider EGD if persistent vomiting or drop in hemoglobin.  Work-up in progress for headache as well as A-fib with RVR.  Elevated alkaline phosphatase noted, check GGT. Continue supportive care we will follow      I discussed the patients findings and my recommendations with the patient.    We appreciate the referral    Electronically signed by RIAN Jeffers, 04/19/23, 9:41 AM EDT.

## 2023-04-19 NOTE — ED NOTES
Pt placed on hospital bed in lowest position call system in reach. Wife at bedside. Pt instructed to ring for assistance

## 2023-04-19 NOTE — Clinical Note
Level of Care: Progressive Care [20]   Admitting Physician: JAX HAMILTON [[id number]]   Attending Physician: JAX HAMILTON [[id number]]   Bed Request Comments: PCU

## 2023-04-19 NOTE — H&P
[facility]      Patient Name: Cisco Frank  : 1940  MRN: [id number]  Primary Care Physician:  Nuno Patton MD  Date of admission: 2023      Subjective      Chief Complaint: Headache, nausea, vomiting and A-fib with RVR    History of Present Illness: Cisco Frank is a 82 y.o. male with a past medical history to include atrial fibrillation chronically anticoagulated on Xarelto, DM 2, CAD, hyperlipidemia, hypertension and Schroeder's esophagus who presented to [facility] on 2023 complaining of intractable headache x1 day, coffee-ground emesis since this a.m., and atrial fibrillation RVR.  Patient reports that yesterday about 1 AM he began having a headache on the right side of his head and into his neck.  He reports approximately 2 weeks ago he fell and hit the left side of his head and broke a few ribs on the right side.  He states that all of that is healing well and he was having no residual headache from that episode.  He reports that at 4:30 yesterday he had a salad for dinner and soon after he began throwing up.  He states that he was throwing up salad yesterday but then this morning he began having coffee-ground emesis.  He denies having any sick contacts.  He denies having any cough or congestion.  He denies any shortness of air or chest pains.  He denies having any abdominal pain, diarrhea but endorses chronic constipation for which he is on MiraLAX.  He denies having any burning with urination or any other urinary symptoms.  He denies having any lower extremity edema.  He denies having any focal neurologic symptoms.  He denies any syncopal episodes.  He denies any palpitations.  We have been asked to admit this patient for further evaluation and treatment    In the emergency department, tachycardic.  Other vital signs stable.  Room air 96%.  High-sensitivity troponin 37, 32 troponin T delta -5.  Glucose 253.  Creatinine 1.11.  Sodium  135.  Potassium 5.0.  Chloride 97.  Pro time 12.2.  INR 1.15.  PTT 29.2.  Lipase 29.  White blood count 8.90.  Hemoglobin 14.4.  Platelets 170.    CT abdomen pelvis without contrast No acute abnormality seen in the abdomen or pelvis. Stable intra-abdominal. Increased, moderate pericardial effusion. Stable small right pleural effusion. Hiatal hernia.    CT head without contrast No acute intracranial abnormality.Volume loss and mild chronic small vessel ischemic changes.    CTA head and neck Essentially normal CT angiogram of the head and neck. There is no evidence of flow-limiting stenosis, large vessel occlusion or aneurysmal dilatation.    Review of Systems   All other systems reviewed and are negative.  Except otherwise noted in HPI      Personal History     Past Medical History:   Diagnosis Date   • Atrial fibrillation    • Cancer    • Coronary artery disease    • History of bladder cancer    • Hyperlipidemia        Past Surgical History:   Procedure Laterality Date   • BLADDER SURGERY  10/26/2019    [facility] Dr. Cope   • BRONCHOSCOPY N/A 6/30/2022    Procedure: BRONCHOSCOPY with bilateral lung wash;  Surgeon: Ayush Velarde MD;  Location: [facility] ENDOSCOPY;  Service: Pulmonary;  Laterality: N/A;  normal bronch   • CARDIAC CATHETERIZATION     • COLONOSCOPY     • COLONOSCOPY W/ POLYPECTOMY  02/23/2021   • SKIN CANCER EXCISION      right temple BX   • UPPER ENDOSCOPIC ULTRASOUND W/ FNA N/A 3/12/2021    Procedure: ESOPHAGOGASTRODUODENOSCOPY WITH endoscopic mucosal resection, biopsy x 1 area, and endoscopic ULTRASOUND;  Surgeon: Abner Infante MD;  Location: [facility] ENDOSCOPY;  Service: Gastroenterology;  Laterality: N/A;  post op: hiatal hernia, duodenal polyp       Family History: family history includes Heart attack in his father; Heart disease in his mother; No Known Problems in his brother, maternal aunt, maternal grandfather, maternal grandmother, maternal uncle, paternal aunt, paternal grandfather, paternal  grandmother, paternal uncle, sister, and another family member. Otherwise pertinent FHx was reviewed and not pertinent to current issue.    Social History:  reports that he quit smoking about 43 years ago. His smoking use included cigarettes. He has never used smokeless tobacco. He reports that he does not currently use alcohol. He reports that he does not use drugs.    Home Medications:  Prior to Admission Medications     Prescriptions Last Dose Informant Patient Reported? Taking?    atorvastatin (LIPITOR) 40 MG tablet   No No    Take 1 tablet by mouth every night at bedtime.    benzonatate (TESSALON) 100 MG capsule   No No    Take 1 capsule by mouth 3 (Three) Times a Day As Needed for Cough.    Patient not taking:  Reported on 4/12/2023    colestipol (COLESTID) 1 g tablet   Yes No    Take 1 tablet by mouth 2 (Two) Times a Day.    fluticasone (FLONASE) 50 MCG/ACT nasal spray   No No    2 sprays into the nostril(s) as directed by provider Daily.    Patient not taking:  Reported on 4/12/2023    glimepiride (AMARYL) 2 MG tablet   No No    TAKE 3 TABLETS EVERY MORNING BEFORE BREAKFAST    glucose blood (Accu-Chek Tessa Plus) test strip   No No    Test daily and prn    HYDROcodone-acetaminophen (Norco) 5-325 MG per tablet   No No    Take 1-2 tablets by mouth Every 6 (Six) Hours As Needed for Moderate Pain or Severe Pain for up to 15 days.    Jardiance 10 MG tablet tablet   Yes No    metoprolol tartrate (LOPRESSOR) 25 MG tablet   No No    Take 0.5 tablets by mouth Every 12 (Twelve) Hours.    mupirocin (BACTROBAN) 2 % ointment   No No    Apply 1 application topically to the appropriate area as directed 3 (Three) Times a Day.    omeprazole-sodium bicarbonate (ZEGERID)  MG per capsule  Self Yes No    Take 1 capsule by mouth Daily. prn    ondansetron ODT (ZOFRAN-ODT) 4 MG disintegrating tablet   No No    Place 1 tablet on the tongue 4 (Four) Times a Day As Needed for Nausea or Vomiting for up to 15 doses.     polyethylene glycol (MIRALAX) 17 g packet   No No    Take 17 g by mouth Daily.    rivaroxaban (XARELTO) 20 MG tablet   No No    Take 1 tablet by mouth Daily With Dinner for 30 days. Indications: Atrial Fibrillation    Tradjenta 5 MG tablet tablet   No No    TAKE 1 TABLET BY MOUTH DAILY FOR 30 DAYS.    vitamin C (ASCORBIC ACID) 500 MG tablet  Self Yes No    Take 1 tablet by mouth Daily.            Allergies:  No Known Allergies    Objective      Vitals:   Temp:  [97.5 °F (36.4 °C)] 97.5 °F (36.4 °C)  Heart Rate:  [] 124  Resp:  [18] 18  BP: (105-145)/(59-98) 145/86  Flow (L/min):  [2] 2    Physical Exam  Constitutional:       General: He is not in acute distress.  HENT:      Head: Normocephalic and atraumatic.   Cardiovascular:      Rate and Rhythm: Tachycardia present. Rhythm irregular.      Pulses: Normal pulses.      Heart sounds: Normal heart sounds.   Pulmonary:      Effort: Pulmonary effort is normal.      Breath sounds: Rhonchi present.   Abdominal:      General: Bowel sounds are normal.      Palpations: Abdomen is soft.      Tenderness: There is no abdominal tenderness.   Musculoskeletal:      Cervical back: Normal range of motion and neck supple.      Right lower leg: No edema.      Left lower leg: No edema.   Skin:     General: Skin is warm and dry.   Neurological:      General: No focal deficit present.      Mental Status: He is alert and oriented to person, place, and time.   Psychiatric:         Mood and Affect: Mood normal.         Behavior: Behavior normal.            Result Review    Result Review:  I have personally reviewed the results from the time of this admission to 4/19/2023 10:44 EDT and agree with these findings:  [x]  Laboratory  [x]  Microbiology  [x]  Radiology  [x]  EKG/Telemetry   []  Cardiology/Vascular   []  Pathology  []  Old records  []  Other:         Assessment & Plan        Active Hospital Problems:  Active Hospital Problems    Diagnosis    • **Intractable headache,  unspecified chronicity pattern, unspecified headache type      Plan:     Home meds not verified at the time of assessment and plan    Atrial fibrillation with RVR  -Patient on Cardizem drip, initiated in emergency department  -Left ventricular ejection fraction appears to be 56 - 60%.  -EKG Atrial fibrillation Low voltage, precordial leads  -Continuous monitor  -TSH,pending  -INR 1.15.    -PTT 29.2.  -Cardiology consult    Intractable headache  History of Fall with hitting head 2 weeks ago  -CT head without contrast No acute intracranial abnormality.Volume loss and mild chronic small vessel ischemic changes.  -CTA head and neck Essentially normal CT angiogram of the head and neck. There is no evidence of flow-limiting stenosis, large vessel occlusion or aneurysmal dilatation.  -/76  -Tylenol for now    Upper GI bleed  -Hemoglobin stable 14.4  -Monitor H&H  -PPI  -Hold Xarelto for now  -GI consulted, plan is followed Acute onset of vomiting-may be secondary to headache versus constipation versus other.  Okay for clear liquids.  Antiemetics and supportive care. No sign of bowel obstruction. Coffee-ground emesis-suspect esophagitis in the setting of Xarelto.  Hemoglobin is normal.  Will monitor H&H and consider EGD in a.m. based on clinical course.  History of Schroeder's esophagus noted, last EGD 2022 no dysplasia.  Hiatal hernia.  Elevated alk phos-check GGT    Essential hypertension  -Continue metoprolol once verified  -Monitor BP     Type 2 diabetes  -Glucose 253 on admission  -Hemoglobin A1c ordered  -Hold home medication  -Start SSI  -Glucose checks ACHS      CAD  Hyperlipidemia  -Lipid panel ordered  -Continue statin         DVT prophylaxis:  Mechanical DVT prophylaxis orders are present.    CODE STATUS:       Admission Status:  I believe this patient meets inpatient status.    I discussed the patient's findings and my recommendations with patient and family.        Signature: Electronically signed by Chantell  JORJE Lawson PA-C, 04/19/23, 10:44 AM EDT.

## 2023-04-19 NOTE — NURSING NOTE
I agree with primary RN skin assessment. Pt was admitted to PCU with a pressure injury to his coccyx that he was unaware of. Unsure if present on admission to hospital as pt was holding in ED all day. Pt also has 2 injuries to glute, he states this is from his wallet. Photos taken, mepilex in place, pt added to Q2h turn schedule, and WOCN consulted.

## 2023-04-20 ENCOUNTER — INPATIENT HOSPITAL (AMBULATORY)
Dept: URBAN - METROPOLITAN AREA HOSPITAL 84 | Facility: HOSPITAL | Age: 83
End: 2023-04-20

## 2023-04-20 ENCOUNTER — ANESTHESIA EVENT (OUTPATIENT)
Dept: GASTROENTEROLOGY | Facility: HOSPITAL | Age: 83
DRG: 378 | End: 2023-04-20
Payer: MEDICARE

## 2023-04-20 ENCOUNTER — ANESTHESIA (OUTPATIENT)
Dept: GASTROENTEROLOGY | Facility: HOSPITAL | Age: 83
DRG: 378 | End: 2023-04-20
Payer: MEDICARE

## 2023-04-20 DIAGNOSIS — K22.70 BARRETT'S ESOPHAGUS WITHOUT DYSPLASIA: ICD-10-CM

## 2023-04-20 DIAGNOSIS — K31.7 POLYP OF STOMACH AND DUODENUM: ICD-10-CM

## 2023-04-20 DIAGNOSIS — K44.9 DIAPHRAGMATIC HERNIA WITHOUT OBSTRUCTION OR GANGRENE: ICD-10-CM

## 2023-04-20 DIAGNOSIS — K92.0 HEMATEMESIS: ICD-10-CM

## 2023-04-20 LAB
ALBUMIN SERPL-MCNC: 3.3 G/DL (ref 3.5–5.2)
ALBUMIN/GLOB SERPL: 1 G/DL
ALP SERPL-CCNC: 148 U/L (ref 39–117)
ALT SERPL W P-5'-P-CCNC: 12 U/L (ref 1–41)
ANION GAP SERPL CALCULATED.3IONS-SCNC: 10 MMOL/L (ref 5–15)
AST SERPL-CCNC: 15 U/L (ref 1–40)
BASOPHILS # BLD AUTO: 0 10*3/MM3 (ref 0–0.2)
BASOPHILS NFR BLD AUTO: 0.3 % (ref 0–1.5)
BILIRUB SERPL-MCNC: 0.9 MG/DL (ref 0–1.2)
BUN SERPL-MCNC: 19 MG/DL (ref 8–23)
BUN/CREAT SERPL: 17.9 (ref 7–25)
CALCIUM SPEC-SCNC: 9 MG/DL (ref 8.6–10.5)
CHLORIDE SERPL-SCNC: 101 MMOL/L (ref 98–107)
CO2 SERPL-SCNC: 24 MMOL/L (ref 22–29)
CREAT SERPL-MCNC: 1.06 MG/DL (ref 0.76–1.27)
DEPRECATED RDW RBC AUTO: 47.3 FL (ref 37–54)
EGFRCR SERPLBLD CKD-EPI 2021: 70.9 ML/MIN/1.73
EOSINOPHIL # BLD AUTO: 0 10*3/MM3 (ref 0–0.4)
EOSINOPHIL NFR BLD AUTO: 0.4 % (ref 0.3–6.2)
ERYTHROCYTE [DISTWIDTH] IN BLOOD BY AUTOMATED COUNT: 14.6 % (ref 12.3–15.4)
GLOBULIN UR ELPH-MCNC: 3.2 GM/DL
GLUCOSE BLDC GLUCOMTR-MCNC: 134 MG/DL (ref 70–105)
GLUCOSE BLDC GLUCOMTR-MCNC: 156 MG/DL (ref 70–105)
GLUCOSE BLDC GLUCOMTR-MCNC: 162 MG/DL (ref 70–105)
GLUCOSE BLDC GLUCOMTR-MCNC: 207 MG/DL (ref 70–105)
GLUCOSE SERPL-MCNC: 113 MG/DL (ref 65–99)
HCT VFR BLD AUTO: 38.4 % (ref 37.5–51)
HGB BLD-MCNC: 13 G/DL (ref 13–17.7)
LYMPHOCYTES # BLD AUTO: 0.7 10*3/MM3 (ref 0.7–3.1)
LYMPHOCYTES NFR BLD AUTO: 9.3 % (ref 19.6–45.3)
MAGNESIUM SERPL-MCNC: 1.8 MG/DL (ref 1.6–2.4)
MCH RBC QN AUTO: 29.8 PG (ref 26.6–33)
MCHC RBC AUTO-ENTMCNC: 33.8 G/DL (ref 31.5–35.7)
MCV RBC AUTO: 88.1 FL (ref 79–97)
MONOCYTES # BLD AUTO: 0.5 10*3/MM3 (ref 0.1–0.9)
MONOCYTES NFR BLD AUTO: 7.1 % (ref 5–12)
NEUTROPHILS NFR BLD AUTO: 6.3 10*3/MM3 (ref 1.7–7)
NEUTROPHILS NFR BLD AUTO: 82.9 % (ref 42.7–76)
NRBC BLD AUTO-RTO: 0.2 /100 WBC (ref 0–0.2)
PHOSPHATE SERPL-MCNC: 3.5 MG/DL (ref 2.5–4.5)
PLATELET # BLD AUTO: 151 10*3/MM3 (ref 140–450)
PMV BLD AUTO: 8 FL (ref 6–12)
POTASSIUM SERPL-SCNC: 3.6 MMOL/L (ref 3.5–5.2)
PROT SERPL-MCNC: 6.5 G/DL (ref 6–8.5)
RBC # BLD AUTO: 4.35 10*6/MM3 (ref 4.14–5.8)
SODIUM SERPL-SCNC: 135 MMOL/L (ref 136–145)
WBC NRBC COR # BLD: 7.6 10*3/MM3 (ref 3.4–10.8)

## 2023-04-20 PROCEDURE — 25010000002 PROPOFOL 10 MG/ML EMULSION: Performed by: NURSE ANESTHETIST, CERTIFIED REGISTERED

## 2023-04-20 PROCEDURE — 83735 ASSAY OF MAGNESIUM: CPT | Performed by: PHYSICIAN ASSISTANT

## 2023-04-20 PROCEDURE — 99232 SBSQ HOSP IP/OBS MODERATE 35: CPT

## 2023-04-20 PROCEDURE — 25010000002 ONDANSETRON PER 1 MG: Performed by: NURSE PRACTITIONER

## 2023-04-20 PROCEDURE — 0DB68ZX EXCISION OF STOMACH, VIA NATURAL OR ARTIFICIAL OPENING ENDOSCOPIC, DIAGNOSTIC: ICD-10-PCS | Performed by: INTERNAL MEDICINE

## 2023-04-20 PROCEDURE — 43239 EGD BIOPSY SINGLE/MULTIPLE: CPT | Performed by: INTERNAL MEDICINE

## 2023-04-20 PROCEDURE — 85025 COMPLETE CBC W/AUTO DIFF WBC: CPT | Performed by: NURSE PRACTITIONER

## 2023-04-20 PROCEDURE — 88305 TISSUE EXAM BY PATHOLOGIST: CPT | Performed by: INTERNAL MEDICINE

## 2023-04-20 PROCEDURE — 63710000001 INSULIN LISPRO (HUMAN) PER 5 UNITS: Performed by: PHYSICIAN ASSISTANT

## 2023-04-20 PROCEDURE — 82962 GLUCOSE BLOOD TEST: CPT

## 2023-04-20 PROCEDURE — 80053 COMPREHEN METABOLIC PANEL: CPT | Performed by: NURSE PRACTITIONER

## 2023-04-20 PROCEDURE — 88342 IMHCHEM/IMCYTCHM 1ST ANTB: CPT | Performed by: INTERNAL MEDICINE

## 2023-04-20 PROCEDURE — 84100 ASSAY OF PHOSPHORUS: CPT | Performed by: INTERNAL MEDICINE

## 2023-04-20 PROCEDURE — 63710000001 INSULIN LISPRO (HUMAN) PER 5 UNITS: Performed by: INTERNAL MEDICINE

## 2023-04-20 RX ORDER — LIDOCAINE HYDROCHLORIDE 20 MG/ML
INJECTION, SOLUTION INFILTRATION; PERINEURAL AS NEEDED
Status: DISCONTINUED | OUTPATIENT
Start: 2023-04-20 | End: 2023-04-20 | Stop reason: SURG

## 2023-04-20 RX ORDER — ONDANSETRON 2 MG/ML
4 INJECTION INTRAMUSCULAR; INTRAVENOUS EVERY 6 HOURS PRN
Status: DISCONTINUED | OUTPATIENT
Start: 2023-04-20 | End: 2023-04-20 | Stop reason: SDUPTHER

## 2023-04-20 RX ORDER — SODIUM CHLORIDE 0.9 % (FLUSH) 0.9 %
3 SYRINGE (ML) INJECTION EVERY 12 HOURS SCHEDULED
Status: DISCONTINUED | OUTPATIENT
Start: 2023-04-20 | End: 2023-04-20

## 2023-04-20 RX ORDER — SODIUM CHLORIDE 9 MG/ML
9 INJECTION, SOLUTION INTRAVENOUS ONCE
Status: COMPLETED | OUTPATIENT
Start: 2023-04-20 | End: 2023-04-20

## 2023-04-20 RX ORDER — PHENYLEPHRINE HCL IN 0.9% NACL 1 MG/10 ML
SYRINGE (ML) INTRAVENOUS AS NEEDED
Status: DISCONTINUED | OUTPATIENT
Start: 2023-04-20 | End: 2023-04-20 | Stop reason: SURG

## 2023-04-20 RX ORDER — SODIUM CHLORIDE 0.9 % (FLUSH) 0.9 %
3-10 SYRINGE (ML) INJECTION AS NEEDED
Status: DISCONTINUED | OUTPATIENT
Start: 2023-04-20 | End: 2023-04-20

## 2023-04-20 RX ORDER — ONDANSETRON 4 MG/1
4 TABLET, FILM COATED ORAL EVERY 6 HOURS PRN
Status: DISCONTINUED | OUTPATIENT
Start: 2023-04-20 | End: 2023-04-20 | Stop reason: SDUPTHER

## 2023-04-20 RX ORDER — SODIUM CHLORIDE 9 MG/ML
40 INJECTION, SOLUTION INTRAVENOUS AS NEEDED
Status: DISCONTINUED | OUTPATIENT
Start: 2023-04-20 | End: 2023-04-23 | Stop reason: HOSPADM

## 2023-04-20 RX ORDER — ATORVASTATIN CALCIUM 40 MG/1
40 TABLET, FILM COATED ORAL DAILY
Status: DISCONTINUED | OUTPATIENT
Start: 2023-04-20 | End: 2023-04-23 | Stop reason: HOSPADM

## 2023-04-20 RX ORDER — PROPOFOL 10 MG/ML
VIAL (ML) INTRAVENOUS AS NEEDED
Status: DISCONTINUED | OUTPATIENT
Start: 2023-04-20 | End: 2023-04-20 | Stop reason: SURG

## 2023-04-20 RX ADMIN — PANTOPRAZOLE SODIUM 40 MG: 40 TABLET, DELAYED RELEASE ORAL at 08:31

## 2023-04-20 RX ADMIN — INSULIN LISPRO 2 UNITS: 100 INJECTION, SOLUTION INTRAVENOUS; SUBCUTANEOUS at 12:30

## 2023-04-20 RX ADMIN — ONDANSETRON 4 MG: 2 INJECTION INTRAMUSCULAR; INTRAVENOUS at 04:38

## 2023-04-20 RX ADMIN — Medication 10 ML: at 08:31

## 2023-04-20 RX ADMIN — INSULIN LISPRO 2 UNITS: 100 INJECTION, SOLUTION INTRAVENOUS; SUBCUTANEOUS at 18:30

## 2023-04-20 RX ADMIN — LIDOCAINE HYDROCHLORIDE 60 MG: 20 INJECTION, SOLUTION INFILTRATION; PERINEURAL at 14:24

## 2023-04-20 RX ADMIN — HYDROCODONE BITARTRATE AND ACETAMINOPHEN 1 TABLET: 5; 325 TABLET ORAL at 00:53

## 2023-04-20 RX ADMIN — Medication 100 MCG: at 14:24

## 2023-04-20 RX ADMIN — PANTOPRAZOLE SODIUM 40 MG: 40 TABLET, DELAYED RELEASE ORAL at 18:30

## 2023-04-20 RX ADMIN — ATORVASTATIN CALCIUM 40 MG: 40 TABLET, FILM COATED ORAL at 08:31

## 2023-04-20 RX ADMIN — Medication 100 MCG: at 14:27

## 2023-04-20 RX ADMIN — ACETAMINOPHEN 650 MG: 325 TABLET, FILM COATED ORAL at 22:55

## 2023-04-20 RX ADMIN — SODIUM CHLORIDE: 0.9 INJECTION, SOLUTION INTRAVENOUS at 14:24

## 2023-04-20 RX ADMIN — Medication 10 ML: at 20:15

## 2023-04-20 RX ADMIN — PROPOFOL 50 MG: 10 INJECTION, EMULSION INTRAVENOUS at 14:24

## 2023-04-20 RX ADMIN — Medication 12.5 MG: at 08:37

## 2023-04-20 RX ADMIN — METOPROLOL TARTRATE 3 MG: 1 INJECTION, SOLUTION INTRAVENOUS at 13:26

## 2023-04-20 RX ADMIN — Medication 12.5 MG: at 20:15

## 2023-04-20 RX ADMIN — DILTIAZEM HYDROCHLORIDE 120 MG: 120 CAPSULE, COATED, EXTENDED RELEASE ORAL at 08:31

## 2023-04-20 NOTE — ANESTHESIA PREPROCEDURE EVALUATION
Anesthesia Evaluation     Patient summary reviewed and Nursing notes reviewed   no history of anesthetic complications:  NPO Solid Status: > 8 hours  NPO Liquid Status: > 8 hours           Airway   Mallampati: II  TM distance: >3 FB  Neck ROM: full  No difficulty expected  Dental - normal exam     Pulmonary - normal exam    breath sounds clear to auscultation  (+) a smoker Former,   Cardiovascular - normal exam  Exercise tolerance: unable to assess    ECG reviewed  PT is on anticoagulation therapy  Rhythm: regular  Rate: normal    (+) hypertension, past MI , CAD, dysrhythmias Atrial Fib, pericardial effusion, hyperlipidemia,     ROS comment: Neg stress 2020  Normal EF and some aortic valve sclerosis 2023 (2.28 KOTA); degree of stenosis is not mentioned  Mild to mod TR with RVSP 35-45    Neuro/Psych  (+) headaches,    GI/Hepatic/Renal/Endo    (+)  GERD, GI bleeding active bleeding, renal disease CRI, diabetes mellitus,     Musculoskeletal (-) negative ROS    Abdominal  - normal exam   Substance History - negative use     OB/GYN negative ob/gyn ROS         Other      history of cancer remission                    Anesthesia Plan    ASA 4     MAC and general     (Admitted with N/v, suspect GI bleed  In Afib with RVR on admission; on dilt and being followed by cards; rate has been in low 100s and his BP stable)  intravenous induction     Anesthetic plan, risks, benefits, and alternatives have been provided, discussed and informed consent has been obtained with: patient.    Plan discussed with CRNA and CAA.        CODE STATUS:

## 2023-04-20 NOTE — PLAN OF CARE
Goal Outcome Evaluation:              Outcome Evaluation: patient alert and oriented x 4 no complaints of pain or nausea today. vital signs stable spo2 97% on room air. patient had AGD done today showing gastritis, patient med treatment. patient with with cane to BR throughout day. patient encouraged to turn every 2 hours. call light in reach.

## 2023-04-20 NOTE — NURSING NOTE
WOCN note:    82 yr old male admitted 4/19/23 with complaint of increasing headache and vomiting. WOCN consult received for a coccyx wound noted upon admission.     Patient presents with blanchable erythema and epithelial sloughing to his low sacrum within the gluteal fold consistent with moisture associated dermatitis.   He also is noted to have 3 erythematous areas to his right buttock. Patient states he feels these areas are from prolonged sitting on his billfold in his right rear pocket. He states the areas have healed since he began carrying his wallet in a front pocket. The erythema has faded since this photo was taken.   Recommend moisture barrier paste or cream to the sacrum and we will continue to follow as needed.

## 2023-04-20 NOTE — ANESTHESIA POSTPROCEDURE EVALUATION
Patient: Cisco Frank    Procedure Summary     Date: 04/20/23 Room / Location: [facility] ENDOSCOPY 1 / [facility] ENDOSCOPY    Anesthesia Start: 1424 Anesthesia Stop: 1432    Procedure: ESOPHAGOGASTRODUODENOSCOPY with gastric biopsy's Diagnosis:       Hematemesis with nausea      (Hematemesis with nausea [K92.0])    Surgeons: Debra Lomeli MD Provider: Mary Oliva MD    Anesthesia Type: MAC, general ASA Status: 4          Anesthesia Type: MAC, general    Vitals  No vitals data found for the desired time range.          Post Anesthesia Care and Evaluation    Patient location during evaluation: PACU  Patient participation: complete - patient participated  Level of consciousness: awake and alert  Pain management: satisfactory to patient    Airway patency: patent  Anesthetic complications: No anesthetic complications  PONV Status: none  Cardiovascular status: acceptable  Respiratory status: acceptable  Hydration status: acceptable

## 2023-04-20 NOTE — PROGRESS NOTES
[facility] Daily Progress Note    Patient Name: Cisco Frank  : 1940  MRN: [id number]  Primary Care Physician:  Nuno Patton MD  Date of admission: 2023      Subjective      Chief Complaint: a fib with RVR, coffee ground emesis      Patient Reports no further emesis. Bowel movement today looked normal. Still in A fib. Pending EGGD    ROS negative except as above       Objective      Vitals:   Temp:  [97.5 °F (36.4 °C)-98.2 °F (36.8 °C)] 98.1 °F (36.7 °C)  Heart Rate:  [] 94  Resp:  [10-26] 14  BP: (102-145)/(61-80) 102/61  Flow (L/min):  [2-6] 6    Physical Exam  Vitals reviewed.   HENT:      Head: Normocephalic.   Cardiovascular:      Rate and Rhythm: Tachycardia present. Rhythm irregular.   Pulmonary:      Effort: Pulmonary effort is normal.   Abdominal:      General: Abdomen is flat.      Palpations: Abdomen is soft.   Musculoskeletal:         General: Normal range of motion.      Cervical back: Normal range of motion.   Skin:     General: Skin is warm.   Neurological:      General: No focal deficit present.      Mental Status: He is alert and oriented to person, place, and time.   Psychiatric:         Mood and Affect: Mood normal.         Behavior: Behavior normal.             Result Review    Result Review:  I have personally reviewed the results from the time of this admission to 2023 16:41 EDT and agree with these findings:  [x]  Laboratory  []  Microbiology  []  Radiology  []  EKG/Telemetry   []  Cardiology/Vascular   []  Pathology  []  Old records  []  Other:  Most notable findings include: hb stable above 13    Wounds (last 24 hours)     LDA Wound     Row Name 23 1200 23 0800 23 0400       Wound 23 0800 Right midline gluteal Abrasion    Wound - Properties Group Placement Date: 23  -EH Placement Time: 0800  -EH Present on Hospital Admission: Y  -EH Side: Right  -EH Orientation: midline  -EH Location:  gluteal  -EH Primary Wound Type: Abrasion  -EH Additional Comments: 2 scabs of right cheek, patinet states from his wallet rubbing  -EH    Dressing Appearance dry;intact;open to air  -JE intact;dry;open to air  -JE open to air  -SM    Dressing Care -- -- open to air  -SM    Periwound Care -- -- dry periwound area maintained  -SM    Retired Wound - Properties Group Placement Date: 04/03/23  -EH Placement Time: 0800  -EH Present on Hospital Admission: Y  -EH Side: Right  -EH Orientation: midline  -EH Location: gluteal  -EH Primary Wound Type: Abrasion  -EH Additional Comments: 2 scabs of right cheek, patinet states from his wallet rubbing  -EH    Retired Wound - Properties Group Date first assessed: 04/03/23  -EH Time first assessed: 0800  -EH Present on Hospital Admission: Y  -EH Side: Right  -EH Location: gluteal  -EH Primary Wound Type: Abrasion  -EH Additional Comments: 2 scabs of right cheek, patinet states from his wallet rubbing  -EH       Wound 04/19/23 1933 coccyx    Wound - Properties Group Placement Date: 04/19/23  -AS Placement Time: 1933  -AS Present on Hospital Admission: Y  -AS Location: coccyx  -AS    Pressure Injury Stage -- 1  -JE --    Dressing Appearance open to air  -JE open to air  -JE intact;dry  -SM    Drainage Amount none  -JE none  -JE none  -SM    Dressing Care -- -- silicone  -SM    Periwound Care -- -- dry periwound area maintained  -SM    Retired Wound - Properties Group Placement Date: 04/19/23  -AS Placement Time: 1933  -AS Present on Hospital Admission: Y  -AS Location: coccyx  -AS    Retired Wound - Properties Group Date first assessed: 04/19/23  -AS Time first assessed: 1933  -AS Present on Hospital Admission: Y  -AS Location: coccyx  -AS    Row Name 04/20/23 0000 04/19/23 2000 04/19/23 1933       Wound 04/03/23 0800 Right midline gluteal Abrasion    Wound - Properties Group Placement Date: 04/03/23  -EH Placement Time: 0800  -EH Present on Hospital Admission: Y  -EH Side: Right  -EH  Orientation: midline  -EH Location: gluteal  -EH Primary Wound Type: Abrasion  -EH Additional Comments: 2 scabs of right cheek, patinet states from his wallet rubbing  -EH    Dressing Appearance open to air  -SM open to air  -SM --    Care, Wound -- cleansed with;soap and water  -SM --    Dressing Care open to air  -SM open to air  -SM --    Periwound Care dry periwound area maintained  -SM dry periwound area maintained  -SM --    Retired Wound - Properties Group Placement Date: 04/03/23  -EH Placement Time: 0800  -EH Present on Hospital Admission: Y  -EH Side: Right  -EH Orientation: midline  -EH Location: gluteal  -EH Primary Wound Type: Abrasion  -EH Additional Comments: 2 scabs of right cheek, patinet states from his wallet rubbing  -EH    Retired Wound - Properties Group Date first assessed: 04/03/23  - Time first assessed: 0800  -EH Present on Hospital Admission: Y  -EH Side: Right  -EH Location: gluteal  -EH Primary Wound Type: Abrasion  -EH Additional Comments: 2 scabs of right cheek, patinet states from his wallet rubbing  -EH       Wound 04/19/23 1933 coccyx    Wound - Properties Group Placement Date: 04/19/23  -AS Placement Time: 1933  -AS Present on Hospital Admission: Y  -AS Location: coccyx  -AS    Wound Image -- -- Images linked: 1  -AS    Dressing Appearance dry;intact  -SM dry;intact  -SM --    Drainage Amount none  -SM none  -SM --    Care, Wound -- cleansed with;soap and water  -SM --    Dressing Care silicone  -SM silicone  -SM --    Periwound Care dry periwound area maintained  -SM dry periwound area maintained  -SM --    Retired Wound - Properties Group Placement Date: 04/19/23  -AS Placement Time: 1933  -AS Present on Hospital Admission: Y  -AS Location: coccyx  -AS    Retired Wound - Properties Group Date first assessed: 04/19/23  -AS Time first assessed: 1933  -AS Present on Hospital Admission: Y  -AS Location: coccyx  -AS    Row Name 04/19/23 1932             Wound 04/03/23 0800 Right  midline gluteal Abrasion    Wound - Properties Group Placement Date: 04/03/23 -EH Placement Time: 0800  -EH Present on Hospital Admission: Y  -EH Side: Right  -EH Orientation: midline  -EH Location: gluteal  -EH Primary Wound Type: Abrasion  -EH Additional Comments: 2 scabs of right cheek, patinet states from his wallet rubbing  -EH    Wound Image Images linked: 1  -AS      Retired Wound - Properties Group Placement Date: 04/03/23 -EH Placement Time: 0800  -EH Present on Hospital Admission: Y  -EH Side: Right  -EH Orientation: midline  -EH Location: gluteal  -EH Primary Wound Type: Abrasion  -EH Additional Comments: 2 scabs of right cheek, patinet states from his wallet rubbing  -EH    Retired Wound - Properties Group Date first assessed: 04/03/23 -EH Time first assessed: 0800  -EH Present on Hospital Admission: Y  -EH Side: Right  -EH Location: gluteal  -EH Primary Wound Type: Abrasion  -EH Additional Comments: 2 scabs of right cheek, patinet states from his wallet rubbing  -EH       Wound 04/19/23 1933 coccyx    Wound - Properties Group Placement Date: 04/19/23  -AS Placement Time: 1933  -AS Present on Hospital Admission: Y  -AS Location: coccyx  -AS    Retired Wound - Properties Group Placement Date: 04/19/23  -AS Placement Time: 1933  -AS Present on Hospital Admission: Y  -AS Location: coccyx  -AS    Retired Wound - Properties Group Date first assessed: 04/19/23  -AS Time first assessed: 1933  -AS Present on Hospital Admission: Y  -AS Location: coccyx  -AS          User Key  (r) = Recorded By, (t) = Taken By, (c) = Cosigned By    Initials Name Provider Type    AS Janay Munoz RN Registered Nurse    Genet Hogan RN Registered Nurse    Lupe Mendoza RN Registered Nurse    Alexis Murray RN Registered Nurse                 Assessment & Plan       82 M with A fib RVR and possible GI bleed        Active Hospital Problems:       Active Hospital Problems     Diagnosis     • **Intractable  headache, unspecified chronicity pattern, unspecified headache type        Plan:   Atrial fibrillation with RVR  -Patient on Cardizem drip, initiated in emergency department  -Left ventricular ejection fraction appears to be 56 - 60%.  -EKG Atrial fibrillation Low voltage, precordial leads  -Continuous monitor  -TSH,pending  -INR 1.15.    -PTT 29.2.  -Cardiology consulted     Intractable headache  History of Fall with hitting head 2 weeks ago  -CT head without contrast No acute intracranial abnormality.Volume loss and mild chronic small vessel ischemic changes.  -CTA head and neck Essentially normal CT angiogram of the head and neck. There is no evidence of flow-limiting stenosis, large vessel occlusion or aneurysmal dilatation.  -/76  -Tylenol for now     Upper GI bleed  -Hemoglobin stable 14.4  -Monitor H&H  -PPI  -Hold Xarelto for now  -GI consulted, plan is followed Acute onset of vomiting-may be secondary to headache versus constipation versus other.  Okay for clear liquids.  Antiemetics and supportive care. No sign of bowel obstruction. Coffee-ground emesis-suspect esophagitis in the setting of Xarelto.  Hemoglobin is normal.  Will monitor H&H and consider EGD in a.m. based on clinical course.  History of Schroeder's esophagus noted, last EGD 2022 no dysplasia.  Hiatal hernia.  Elevated alk phos-check GGT  EGD today     Essential hypertension  -Continue metoprolol once verified  -Monitor BP     Type 2 diabetes  -Glucose 253 on admission  -Hemoglobin A1c ordered  -Hold home medication  -Start SSI  -Glucose checks ACHS      CAD  Hyperlipidemia  -Lipid panel ordered  -Continue statin           DVT prophylaxis:  Mechanical DVT prophylaxis orders are present.     CODE STATUS:    Admission Status:  I believe this patient meets inpatient status.     I discussed the patient's findings and my recommendations with patient and family.   04/20/23      Electronically signed by Josemaunel Jimenez MD, 04/20/23, 16:41  EDT.  [facility] Hospitalist Team

## 2023-04-20 NOTE — PLAN OF CARE
Goal Outcome Evaluation:  Plan of Care Reviewed With: patient        Progress: no change  Outcome Evaluation: Patient alert and oriented, patient continues on O2@2liters via NC, patient complains of right rib pain and nausea this shift, Zofran and pain meds given, patient continues on clear liquid diet, HR , VSS, patient turned q 2 hrs, patient has rested well this shift.

## 2023-04-20 NOTE — PROGRESS NOTES
CARDIOLOGY PROGRESS NOTE:    Cisco Frank  82 y.o.  male  1940  [id number]      Referring Provider: Dr. Corona    Reason for follow-up: Atrial fibrilation with rapid ventricular response     Patient Care Team:  Nuno Patton MD as PCP - General (Family Medicine)  Kailash Garsia MD as Consulting Physician (Cardiology)    Subjective  Patient seen and examined.  Labs and chart reviewed.  Patient is doing well today and reports improvement in headaches and no vomiting episodes this morning.  Plan for EGD today with GI to evaluate for upper GI bleed, Xarelto remains on hold    Objective  Patient lying in bed resting comfortably on room air     Review of Systems   Cardiovascular: Negative for chest pain, leg swelling, orthopnea, palpitations and syncope.   Respiratory: Negative for cough and shortness of breath.    Gastrointestinal: Negative for abdominal pain, nausea and vomiting.   Neurological: Negative for dizziness, light-headedness and weakness.   All other systems reviewed and are negative.      Patient has no known allergies.    Scheduled Meds:atorvastatin, 40 mg, Oral, Daily  dilTIAZem CD, 120 mg, Oral, Q24H  insulin lispro, 2-7 Units, Subcutaneous, TID With Meals  metoprolol tartrate, 12.5 mg, Oral, Q12H  pantoprazole, 40 mg, Oral, BID AC  senna-docusate sodium, 2 tablet, Oral, BID  sodium chloride, 10 mL, Intravenous, Q12H  sodium chloride, 3 mL, Intravenous, Q12H      Continuous Infusions:   PRN Meds:.•  acetaminophen **OR** acetaminophen **OR** acetaminophen  •  aluminum-magnesium hydroxide-simethicone  •  senna-docusate sodium **AND** polyethylene glycol **AND** bisacodyl **AND** bisacodyl  •  dextrose  •  dextrose  •  glucagon (human recombinant)  •  magnesium sulfate **OR** magnesium sulfate **OR** magnesium sulfate  •  melatonin  •  ondansetron **OR** ondansetron  •  potassium & sodium phosphates **OR** potassium & sodium phosphates  •  potassium chloride **OR** potassium chloride **OR**  "potassium chloride  •  [COMPLETED] Insert Peripheral IV **AND** sodium chloride  •  sodium chloride  •  sodium chloride  •  sodium chloride  •  sodium chloride        VITAL SIGNS  Vitals:    04/19/23 2235 04/20/23 0100 04/20/23 0500 04/20/23 1011   BP: 113/68 115/74 141/72 113/70   BP Location: Right arm Right arm Right arm Right arm   Patient Position: Lying Lying Lying Lying   Pulse: 104 110 107 109   Resp: 18 22 20 26   Temp: 97.5 °F (36.4 °C) 97.8 °F (36.6 °C) 98.2 °F (36.8 °C)    TempSrc: Oral Oral Oral    SpO2: 94% 98% 97% 95%   Weight:   83.9 kg (184 lb 15.5 oz)    Height:           Flowsheet Rows    Flowsheet Row First Filed Value   Admission Height 177.8 cm (70\") Documented at 04/19/2023 0414   Admission Weight 84.2 kg (185 lb 10 oz) Documented at 04/19/2023 0414           TELEMETRY: Atrial fibrillation with controlled ventricular response    Physical Exam:  Vitals reviewed.   Constitutional:       Appearance: Not in distress.   Eyes:      Pupils: Pupils are equal, round, and reactive to light.   HENT:    Mouth/Throat:      Pharynx: Oropharynx is clear.   Pulmonary:      Effort: Pulmonary effort is normal.      Breath sounds: Normal breath sounds.   Cardiovascular:      Normal rate. Irregularly irregular rhythm.   Pulses:     Intact distal pulses.   Edema:     Peripheral edema absent.   Abdominal:      General: Bowel sounds are normal.   Musculoskeletal: Normal range of motion.      Cervical back: Normal range of motion and neck supple. Skin:     General: Skin is warm.   Neurological:      General: No focal deficit present.      Mental Status: Alert and oriented to person, place and time.          Results Review:   I reviewed the patient's new clinical results.  Lab Results (last 24 hours)     Procedure Component Value Units Date/Time    POC Glucose Once [[id number]]  (Abnormal) Collected: 04/20/23 1208    Specimen: Blood Updated: 04/20/23 1210     Glucose 207 mg/dL      Comment: Serial Number: " [id number]:  [id number]       POC Glucose Once [[id number]]  (Abnormal) Collected: 04/20/23 0705    Specimen: Blood Updated: 04/20/23 0707     Glucose 134 mg/dL      Comment: Serial Number: [id number]:  [id number]       Comprehensive Metabolic Panel [[id number]]  (Abnormal) Collected: 04/20/23 0448    Specimen: Blood Updated: 04/20/23 0607     Glucose 113 mg/dL      BUN 19 mg/dL      Creatinine 1.06 mg/dL      Sodium 135 mmol/L      Potassium 3.6 mmol/L      Comment: Slight hemolysis detected by analyzer. Results may be affected.        Chloride 101 mmol/L      CO2 24.0 mmol/L      Calcium 9.0 mg/dL      Total Protein 6.5 g/dL      Albumin 3.3 g/dL      ALT (SGPT) 12 U/L      AST (SGOT) 15 U/L      Comment: Slight hemolysis detected by analyzer. Results may be affected.        Alkaline Phosphatase 148 U/L      Total Bilirubin 0.9 mg/dL      Globulin 3.2 gm/dL      A/G Ratio 1.0 g/dL      BUN/Creatinine Ratio 17.9     Anion Gap 10.0 mmol/L      eGFR 70.9 mL/min/1.73     Narrative:      GFR Normal >60  Chronic Kidney Disease <60  Kidney Failure <15    The GFR formula is only valid for adults with stable renal function between ages 18 and 70.    Magnesium [[id number]]  (Normal) Collected: 04/20/23 0135    Specimen: Blood Updated: 04/20/23 0348     Magnesium 1.8 mg/dL     Phosphorus [[id number]]  (Normal) Collected: 04/20/23 0135    Specimen: Blood Updated: 04/20/23 0336     Phosphorus 3.5 mg/dL     CBC & Differential [[id number]]  (Abnormal) Collected: 04/20/23 0135    Specimen: Blood Updated: 04/20/23 0308    Narrative:      The following orders were created for panel order CBC & Differential.  Procedure                               Abnormality         Status                     ---------                               -----------         ------                     CBC Auto Differential[[id number]]        Abnormal            Final result                 Please view results for these tests on the individual  orders.    CBC Auto Differential [[id number]]  (Abnormal) Collected: 04/20/23 0135    Specimen: Blood Updated: 04/20/23 0308     WBC 7.60 10*3/mm3      RBC 4.35 10*6/mm3      Hemoglobin 13.0 g/dL      Hematocrit 38.4 %      MCV 88.1 fL      MCH 29.8 pg      MCHC 33.8 g/dL      RDW 14.6 %      RDW-SD 47.3 fl      MPV 8.0 fL      Platelets 151 10*3/mm3      Neutrophil % 82.9 %      Lymphocyte % 9.3 %      Monocyte % 7.1 %      Eosinophil % 0.4 %      Basophil % 0.3 %      Neutrophils, Absolute 6.30 10*3/mm3      Lymphocytes, Absolute 0.70 10*3/mm3      Monocytes, Absolute 0.50 10*3/mm3      Eosinophils, Absolute 0.00 10*3/mm3      Basophils, Absolute 0.00 10*3/mm3      nRBC 0.2 /100 WBC     POC Glucose Once [[id number]]  (Abnormal) Collected: 04/19/23 2011    Specimen: Blood Updated: 04/19/23 2014     Glucose 177 mg/dL      Comment: Serial Number: [id number]:  [id number]       Magnesium [[id number]]  (Normal) Collected: 04/19/23 0605    Specimen: Blood Updated: 04/19/23 1734     Magnesium 1.9 mg/dL     POC Glucose Once [[id number]]  (Abnormal) Collected: 04/19/23 1706    Specimen: Blood Updated: 04/19/23 1708     Glucose 204 mg/dL      Comment: Serial Number: [id number]:  [id number]       Gamma GT [[id number]]  (Normal) Collected: 04/19/23 0605    Specimen: Blood Updated: 04/19/23 1648     GGT 21 U/L     POC Glucose Once [[id number]]  (Abnormal) Collected: 04/19/23 1330    Specimen: Blood Updated: 04/19/23 1332     Glucose 261 mg/dL      Comment: Serial Number: [id number]:  [id number]             Imaging Results (Last 24 Hours)     ** No results found for the last 24 hours. **          EKG        I personally viewed and interpreted the patient's EKG/Telemetry data:    ECHOCARDIOGRAM:  Results for orders placed during the hospital encounter of 04/03/23    Adult Transthoracic Echo Complete W/ Cont if Necessary Per Protocol    Interpretation Summary  •  Left ventricular ejection fraction appears to be 56 -  60%.  •  The right ventricular cavity is mildly dilated.  •  Estimated right ventricular systolic pressure from tricuspid regurgitation is mildly elevated (35-45 mmHg).  •  There is a moderate (1-2cm) pericardial effusion. There is no evidence of cardiac tamponade.       STRESS MYOVIEW:  Results for orders placed during the hospital encounter of 10/26/20    Stress Test With Myocardial Perfusion One Day    Interpretation Summary  · Findings consistent with a normal ECG stress test.  · Left ventricular ejection fraction is normal. (Calculated EF = 70%).  · Impressions are consistent with a low risk study.  · Inferolateral reverse redistribution without any ischemia EF 70%.       CARDIAC CATHETERIZATION:  No results found for this or any previous visit.       OTHER:         Assessment & Plan     Principal Problem:    Intractable headache, unspecified chronicity pattern, unspecified headache type  Active Problems:    Essential hypertension    Type 2 diabetes mellitus with hyperglycemia, without long-term current use of insulin (HCC)    Coronary artery disease    Hyperlipidemia    Atrial fibrillation    Hematemesis with nausea     Patient presented with headache and N/V  CTA of head and neck negative for acute abnormality  GI consulted for concern of GI bleed, H&H stable, started on PPI  Plan for EGD today   Xarelto on hold for possible upper GI bleed, monitor H&H   Restart anticoagulation when okay with GI  [id number] score is 5  Patient remains in atrial fibrillation however rate has improved  Patient is currently on Cardizem and Lopressor  Recent echocardiogram on 4/4/2023 showed normal LVEF of 56-60% with moderate (1-2 cm) pericardial effusion and no evidence of cardiac tamponade  Patient may be good candidate for watchman with GI bleed and persistent pericardial effusion   Recent A1c elevated, patient on home oral medications   We will continue to monitor patient's heart rate and rhythm      I discussed the  patients findings and my recommendations with patient and nurse    Comfort Romo, APRN  04/20/23  12:23 EDT

## 2023-04-20 NOTE — OP NOTE
ESOPHAGOGASTRODUODENOSCOPY Procedure Report    Patient Name:  Cisco Frank  YOB: 1940    Date of Surgery:  4/20/2023     Pre-Op Diagnosis:  Hematemesis with nausea [K92.0]       Post Op Diagnosis:  Gastric nodules, Schroeder's esophagus      Procedure/CPT® Codes:      Procedure(s):  ESOPHAGOGASTRODUODENOSCOPY with gastric biopsies    Staff:  Surgeon(s):  Debra Lomeli MD         Anesthesia: Monitored Anesthesia Care    Implants:    Nothing was implanted during the procedure    Specimen:        See Below    No blood loss    Complications:  None     Description of Procedure:  Informed consent was obtained for the procedure, including sedation.  Risks of perforation, hemorrhage, adverse drug reaction and aspiration were discussed.  The patient was brought into the endoscopy suite. Continuous cardiopulmonary monitoring was performed. The patient was placed in the left lateral decubitus position.  The bite block was inserted into the patient's mouth. After adequate sedation was attained, the Olympus gastroscope was inserted into the patient's mouth and advanced to the second portion of the duodenum without difficulty.  Circumferential examination was performed. A retroflex exam was performed in the patient's stomach.  On completion of the exam, the bowel was decompressed, the scope was removed from the patient, the patient tolerated the procedure well, there were no immediate post-operative complications.     Examination of the esophagus showed Schroeder's esophagus involving the distal esophagus.  Examination was performed under narrowband imaging.  There was no nodularity or mucosal irregularities.  Hiatal hernia was present  Examination of the stomach showed inflammatory appearing nodules in the gastric cardia with stigmata of recent bleeding.  Biopsies were obtained and sent for pathology.  Otherwise the gastric mucosa appeared normal.  Retroflex examination of the stomach redemonstrated the  gastric nodules at the cardia  Examination of the duodenum showed normal mucosa with bilious fluid present.      Impression:  Coffee-ground emesis, likely secondary to gastric nodules in the presence of oral anticoagulation  Schroeder's esophagus  Headache may have contributed to acute onset of vomiting on admission.    Recommendations:  Follow-up histopathology  Continue PPI daily  Okay to resume anticoagulation in a.m.  Diet as tolerated  GI will see inpatient as needed        Debra Lomeli MD     Date: 4/20/2023  Time: 14:34 EDT

## 2023-04-21 LAB
ALBUMIN SERPL-MCNC: 2.9 G/DL (ref 3.5–5.2)
ALBUMIN SERPL-MCNC: 3 G/DL (ref 3.5–5.2)
ALBUMIN/GLOB SERPL: 0.9 G/DL
ALBUMIN/GLOB SERPL: 0.9 G/DL
ALP SERPL-CCNC: 138 U/L (ref 39–117)
ALP SERPL-CCNC: 163 U/L (ref 39–117)
ALT SERPL W P-5'-P-CCNC: 10 U/L (ref 1–41)
ALT SERPL W P-5'-P-CCNC: 7 U/L (ref 1–41)
ANION GAP SERPL CALCULATED.3IONS-SCNC: 8 MMOL/L (ref 5–15)
ANION GAP SERPL CALCULATED.3IONS-SCNC: 9 MMOL/L (ref 5–15)
AST SERPL-CCNC: 11 U/L (ref 1–40)
AST SERPL-CCNC: 12 U/L (ref 1–40)
BASOPHILS # BLD AUTO: 0 10*3/MM3 (ref 0–0.2)
BASOPHILS # BLD AUTO: 0 10*3/MM3 (ref 0–0.2)
BASOPHILS NFR BLD AUTO: 0.4 % (ref 0–1.5)
BASOPHILS NFR BLD AUTO: 0.5 % (ref 0–1.5)
BILIRUB SERPL-MCNC: 0.6 MG/DL (ref 0–1.2)
BILIRUB SERPL-MCNC: 0.8 MG/DL (ref 0–1.2)
BUN SERPL-MCNC: 19 MG/DL (ref 8–23)
BUN SERPL-MCNC: 21 MG/DL (ref 8–23)
BUN/CREAT SERPL: 14.5 (ref 7–25)
BUN/CREAT SERPL: 18.8 (ref 7–25)
CALCIUM SPEC-SCNC: 8.1 MG/DL (ref 8.6–10.5)
CALCIUM SPEC-SCNC: 8.8 MG/DL (ref 8.6–10.5)
CHLORIDE SERPL-SCNC: 101 MMOL/L (ref 98–107)
CHLORIDE SERPL-SCNC: 102 MMOL/L (ref 98–107)
CO2 SERPL-SCNC: 24 MMOL/L (ref 22–29)
CO2 SERPL-SCNC: 27 MMOL/L (ref 22–29)
CREAT SERPL-MCNC: 1.01 MG/DL (ref 0.76–1.27)
CREAT SERPL-MCNC: 1.45 MG/DL (ref 0.76–1.27)
DEPRECATED RDW RBC AUTO: 46.4 FL (ref 37–54)
DEPRECATED RDW RBC AUTO: 46.4 FL (ref 37–54)
EGFRCR SERPLBLD CKD-EPI 2021: 48.7 ML/MIN/1.73
EGFRCR SERPLBLD CKD-EPI 2021: 75.2 ML/MIN/1.73
EOSINOPHIL # BLD AUTO: 0.1 10*3/MM3 (ref 0–0.4)
EOSINOPHIL # BLD AUTO: 0.1 10*3/MM3 (ref 0–0.4)
EOSINOPHIL NFR BLD AUTO: 0.8 % (ref 0.3–6.2)
EOSINOPHIL NFR BLD AUTO: 1.3 % (ref 0.3–6.2)
ERYTHROCYTE [DISTWIDTH] IN BLOOD BY AUTOMATED COUNT: 14.5 % (ref 12.3–15.4)
ERYTHROCYTE [DISTWIDTH] IN BLOOD BY AUTOMATED COUNT: 14.6 % (ref 12.3–15.4)
GLOBULIN UR ELPH-MCNC: 3.2 GM/DL
GLOBULIN UR ELPH-MCNC: 3.3 GM/DL
GLUCOSE BLDC GLUCOMTR-MCNC: 184 MG/DL (ref 70–105)
GLUCOSE BLDC GLUCOMTR-MCNC: 203 MG/DL (ref 70–105)
GLUCOSE BLDC GLUCOMTR-MCNC: 243 MG/DL (ref 70–105)
GLUCOSE BLDC GLUCOMTR-MCNC: 257 MG/DL (ref 70–105)
GLUCOSE SERPL-MCNC: 161 MG/DL (ref 65–99)
GLUCOSE SERPL-MCNC: 178 MG/DL (ref 65–99)
HCT VFR BLD AUTO: 39.2 % (ref 37.5–51)
HCT VFR BLD AUTO: 40.8 % (ref 37.5–51)
HGB BLD-MCNC: 12.6 G/DL (ref 13–17.7)
HGB BLD-MCNC: 13.2 G/DL (ref 13–17.7)
LYMPHOCYTES # BLD AUTO: 0.9 10*3/MM3 (ref 0.7–3.1)
LYMPHOCYTES # BLD AUTO: 1.2 10*3/MM3 (ref 0.7–3.1)
LYMPHOCYTES NFR BLD AUTO: 12.2 % (ref 19.6–45.3)
LYMPHOCYTES NFR BLD AUTO: 14.6 % (ref 19.6–45.3)
MAGNESIUM SERPL-MCNC: 1.5 MG/DL (ref 1.6–2.4)
MAGNESIUM SERPL-MCNC: 1.7 MG/DL (ref 1.6–2.4)
MCH RBC QN AUTO: 29.2 PG (ref 26.6–33)
MCH RBC QN AUTO: 29.6 PG (ref 26.6–33)
MCHC RBC AUTO-ENTMCNC: 32.2 G/DL (ref 31.5–35.7)
MCHC RBC AUTO-ENTMCNC: 32.3 G/DL (ref 31.5–35.7)
MCV RBC AUTO: 90.6 FL (ref 79–97)
MCV RBC AUTO: 91.5 FL (ref 79–97)
MONOCYTES # BLD AUTO: 0.6 10*3/MM3 (ref 0.1–0.9)
MONOCYTES # BLD AUTO: 0.8 10*3/MM3 (ref 0.1–0.9)
MONOCYTES NFR BLD AUTO: 10.2 % (ref 5–12)
MONOCYTES NFR BLD AUTO: 8.1 % (ref 5–12)
NEUTROPHILS NFR BLD AUTO: 5.8 10*3/MM3 (ref 1.7–7)
NEUTROPHILS NFR BLD AUTO: 6 10*3/MM3 (ref 1.7–7)
NEUTROPHILS NFR BLD AUTO: 73.4 % (ref 42.7–76)
NEUTROPHILS NFR BLD AUTO: 78.5 % (ref 42.7–76)
NRBC BLD AUTO-RTO: 0 /100 WBC (ref 0–0.2)
NRBC BLD AUTO-RTO: 0 /100 WBC (ref 0–0.2)
PHOSPHATE SERPL-MCNC: 2.7 MG/DL (ref 2.5–4.5)
PHOSPHATE SERPL-MCNC: 2.9 MG/DL (ref 2.5–4.5)
PLATELET # BLD AUTO: 136 10*3/MM3 (ref 140–450)
PLATELET # BLD AUTO: 141 10*3/MM3 (ref 140–450)
PMV BLD AUTO: 7.5 FL (ref 6–12)
PMV BLD AUTO: 7.5 FL (ref 6–12)
POTASSIUM SERPL-SCNC: 3.9 MMOL/L (ref 3.5–5.2)
POTASSIUM SERPL-SCNC: 4.3 MMOL/L (ref 3.5–5.2)
PROT SERPL-MCNC: 6.1 G/DL (ref 6–8.5)
PROT SERPL-MCNC: 6.3 G/DL (ref 6–8.5)
QT INTERVAL: 321 MS
RBC # BLD AUTO: 4.33 10*6/MM3 (ref 4.14–5.8)
RBC # BLD AUTO: 4.46 10*6/MM3 (ref 4.14–5.8)
SODIUM SERPL-SCNC: 135 MMOL/L (ref 136–145)
SODIUM SERPL-SCNC: 136 MMOL/L (ref 136–145)
WBC NRBC COR # BLD: 7.4 10*3/MM3 (ref 3.4–10.8)
WBC NRBC COR # BLD: 8.1 10*3/MM3 (ref 3.4–10.8)

## 2023-04-21 PROCEDURE — 99232 SBSQ HOSP IP/OBS MODERATE 35: CPT | Performed by: INTERNAL MEDICINE

## 2023-04-21 PROCEDURE — 25010000002 DIGOXIN PER 500 MCG

## 2023-04-21 PROCEDURE — 84100 ASSAY OF PHOSPHORUS: CPT | Performed by: INTERNAL MEDICINE

## 2023-04-21 PROCEDURE — 80053 COMPREHEN METABOLIC PANEL: CPT | Performed by: INTERNAL MEDICINE

## 2023-04-21 PROCEDURE — 85025 COMPLETE CBC W/AUTO DIFF WBC: CPT | Performed by: INTERNAL MEDICINE

## 2023-04-21 PROCEDURE — 97162 PT EVAL MOD COMPLEX 30 MIN: CPT

## 2023-04-21 PROCEDURE — 83735 ASSAY OF MAGNESIUM: CPT | Performed by: INTERNAL MEDICINE

## 2023-04-21 PROCEDURE — 36415 COLL VENOUS BLD VENIPUNCTURE: CPT | Performed by: INTERNAL MEDICINE

## 2023-04-21 PROCEDURE — 82962 GLUCOSE BLOOD TEST: CPT

## 2023-04-21 PROCEDURE — 63710000001 INSULIN LISPRO (HUMAN) PER 5 UNITS: Performed by: INTERNAL MEDICINE

## 2023-04-21 RX ORDER — DIGOXIN 0.25 MG/ML
250 INJECTION INTRAMUSCULAR; INTRAVENOUS ONCE
Status: COMPLETED | OUTPATIENT
Start: 2023-04-21 | End: 2023-04-21

## 2023-04-21 RX ORDER — DIGOXIN 250 MCG
125 TABLET ORAL
Status: DISCONTINUED | OUTPATIENT
Start: 2023-04-21 | End: 2023-04-23 | Stop reason: HOSPADM

## 2023-04-21 RX ADMIN — Medication 12.5 MG: at 08:25

## 2023-04-21 RX ADMIN — Medication 10 ML: at 08:26

## 2023-04-21 RX ADMIN — INSULIN LISPRO 4 UNITS: 100 INJECTION, SOLUTION INTRAVENOUS; SUBCUTANEOUS at 17:26

## 2023-04-21 RX ADMIN — INSULIN LISPRO 2 UNITS: 100 INJECTION, SOLUTION INTRAVENOUS; SUBCUTANEOUS at 08:26

## 2023-04-21 RX ADMIN — ATORVASTATIN CALCIUM 40 MG: 40 TABLET, FILM COATED ORAL at 08:25

## 2023-04-21 RX ADMIN — Medication 5 MG: at 20:00

## 2023-04-21 RX ADMIN — PANTOPRAZOLE SODIUM 40 MG: 40 TABLET, DELAYED RELEASE ORAL at 08:25

## 2023-04-21 RX ADMIN — INSULIN LISPRO 3 UNITS: 100 INJECTION, SOLUTION INTRAVENOUS; SUBCUTANEOUS at 11:40

## 2023-04-21 RX ADMIN — Medication 10 ML: at 20:01

## 2023-04-21 RX ADMIN — PANTOPRAZOLE SODIUM 40 MG: 40 TABLET, DELAYED RELEASE ORAL at 17:26

## 2023-04-21 RX ADMIN — METOPROLOL TARTRATE 25 MG: 25 TABLET, FILM COATED ORAL at 20:00

## 2023-04-21 RX ADMIN — DIGOXIN 125 MCG: 250 TABLET ORAL at 13:21

## 2023-04-21 RX ADMIN — RIVAROXABAN 20 MG: 20 TABLET, FILM COATED ORAL at 17:26

## 2023-04-21 RX ADMIN — DIGOXIN 250 MCG: 250 INJECTION, SOLUTION INTRAMUSCULAR; INTRAVENOUS at 13:21

## 2023-04-21 RX ADMIN — DILTIAZEM HYDROCHLORIDE 120 MG: 120 CAPSULE, COATED, EXTENDED RELEASE ORAL at 08:26

## 2023-04-21 NOTE — PLAN OF CARE
Goal Outcome Evaluation:  Plan of Care Reviewed With: patient        Progress: no change  Outcome Evaluation: Patient alert and oriented, continues  with afib Hr , asymptomatic, no complaints of nausea or pain, patient continues on O2@2liters via nc @ HS, patient turned q 2 hrs, patient has rested well this shift.

## 2023-04-21 NOTE — PROGRESS NOTES
CARDIOLOGY PROGRESS NOTE:    Cisco Frank  82 y.o.  male  1940  [id number]      Referring Provider: Dr. Corona    Reason for follow-up: Atrial fibrilation with rapid ventricular response     Patient Care Team:  Nuno Patton MD as PCP - General (Family Medicine)  Kailash Garsia MD as Consulting Physician (Cardiology)    Subjective  Patient is doing well without any symptoms today, but his heart rate has been higher  Objective  Patient lying in bed resting comfortably on room air     Review of Systems   Cardiovascular: Negative for chest pain, leg swelling, orthopnea, palpitations and syncope.   Respiratory: Negative for cough and shortness of breath.    Gastrointestinal: Negative for abdominal pain, nausea and vomiting.   Neurological: Negative for dizziness, light-headedness and weakness.   All other systems reviewed and are negative.      Patient has no known allergies.    Scheduled Meds:atorvastatin, 40 mg, Oral, Daily  digoxin, 125 mcg, Oral, Daily  dilTIAZem CD, 120 mg, Oral, Q24H  insulin lispro, 2-7 Units, Subcutaneous, TID With Meals  metoprolol tartrate, 25 mg, Oral, Q12H  pantoprazole, 40 mg, Oral, BID AC  rivaroxaban, 20 mg, Oral, Daily With Dinner  senna-docusate sodium, 2 tablet, Oral, BID  sodium chloride, 10 mL, Intravenous, Q12H      Continuous Infusions:   PRN Meds:.•  acetaminophen **OR** acetaminophen **OR** acetaminophen  •  aluminum-magnesium hydroxide-simethicone  •  senna-docusate sodium **AND** polyethylene glycol **AND** bisacodyl **AND** bisacodyl  •  dextrose  •  dextrose  •  glucagon (human recombinant)  •  magnesium sulfate **OR** magnesium sulfate **OR** magnesium sulfate  •  melatonin  •  ondansetron **OR** ondansetron  •  potassium & sodium phosphates **OR** potassium & sodium phosphates  •  potassium chloride **OR** potassium chloride **OR** potassium chloride  •  [COMPLETED] Insert Peripheral IV **AND** sodium chloride  •  sodium chloride  •  sodium chloride  •  sodium  "chloride        VITAL SIGNS  Vitals:    04/21/23 0825 04/21/23 0938 04/21/23 1321 04/21/23 1437   BP: 129/83 111/75  100/60   BP Location:       Patient Position:       Pulse: 106 116 105 103   Resp:  18  18   Temp:  98.6 °F (37 °C)  98.1 °F (36.7 °C)   TempSrc:  Oral  Oral   SpO2:  92%  92%   Weight:       Height:           Flowsheet Rows    Flowsheet Row First Filed Value   Admission Height 177.8 cm (70\") Documented at 04/19/2023 0414   Admission Weight 84.2 kg (185 lb 10 oz) Documented at 04/19/2023 0414           TELEMETRY: Atrial fibrillation with slightly high ventricular response    Physical Exam:  Vitals reviewed.   Constitutional:       Appearance: Not in distress.   Eyes:      Pupils: Pupils are equal, round, and reactive to light.   HENT:    Mouth/Throat:      Pharynx: Oropharynx is clear.   Pulmonary:      Effort: Pulmonary effort is normal.      Breath sounds: Normal breath sounds.   Cardiovascular:      Normal rate. Irregularly irregular rhythm.   Pulses:     Intact distal pulses.   Edema:     Peripheral edema absent.   Abdominal:      General: Bowel sounds are normal.   Musculoskeletal: Normal range of motion.      Cervical back: Normal range of motion and neck supple. Skin:     General: Skin is warm.   Neurological:      General: No focal deficit present.      Mental Status: Alert and oriented to person, place and time.          Results Review:   I reviewed the patient's new clinical results.  Lab Results (last 24 hours)     Procedure Component Value Units Date/Time    POC Glucose Once [[id number]]  (Abnormal) Collected: 04/21/23 1122    Specimen: Blood Updated: 04/21/23 1124     Glucose 243 mg/dL      Comment: Serial Number: [id number]:  [id number]       POC Glucose Once [[id number]]  (Abnormal) Collected: 04/21/23 0725    Specimen: Blood Updated: 04/21/23 0726     Glucose 184 mg/dL      Comment: Serial Number: [id number]:  [id number]       Magnesium [[id number]]  (Normal) Collected: " 04/21/23 0055    Specimen: Blood Updated: 04/21/23 0156     Magnesium 1.7 mg/dL     Comprehensive Metabolic Panel [[id number]]  (Abnormal) Collected: 04/21/23 0055    Specimen: Blood Updated: 04/21/23 0156     Glucose 161 mg/dL      BUN 19 mg/dL      Creatinine 1.01 mg/dL      Sodium 135 mmol/L      Potassium 3.9 mmol/L      Chloride 102 mmol/L      CO2 24.0 mmol/L      Calcium 8.8 mg/dL      Total Protein 6.3 g/dL      Albumin 3.0 g/dL      ALT (SGPT) 10 U/L      AST (SGOT) 11 U/L      Alkaline Phosphatase 138 U/L      Total Bilirubin 0.8 mg/dL      Globulin 3.3 gm/dL      A/G Ratio 0.9 g/dL      BUN/Creatinine Ratio 18.8     Anion Gap 9.0 mmol/L      eGFR 75.2 mL/min/1.73     Narrative:      GFR Normal >60  Chronic Kidney Disease <60  Kidney Failure <15    The GFR formula is only valid for adults with stable renal function between ages 18 and 70.    Phosphorus [[id number]]  (Normal) Collected: 04/21/23 0055    Specimen: Blood Updated: 04/21/23 0156     Phosphorus 2.7 mg/dL     CBC & Differential [[id number]]  (Abnormal) Collected: 04/21/23 0055    Specimen: Blood Updated: 04/21/23 0121    Narrative:      The following orders were created for panel order CBC & Differential.  Procedure                               Abnormality         Status                     ---------                               -----------         ------                     CBC Auto Differential[[id number]]        Abnormal            Final result                 Please view results for these tests on the individual orders.    CBC Auto Differential [[id number]]  (Abnormal) Collected: 04/21/23 0055    Specimen: Blood Updated: 04/21/23 0121     WBC 7.40 10*3/mm3      RBC 4.46 10*6/mm3      Hemoglobin 13.2 g/dL      Hematocrit 40.8 %      MCV 91.5 fL      MCH 29.6 pg      MCHC 32.3 g/dL      RDW 14.5 %      RDW-SD 46.4 fl      MPV 7.5 fL      Platelets 136 10*3/mm3      Neutrophil % 78.5 %      Lymphocyte % 12.2 %      Monocyte % 8.1 %      Eosinophil  % 0.8 %      Basophil % 0.4 %      Neutrophils, Absolute 5.80 10*3/mm3      Lymphocytes, Absolute 0.90 10*3/mm3      Monocytes, Absolute 0.60 10*3/mm3      Eosinophils, Absolute 0.10 10*3/mm3      Basophils, Absolute 0.00 10*3/mm3      nRBC 0.0 /100 WBC     POC Glucose Once [[id number]]  (Abnormal) Collected: 04/20/23 2155    Specimen: Blood Updated: 04/20/23 2156     Glucose 162 mg/dL      Comment: Serial Number: [id number]:  [id number]       POC Glucose Once [[id number]]  (Abnormal) Collected: 04/20/23 1714    Specimen: Blood Updated: 04/20/23 1716     Glucose 156 mg/dL      Comment: Serial Number: [id number]:  [id number]       Tissue Pathology Exam [[id number]] Collected: 04/20/23 1428    Specimen: Tissue from Gastric, Body Updated: 04/20/23 1525          Imaging Results (Last 24 Hours)     ** No results found for the last 24 hours. **          EKG        I personally viewed and interpreted the patient's EKG/Telemetry data:    ECHOCARDIOGRAM:  Results for orders placed during the hospital encounter of 04/03/23    Adult Transthoracic Echo Complete W/ Cont if Necessary Per Protocol    Interpretation Summary  •  Left ventricular ejection fraction appears to be 56 - 60%.  •  The right ventricular cavity is mildly dilated.  •  Estimated right ventricular systolic pressure from tricuspid regurgitation is mildly elevated (35-45 mmHg).  •  There is a moderate (1-2cm) pericardial effusion. There is no evidence of cardiac tamponade.       STRESS MYOVIEW:  Results for orders placed during the hospital encounter of 10/26/20    Stress Test With Myocardial Perfusion One Day    Interpretation Summary  · Findings consistent with a normal ECG stress test.  · Left ventricular ejection fraction is normal. (Calculated EF = 70%).  · Impressions are consistent with a low risk study.  · Inferolateral reverse redistribution without any ischemia EF 70%.       CARDIAC CATHETERIZATION:  No results found for this or any previous  visit.       OTHER:         Assessment & Plan     Principal Problem:    Intractable headache, unspecified chronicity pattern, unspecified headache type  Active Problems:    Essential hypertension    Type 2 diabetes mellitus with hyperglycemia, without long-term current use of insulin (HCC)    Coronary artery disease    Hyperlipidemia    Atrial fibrillation    Hematemesis with nausea     Patient presented with headache and N/V  CTA of head and neck negative for acute abnormality  GI consulted for concern of GI bleed, H&H stable, started on PPI  Plan for EGD today   Xarelto on hold for possible upper GI bleed, monitor H&H   Restart anticoagulation when okay with GI  [id number] score is 5  Patient remains in atrial fibrillation however rate has improved  Patient is currently on Cardizem and Lopressor  Recent echocardiogram on 4/4/2023 showed normal LVEF of 56-60% with moderate (1-2 cm) pericardial effusion and no evidence of cardiac tamponade  Patient may be good candidate for watchman with GI bleed and persistent pericardial effusion   Recent A1c elevated, patient on home oral medications   Patient's heart rate has been high and hence I am adding digoxin to his Cardizem and Lopressor and will still continue anticoagulation with Xarelto which has been started by GI after his endoscopy is complete  We will watch him for his heart rate and blood pressure and also any bleeding episodes  We will follow him in the office      I discussed the patients findings and my recommendations with patient and nurse    Kailash Garsia MD  04/21/23  14:39 EDT

## 2023-04-21 NOTE — PLAN OF CARE
Goal Outcome Evaluation:  Plan of Care Reviewed With: patient        Progress: no change  Outcome Evaluation: Patient no longer complaining of nausea or vomitting. Heart rate remains elevated, especially when walking. Dig ordered and medications adjusted. Planning to monitor overnight with possible discharge tomorrow.

## 2023-04-21 NOTE — THERAPY EVALUATION
Patient Name: Cisco Frank  : 1940    MRN: [id number]                              Today's Date: 2023       Admit Date: 2023    Visit Dx:     ICD-10-CM ICD-9-CM   1. Intractable headache, unspecified chronicity pattern, unspecified headache type  R51.9 784.0   2. Atrial fibrillation with RVR  I48.91 427.31   3. Gastrointestinal hemorrhage, unspecified gastrointestinal hemorrhage type  K92.2 578.9   4. Abdominal pain, unspecified abdominal location  R10.9 789.00   5. Hematemesis with nausea  K92.0 578.0     787.02     Patient Active Problem List   Diagnosis   • Essential hypertension   • Type 2 diabetes mellitus with hyperglycemia, without long-term current use of insulin (HCC)   • Coronary artery disease   • Hyperlipidemia   • History of bladder cancer   • Chronic renal insufficiency   • Colon polyps   • Gastroesophageal reflux disease   • Myocardial infarction   • Postviral fatigue syndrome   • Medicare annual wellness visit, subsequent   • Chest pain   • Overweight   • Schroeder's esophagus without dysplasia   • Esophageal polyp   • Irritant contact dermatitis due to detergent   • Bilateral pseudophakia   • 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   • Posterior vitreous detachment   • Persistent vomiting   • Gastroenteritis   • Hemoptysis   • Pericardial effusion   • History of prostate cancer   • Atrial fibrillation   • COVID-19   • Acute kidney injury   • Dehydration   • Orthostatic hypotension   • Closed fracture of one rib of right side, initial encounter   • Fall   • Minor closed head injury   • Intractable headache, unspecified chronicity pattern, unspecified headache type   • Hematemesis with nausea     Past Medical History:   Diagnosis Date   • Atrial fibrillation    • Cancer    • Coronary artery disease    • History of bladder cancer    • Hyperlipidemia      Past Surgical History:   Procedure Laterality Date   • BLADDER SURGERY  10/26/2019    [facility]  Dr. Cope   • BRONCHOSCOPY N/A 6/30/2022    Procedure: BRONCHOSCOPY with bilateral lung wash;  Surgeon: Ayush Velarde MD;  Location: [facility] ENDOSCOPY;  Service: Pulmonary;  Laterality: N/A;  normal bronch   • CARDIAC CATHETERIZATION     • COLONOSCOPY     • COLONOSCOPY W/ POLYPECTOMY  02/23/2021   • ENDOSCOPY N/A 4/20/2023    Procedure: ESOPHAGOGASTRODUODENOSCOPY with gastric biopsy's;  Surgeon: Debra Lomeli MD;  Location: [facility] ENDOSCOPY;  Service: Gastroenterology;  Laterality: N/A;  gastritis, cardia nodules, barretts esophagus   • SKIN CANCER EXCISION      right temple BX   • UPPER ENDOSCOPIC ULTRASOUND W/ FNA N/A 3/12/2021    Procedure: ESOPHAGOGASTRODUODENOSCOPY WITH endoscopic mucosal resection, biopsy x 1 area, and endoscopic ULTRASOUND;  Surgeon: Abner Infante MD;  Location: [facility] ENDOSCOPY;  Service: Gastroenterology;  Laterality: N/A;  post op: hiatal hernia, duodenal polyp      General Information     Row Name 04/21/23 1259          Physical Therapy Time and Intention    Document Type evaluation  -EL     Mode of Treatment individual therapy;physical therapy  -EL     Row Name 04/21/23 1259          General Information    Prior Level of Function independent:;all household mobility;ADL's;driving  -EL     Row Name 04/21/23 1259          Living Environment    People in Home spouse  -EL     Row Name 04/21/23 1259          Home Main Entrance    Number of Stairs, Main Entrance none  -EL     Row Name 04/21/23 1259          Stairs Within Home, Primary    Number of Stairs, Within Home, Primary none  -EL     Row Name 04/21/23 1259          Cognition    Orientation Status (Cognition) oriented x 4  -EL     Row Name 04/21/23 1259          Safety Issues, Functional Mobility    Impairments Affecting Function (Mobility) endurance/activity tolerance  -EL           User Key  (r) = Recorded By, (t) = Taken By, (c) = Cosigned By    Initials Name Provider Type    Danny Parker PT Physical Therapist                Mobility     Row Name 04/21/23 1303          Bed Mobility    Bed Mobility supine-sit  -EL     Supine-Sit [place] (Bed Mobility) modified independence  -EL     Assistive Device (Bed Mobility) bed rails  -EL     Row Name 04/21/23 1303          Bed-Chair Transfer    Bed-Chair [place] (Transfers) contact guard  -EL     Assistive Device (Bed-Chair Transfers) cane, straight  -EL     Row Name 04/21/23 1303          Sit-Stand Transfer    Sit-Stand [place] (Transfers) contact guard  -EL     Assistive Device (Sit-Stand Transfers) cane, straight  -EL     Row Name 04/21/23 1303          Gait/Stairs (Locomotion)    [place] Level (Gait) contact guard  -EL     Assistive Device (Gait) cane, straight  -EL     Distance in Feet (Gait) 80  -EL     Deviations/Abnormal Patterns (Gait) gait speed decreased  -EL     Comment, (Gait/Stairs) HR elevated to 145 during mobility  -EL           User Key  (r) = Recorded By, (t) = Taken By, (c) = Cosigned By    Initials Name Provider Type    Danny Parker, PT Physical Therapist               Obj/Interventions     [facility] Name 04/21/23 1305          Range of Motion Comprehensive    General Range of Motion bilateral lower extremity ROM WFL  -EL     Row Name 04/21/23 1305          Strength Comprehensive (MMT)    General Manual Muscle Testing (MMT) Assessment no strength deficits identified  -EL     Row Name 04/21/23 1305          Balance    Balance Assessment sitting static balance;standing static balance;standing dynamic balance  -EL     Static Sitting Balance independent  -EL     Static Standing Balance contact guard  -EL     Dynamic Standing Balance contact guard  -EL     Position/Device Used, Standing Balance cane, straight  -EL     Row Name 04/21/23 1305          Sensory Assessment (Somatosensory)    Sensory Assessment (Somatosensory) sensation intact  -EL           User Key  (r) = Recorded By, (t) = Taken By, (c) = Cosigned By    Initials Name Provider Type    Danny Parker, PT  Physical Therapist               Goals/Plan     Row Name 04/21/23 1311          Bed Mobility Goal 1 (PT)    Activity/Assistive Device (Bed Mobility Goal 1, PT) bed mobility activities, all  -EL     [place] Level/Cues Needed (Bed Mobility Goal 1, PT) modified independence  -EL     Time Frame (Bed Mobility Goal 1, PT) long term goal (LTG);2 weeks  -EL     Row Name 04/21/23 1311          Transfer Goal 1 (PT)    Activity/Assistive Device (Transfer Goal 1, PT) transfers, all;cane, straight  -EL     [place] Level/Cues Needed (Transfer Goal 1, PT) modified independence  -EL     Time Frame (Transfer Goal 1, PT) long term goal (LTG);2 weeks  -EL     Row Name 04/21/23 1311          Gait Training Goal 1 (PT)    Activity/Assistive Device (Gait Training Goal 1, PT) gait (walking locomotion);cane, straight  -EL     [place] Level (Gait Training Goal 1, PT) modified independence  -EL     Time Frame (Gait Training Goal 1, PT) long term goal (LTG);2 weeks  -EL     Row Name 04/21/23 1311          Therapy Assessment/Plan (PT)    Planned Therapy Interventions (PT) balance training;neuromuscular re-education;bed mobility training;transfer training;gait training;patient/family education;strengthening  -EL           User Key  (r) = Recorded By, (t) = Taken By, (c) = Cosigned By    Initials Name Provider Type    Danny Parker, PT Physical Therapist               Clinical Impression     Row Name 04/21/23 1306          Pain    Additional Documentation Pain Scale: FACES Pre/Post-Treatment (Group)  -EL     Row Name 04/21/23 1306          Pain Scale: FACES Pre/Post-Treatment    Pain: FACES Scale, Pretreatment 2-->hurts little bit  -EL     Posttreatment Pain Rating 2-->hurts little bit  -EL     Pain Location - flank  -EL     Row Name 04/21/23 1306          Plan of Care Review    Plan of Care Reviewed With patient  -EL     Outcome Evaluation Pt is an 83 YO M admitted s/p fall from ladder with rib pain, pt nauseous in ER, vomitted  and emesis contained blood. Pt this date reports no nausea, EGD performed yesterday, and has mild c/o rib pain. Pt states he lives at home with spouse, typically is independent with all ADLs, ambulation with SPC as needed. Pt in AFib this date with HR ranging from  but is asymptomatic. Pt ambluating wiht CGA without LOB. Pt on 3 L when PT entered with O2 saturations >90%, O2 removed for duration of evaluation, including mobility assessment and O2 maintained above 90% on RA. Pt appears safe to return home with family. PT to continue to follow while admitted to encourage mobiltiy.  -EL     Row Name 04/21/23 1306          Therapy Assessment/Plan (PT)    Rehab Potential (PT) good, to achieve stated therapy goals  -EL     Criteria for Skilled Interventions Met (PT) yes  -EL     Therapy Frequency (PT) 3 times/wk  -EL     Predicted Duration of Therapy Intervention (PT) until d/c  -EL     Row Name 04/21/23 1306          Vital Signs    Pre SpO2 (%) 94  3L  -EL     O2 Delivery Pre Treatment supplemental O2  -EL     Intra SpO2 (%) 95  Room air  -EL     O2 Delivery Intra Treatment room air  -EL     Post SpO2 (%) 94  Room air  -EL     O2 Delivery Post Treatment room air  -EL     Pre Patient Position Supine  -EL     Intra Patient Position Standing  -EL     Post Patient Position Sitting  -EL     Row Name 04/21/23 1306          Positioning and Restraints    Pre-Treatment Position in bed  -EL     Post Treatment Position chair  -EL     In Chair notified nsg;reclined;call light within reach;encouraged to call for assist;exit alarm on  -EL           User Key  (r) = Recorded By, (t) = Taken By, (c) = Cosigned By    Initials Name Provider Type    EL Danny Murray, PT Physical Therapist               Outcome Measures     Row Name 04/21/23 1313          How much help from another person do you currently need...    Turning from your back to your side while in flat bed without using bedrails? 4  -EL     Moving from lying on back to  sitting on the side of a flat bed without bedrails? 3  -EL     Moving to and from a bed to a chair (including a wheelchair)? 3  -EL     Standing up from a chair using your arms (e.g., wheelchair, bedside chair)? 3  -EL     Climbing 3-5 steps with a railing? 3  -EL     To walk in hospital room? 3  -EL     AM-PAC 6 Clicks Score (PT) 19  -EL     Highest level of mobility 6 --> Walked 10 steps or more  -     Row Name 04/21/23 1313          Functional Assessment    Outcome Measure Options AM-PAC 6 Clicks Basic Mobility (PT)  -           User Key  (r) = Recorded By, (t) = Taken By, (c) = Cosigned By    Initials Name Provider Type    EL Dnany Murray PT Physical Therapist                             Physical Therapy Education     Title: PT OT SLP Therapies (Done)     Topic: Physical Therapy (Done)     Point: Mobility training (Done)     Learning Progress Summary           Patient Acceptance, E,TB, VU by  at 4/21/2023 1313                   Point: Precautions (Done)     Learning Progress Summary           Patient Acceptance, E,TB, VU by  at 4/21/2023 1313                               User Key     Initials Effective Dates Name Provider Type Discipline     06/23/20 -  Danny Murray, PT Physical Therapist PT              PT Recommendation and Plan  Planned Therapy Interventions (PT): balance training, neuromuscular re-education, bed mobility training, transfer training, gait training, patient/family education, strengthening  Plan of Care Reviewed With: patient  Outcome Evaluation: Pt is an 83 YO M admitted s/p fall from ladder with rib pain, pt nauseous in ER, vomitted and emesis contained blood. Pt this date reports no nausea, EGD performed yesterday, and has mild c/o rib pain. Pt states he lives at home with spouse, typically is independent with all ADLs, ambulation with SPC as needed. Pt in AFib this date with HR ranging from  but is asymptomatic. Pt ambluating wiht CGA without LOB. Pt on 3 L when PT entered  with O2 saturations >90%, O2 removed for duration of evaluation, including mobility assessment and O2 maintained above 90% on RA. Pt appears safe to return home with family. PT to continue to follow while admitted to encourage mobiltiy.     Time Calculation:    PT Charges     Row Name 04/21/23 1314             Time Calculation    Start Time 0851  -EL      Stop Time 0910  -EL      Time Calculation (min) 19 min  -EL      PT Received On 04/21/23  -EL      PT - Next Appointment 04/24/23  -EL      PT Goal Re-Cert Due Date 05/05/23  -EL            User Key  (r) = Recorded By, (t) = Taken By, (c) = Cosigned By    Initials Name Provider Type    Danny Parker, MICHEL Physical Therapist              Therapy Charges for Today     Code Description Service Date Service Provider Modifiers Qty    33120168428 HC PT EVAL MOD COMPLEXITY 3 4/21/2023 Danny Murray PT GP 1          PT G-Codes  Outcome Measure Options: AM-PAC 6 Clicks Basic Mobility (PT)  AM-PAC 6 Clicks Score (PT): 19  PT Discharge Summary  Anticipated Discharge Disposition (PT): home with assist    Danny Murray PT  4/21/2023

## 2023-04-21 NOTE — CASE MANAGEMENT/SOCIAL WORK
Continued Stay Note   Andrew     Patient Name: Cisco Frank  MRN: [id number]  Today's Date: 4/21/2023    Admit Date: 4/19/2023    Plan: DC Plan: Return home with spouse.   Discharge Plan     Row Name 04/21/23 1723       Plan    Plan DC Plan: Return home with spouse.    Patient/Family in Agreement with Plan yes    Plan Comments Barriers to discharge: Afib- heart rate still elevated off drip.  EGD negative, ok to restart eliquis.  Monitor overnight.              Expected Discharge Date and Time     Expected Discharge Date Expected Discharge Time    Apr 22, 2023           Phone communication or documentation only - no physical contact with patient or family.  Luba Kaplan RN     Office Phone [phone]  Office Cell [phone]'

## 2023-04-21 NOTE — PROGRESS NOTES
[facility] Medicine Services Daily Progress Note    Patient Name: Cisco Frank  : 1940  MRN: [id number]  Primary Care Physician:  Nuno Patton MD  Date of admission: 2023      Subjective      Chief Complaint: a fib with RVR, coffee ground emesis     Patient Reports no further emesis. EGD without active bleeding. Still in a fib. Meds adjusted. AC resumed per gi/cardio      ROS negative except as above     Objective      Vitals:   Temp:  [97.1 °F (36.2 °C)-98.6 °F (37 °C)] 98.1 °F (36.7 °C)  Heart Rate:  [] 103  Resp:  [14-18] 18  BP: (100-130)/(60-83) 100/60  Flow (L/min):  [2] 2    Physical Exam  Vitals reviewed. Wife at bedside   HENT:      Head: Normocephalic.   Cardiovascular:      Rate and Rhythm: Tachycardia present. Rhythm irregular.   Pulmonary:      Effort: Pulmonary effort is normal.   Abdominal:      General: Abdomen is flat.      Palpations: Abdomen is soft.   Musculoskeletal:         General: Normal range of motion.      Cervical back: Normal range of motion.   Skin:     General: Skin is warm.   Neurological:      General: No focal deficit present.      Mental Status: He is alert and oriented to person, place, and time.   Psychiatric:         Mood and Affect: Mood normal.         Behavior: Behavior normal.        Result Review    Result Review:  I have personally reviewed the results from the time of this admission to 2023 14:59 EDT and agree with these findings:  [x]  Laboratory  []  Microbiology  []  Radiology  []  EKG/Telemetry   []  Cardiology/Vascular   []  Pathology  []  Old records  []  Other:  Most notable findings include: labs normal     Wounds (last 24 hours)     LDA Wound     Row Name 23 1200 23 0800 23 0400       Wound 23 0800 Right midline gluteal Abrasion    Wound - Properties Group Placement Date: 23  -EH Placement Time: 08  -EH Present on Hospital Admission: Y  -EH Side: Right  -EH Orientation: midline   -EH Location: gluteal  -EH Primary Wound Type: Abrasion  -EH Additional Comments: 2 scabs of right cheek, patinet states from his wallet rubbing  -EH    Dressing Appearance -- intact;dry  -BC --    Retired Wound - Properties Group Placement Date: 04/03/23  -EH Placement Time: 0800  -EH Present on Hospital Admission: Y  -EH Side: Right  -EH Orientation: midline  -EH Location: gluteal  -EH Primary Wound Type: Abrasion  -EH Additional Comments: 2 scabs of right cheek, patinet states from his wallet rubbing  -EH    Retired Wound - Properties Group Date first assessed: 04/03/23  -EH Time first assessed: 0800  -EH Present on Hospital Admission: Y  -EH Side: Right  -EH Location: gluteal  -EH Primary Wound Type: Abrasion  -EH Additional Comments: 2 scabs of right cheek, patinet states from his wallet rubbing  -EH       Wound 04/19/23 1933 coccyx    Wound - Properties Group Placement Date: 04/19/23  -AS Placement Time: 1933 -AS Present on Hospital Admission: Y  -AS Location: coccyx  -AS    Dressing Appearance -- intact;dry  -BC --    Drainage Amount none  -BC none  -BC none  -SM    Retired Wound - Properties Group Placement Date: 04/19/23  -AS Placement Time: 1933 -AS Present on Hospital Admission: Y  -AS Location: coccyx  -AS    Retired Wound - Properties Group Date first assessed: 04/19/23  -AS Time first assessed: 1933  -AS Present on Hospital Admission: Y  -AS Location: coccyx  -AS    Row Name 04/21/23 0000 04/20/23 2000 04/20/23 1600       Wound 04/03/23 0800 Right midline gluteal Abrasion    Wound - Properties Group Placement Date: 04/03/23  -EH Placement Time: 0800  -EH Present on Hospital Admission: Y  -EH Side: Right  -EH Orientation: midline  -EH Location: gluteal  -EH Primary Wound Type: Abrasion  -EH Additional Comments: 2 scabs of right cheek, patinet states from his wallet rubbing  -EH    Dressing Appearance dry;intact  -SM intact;dry  -SM dry;intact;open to air  -JE    Care, Wound -- cleansed with;soap and  water  -SM --    Dressing Care silicone  -SM silicone  -SM --    Periwound Care dry periwound area maintained  -SM dry periwound area maintained  -SM --    Retired Wound - Properties Group Placement Date: 04/03/23  -EH Placement Time: 0800  -EH Present on Hospital Admission: Y  -EH Side: Right  -EH Orientation: midline  -EH Location: gluteal  -EH Primary Wound Type: Abrasion  -EH Additional Comments: 2 scabs of right cheek, patinet states from his wallet rubbing  -EH    Retired Wound - Properties Group Date first assessed: 04/03/23  -EH Time first assessed: 0800  -EH Present on Hospital Admission: Y  -EH Side: Right  -EH Location: gluteal  -EH Primary Wound Type: Abrasion  -EH Additional Comments: 2 scabs of right cheek, patinet states from his wallet rubbing  -EH       Wound 04/19/23 1933 coccyx    Wound - Properties Group Placement Date: 04/19/23  -AS Placement Time: 1933  -AS Present on Hospital Admission: Y  -AS Location: coccyx  -AS    Pressure Injury Stage -- 1  -SM --    Dressing Appearance dry;intact  -SM dry;intact  -SM open to air;dry;intact  -JE    Drainage Amount none  -SM none  -SM none  -JE    Care, Wound -- cleansed with;soap and water;barrier applied  -SM --    Dressing Care silicone  -SM silicone  -SM --    Periwound Care dry periwound area maintained  -SM dry periwound area maintained  -SM --    Retired Wound - Properties Group Placement Date: 04/19/23  -AS Placement Time: 1933 -AS Present on Hospital Admission: Y  -AS Location: coccyx  -AS    Retired Wound - Properties Group Date first assessed: 04/19/23  -AS Time first assessed: 1933  -AS Present on Hospital Admission: Y  -AS Location: coccyx  -AS          User Key  (r) = Recorded By, (t) = Taken By, (c) = Cosigned By    Initials Name Provider Type    AS Janay Munoz RN Registered Nurse    Madelin Burch RN Registered Nurse    Genet Hogan RN Registered Nurse    Lupe Mendoza RN Registered Nurse    Alexis Murray,  RN Registered Nurse                 Assessment & Plan       82 M with A fib RVR and possible GI bleed         Active Hospital Problems:          Active Hospital Problems     Diagnosis     • **Intractable headache, unspecified chronicity pattern, unspecified headache type        Plan:   Atrial fibrillation with RVR  -Patient on Cardizem drip, initiated in emergency department  -Left ventricular ejection fraction appears to be 56 - 60%.  -EKG Atrial fibrillation Low voltage, precordial leads  -Continuous monitor  -TSH,pending  -INR 1.15  -PTT 29.2.  -Cardiology consulted medications adjusted for uncontrolled a fib   -AC resumed 4/21     Intractable headache  History of Fall with hitting head 2 weeks ago  -CT head without contrast No acute intracranial abnormality.Volume loss and mild chronic small vessel ischemic changes.  -CTA head and neck Essentially normal CT angiogram of the head and neck. There is no evidence of flow-limiting stenosis, large vessel occlusion or aneurysmal dilatation.  -/76  -Tylenol for now     Upper GI bleed  -Hemoglobin stable 14.4  -Monitor H&H  -PPI  -resume xarelto 4/21  -GI consulted, plan is followed Acute onset of vomiting-may be secondary to headache versus constipation versus other.  Okay for clear liquids.  Antiemetics and supportive care. No sign of bowel obstruction. Coffee-ground emesis-suspect esophagitis in the setting of Xarelto.  Hemoglobin is normal.  Will monitor H&H and consider EGD in a.m. based on clinical course.  History of Schroeder's esophagus noted, last EGD 2022 no dysplasia.  Hiatal hernia.  Elevated alk phos-check GGT  EGD without acute bleeding     Essential hypertension  -Continue metoprolol once verified  -Monitor BP     Type 2 diabetes  -Glucose 253 on admission  -Hemoglobin A1c ordered  -Hold home medication  -Start SSI  -Glucose checks ACHS      CAD  Hyperlipidemia  -Lipid panel ordered  -Continue statin           DVT prophylaxis:  Mechanical DVT  prophylaxis orders are present.     CODE STATUS:    Admission Status:  I believe this patient meets inpatient status.     I discussed the patient's findings and my recommendations with patient and family.     04/21/23      Electronically signed by Josemanuel Jimenez MD, 04/21/23, 14:59 EDT.  [facility], [place], operated by [facility] Hospitalist Team

## 2023-04-21 NOTE — PLAN OF CARE
Goal Outcome Evaluation:  Plan of Care Reviewed With: patient           Outcome Evaluation: Pt is an 81 YO M admitted s/p fall from ladder with rib pain, pt nauseous in ER, vomitted and emesis contained blood. Pt this date reports no nausea, EGD performed yesterday, and has mild c/o rib pain. Pt states he lives at home with spouse, typically is independent with all ADLs, ambulation with SPC as needed. Pt in AFib this date with HR ranging from  but is asymptomatic. Pt ambluating wiht CGA without LOB. Pt on 3 L when PT entered with O2 saturations >90%, O2 removed for duration of evaluation, including mobility assessment and O2 maintained above 90% on RA. Pt appears safe to return home with family. PT to continue to follow while admitted to encourage mobiltiy.

## 2023-04-22 LAB
BACTERIA UR QL AUTO: ABNORMAL /HPF
BASOPHILS # BLD AUTO: 0 10*3/MM3 (ref 0–0.2)
BASOPHILS NFR BLD AUTO: 0.3 % (ref 0–1.5)
BILIRUB UR QL STRIP: NEGATIVE
CHLORIDE UR-SCNC: 44 MMOL/L
CLARITY UR: CLEAR
COLOR UR: YELLOW
DEPRECATED RDW RBC AUTO: 47.7 FL (ref 37–54)
EOSINOPHIL # BLD AUTO: 0.1 10*3/MM3 (ref 0–0.4)
EOSINOPHIL NFR BLD AUTO: 1.9 % (ref 0.3–6.2)
ERYTHROCYTE [DISTWIDTH] IN BLOOD BY AUTOMATED COUNT: 14.7 % (ref 12.3–15.4)
GLUCOSE BLDC GLUCOMTR-MCNC: 202 MG/DL (ref 70–105)
GLUCOSE BLDC GLUCOMTR-MCNC: 230 MG/DL (ref 70–105)
GLUCOSE BLDC GLUCOMTR-MCNC: 232 MG/DL (ref 70–105)
GLUCOSE UR STRIP-MCNC: NEGATIVE MG/DL
HCT VFR BLD AUTO: 37.3 % (ref 37.5–51)
HGB BLD-MCNC: 12.6 G/DL (ref 13–17.7)
HGB UR QL STRIP.AUTO: ABNORMAL
HYALINE CASTS UR QL AUTO: ABNORMAL /LPF
KETONES UR QL STRIP: NEGATIVE
LEUKOCYTE ESTERASE UR QL STRIP.AUTO: NEGATIVE
LYMPHOCYTES # BLD AUTO: 1 10*3/MM3 (ref 0.7–3.1)
LYMPHOCYTES NFR BLD AUTO: 15.6 % (ref 19.6–45.3)
MCH RBC QN AUTO: 29.7 PG (ref 26.6–33)
MCHC RBC AUTO-ENTMCNC: 33.7 G/DL (ref 31.5–35.7)
MCV RBC AUTO: 88.1 FL (ref 79–97)
MONOCYTES # BLD AUTO: 0.6 10*3/MM3 (ref 0.1–0.9)
MONOCYTES NFR BLD AUTO: 9.5 % (ref 5–12)
NEUTROPHILS NFR BLD AUTO: 4.7 10*3/MM3 (ref 1.7–7)
NEUTROPHILS NFR BLD AUTO: 72.7 % (ref 42.7–76)
NITRITE UR QL STRIP: NEGATIVE
NRBC BLD AUTO-RTO: 0 /100 WBC (ref 0–0.2)
PH UR STRIP.AUTO: <=5 [PH] (ref 5–8)
PLATELET # BLD AUTO: 126 10*3/MM3 (ref 140–450)
PMV BLD AUTO: 8 FL (ref 6–12)
PROT UR QL STRIP: ABNORMAL
RBC # BLD AUTO: 4.24 10*6/MM3 (ref 4.14–5.8)
RBC # UR STRIP: ABNORMAL /HPF
REF LAB TEST METHOD: ABNORMAL
SODIUM UR-SCNC: 44 MMOL/L
SP GR UR STRIP: 1.01 (ref 1–1.03)
SQUAMOUS #/AREA URNS HPF: ABNORMAL /HPF
UROBILINOGEN UR QL STRIP: ABNORMAL
WBC # UR STRIP: ABNORMAL /HPF
WBC NRBC COR # BLD: 6.4 10*3/MM3 (ref 3.4–10.8)

## 2023-04-22 PROCEDURE — 81001 URINALYSIS AUTO W/SCOPE: CPT | Performed by: INTERNAL MEDICINE

## 2023-04-22 PROCEDURE — 99232 SBSQ HOSP IP/OBS MODERATE 35: CPT | Performed by: INTERNAL MEDICINE

## 2023-04-22 PROCEDURE — 82436 ASSAY OF URINE CHLORIDE: CPT | Performed by: INTERNAL MEDICINE

## 2023-04-22 PROCEDURE — 84100 ASSAY OF PHOSPHORUS: CPT | Performed by: INTERNAL MEDICINE

## 2023-04-22 PROCEDURE — 25010000002 MAGNESIUM SULFATE 2 GM/50ML SOLUTION: Performed by: INTERNAL MEDICINE

## 2023-04-22 PROCEDURE — 84300 ASSAY OF URINE SODIUM: CPT | Performed by: INTERNAL MEDICINE

## 2023-04-22 PROCEDURE — 83735 ASSAY OF MAGNESIUM: CPT | Performed by: INTERNAL MEDICINE

## 2023-04-22 PROCEDURE — 85025 COMPLETE CBC W/AUTO DIFF WBC: CPT | Performed by: INTERNAL MEDICINE

## 2023-04-22 PROCEDURE — 63710000001 INSULIN LISPRO (HUMAN) PER 5 UNITS: Performed by: INTERNAL MEDICINE

## 2023-04-22 PROCEDURE — 82962 GLUCOSE BLOOD TEST: CPT

## 2023-04-22 PROCEDURE — 36415 COLL VENOUS BLD VENIPUNCTURE: CPT | Performed by: INTERNAL MEDICINE

## 2023-04-22 PROCEDURE — 80053 COMPREHEN METABOLIC PANEL: CPT | Performed by: INTERNAL MEDICINE

## 2023-04-22 RX ORDER — MAGNESIUM SULFATE HEPTAHYDRATE 40 MG/ML
2 INJECTION, SOLUTION INTRAVENOUS ONCE
Status: DISCONTINUED | OUTPATIENT
Start: 2023-04-22 | End: 2023-04-22

## 2023-04-22 RX ORDER — SODIUM CHLORIDE 9 MG/ML
125 INJECTION, SOLUTION INTRAVENOUS CONTINUOUS
Status: DISCONTINUED | OUTPATIENT
Start: 2023-04-22 | End: 2023-04-23

## 2023-04-22 RX ADMIN — RIVAROXABAN 20 MG: 20 TABLET, FILM COATED ORAL at 18:25

## 2023-04-22 RX ADMIN — SODIUM CHLORIDE 125 ML/HR: 9 INJECTION, SOLUTION INTRAVENOUS at 12:22

## 2023-04-22 RX ADMIN — METOPROLOL TARTRATE 25 MG: 25 TABLET, FILM COATED ORAL at 20:40

## 2023-04-22 RX ADMIN — MAGNESIUM SULFATE HEPTAHYDRATE 2 G: 2 INJECTION, SOLUTION INTRAVENOUS at 01:57

## 2023-04-22 RX ADMIN — METOPROLOL TARTRATE 25 MG: 25 TABLET, FILM COATED ORAL at 08:23

## 2023-04-22 RX ADMIN — Medication 10 ML: at 20:41

## 2023-04-22 RX ADMIN — SODIUM CHLORIDE 125 ML/HR: 9 INJECTION, SOLUTION INTRAVENOUS at 20:41

## 2023-04-22 RX ADMIN — INSULIN LISPRO 3 UNITS: 100 INJECTION, SOLUTION INTRAVENOUS; SUBCUTANEOUS at 12:22

## 2023-04-22 RX ADMIN — PANTOPRAZOLE SODIUM 40 MG: 40 TABLET, DELAYED RELEASE ORAL at 08:23

## 2023-04-22 RX ADMIN — INSULIN LISPRO 3 UNITS: 100 INJECTION, SOLUTION INTRAVENOUS; SUBCUTANEOUS at 18:25

## 2023-04-22 RX ADMIN — INSULIN LISPRO 3 UNITS: 100 INJECTION, SOLUTION INTRAVENOUS; SUBCUTANEOUS at 08:23

## 2023-04-22 RX ADMIN — PANTOPRAZOLE SODIUM 40 MG: 40 TABLET, DELAYED RELEASE ORAL at 18:25

## 2023-04-22 RX ADMIN — Medication 5 MG: at 20:40

## 2023-04-22 RX ADMIN — Medication 10 ML: at 08:23

## 2023-04-22 RX ADMIN — ATORVASTATIN CALCIUM 40 MG: 40 TABLET, FILM COATED ORAL at 08:23

## 2023-04-22 RX ADMIN — ACETAMINOPHEN 650 MG: 325 TABLET, FILM COATED ORAL at 20:40

## 2023-04-22 RX ADMIN — DIGOXIN 125 MCG: 250 TABLET ORAL at 12:22

## 2023-04-22 RX ADMIN — MAGNESIUM SULFATE HEPTAHYDRATE 2 G: 2 INJECTION, SOLUTION INTRAVENOUS at 00:01

## 2023-04-22 RX ADMIN — DILTIAZEM HYDROCHLORIDE 120 MG: 120 CAPSULE, COATED, EXTENDED RELEASE ORAL at 08:23

## 2023-04-22 RX ADMIN — MAGNESIUM SULFATE HEPTAHYDRATE 2 G: 2 INJECTION, SOLUTION INTRAVENOUS at 04:05

## 2023-04-22 NOTE — PLAN OF CARE
Goal Outcome Evaluation:              Outcome Evaluation: The Mag was low, replaced per protocols. He also wears a Abd binder because he has some previous Fx ribs. VS stable.

## 2023-04-22 NOTE — CONSULTS
RENAL/[facility] CONSULT:    Referring Provider: Dr. Jimenez  Reason for Consultation: ALMA    Subjective     Chief complaint: A-fib, coffee ground emesis, fall, HA    History of present illness:  Patient is an 81 yo WM with medical history as detailed below.  He presented after a fall with right side pain and headache.  Also with coffee ground emesis and A-fib.  Cr had been stable at 1 but bumped to 1.4 today.  Review of chart reveals he had a CTA on 4/19.  He states good UOP.  No CP or SOA.  No N/V/D.  No other complaints.    History  Past Medical History:   Diagnosis Date   • Atrial fibrillation    • Cancer    • Coronary artery disease    • History of bladder cancer    • Hyperlipidemia    ,   Past Surgical History:   Procedure Laterality Date   • BLADDER SURGERY  10/26/2019    [facility] Dr. Cope   • BRONCHOSCOPY N/A 6/30/2022    Procedure: BRONCHOSCOPY with bilateral lung wash;  Surgeon: Ayush Velarde MD;  Location: [facility] ENDOSCOPY;  Service: Pulmonary;  Laterality: N/A;  normal bronch   • CARDIAC CATHETERIZATION     • COLONOSCOPY     • COLONOSCOPY W/ POLYPECTOMY  02/23/2021   • ENDOSCOPY N/A 4/20/2023    Procedure: ESOPHAGOGASTRODUODENOSCOPY with gastric biopsy's;  Surgeon: Debra Lomeli MD;  Location: [facility] ENDOSCOPY;  Service: Gastroenterology;  Laterality: N/A;  gastritis, cardia nodules, barretts esophagus   • SKIN CANCER EXCISION      right temple BX   • UPPER ENDOSCOPIC ULTRASOUND W/ FNA N/A 3/12/2021    Procedure: ESOPHAGOGASTRODUODENOSCOPY WITH endoscopic mucosal resection, biopsy x 1 area, and endoscopic ULTRASOUND;  Surgeon: Abner Infante MD;  Location: [facility] ENDOSCOPY;  Service: Gastroenterology;  Laterality: N/A;  post op: hiatal hernia, duodenal polyp   ,   Family History   Problem Relation Age of Onset   • Heart disease Mother    • Heart attack Father    • No Known Problems Sister    • No Known Problems Brother    • No Known Problems Maternal Aunt    • No Known Problems Maternal Uncle    • No Known  Problems Paternal Aunt    • No Known Problems Paternal Uncle    • No Known Problems Maternal Grandmother    • No Known Problems Maternal Grandfather    • No Known Problems Paternal Grandmother    • No Known Problems Paternal Grandfather    • No Known Problems Other    • Anemia Neg Hx    • Arrhythmia Neg Hx    • Asthma Neg Hx    • Clotting disorder Neg Hx    • Fainting Neg Hx    • Heart failure Neg Hx    • Hyperlipidemia Neg Hx    • Hypertension Neg Hx    ,   Social History     Socioeconomic History   • Marital status:    Tobacco Use   • Smoking status: Former     Types: Cigarettes     Quit date: 1980     Years since quittin.3   • Smokeless tobacco: Never   Vaping Use   • Vaping Use: Never used   Substance and Sexual Activity   • Alcohol use: Not Currently   • Drug use: Never   • Sexual activity: Defer     E-cigarette/Vaping   • E-cigarette/Vaping Use Never User    • Passive Exposure No    • Counseling Given No      E-cigarette/Vaping Substances   • Nicotine No    • THC No    • CBD No    • Flavoring No      E-cigarette/Vaping Devices   • Disposable No    • Pre-filled or Refillable Cartridge No    • Refillable Tank No    • Pre-filled Pod No        ,   Medications Prior to Admission   Medication Sig Dispense Refill Last Dose   • atorvastatin (LIPITOR) 40 MG tablet Take 1 tablet by mouth every night at bedtime. 90 tablet 3    • colestipol (COLESTID) 1 g tablet Take 1 tablet by mouth 2 (Two) Times a Day.      • glimepiride (AMARYL) 2 MG tablet TAKE 3 TABLETS EVERY MORNING BEFORE BREAKFAST 270 tablet 2    • glucose blood (Accu-Chek Tessa Plus) test strip Test daily and prn 90 each 3    • HYDROcodone-acetaminophen (Norco) 5-325 MG per tablet Take 1-2 tablets by mouth Every 6 (Six) Hours As Needed for Moderate Pain or Severe Pain for up to 15 days. 30 tablet 0    • Jardiance 10 MG tablet tablet Take 1 tablet by mouth Daily.      • metoprolol tartrate (LOPRESSOR) 25 MG tablet Take 0.5 tablets by mouth Every  12 (Twelve) Hours. 60 tablet 0    • omeprazole-sodium bicarbonate (ZEGERID)  MG per capsule Take 1 capsule by mouth Daily.      • ondansetron ODT (ZOFRAN-ODT) 4 MG disintegrating tablet Place 1 tablet on the tongue 4 (Four) Times a Day As Needed for Nausea or Vomiting for up to 15 doses. 15 tablet 2    • polyethylene glycol (MIRALAX) 17 g packet Take 17 g by mouth Daily. 30 each 0    • rivaroxaban (XARELTO) 20 MG tablet Take 1 tablet by mouth Daily.      • Tradjenta 5 MG tablet tablet TAKE 1 TABLET BY MOUTH DAILY FOR 30 DAYS. 90 tablet 0    • vitamin C (ASCORBIC ACID) 500 MG tablet Take 1 tablet by mouth Daily.      , Scheduled Meds:  atorvastatin, 40 mg, Oral, Daily  digoxin, 125 mcg, Oral, Daily  dilTIAZem CD, 120 mg, Oral, Q24H  insulin lispro, 2-7 Units, Subcutaneous, TID With Meals  metoprolol tartrate, 25 mg, Oral, Q12H  pantoprazole, 40 mg, Oral, BID AC  rivaroxaban, 20 mg, Oral, Daily With Dinner  senna-docusate sodium, 2 tablet, Oral, BID  sodium chloride, 10 mL, Intravenous, Q12H    , Continuous Infusions:  sodium chloride, 125 mL/hr    , PRN Meds:  •  acetaminophen **OR** acetaminophen **OR** acetaminophen  •  aluminum-magnesium hydroxide-simethicone  •  senna-docusate sodium **AND** polyethylene glycol **AND** bisacodyl **AND** bisacodyl  •  dextrose  •  dextrose  •  glucagon (human recombinant)  •  magnesium sulfate **OR** magnesium sulfate **OR** magnesium sulfate  •  melatonin  •  ondansetron **OR** ondansetron  •  potassium & sodium phosphates **OR** potassium & sodium phosphates  •  potassium chloride **OR** potassium chloride **OR** potassium chloride  •  [COMPLETED] Insert Peripheral IV **AND** sodium chloride  •  sodium chloride  •  sodium chloride  •  sodium chloride and Allergies:  Patient has no known allergies.    Review of Systems  Pertinent items are noted in HPI    Objective     Vital Signs  Temp:  [97.3 °F (36.3 °C)-98.1 °F (36.7 °C)] 97.5 °F (36.4 °C)  Heart Rate:  []  95  Resp:  [16-18] 18  BP: (100-129)/(60-81) 114/74    I/O this shift:  In: 240 [P.O.:240]  Out: 325 [Urine:325]  I/O last 3 completed shifts:  In: 990 [P.O.:840; I.V.:150]  Out: 1525 [Urine:1525]    Physical Exam:  GEN: Awake, NAD  ENT: PERRL, EOMI, MMM  NECK: Supple, no JVD  CHEST: CTAB, no W/R/C  CV: RRR, no M/G/R  ABD: Soft, NT, +BS  SKIN: Warm and Dry  NEURO: CN's intact      Results Review:   I reviewed the patient's new clinical results.    WBC WBC   Date Value Ref Range Status   04/21/2023 8.10 3.40 - 10.80 10*3/mm3 Final   04/21/2023 7.40 3.40 - 10.80 10*3/mm3 Final   04/20/2023 7.60 3.40 - 10.80 10*3/mm3 Final      HGB Hemoglobin   Date Value Ref Range Status   04/21/2023 12.6 (L) 13.0 - 17.7 g/dL Final   04/21/2023 13.2 13.0 - 17.7 g/dL Final   04/20/2023 13.0 13.0 - 17.7 g/dL Final      HCT Hematocrit   Date Value Ref Range Status   04/21/2023 39.2 37.5 - 51.0 % Final   04/21/2023 40.8 37.5 - 51.0 % Final   04/20/2023 38.4 37.5 - 51.0 % Final      Platlets No results found for: LABPLAT   MCV MCV   Date Value Ref Range Status   04/21/2023 90.6 79.0 - 97.0 fL Final   04/21/2023 91.5 79.0 - 97.0 fL Final   04/20/2023 88.1 79.0 - 97.0 fL Final          Sodium Sodium   Date Value Ref Range Status   04/21/2023 136 136 - 145 mmol/L Final   04/21/2023 135 (L) 136 - 145 mmol/L Final   04/20/2023 135 (L) 136 - 145 mmol/L Final      Potassium Potassium   Date Value Ref Range Status   04/21/2023 4.3 3.5 - 5.2 mmol/L Final     Comment:     Slight hemolysis detected by analyzer. Results may be affected.   04/21/2023 3.9 3.5 - 5.2 mmol/L Final   04/20/2023 3.6 3.5 - 5.2 mmol/L Final     Comment:     Slight hemolysis detected by analyzer. Results may be affected.      Chloride Chloride   Date Value Ref Range Status   04/21/2023 101 98 - 107 mmol/L Final   04/21/2023 102 98 - 107 mmol/L Final   04/20/2023 101 98 - 107 mmol/L Final      CO2 CO2   Date Value Ref Range Status   04/21/2023 27.0 22.0 - 29.0 mmol/L Final    04/21/2023 24.0 22.0 - 29.0 mmol/L Final   04/20/2023 24.0 22.0 - 29.0 mmol/L Final      BUN BUN   Date Value Ref Range Status   04/21/2023 21 8 - 23 mg/dL Final   04/21/2023 19 8 - 23 mg/dL Final   04/20/2023 19 8 - 23 mg/dL Final      Creatinine Creatinine   Date Value Ref Range Status   04/21/2023 1.45 (H) 0.76 - 1.27 mg/dL Final   04/21/2023 1.01 0.76 - 1.27 mg/dL Final   04/20/2023 1.06 0.76 - 1.27 mg/dL Final      Calcium Calcium   Date Value Ref Range Status   04/21/2023 8.1 (L) 8.6 - 10.5 mg/dL Final   04/21/2023 8.8 8.6 - 10.5 mg/dL Final   04/20/2023 9.0 8.6 - 10.5 mg/dL Final      PO4 No results found for: CAPO4   Albumin Albumin   Date Value Ref Range Status   04/21/2023 2.9 (L) 3.5 - 5.2 g/dL Final   04/21/2023 3.0 (L) 3.5 - 5.2 g/dL Final   04/20/2023 3.3 (L) 3.5 - 5.2 g/dL Final      Magnesium Magnesium   Date Value Ref Range Status   04/21/2023 1.5 (L) 1.6 - 2.4 mg/dL Final   04/21/2023 1.7 1.6 - 2.4 mg/dL Final   04/20/2023 1.8 1.6 - 2.4 mg/dL Final      Uric Acid No results found for: URICACID         atorvastatin, 40 mg, Oral, Daily  digoxin, 125 mcg, Oral, Daily  dilTIAZem CD, 120 mg, Oral, Q24H  insulin lispro, 2-7 Units, Subcutaneous, TID With Meals  metoprolol tartrate, 25 mg, Oral, Q12H  pantoprazole, 40 mg, Oral, BID AC  rivaroxaban, 20 mg, Oral, Daily With Dinner  senna-docusate sodium, 2 tablet, Oral, BID  sodium chloride, 10 mL, Intravenous, Q12H      sodium chloride, 125 mL/hr        Assessment & Plan       ALMA - Likely ANGELO    Hypomagnesemia    Intractable headache, unspecified chronicity pattern, unspecified headache type    Essential hypertension    Type 2 diabetes mellitus with hyperglycemia, without long-term current use of insulin (HCC)    Coronary artery disease    Hyperlipidemia    Atrial fibrillation    Hematemesis with nausea    PLAN: Check urine studies.  Kidneys OK on recent CT scan.  Start IVF today and reassess with repeat BMP in AM.  Discussed importance of remaining in  the hospital until kidney function stabilizes/improves.  Replace Mag.      Sher Granda MD   [facility]  04/22/23  @[facility]

## 2023-04-22 NOTE — PLAN OF CARE
Goal Outcome Evaluation:         Pt is eating well, walked in [address] with RN and gate belt, vitals stable

## 2023-04-22 NOTE — PROGRESS NOTES
Referring Provider: Josemanuel Jimenez MD    Reason for follow-up:  A-fib with RVR  Anticoagulation management     Patient Care Team:  Nuno Patton MD as PCP - General (Family Medicine)  Kailash Garsia MD as Consulting Physician (Cardiology)    Subjective .      ROS    The patient has been without any chest discomfort ,shortness of breath, palpitations, dizziness or syncope.  Denies having any headache ,abdominal pain ,nausea, vomiting , diarrhea constipation, loss of weight or loss of appetite.  Denies having any excessive bruising ,hematuria or blood in the stool.    Review of all systems negative except as indicated    History  Past Medical History:   Diagnosis Date   • Atrial fibrillation    • Cancer    • Coronary artery disease    • History of bladder cancer    • Hyperlipidemia        Past Surgical History:   Procedure Laterality Date   • BLADDER SURGERY  10/26/2019    [facility] Dr. Cope   • BRONCHOSCOPY N/A 6/30/2022    Procedure: BRONCHOSCOPY with bilateral lung wash;  Surgeon: Ayush Velarde MD;  Location: [facility] ENDOSCOPY;  Service: Pulmonary;  Laterality: N/A;  normal bronch   • CARDIAC CATHETERIZATION     • COLONOSCOPY     • COLONOSCOPY W/ POLYPECTOMY  02/23/2021   • ENDOSCOPY N/A 4/20/2023    Procedure: ESOPHAGOGASTRODUODENOSCOPY with gastric biopsy's;  Surgeon: Debra Lomeli MD;  Location: [facility] ENDOSCOPY;  Service: Gastroenterology;  Laterality: N/A;  gastritis, cardia nodules, barretts esophagus   • SKIN CANCER EXCISION      right temple BX   • UPPER ENDOSCOPIC ULTRASOUND W/ FNA N/A 3/12/2021    Procedure: ESOPHAGOGASTRODUODENOSCOPY WITH endoscopic mucosal resection, biopsy x 1 area, and endoscopic ULTRASOUND;  Surgeon: Abner Infante MD;  Location: [facility] ENDOSCOPY;  Service: Gastroenterology;  Laterality: N/A;  post op: hiatal hernia, duodenal polyp       Family History   Problem Relation Age of Onset   • Heart disease Mother    • Heart attack Father    • No Known Problems Sister    • No Known  Problems Brother    • No Known Problems Maternal Aunt    • No Known Problems Maternal Uncle    • No Known Problems Paternal Aunt    • No Known Problems Paternal Uncle    • No Known Problems Maternal Grandmother    • No Known Problems Maternal Grandfather    • No Known Problems Paternal Grandmother    • No Known Problems Paternal Grandfather    • No Known Problems Other    • Anemia Neg Hx    • Arrhythmia Neg Hx    • Asthma Neg Hx    • Clotting disorder Neg Hx    • Fainting Neg Hx    • Heart failure Neg Hx    • Hyperlipidemia Neg Hx    • Hypertension Neg Hx        Social History     Tobacco Use   • Smoking status: Former     Types: Cigarettes     Quit date: 1980     Years since quittin.3   • Smokeless tobacco: Never   Vaping Use   • Vaping Use: Never used   Substance Use Topics   • Alcohol use: Not Currently   • Drug use: Never        Medications Prior to Admission   Medication Sig Dispense Refill Last Dose   • atorvastatin (LIPITOR) 40 MG tablet Take 1 tablet by mouth every night at bedtime. 90 tablet 3    • colestipol (COLESTID) 1 g tablet Take 1 tablet by mouth 2 (Two) Times a Day.      • glimepiride (AMARYL) 2 MG tablet TAKE 3 TABLETS EVERY MORNING BEFORE BREAKFAST 270 tablet 2    • glucose blood (Accu-Chek Tessa Plus) test strip Test daily and prn 90 each 3    • HYDROcodone-acetaminophen (Norco) 5-325 MG per tablet Take 1-2 tablets by mouth Every 6 (Six) Hours As Needed for Moderate Pain or Severe Pain for up to 15 days. 30 tablet 0    • Jardiance 10 MG tablet tablet Take 1 tablet by mouth Daily.      • metoprolol tartrate (LOPRESSOR) 25 MG tablet Take 0.5 tablets by mouth Every 12 (Twelve) Hours. 60 tablet 0    • omeprazole-sodium bicarbonate (ZEGERID)  MG per capsule Take 1 capsule by mouth Daily.      • ondansetron ODT (ZOFRAN-ODT) 4 MG disintegrating tablet Place 1 tablet on the tongue 4 (Four) Times a Day As Needed for Nausea or Vomiting for up to 15 doses. 15 tablet 2    • polyethylene  glycol (MIRALAX) 17 g packet Take 17 g by mouth Daily. 30 each 0    • rivaroxaban (XARELTO) 20 MG tablet Take 1 tablet by mouth Daily.      • Tradjenta 5 MG tablet tablet TAKE 1 TABLET BY MOUTH DAILY FOR 30 DAYS. 90 tablet 0    • vitamin C (ASCORBIC ACID) 500 MG tablet Take 1 tablet by mouth Daily.          Allergies  Patient has no known allergies.    Scheduled Meds:atorvastatin, 40 mg, Oral, Daily  digoxin, 125 mcg, Oral, Daily  dilTIAZem CD, 120 mg, Oral, Q24H  insulin lispro, 2-7 Units, Subcutaneous, TID With Meals  metoprolol tartrate, 25 mg, Oral, Q12H  pantoprazole, 40 mg, Oral, BID AC  rivaroxaban, 20 mg, Oral, Daily With Dinner  senna-docusate sodium, 2 tablet, Oral, BID  sodium chloride, 10 mL, Intravenous, Q12H      Continuous Infusions:sodium chloride, 125 mL/hr      PRN Meds:.•  acetaminophen **OR** acetaminophen **OR** acetaminophen  •  aluminum-magnesium hydroxide-simethicone  •  senna-docusate sodium **AND** polyethylene glycol **AND** bisacodyl **AND** bisacodyl  •  dextrose  •  dextrose  •  glucagon (human recombinant)  •  magnesium sulfate **OR** magnesium sulfate **OR** magnesium sulfate  •  melatonin  •  ondansetron **OR** ondansetron  •  potassium & sodium phosphates **OR** potassium & sodium phosphates  •  potassium chloride **OR** potassium chloride **OR** potassium chloride  •  [COMPLETED] Insert Peripheral IV **AND** sodium chloride  •  sodium chloride  •  sodium chloride  •  sodium chloride    Objective     VITAL SIGNS  Vitals:    04/21/23 2152 04/22/23 0153 04/22/23 0554 04/22/23 1026   BP: 114/70 129/81 119/81 114/74   BP Location: Right arm Right arm Right arm    Patient Position: Lying Lying Lying    Pulse: 89 90 96 95   Resp: 16 16 16 18   Temp: 97.3 °F (36.3 °C) 97.7 °F (36.5 °C) 97.3 °F (36.3 °C) 97.5 °F (36.4 °C)   TempSrc: Oral Axillary Oral Oral   SpO2: 94% 98% 96% 94%   Weight:   86.8 kg (191 lb 5.8 oz)    Height:           Flowsheet Rows    Flowsheet Row First Filed Value  "  Admission Height 177.8 cm (70\") Documented at 04/19/2023 0414   Admission Weight 84.2 kg (185 lb 10 oz) Documented at 04/19/2023 0414            Intake/Output Summary (Last 24 hours) at 4/22/2023 1216  Last data filed at 4/22/2023 1000  Gross per 24 hour   Intake 630 ml   Output 1050 ml   Net -420 ml        TELEMETRY: Atrial fibrillation with controlled ventricular response    Physical Exam:  The patient is alert, oriented and in no distress.  Vital signs as noted above.  Head and neck revealed no carotid bruits or jugular venous distention.  No thyromegaly or lymphadenopathy is present  Lungs clear.  No wheezing.  Breath sounds are normal bilaterally.  Heart normal first and second heart sounds.  No murmur. No precordial rub is present.  No gallop is present.  Abdomen soft and nontender.  No organomegaly is present.  Extremities with good peripheral pulses without any pedal edema.  Skin warm and dry.  Musculoskeletal system is grossly normal  CNS grossly normal      Results Review:   I reviewed the patient's new clinical results.  Lab Results (last 24 hours)     Procedure Component Value Units Date/Time    POC Glucose Once [[id number]]  (Abnormal) Collected: 04/22/23 1122    Specimen: Blood Updated: 04/22/23 1123     Glucose 232 mg/dL      Comment: Serial Number: [id number]:  [id number]       POC Glucose Once [[id number]]  (Abnormal) Collected: 04/22/23 0728    Specimen: Blood Updated: 04/22/23 0729     Glucose 202 mg/dL      Comment: Serial Number: [id number]:  [id number]       Magnesium [[id number]]  (Abnormal) Collected: 04/21/23 2248    Specimen: Blood Updated: 04/21/23 2314     Magnesium 1.5 mg/dL     Comprehensive Metabolic Panel [[id number]]  (Abnormal) Collected: 04/21/23 2248    Specimen: Blood Updated: 04/21/23 2314     Glucose 178 mg/dL      BUN 21 mg/dL      Creatinine 1.45 mg/dL      Sodium 136 mmol/L      Potassium 4.3 mmol/L      Comment: Slight hemolysis detected by analyzer. Results may " be affected.        Chloride 101 mmol/L      CO2 27.0 mmol/L      Calcium 8.1 mg/dL      Total Protein 6.1 g/dL      Albumin 2.9 g/dL      ALT (SGPT) 7 U/L      AST (SGOT) 12 U/L      Alkaline Phosphatase 163 U/L      Total Bilirubin 0.6 mg/dL      Globulin 3.2 gm/dL      A/G Ratio 0.9 g/dL      BUN/Creatinine Ratio 14.5     Anion Gap 8.0 mmol/L      eGFR 48.7 mL/min/1.73     Narrative:      GFR Normal >60  Chronic Kidney Disease <60  Kidney Failure <15    The GFR formula is only valid for adults with stable renal function between ages 18 and 70.    Phosphorus [[id number]]  (Normal) Collected: 04/21/23 2248    Specimen: Blood Updated: 04/21/23 2314     Phosphorus 2.9 mg/dL     CBC & Differential [[id number]]  (Abnormal) Collected: 04/21/23 2248    Specimen: Blood Updated: 04/21/23 2256    Narrative:      The following orders were created for panel order CBC & Differential.  Procedure                               Abnormality         Status                     ---------                               -----------         ------                     CBC Auto Differential[[id number]]        Abnormal            Final result                 Please view results for these tests on the individual orders.    CBC Auto Differential [[id number]]  (Abnormal) Collected: 04/21/23 2248    Specimen: Blood Updated: 04/21/23 2256     WBC 8.10 10*3/mm3      RBC 4.33 10*6/mm3      Hemoglobin 12.6 g/dL      Hematocrit 39.2 %      MCV 90.6 fL      MCH 29.2 pg      MCHC 32.2 g/dL      RDW 14.6 %      RDW-SD 46.4 fl      MPV 7.5 fL      Platelets 141 10*3/mm3      Neutrophil % 73.4 %      Lymphocyte % 14.6 %      Monocyte % 10.2 %      Eosinophil % 1.3 %      Basophil % 0.5 %      Neutrophils, Absolute 6.00 10*3/mm3      Lymphocytes, Absolute 1.20 10*3/mm3      Monocytes, Absolute 0.80 10*3/mm3      Eosinophils, Absolute 0.10 10*3/mm3      Basophils, Absolute 0.00 10*3/mm3      nRBC 0.0 /100 WBC     POC Glucose Once [[id number]]  (Abnormal)  Collected: 04/21/23 2005    Specimen: Blood Updated: 04/21/23 2006     Glucose 203 mg/dL      Comment: Serial Number: [id number]:  [id number]       POC Glucose Once [[id number]]  (Abnormal) Collected: 04/21/23 1644    Specimen: Blood Updated: 04/21/23 1645     Glucose 257 mg/dL      Comment: Serial Number: [id number]:  [id number]             Imaging Results (Last 24 Hours)     ** No results found for the last 24 hours. **      LAB RESULTS (LAST 7 DAYS)    CBC  Results from last 7 days   Lab Units 04/21/23 2248 04/21/23 0055 04/20/23 0135 04/19/23 0448   WBC 10*3/mm3 8.10 7.40 7.60 8.90   RBC 10*6/mm3 4.33 4.46 4.35 4.97   HEMOGLOBIN g/dL 12.6* 13.2 13.0 14.4   HEMATOCRIT % 39.2 40.8 38.4 45.1   MCV fL 90.6 91.5 88.1 90.9   PLATELETS 10*3/mm3 141 136* 151 170       BMP  Results from last 7 days   Lab Units 04/21/23 2248 04/21/23 0055 04/20/23 0448 04/20/23 0135 04/19/23 0605 04/19/23 0448   SODIUM mmol/L 136 135* 135*  --   --  135*   POTASSIUM mmol/L 4.3 3.9 3.6  --   --  5.0   CHLORIDE mmol/L 101 102 101  --   --  97*   CO2 mmol/L 27.0 24.0 24.0  --   --  27.0   BUN mg/dL 21 19 19  --   --  23   CREATININE mg/dL 1.45* 1.01 1.06  --   --  1.11   GLUCOSE mg/dL 178* 161* 113*  --   --  253*   MAGNESIUM mg/dL 1.5* 1.7  --  1.8 1.9  --    PHOSPHORUS mg/dL 2.9 2.7  --  3.5  --   --        CMP   Results from last 7 days   Lab Units 04/21/23 2248 04/21/23 0055 04/20/23 0448 04/19/23 0448   SODIUM mmol/L 136 135* 135* 135*   POTASSIUM mmol/L 4.3 3.9 3.6 5.0   CHLORIDE mmol/L 101 102 101 97*   CO2 mmol/L 27.0 24.0 24.0 27.0   BUN mg/dL 21 19 19 23   CREATININE mg/dL 1.45* 1.01 1.06 1.11   GLUCOSE mg/dL 178* 161* 113* 253*   ALBUMIN g/dL 2.9* 3.0* 3.3* 3.9   BILIRUBIN mg/dL 0.6 0.8 0.9 0.8   ALK PHOS U/L 163* 138* 148* 174*   AST (SGOT) U/L 12 11 15 12   ALT (SGPT) U/L 7 10 12 12   LIPASE U/L  --   --   --  29         BNP        TROPONIN  Results from last 7 days   Lab Units 04/19/23  0605   HSTROP T  ng/L 32*       CoAg  Results from last 7 days   Lab Units 04/19/23  0448   INR  1.15*   APTT seconds 29.2*       Creatinine Clearance  Estimated Creatinine Clearance: 49.9 mL/min (A) (by C-G formula based on SCr of 1.45 mg/dL (H)).    ABG        Radiology  No radiology results for the last day        EKG      I personally viewed and interpreted the patient's EKG/Telemetry data:    ECHOCARDIOGRAM:    Results for orders placed during the hospital encounter of 04/03/23    Adult Transthoracic Echo Complete W/ Cont if Necessary Per Protocol    Interpretation Summary  •  Left ventricular ejection fraction appears to be 56 - 60%.  •  The right ventricular cavity is mildly dilated.  •  Estimated right ventricular systolic pressure from tricuspid regurgitation is mildly elevated (35-45 mmHg).  •  There is a moderate (1-2cm) pericardial effusion. There is no evidence of cardiac tamponade.          STRESS MYOVIEW:    Cardiolite (Tc-99m Sestamibi) stress test    CARDIAC CATHETERIZATION:            OTHER:         Assessment & Plan     Principal Problem:    Intractable headache, unspecified chronicity pattern, unspecified headache type  Active Problems:    Essential hypertension    Type 2 diabetes mellitus with hyperglycemia, without long-term current use of insulin (HCC)    Coronary artery disease    Hyperlipidemia    Atrial fibrillation    Hematemesis with nausea        Patient presented with headache and N/V  CTA of head and neck negative for acute abnormality  GI consulted for concern of GI bleed, H&H stable, started on PPI  Plan for EGD today   Xarelto on hold for possible upper GI bleed, monitor H&H   Restart anticoagulation when okay with GI  HBI2ID3-LHYn score is 5  Patient remains in atrial fibrillation however rate has improved  Patient is currently on Cardizem and Lopressor  Recent echocardiogram on 4/4/2023 showed normal LVEF of 56-60% with moderate (1-2 cm) pericardial effusion and no evidence of cardiac  tamponade  Patient may be good candidate for watchman with GI bleed and persistent pericardial effusion   Recent A1c elevated, patient on home oral medications   Patient's heart rate has been high and hence I am adding digoxin to his Cardizem and Lopressor and will still continue anticoagulation with Xarelto which has been started by GI after his endoscopy is complete  We will watch him for his heart rate and blood pressure and also any bleeding episodes  We will follow him in the office    }}}}}}}}}}}}}}}}}}}}   4/22/2023  Chart was reviewed in entirety.  Patient has atrial fibrillation.  Patient was on Xarelto and had coffee-ground emesis.  Patient had endoscopy- reviewed as follows.  Xarelto was restarted    Coffee-ground emesis, likely secondary to gastric nodules in the presence of oral anticoagulation  Schroeder's esophagus  Headache may have contributed to acute onset of vomiting on admission.     Recommendations:  Follow-up histopathology  Continue PPI daily  Okay to resume anticoagulation in a.m.  Diet as tolerated  GI will see inpatient as needed    Atrial fibrillation is well controlled.  Patient is feeling much better.    Renal dysfunction  21/1.45    Significant hypomagnesemia  1.5- 4/21/2023  IV magnesium supplements.    Medications were reviewed and updated.  Patient is on atorvastatin digoxin 0.125 mg a day Cardizem  mg a day metoprolol tartrate 25 mg twice a day pantoprazole Xarelto 20 mg a day    Repeat labs especially magnesium level.    Have discussed with patient's family at bedside.    Jeffery Vitale MD  04/22/23  12:16 EDT

## 2023-04-22 NOTE — PROGRESS NOTES
[facility] Daily Progress Note    Patient Name: Cisco Frank  : 1940  MRN: [id number]  Primary Care Physician:  Nuno Patton MD  Date of admission: 2023      Subjective      Chief Complaint: a fib with RVR, coffee ground emesis     Patient Reports no further emesis. EGD without active bleeding.  A-fib now rate controlled.  Medication adjustments seem to be working.  Anticoagulation resumed.  Patient's creatinine jumped to 1.45.  Nephrology consulted.  IV fluids started      ROS negative except as above     Objective      Vitals:   Temp:  [97.3 °F (36.3 °C)-98.2 °F (36.8 °C)] 98.2 °F (36.8 °C)  Heart Rate:  [] 91  Resp:  [16-20] 20  BP: (105-129)/(66-81) 117/71  Flow (L/min):  [2] 2    Physical Exam  Vitals reviewed. Wife at bedside   HENT:      Head: Normocephalic.   Cardiovascular:      Rate and Rhythm: Rate controlled A-fib  Pulmonary:      Effort: Pulmonary effort is normal.   Abdominal:      General: Abdomen is flat.      Palpations: Abdomen is soft.   Musculoskeletal:         General: Normal range of motion.      Cervical back: Normal range of motion.   Skin:     General: Skin is warm.   Neurological:      General: No focal deficit present.      Mental Status: He is alert and oriented to person, place, and time.   Psychiatric:         Mood and Affect: Mood normal.         Behavior: Behavior normal.                        Result Review    Result Review:  I have personally reviewed the results from the time of this admission to 2023 16:14 EDT and agree with these findings:  [x]  Laboratory  []  Microbiology  []  Radiology  []  EKG/Telemetry   []  Cardiology/Vascular   []  Pathology  []  Old records  []  Other:  Most notable findings include: Creatinine 1.45    Wounds (last 24 hours)     LDA Wound     Row Name 23 1210 23 0722 23 0402       Wound 23 0800 Right midline gluteal Abrasion    Wound - Properties Group  Placement Date: 04/03/23 - Placement Time: 0800  -EH Present on Hospital Admission: Y  -EH Side: Right  -EH Orientation: midline  -EH Location: gluteal  -EH Primary Wound Type: Abrasion  -EH Additional Comments: 2 scabs of right cheek, patinet states from his wallet rubbing  -EH    Dressing Appearance -- dry;intact  -NE dry;intact  -DC    Closure Adhesive bandage  -NE Adhesive bandage  -NE --    Care, Wound -- -- cleansed with;sterile normal saline  -DC    Dressing Care -- -- silicone  -DC    Periwound Care -- -- dry periwound area maintained  -DC    Retired Wound - Properties Group Placement Date: 04/03/23 -EH Placement Time: 0800  -EH Present on Hospital Admission: Y  -EH Side: Right  -EH Orientation: midline  -EH Location: gluteal  -EH Primary Wound Type: Abrasion  -EH Additional Comments: 2 scabs of right cheek, patinet states from his wallet rubbing  -EH    Retired Wound - Properties Group Date first assessed: 04/03/23 - Time first assessed: 0800  -EH Present on Hospital Admission: Y  -EH Side: Right  -EH Location: gluteal  -EH Primary Wound Type: Abrasion  -EH Additional Comments: 2 scabs of right cheek, patinet states from his wallet rubbing  -EH       Wound 04/19/23 1933 coccyx    Wound - Properties Group Placement Date: 04/19/23  -AS Placement Time: 1933 -AS Present on Hospital Admission: Y  -AS Location: coccyx  -AS    Pressure Injury Stage -- -- 1  -DC    Dressing Appearance -- -- dry;intact  -DC    Closure Adhesive bandage  -NE Adhesive bandage  -NE --    Drainage Amount none  -NE none  -NE none  -DC    Care, Wound -- -- cleansed with;sterile normal saline  -DC    Dressing Care -- -- silicone  -DC    Periwound Care -- -- dry periwound area maintained  -DC    Retired Wound - Properties Group Placement Date: 04/19/23  -AS Placement Time: 1933  -AS Present on Hospital Admission: Y  -AS Location: coccyx  -AS    Retired Wound - Properties Group Date first assessed: 04/19/23  -AS Time first assessed:  1933  -AS Present on Hospital Admission: Y  -AS Location: coccyx  -AS    Row Name 04/22/23 0002 04/21/23 2002          Wound 04/03/23 0800 Right midline gluteal Abrasion    Wound - Properties Group Placement Date: 04/03/23  - Placement Time: 0800  -EH Present on Hospital Admission: Y  -EH Side: Right  -EH Orientation: midline  -EH Location: gluteal  -EH Primary Wound Type: Abrasion  -EH Additional Comments: 2 scabs of right cheek, patinet states from his wallet rubbing  -EH    Dressing Appearance dry;intact  -DC dry;intact  -DC     Care, Wound -- cleansed with;sterile normal saline  -DC     Dressing Care silicone  -DC silicone  -DC     Periwound Care dry periwound area maintained  -DC dry periwound area maintained  -DC     Retired Wound - Properties Group Placement Date: 04/03/23  - Placement Time: 0800  -EH Present on Hospital Admission: Y  -EH Side: Right  -EH Orientation: midline  -EH Location: gluteal  -EH Primary Wound Type: Abrasion  -EH Additional Comments: 2 scabs of right cheek, patinet states from his wallet rubbing  -EH    Retired Wound - Properties Group Date first assessed: 04/03/23  -EH Time first assessed: 0800  -EH Present on Hospital Admission: Y  -EH Side: Right  -EH Location: gluteal  -EH Primary Wound Type: Abrasion  -EH Additional Comments: 2 scabs of right cheek, patinet states from his wallet rubbing  -EH       Wound 04/19/23 1933 coccyx    Wound - Properties Group Placement Date: 04/19/23  -AS Placement Time: 1933  -AS Present on Hospital Admission: Y  -AS Location: coccyx  -AS    Pressure Injury Stage 1  -DC 1  -DC     Dressing Appearance dry;intact  -DC dry;intact  -DC     Drainage Amount none  -DC none  -DC     Care, Wound -- cleansed with;sterile normal saline  -DC     Dressing Care silicone  -DC silicone  -DC     Periwound Care dry periwound area maintained  -DC dry periwound area maintained  -DC     Retired Wound - Properties Group Placement Date: 04/19/23  -AS Placement Time: 1933   -AS Present on Hospital Admission: Y  -AS Location: coccyx  -AS    Retired Wound - Properties Group Date first assessed: 04/19/23  -AS Time first assessed: 1933  -AS Present on Hospital Admission: Y  -AS Location: coccyx  -AS          User Key  (r) = Recorded By, (t) = Taken By, (c) = Cosigned By    Initials Name Provider Type    Eli Gandhi LPN Licensed Nurse    AS Janay Munoz RN Registered Nurse    Sarath Rodríguez, RN Registered Nurse    Alexis Murray RN Registered Nurse                       Assessment & Plan       82 M with A fib RVR and possible GI bleed         Active Hospital Problems:          Active Hospital Problems     Diagnosis     • **Intractable headache, unspecified chronicity pattern, unspecified headache type        Plan:   Atrial fibrillation with RVR  -Patient on Cardizem drip, initiated in emergency department  -Left ventricular ejection fraction appears to be 56 - 60%.  -EKG Atrial fibrillation Low voltage, precordial leads  -Continuous monitor  -TSH,pending  -INR 1.15  -PTT 29.2.  -Cardiology consulted medications adjusted for uncontrolled a fib   -AC resumed 4/21  Rate controlled on 4/22     Intractable headache  History of Fall with hitting head 2 weeks ago  -CT head without contrast No acute intracranial abnormality.Volume loss and mild chronic small vessel ischemic changes.  -CTA head and neck Essentially normal CT angiogram of the head and neck. There is no evidence of flow-limiting stenosis, large vessel occlusion or aneurysmal dilatation.  -/76  -Tylenol for now     Upper GI bleed  -Hemoglobin stable 14.4  -Monitor H&H  -PPI  -resume xarelto 4/21  -GI consulted, plan is followed Acute onset of vomiting-may be secondary to headache versus constipation versus other.  Okay for clear liquids.  Antiemetics and supportive care. No sign of bowel obstruction. Coffee-ground emesis-suspect esophagitis in the setting of Xarelto.  Hemoglobin is normal.  Will monitor H&H  and consider EGD in a.m. based on clinical course.  History of Schroeder's esophagus noted, last EGD 2022 no dysplasia.  Hiatal hernia.  Elevated alk phos-check GGT  EGD without acute bleeding     Essential hypertension  -Continue metoprolol once verified  -Monitor BP     Type 2 diabetes  -Glucose 253 on admission  -Hemoglobin A1c ordered  -Hold home medication  -Start SSI  -Glucose checks ACHS      CAD  Hyperlipidemia  -Lipid panel ordered  -Continue statin     Acute renal insufficiency  Nephrology consulted  IV fluids started  Urine studies in progress     DVT prophylaxis:  Mechanical DVT prophylaxis orders are present.     CODE STATUS:    Admission Status:  I believe this patient meets inpatient status.     I discussed the patient's findings and my recommendations with patient and family.     04/22/23      Electronically signed by Josemanuel Jimenez MD, 04/22/23, 16:14 EDT.  [facility] Hospitalist Team

## 2023-04-23 ENCOUNTER — READMISSION MANAGEMENT (OUTPATIENT)
Dept: CALL CENTER | Facility: HOSPITAL | Age: 83
End: 2023-04-23
Payer: MEDICARE

## 2023-04-23 VITALS
DIASTOLIC BLOOD PRESSURE: 83 MMHG | HEART RATE: 89 BPM | HEIGHT: 70 IN | BODY MASS INDEX: 27.81 KG/M2 | WEIGHT: 194.22 LBS | RESPIRATION RATE: 18 BRPM | OXYGEN SATURATION: 97 % | SYSTOLIC BLOOD PRESSURE: 129 MMHG | TEMPERATURE: 98.7 F

## 2023-04-23 LAB
ALBUMIN SERPL-MCNC: 2.8 G/DL (ref 3.5–5.2)
ALBUMIN/GLOB SERPL: 1 G/DL
ALP SERPL-CCNC: 135 U/L (ref 39–117)
ALT SERPL W P-5'-P-CCNC: 6 U/L (ref 1–41)
ANION GAP SERPL CALCULATED.3IONS-SCNC: 8 MMOL/L (ref 5–15)
AST SERPL-CCNC: 9 U/L (ref 1–40)
BILIRUB SERPL-MCNC: 0.6 MG/DL (ref 0–1.2)
BUN SERPL-MCNC: 19 MG/DL (ref 8–23)
BUN/CREAT SERPL: 29.2 (ref 7–25)
CALCIUM SPEC-SCNC: 8.3 MG/DL (ref 8.6–10.5)
CHLORIDE SERPL-SCNC: 102 MMOL/L (ref 98–107)
CO2 SERPL-SCNC: 26 MMOL/L (ref 22–29)
CREAT SERPL-MCNC: 0.65 MG/DL (ref 0.76–1.27)
EGFRCR SERPLBLD CKD-EPI 2021: 95.3 ML/MIN/1.73
GLOBULIN UR ELPH-MCNC: 2.9 GM/DL
GLUCOSE BLDC GLUCOMTR-MCNC: 164 MG/DL (ref 70–105)
GLUCOSE BLDC GLUCOMTR-MCNC: 236 MG/DL (ref 70–105)
GLUCOSE SERPL-MCNC: 124 MG/DL (ref 65–99)
MAGNESIUM SERPL-MCNC: 1.9 MG/DL (ref 1.6–2.4)
PHOSPHATE SERPL-MCNC: 3.1 MG/DL (ref 2.5–4.5)
POTASSIUM SERPL-SCNC: 3.9 MMOL/L (ref 3.5–5.2)
PROT SERPL-MCNC: 5.7 G/DL (ref 6–8.5)
SODIUM SERPL-SCNC: 136 MMOL/L (ref 136–145)

## 2023-04-23 PROCEDURE — 82962 GLUCOSE BLOOD TEST: CPT

## 2023-04-23 PROCEDURE — 99232 SBSQ HOSP IP/OBS MODERATE 35: CPT | Performed by: INTERNAL MEDICINE

## 2023-04-23 PROCEDURE — 63710000001 INSULIN LISPRO (HUMAN) PER 5 UNITS: Performed by: INTERNAL MEDICINE

## 2023-04-23 RX ORDER — DILTIAZEM HYDROCHLORIDE 120 MG/1
120 CAPSULE, COATED, EXTENDED RELEASE ORAL
Qty: 30 CAPSULE | Refills: 2 | Status: SHIPPED | OUTPATIENT
Start: 2023-04-24 | End: 2023-05-08

## 2023-04-23 RX ORDER — PANTOPRAZOLE SODIUM 40 MG/1
40 TABLET, DELAYED RELEASE ORAL
Qty: 60 TABLET | Refills: 2 | Status: SHIPPED | OUTPATIENT
Start: 2023-04-23 | End: 2023-05-11

## 2023-04-23 RX ORDER — DIGOXIN 125 MCG
125 TABLET ORAL
Qty: 30 TABLET | Refills: 2 | Status: SHIPPED | OUTPATIENT
Start: 2023-04-23

## 2023-04-23 RX ADMIN — DIGOXIN 125 MCG: 250 TABLET ORAL at 12:08

## 2023-04-23 RX ADMIN — DILTIAZEM HYDROCHLORIDE 120 MG: 120 CAPSULE, COATED, EXTENDED RELEASE ORAL at 08:41

## 2023-04-23 RX ADMIN — INSULIN LISPRO 3 UNITS: 100 INJECTION, SOLUTION INTRAVENOUS; SUBCUTANEOUS at 12:08

## 2023-04-23 RX ADMIN — METOPROLOL TARTRATE 25 MG: 25 TABLET, FILM COATED ORAL at 08:41

## 2023-04-23 RX ADMIN — Medication 10 ML: at 08:42

## 2023-04-23 RX ADMIN — ATORVASTATIN CALCIUM 40 MG: 40 TABLET, FILM COATED ORAL at 08:41

## 2023-04-23 RX ADMIN — INSULIN LISPRO 2 UNITS: 100 INJECTION, SOLUTION INTRAVENOUS; SUBCUTANEOUS at 08:41

## 2023-04-23 RX ADMIN — PANTOPRAZOLE SODIUM 40 MG: 40 TABLET, DELAYED RELEASE ORAL at 08:41

## 2023-04-23 RX ADMIN — SODIUM CHLORIDE 125 ML/HR: 9 INJECTION, SOLUTION INTRAVENOUS at 04:43

## 2023-04-23 NOTE — DISCHARGE SUMMARY
[facility] Medicine Services  DISCHARGE SUMMARY    Patient Name: Cisco Frank  : 1940  MRN: [id number]    Discharge condition: Stabilized  Date of Admission: 2023  Discharge Diagnosis: A-fib with RVR, GI bleed  Date of Discharge: 2023  Primary Care Physician: Nuno Patton MD      Presenting Problem:   Atrial fibrillation with RVR [I48.91]  Abdominal pain, unspecified abdominal location [R10.9]  Intractable headache, unspecified chronicity pattern, unspecified headache type [R51.9]  Gastrointestinal hemorrhage, unspecified gastrointestinal hemorrhage type [K92.2]    Active and Resolved Hospital Problems:  Active Hospital Problems    Diagnosis POA   • **Intractable headache, unspecified chronicity pattern, unspecified headache type [R51.9] Yes   • Hematemesis with nausea [K92.0] Unknown   • Atrial fibrillation [I48.91] Yes   • Coronary artery disease [I25.10] Yes   • Hyperlipidemia [E78.5] Yes   • Type 2 diabetes mellitus with hyperglycemia, without long-term current use of insulin (HCC) [E11.65] Yes   • Essential hypertension [I10] Yes      Resolved Hospital Problems   No resolved problems to display.         Hospital Course     Hospital Course:  Cisco Frank is a 82 y.o. male who presented to the hospital with A-fib with RVR as well as abdominal pain and GI bleeding.  He was seen by GI and taken for scope.  No acute bleeding was noted.  He was cleared to go back on anticoagulation.  The patient was then being treated for A-fib with RVR and received digoxin and Cardizem drip.  Cardiology was following.  Eventually the atrial fibrillation stabilized and patient was cleared for discharge home.  Patient did have a creatinine elevation which required nephrology evaluation.  Creatinine was back to normal and patient was sent home in stable condition with stable vitals.  The patient was noted to have a large pericardial effusion.  Cardiology had evaluated and  deemed this stable.  Patient was instructed to follow-up with cardiology outpatient and was sent home.              Reasons For Change In Medications and Indications for New Medications:      Day of Discharge     Vital Signs:  Temp:  [97.7 °F (36.5 °C)-98.7 °F (37.1 °C)] 98.7 °F (37.1 °C)  Heart Rate:  [72-92] 89  Resp:  [18-20] 18  BP: (108-138)/(70-83) 129/83  Flow (L/min):  [2] 2    Physical Exam:  Physical Exam  Vitals reviewed.   Constitutional:       Comments: Family and friends at bedside   HENT:      Head: Normocephalic.   Cardiovascular:      Rate and Rhythm: Normal rate. Rhythm irregular.   Pulmonary:      Effort: Pulmonary effort is normal.   Abdominal:      General: Abdomen is flat.      Palpations: Abdomen is soft.   Musculoskeletal:         General: Normal range of motion.      Cervical back: Normal range of motion.   Skin:     General: Skin is warm.   Neurological:      General: No focal deficit present.      Mental Status: He is alert and oriented to person, place, and time.   Psychiatric:         Mood and Affect: Mood normal.         Behavior: Behavior normal.            Pertinent  and/or Most Recent Results     LAB RESULTS:      Lab 04/22/23 2232 04/21/23 2248 04/21/23 0055 04/20/23 0135 04/19/23 0448   WBC 6.40 8.10 7.40 7.60 8.90   HEMOGLOBIN 12.6* 12.6* 13.2 13.0 14.4   HEMATOCRIT 37.3* 39.2 40.8 38.4 45.1   PLATELETS 126* 141 136* 151 170   NEUTROS ABS 4.70 6.00 5.80 6.30 7.50*   LYMPHS ABS 1.00 1.20 0.90 0.70 0.90   MONOS ABS 0.60 0.80 0.60 0.50 0.40   EOS ABS 0.10 0.10 0.10 0.00 0.00   MCV 88.1 90.6 91.5 88.1 90.9   PROTIME  --   --   --   --  12.2*   APTT  --   --   --   --  29.2*         Lab 04/22/23 2232 04/21/23 2248 04/21/23 0055 04/20/23 0448 04/20/23  0135 04/19/23  0605 04/19/23  0448   SODIUM 136 136 135* 135*  --   --  135*   POTASSIUM 3.9 4.3 3.9 3.6  --   --  5.0   CHLORIDE 102 101 102 101  --   --  97*   CO2 26.0 27.0 24.0 24.0  --   --  27.0   ANION GAP 8.0 8.0 9.0 10.0   --   --  11.0   BUN 19 21 19 19  --   --  23   CREATININE 0.65* 1.45* 1.01 1.06  --   --  1.11   EGFR 95.3 48.7* 75.2 70.9  --   --  67.1   GLUCOSE 124* 178* 161* 113*  --   --  253*   CALCIUM 8.3* 8.1* 8.8 9.0  --   --  9.4   MAGNESIUM 1.9 1.5* 1.7  --  1.8 1.9  --    PHOSPHORUS 3.1 2.9 2.7  --  3.5  --   --          Lab 04/22/23  2232 04/21/23  2248 04/21/23  0055 04/20/23  0448 04/19/23 0448   TOTAL PROTEIN 5.7* 6.1 6.3 6.5 7.5   ALBUMIN 2.8* 2.9* 3.0* 3.3* 3.9   GLOBULIN 2.9 3.2 3.3 3.2 3.6   ALT (SGPT) 6 7 10 12 12   AST (SGOT) 9 12 11 15 12   BILIRUBIN 0.6 0.6 0.8 0.9 0.8   ALK PHOS 135* 163* 138* 148* 174*   LIPASE  --   --   --   --  29         Lab 04/19/23  0605 04/19/23  0448   HSTROP T 32* 37*   PROTIME  --  12.2*   INR  --  1.15*             Lab 04/19/23  0605   ABO TYPING O   RH TYPING Positive   ANTIBODY SCREEN Negative         Brief Urine Lab Results  (Last result in the past 365 days)      Color   Clarity   Blood   Leuk Est   Nitrite   Protein   CREAT   Urine HCG        04/22/23 1512 Yellow   Clear   Small (1+)   Negative   Negative   Trace               Microbiology Results (last 10 days)     ** No results found for the last 240 hours. **          CT Abdomen Pelvis Without Contrast    Result Date: 4/19/2023  Impression: Impression: 1. No acute abnormality seen in the abdomen or pelvis. Stable intra-abdominal findings as above. 2. Increased, moderate pericardial effusion. Stable small right pleural effusion. 3. Hiatal hernia. Electronically signed by:  Kalin Sharp M.D.  4/19/2023 4:12 AM Mountain Time    CT Abdomen Pelvis Without Contrast    Result Date: 4/3/2023  Impression: 1.  There is extensive sigmoid diverticulosis without diverticulitis. 2.  No bowel, bowel thickening, appendicitis, obstructive uropathy, pancreatitis, free fluid, fluid collection, focal intraperitoneal inflammatory change, soft tissue mass or lymphadenopathy is demonstrated. 3.  There is a large sliding-type hiatal hernia  with the stomach partially position within the posterior mediastinum. 4.  There is moderate amount of pericardial fluid with fluid density measuring near water. 5.  There is a small to moderate right-sided pleural effusion with fluid density measuring near water. 6.  There is associated compressive atelectasis at the right posterior lung base. Thank you for the referral of this patient. This exam was interpreted by an [employer] Board [employer] certified radiologist with subspecialty fellowship training. If there are any questions regarding this exam please feel free to contact a radiologist directly at [phone]. Slot 70 Electronically signed by:  Nathaniel Skinner M.D.  4/3/2023 3:51 AM Mountain Time    XR Ribs Right With PA Chest    Result Date: 3/31/2023  Impression: Impression: 1. No acute displaced right rib fracture is identified. 2. Stable cardiac megaly. Electronically Signed: Latha Strong  3/31/2023 2:11 PM EDT  Workstation ID: [id number]    CT Head Without Contrast    Result Date: 4/19/2023  Impression: 1. No acute intracranial abnormality. 2. Volume loss and mild chronic small vessel ischemic changes. Electronically signed by:  Jose M Galindo M.D.  4/19/2023 4:05 AM Mountain Time    CT Head Without Contrast    Result Date: 3/31/2023  Impression: Impression: 1. Generalized atrophy with mild periventricular ischemic changes. No acute intracranial findings. Electronically Signed: Alexandru Jeong  3/31/2023 12:55 PM EDT  Workstation ID: [id number]    CT Angiogram Neck    Result Date: 4/19/2023  Impression: Impression: Essentially normal CT angiogram of the head and neck as above. There is no evidence of flow-limiting stenosis, large vessel occlusion or aneurysmal dilatation. Electronically Signed: Monroe Kline  4/19/2023 8:59 AM EDT  Workstation ID: [id number]    CT Chest Without Contrast Diagnostic    Result Date: 4/3/2023  Impression: 1. Minimally displaced posterior right 11th rib fracture. 2. Small right and  trace left pleural effusions. 3. Moderate-sized pericardial effusion. 4. Moderate hiatal hernia. Electronically signed by:  Kalin Sharp M.D.  4/3/2023 3:43 AM Mountain Time    CT Angiogram Head    Result Date: 4/19/2023  Impression: Impression: Essentially normal CT angiogram of the head and neck as above. There is no evidence of flow-limiting stenosis, large vessel occlusion or aneurysmal dilatation. Electronically Signed: Monroe Kline  4/19/2023 8:59 AM EDT  Workstation ID: [id number]              Results for orders placed during the hospital encounter of 04/03/23    Adult Transthoracic Echo Complete W/ Cont if Necessary Per Protocol    Interpretation Summary  •  Left ventricular ejection fraction appears to be 56 - 60%.  •  The right ventricular cavity is mildly dilated.  •  Estimated right ventricular systolic pressure from tricuspid regurgitation is mildly elevated (35-45 mmHg).  •  There is a moderate (1-2cm) pericardial effusion. There is no evidence of cardiac tamponade.      Labs Pending at Discharge:  Pending Labs     Order Current Status    Tissue Pathology Exam In process          Procedures Performed  Procedure(s):  ESOPHAGOGASTRODUODENOSCOPY with gastric biopsy's  04/20 1421 UPPER GI ENDOSCOPY      Consults:   Consults     Date and Time Order Name Status Description    4/22/2023 10:24 AM Inpatient Nephrology Consult Completed     4/19/2023 10:45 AM Inpatient Cardiology Consult      4/19/2023  6:49 AM Gastroenterology (on-call MD unless specified) Completed     4/3/2023  3:30 PM Inpatient Cardiology Consult Completed             Discharge Details        Discharge Medications      New Medications      Instructions Start Date   digoxin 125 MCG tablet  Commonly known as: LANOXIN   125 mcg, Oral, Daily Digoxin      dilTIAZem  MG 24 hr capsule  Commonly known as: CARDIZEM CD   120 mg, Oral, Every 24 Hours Scheduled   Start Date: April 24, 2023     pantoprazole 40 MG EC tablet  Commonly known as:  PROTONIX   40 mg, Oral, 2 Times Daily Before Meals         Changes to Medications      Instructions Start Date   metoprolol tartrate 25 MG tablet  Commonly known as: LOPRESSOR  What changed: how much to take   25 mg, Oral, Every 12 Hours Scheduled         Continue These Medications      Instructions Start Date   atorvastatin 40 MG tablet  Commonly known as: LIPITOR   40 mg, Oral, Every Night at Bedtime      colestipol 1 g tablet  Commonly known as: COLESTID   1 g, Oral, 2 Times Daily      glimepiride 2 MG tablet  Commonly known as: AMARYL   TAKE 3 TABLETS EVERY MORNING BEFORE BREAKFAST      glucose blood test strip  Commonly known as: Accu-Chek Tessa Plus   Test daily and prn      HYDROcodone-acetaminophen 5-325 MG per tablet  Commonly known as: Norco   1-2 tablets, Oral, Every 6 Hours PRN      Jardiance 10 MG tablet tablet  Generic drug: empagliflozin   10 mg, Oral, Daily      ondansetron ODT 4 MG disintegrating tablet  Commonly known as: ZOFRAN-ODT   4 mg, Translingual, 4 Times Daily PRN      polyethylene glycol 17 g packet  Commonly known as: MIRALAX   17 g, Oral, Daily      rivaroxaban 20 MG tablet  Commonly known as: XARELTO   20 mg, Oral, Daily      Tradjenta 5 MG tablet tablet  Generic drug: linagliptin   TAKE 1 TABLET BY MOUTH DAILY FOR 30 DAYS.      vitamin C 500 MG tablet  Commonly known as: ASCORBIC ACID   500 mg, Oral, Daily         Stop These Medications    omeprazole-sodium bicarbonate  MG per capsule  Commonly known as: ZEGERID            No Known Allergies      Discharge Disposition:   Home or Self Care    Diet:  Hospital:  Diet Order   Procedures   • Diet: Cardiac Diets; Healthy Heart (2-3 Na+); Texture: Regular Texture (IDDSI 7); Fluid Consistency: Thin (IDDSI 0)         Discharge Activity:         CODE STATUS:  Code Status and Medical Interventions:   Ordered at: 04/20/23 9905     Level Of Support Discussed With:    Patient     Code Status (Patient has no pulse and is not breathing):    CPR  (Attempt to Resuscitate)     Medical Interventions (Patient has pulse or is breathing):    Full Support     Release to patient:    Routine Release         Future Appointments   Date Time Provider Department Center   9/28/2023  1:40 PM Kailash Garsia MD [facility]   4/12/2024 10:30 AM Nuno Patton MD [facility]           Time spent on Discharge including face to face service:  65 minutes    This patient has been examined wearing appropriate Personal Protective Equipment and discussed with patient. 04/23/23      Signature: Josemanuel Jimenez MD

## 2023-04-23 NOTE — PROGRESS NOTES
Referring Provider: Josemanuel Jimenez MD    Reason for follow-up:  A-fib with RVR  Anticoagulation management     Patient Care Team:  Nuno Patton MD as PCP - General (Family Medicine)  Kailash Garsia MD as Consulting Physician (Cardiology)    Subjective .      ROS    The patient has been without any chest discomfort ,shortness of breath, palpitations, dizziness or syncope.  Denies having any headache ,abdominal pain ,nausea, vomiting , diarrhea constipation, loss of weight or loss of appetite.  Denies having any excessive bruising ,hematuria or blood in the stool.    Review of all systems negative except as indicated    History  Past Medical History:   Diagnosis Date   • Atrial fibrillation    • Cancer    • Coronary artery disease    • History of bladder cancer    • Hyperlipidemia        Past Surgical History:   Procedure Laterality Date   • BLADDER SURGERY  10/26/2019    [facility] [address]   • BRONCHOSCOPY N/A 6/30/2022    Procedure: BRONCHOSCOPY with bilateral lung wash;  Surgeon: Ayush Velarde MD;  Location: [facility] ENDOSCOPY;  Service: Pulmonary;  Laterality: N/A;  normal bronch   • CARDIAC CATHETERIZATION     • COLONOSCOPY     • COLONOSCOPY W/ POLYPECTOMY  02/23/2021   • ENDOSCOPY N/A 4/20/2023    Procedure: ESOPHAGOGASTRODUODENOSCOPY with gastric biopsy's;  Surgeon: Debra Lomeli MD;  Location: [facility] ENDOSCOPY;  Service: Gastroenterology;  Laterality: N/A;  gastritis, cardia nodules, barretts esophagus   • SKIN CANCER EXCISION      right temple BX   • UPPER ENDOSCOPIC ULTRASOUND W/ FNA N/A 3/12/2021    Procedure: ESOPHAGOGASTRODUODENOSCOPY WITH endoscopic mucosal resection, biopsy x 1 area, and endoscopic ULTRASOUND;  Surgeon: Abner Infante MD;  Location: [facility] ENDOSCOPY;  Service: Gastroenterology;  Laterality: N/A;  post op: hiatal hernia, duodenal polyp       Family History   Problem Relation Age of Onset   • Heart disease Mother    • Heart attack Father    • No Known Problems Sister    • No Known  Problems Brother    • No Known Problems Maternal Aunt    • No Known Problems Maternal Uncle    • No Known Problems Paternal Aunt    • No Known Problems Paternal Uncle    • No Known Problems Maternal Grandmother    • No Known Problems Maternal Grandfather    • No Known Problems Paternal Grandmother    • No Known Problems Paternal Grandfather    • No Known Problems Other    • Anemia Neg Hx    • Arrhythmia Neg Hx    • Asthma Neg Hx    • Clotting disorder Neg Hx    • Fainting Neg Hx    • Heart failure Neg Hx    • Hyperlipidemia Neg Hx    • Hypertension Neg Hx        Social History     Tobacco Use   • Smoking status: Former     Types: Cigarettes     Quit date: 1980     Years since quittin.3   • Smokeless tobacco: Never   Vaping Use   • Vaping Use: Never used   Substance Use Topics   • Alcohol use: Not Currently   • Drug use: Never        Medications Prior to Admission   Medication Sig Dispense Refill Last Dose   • atorvastatin (LIPITOR) 40 MG tablet Take 1 tablet by mouth every night at bedtime. 90 tablet 3    • colestipol (COLESTID) 1 g tablet Take 1 tablet by mouth 2 (Two) Times a Day.      • glimepiride (AMARYL) 2 MG tablet TAKE 3 TABLETS EVERY MORNING BEFORE BREAKFAST 270 tablet 2    • glucose blood (Accu-Chek Tessa Plus) test strip Test daily and prn 90 each 3    • HYDROcodone-acetaminophen (Norco) 5-325 MG per tablet Take 1-2 tablets by mouth Every 6 (Six) Hours As Needed for Moderate Pain or Severe Pain for up to 15 days. 30 tablet 0    • Jardiance 10 MG tablet tablet Take 1 tablet by mouth Daily.      • metoprolol tartrate (LOPRESSOR) 25 MG tablet Take 0.5 tablets by mouth Every 12 (Twelve) Hours. 60 tablet 0    • omeprazole-sodium bicarbonate (ZEGERID)  MG per capsule Take 1 capsule by mouth Daily.      • ondansetron ODT (ZOFRAN-ODT) 4 MG disintegrating tablet Place 1 tablet on the tongue 4 (Four) Times a Day As Needed for Nausea or Vomiting for up to 15 doses. 15 tablet 2    • polyethylene  glycol (MIRALAX) 17 g packet Take 17 g by mouth Daily. 30 each 0    • rivaroxaban (XARELTO) 20 MG tablet Take 1 tablet by mouth Daily.      • Tradjenta 5 MG tablet tablet TAKE 1 TABLET BY MOUTH DAILY FOR 30 DAYS. 90 tablet 0    • vitamin C (ASCORBIC ACID) 500 MG tablet Take 1 tablet by mouth Daily.          Allergies  Patient has no known allergies.    Scheduled Meds:atorvastatin, 40 mg, Oral, Daily  digoxin, 125 mcg, Oral, Daily  dilTIAZem CD, 120 mg, Oral, Q24H  insulin lispro, 2-7 Units, Subcutaneous, TID With Meals  metoprolol tartrate, 25 mg, Oral, Q12H  pantoprazole, 40 mg, Oral, BID AC  rivaroxaban, 20 mg, Oral, Daily With Dinner  senna-docusate sodium, 2 tablet, Oral, BID  sodium chloride, 10 mL, Intravenous, Q12H      Continuous Infusions:sodium chloride, 125 mL/hr, Last Rate: 125 mL/hr (04/23/23 0443)      PRN Meds:.•  acetaminophen **OR** acetaminophen **OR** acetaminophen  •  aluminum-magnesium hydroxide-simethicone  •  senna-docusate sodium **AND** polyethylene glycol **AND** bisacodyl **AND** bisacodyl  •  dextrose  •  dextrose  •  glucagon (human recombinant)  •  magnesium sulfate **OR** magnesium sulfate **OR** magnesium sulfate  •  melatonin  •  ondansetron **OR** ondansetron  •  potassium & sodium phosphates **OR** potassium & sodium phosphates  •  potassium chloride **OR** potassium chloride **OR** potassium chloride  •  [COMPLETED] Insert Peripheral IV **AND** sodium chloride  •  sodium chloride  •  sodium chloride  •  sodium chloride    Objective     VITAL SIGNS  Vitals:    04/22/23 2240 04/23/23 0235 04/23/23 0500 04/23/23 0535   BP: 108/70 138/76  129/83   BP Location: Right arm Right arm  Right arm   Patient Position: Lying Lying  Lying   Pulse: 79 72  89   Resp: 18 18  18   Temp: 97.7 °F (36.5 °C) 98.2 °F (36.8 °C)  98.7 °F (37.1 °C)   TempSrc: Oral Oral  Oral   SpO2: 94% 95%  97%   Weight:   88.1 kg (194 lb 3.6 oz)    Height:           Flowsheet Rows    Flowsheet Row First Filed Value  "  Admission Height 177.8 cm (70\") Documented at 04/19/2023 0414   Admission Weight 84.2 kg (185 lb 10 oz) Documented at 04/19/2023 0414            Intake/Output Summary (Last 24 hours) at 4/23/2023 0756  Last data filed at 4/23/2023 0535  Gross per 24 hour   Intake 2480 ml   Output 2300 ml   Net 180 ml        TELEMETRY: Atrial fibrillation with controlled ventricular response    Physical Exam:  The patient is alert, oriented and in no distress.  Vital signs as noted above.  Head and neck revealed no carotid bruits or jugular venous distention.  No thyromegaly or lymphadenopathy is present  Lungs clear.  No wheezing.  Breath sounds are normal bilaterally.  Heart normal first and second heart sounds.  No murmur. No precordial rub is present.  No gallop is present.  Abdomen soft and nontender.  No organomegaly is present.  Extremities with good peripheral pulses without any pedal edema.  Skin warm and dry.  Musculoskeletal system is grossly normal  CNS grossly normal    Reviewed and updated.    Results Review:   I reviewed the patient's new clinical results.  Lab Results (last 24 hours)     Procedure Component Value Units Date/Time    Magnesium [[id number]]  (Normal) Collected: 04/22/23 2232    Specimen: Blood Updated: 04/23/23 0015     Magnesium 1.9 mg/dL     Comprehensive Metabolic Panel [[id number]]  (Abnormal) Collected: 04/22/23 2232    Specimen: Blood Updated: 04/23/23 0012     Glucose 124 mg/dL      BUN 19 mg/dL      Creatinine 0.65 mg/dL      Sodium 136 mmol/L      Potassium 3.9 mmol/L      Chloride 102 mmol/L      CO2 26.0 mmol/L      Calcium 8.3 mg/dL      Total Protein 5.7 g/dL      Albumin 2.8 g/dL      ALT (SGPT) 6 U/L      AST (SGOT) 9 U/L      Alkaline Phosphatase 135 U/L      Total Bilirubin 0.6 mg/dL      Globulin 2.9 gm/dL      A/G Ratio 1.0 g/dL      BUN/Creatinine Ratio 29.2     Anion Gap 8.0 mmol/L      eGFR 95.3 mL/min/1.73     Narrative:      GFR Normal >60  Chronic Kidney Disease <60  Kidney " Failure <15    The GFR formula is only valid for adults with stable renal function between ages 18 and 70.    Phosphorus [[id number]]  (Normal) Collected: 04/22/23 2232    Specimen: Blood Updated: 04/23/23 0006     Phosphorus 3.1 mg/dL     CBC & Differential [[id number]]  (Abnormal) Collected: 04/22/23 2232    Specimen: Blood Updated: 04/22/23 2342    Narrative:      The following orders were created for panel order CBC & Differential.  Procedure                               Abnormality         Status                     ---------                               -----------         ------                     CBC Auto Differential[[id number]]        Abnormal            Final result                 Please view results for these tests on the individual orders.    CBC Auto Differential [[id number]]  (Abnormal) Collected: 04/22/23 2232    Specimen: Blood Updated: 04/22/23 2342     WBC 6.40 10*3/mm3      RBC 4.24 10*6/mm3      Hemoglobin 12.6 g/dL      Hematocrit 37.3 %      MCV 88.1 fL      MCH 29.7 pg      MCHC 33.7 g/dL      RDW 14.7 %      RDW-SD 47.7 fl      MPV 8.0 fL      Platelets 126 10*3/mm3      Neutrophil % 72.7 %      Lymphocyte % 15.6 %      Monocyte % 9.5 %      Eosinophil % 1.9 %      Basophil % 0.3 %      Neutrophils, Absolute 4.70 10*3/mm3      Lymphocytes, Absolute 1.00 10*3/mm3      Monocytes, Absolute 0.60 10*3/mm3      Eosinophils, Absolute 0.10 10*3/mm3      Basophils, Absolute 0.00 10*3/mm3      nRBC 0.0 /100 WBC     POC Glucose Once [[id number]]  (Abnormal) Collected: 04/22/23 1627    Specimen: Blood Updated: 04/22/23 1628     Glucose 230 mg/dL      Comment: Serial Number: [id number]:  [id number]       Urinalysis, Microscopic Only - Urine, Clean Catch [[id number]]  (Abnormal) Collected: 04/22/23 1512    Specimen: Urine, Clean Catch Updated: 04/22/23 1552     RBC, UA 0-2 /HPF      WBC, UA 0-2 /HPF      Bacteria, UA None Seen /HPF      Squamous Epithelial Cells, UA 0-2 /HPF      Hyaline Casts, UA 3-6  /LPF      Methodology Manual Light Microscopy    Sodium, Urine, Random - Urine, Clean Catch [[id number]] Collected: 04/22/23 1512    Specimen: Urine, Clean Catch Updated: 04/22/23 1548     Sodium, Urine 44 mmol/L     Narrative:      Reference intervals for random urine have not been established.  Clinical usage is dependent upon physician's interpretation in combination with other laboratory tests.       Chloride, Urine, Random - Urine, Clean Catch [[id number]] Collected: 04/22/23 1512    Specimen: Urine, Clean Catch Updated: 04/22/23 1548     Chloride, Urine 44 mmol/L     Narrative:      Reference intervals for random urine have not been established.  Clinical usage is dependent upon physician's interpretation in combination with other laboratory tests.       Urinalysis With Microscopic If Indicated (No Culture) - Urine, Clean Catch [[id number]]  (Abnormal) Collected: 04/22/23 1512    Specimen: Urine, Clean Catch Updated: 04/22/23 1529     Color, UA Yellow     Appearance, UA Clear     pH, UA <=5.0     Specific Gravity, UA 1.012     Glucose, UA Negative     Ketones, UA Negative     Bilirubin, UA Negative     Blood, UA Small (1+)     Protein, UA Trace     Leuk Esterase, UA Negative     Nitrite, UA Negative     Urobilinogen, UA 0.2 E.U./dL    POC Glucose Once [[id number]]  (Abnormal) Collected: 04/22/23 1122    Specimen: Blood Updated: 04/22/23 1123     Glucose 232 mg/dL      Comment: Serial Number: [id number]:  [id number]             Imaging Results (Last 24 Hours)     ** No results found for the last 24 hours. **      LAB RESULTS (LAST 7 DAYS)    CBC  Results from last 7 days   Lab Units 04/22/23  2232 04/21/23  2248 04/21/23  0055 04/20/23  0135 04/19/23  0448   WBC 10*3/mm3 6.40 8.10 7.40 7.60 8.90   RBC 10*6/mm3 4.24 4.33 4.46 4.35 4.97   HEMOGLOBIN g/dL 12.6* 12.6* 13.2 13.0 14.4   HEMATOCRIT % 37.3* 39.2 40.8 38.4 45.1   MCV fL 88.1 90.6 91.5 88.1 90.9   PLATELETS 10*3/mm3 126* 141 136* 151 170        BMP  Results from last 7 days   Lab Units 04/22/23 2232 04/21/23 2248 04/21/23 0055 04/20/23 0448 04/20/23  0135 04/19/23  0605 04/19/23 0448   SODIUM mmol/L 136 136 135* 135*  --   --  135*   POTASSIUM mmol/L 3.9 4.3 3.9 3.6  --   --  5.0   CHLORIDE mmol/L 102 101 102 101  --   --  97*   CO2 mmol/L 26.0 27.0 24.0 24.0  --   --  27.0   BUN mg/dL 19 21 19 19  --   --  23   CREATININE mg/dL 0.65* 1.45* 1.01 1.06  --   --  1.11   GLUCOSE mg/dL 124* 178* 161* 113*  --   --  253*   MAGNESIUM mg/dL 1.9 1.5* 1.7  --  1.8 1.9  --    PHOSPHORUS mg/dL 3.1 2.9 2.7  --  3.5  --   --        CMP   Results from last 7 days   Lab Units 04/22/23 2232 04/21/23 2248 04/21/23 0055 04/20/23 0448 04/19/23 0448   SODIUM mmol/L 136 136 135* 135* 135*   POTASSIUM mmol/L 3.9 4.3 3.9 3.6 5.0   CHLORIDE mmol/L 102 101 102 101 97*   CO2 mmol/L 26.0 27.0 24.0 24.0 27.0   BUN mg/dL 19 21 19 19 23   CREATININE mg/dL 0.65* 1.45* 1.01 1.06 1.11   GLUCOSE mg/dL 124* 178* 161* 113* 253*   ALBUMIN g/dL 2.8* 2.9* 3.0* 3.3* 3.9   BILIRUBIN mg/dL 0.6 0.6 0.8 0.9 0.8   ALK PHOS U/L 135* 163* 138* 148* 174*   AST (SGOT) U/L 9 12 11 15 12   ALT (SGPT) U/L 6 7 10 12 12   LIPASE U/L  --   --   --   --  29         BNP        TROPONIN  Results from last 7 days   Lab Units 04/19/23  0605   HSTROP T ng/L 32*       CoAg  Results from last 7 days   Lab Units 04/19/23  0448   INR  1.15*   APTT seconds 29.2*       Creatinine Clearance  Estimated Creatinine Clearance: 101.3 mL/min (A) (by C-G formula based on SCr of 0.65 mg/dL (L)).    ABG        Radiology  No radiology results for the last day        EKG            I personally viewed and interpreted the patient's EKG/Telemetry data:    ECHOCARDIOGRAM:    Results for orders placed during the hospital encounter of 04/03/23    Adult Transthoracic Echo Complete W/ Cont if Necessary Per Protocol    Interpretation Summary  •  Left ventricular ejection fraction appears to be 56 - 60%.  •  The right  ventricular cavity is mildly dilated.  •  Estimated right ventricular systolic pressure from tricuspid regurgitation is mildly elevated (35-45 mmHg).  •  There is a moderate (1-2cm) pericardial effusion. There is no evidence of cardiac tamponade.          STRESS MYOVIEW:    Cardiolite (Tc-99m Sestamibi) stress test    CARDIAC CATHETERIZATION:            OTHER:         Assessment & Plan     Principal Problem:    Intractable headache, unspecified chronicity pattern, unspecified headache type  Active Problems:    Essential hypertension    Type 2 diabetes mellitus with hyperglycemia, without long-term current use of insulin ([facility])    Coronary artery disease    Hyperlipidemia    Atrial fibrillation    Hematemesis with nausea        Patient presented with headache and N/V  CTA of head and neck negative for acute abnormality  GI consulted for concern of GI bleed, H&H stable, started on PPI  Plan for EGD today   Xarelto on hold for possible upper GI bleed, monitor H&H   Restart anticoagulation when okay with GI  [id number] score is 5  Patient remains in atrial fibrillation however rate has improved  Patient is currently on Cardizem and Lopressor  Recent echocardiogram on 4/4/2023 showed normal LVEF of 56-60% with moderate (1-2 cm) pericardial effusion and no evidence of cardiac tamponade  Patient may be good candidate for watchman with GI bleed and persistent pericardial effusion   Recent A1c elevated, patient on home oral medications   Patient's heart rate has been high and hence I am adding digoxin to his Cardizem and Lopressor and will still continue anticoagulation with Xarelto which has been started by GI after his endoscopy is complete  We will watch him for his heart rate and blood pressure and also any bleeding episodes  We will follow him in the office    }}}}}}}}}}}}}}}}}}}}   4/23/2023    Patient has atrial fibrillation.  Patient was on Xarelto and had coffee-ground emesis.  Patient had endoscopy- reviewed as  follows.  Xarelto was restarted.  Patient did not have any further problems.    Upper endoscopy coffee-ground emesis, likely secondary to gastric nodules in the presence of oral anticoagulation  Schroeder's esophagus  Headache may have contributed to acute onset of vomiting on admission.     Recommendations:  Follow-up histopathology  Continue PPI daily    Atrial fibrillation is well controlled.  Patient is feeling much better.    Renal dysfunction  21/1.45    Significant hypomagnesemia  1.5- 4/21/2023  IV magnesium supplements.  Magnesium level 1.9- 4/22/2023    Medications were reviewed and updated.  Patient is on atorvastatin digoxin 0.125 mg a day Cardizem  mg a day metoprolol tartrate 25 mg twice a day pantoprazole Xarelto 20 mg a day    Primary cardiologist will see the patient tomorrow.      Jeffery Vitale MD  04/23/23  07:56 EDT

## 2023-04-23 NOTE — PLAN OF CARE
Goal Outcome Evaluation:              Outcome Evaluation: The pt. has NS running @ 125ml/hr. He is still in A-fib with rate controlled. He also takes PO Cardizem along with Metoprolol. VS stable.

## 2023-04-23 NOTE — OUTREACH NOTE
Prep Survey    Flowsheet Row Responses   [facility] patient discharged from? [facility]   Is LACE score < 7 ? No   Eligibility [facility]   Date of Admission 04/19/23   Date of Discharge 04/23/23   Discharge Disposition Home or Self Care   Discharge diagnosis Intractable headache   Does the patient have one of the following disease processes/diagnoses(primary or secondary)? Other   Does the patient have Home health ordered? No   Is there a DME ordered? No   Prep survey completed? Yes          Idania HEARD - Registered Nurse

## 2023-04-23 NOTE — PROGRESS NOTES
"RENAL/KCC:     LOS: 4 days    Patient Care Team:  Nuno Patton MD as PCP - General (Family Medicine)  Kailash Garsia MD as Consulting Physician (Cardiology)    Chief Complaint:  ALMA/ANGELO    Subjective     Interval History:   Chart reviewed  Feeling well  Wanting to go home    Objective     Vital Sign Min/Max for last 24 hours  Temp  Min: 97.5 °F (36.4 °C)  Max: 98.7 °F (37.1 °C)   BP  Min: 108/70  Max: 138/76   Pulse  Min: 72  Max: 95   Resp  Min: 18  Max: 20   SpO2  Min: 93 %  Max: 97 %   Flow (L/min)  Min: 2  Max: 2   Weight  Min: 88.1 kg (194 lb 3.6 oz)  Max: 88.1 kg (194 lb 3.6 oz)     Flowsheet Rows    Flowsheet Row First Filed Value   Admission Height 177.8 cm (70\") Documented at 04/19/2023 0414   Admission Weight 84.2 kg (185 lb 10 oz) Documented at 04/19/2023 0414          I/O this shift:  In: -   Out: 700 [Urine:700]  I/O last 3 completed shifts:  In: 2630 [P.O.:480; I.V.:2150]  Out: 3225 [Urine:3225]    Physical Exam:  GEN: Awake, NAD  ENT: PERRL, EOMI, MMM  NECK: Supple, no JVD  CHEST: CTAB, no W/R/C  CV: RRR, no M/G/R  ABD: Soft, NT, +BS  SKIN: Warm and Dry  NEURO: CN's intact      WBC WBC   Date Value Ref Range Status   04/22/2023 6.40 3.40 - 10.80 10*3/mm3 Final   04/21/2023 8.10 3.40 - 10.80 10*3/mm3 Final   04/21/2023 7.40 3.40 - 10.80 10*3/mm3 Final      HGB Hemoglobin   Date Value Ref Range Status   04/22/2023 12.6 (L) 13.0 - 17.7 g/dL Final   04/21/2023 12.6 (L) 13.0 - 17.7 g/dL Final   04/21/2023 13.2 13.0 - 17.7 g/dL Final      HCT Hematocrit   Date Value Ref Range Status   04/22/2023 37.3 (L) 37.5 - 51.0 % Final   04/21/2023 39.2 37.5 - 51.0 % Final   04/21/2023 40.8 37.5 - 51.0 % Final      Platlets No results found for: LABPLAT   MCV MCV   Date Value Ref Range Status   04/22/2023 88.1 79.0 - 97.0 fL Final   04/21/2023 90.6 79.0 - 97.0 fL Final   04/21/2023 91.5 79.0 - 97.0 fL Final          Sodium Sodium   Date Value Ref Range Status   04/22/2023 136 136 - 145 mmol/L Final   04/21/2023 " 136 136 - 145 mmol/L Final   04/21/2023 135 (L) 136 - 145 mmol/L Final      Potassium Potassium   Date Value Ref Range Status   04/22/2023 3.9 3.5 - 5.2 mmol/L Final   04/21/2023 4.3 3.5 - 5.2 mmol/L Final     Comment:     Slight hemolysis detected by analyzer. Results may be affected.   04/21/2023 3.9 3.5 - 5.2 mmol/L Final      Chloride Chloride   Date Value Ref Range Status   04/22/2023 102 98 - 107 mmol/L Final   04/21/2023 101 98 - 107 mmol/L Final   04/21/2023 102 98 - 107 mmol/L Final      CO2 CO2   Date Value Ref Range Status   04/22/2023 26.0 22.0 - 29.0 mmol/L Final   04/21/2023 27.0 22.0 - 29.0 mmol/L Final   04/21/2023 24.0 22.0 - 29.0 mmol/L Final      BUN BUN   Date Value Ref Range Status   04/22/2023 19 8 - 23 mg/dL Final   04/21/2023 21 8 - 23 mg/dL Final   04/21/2023 19 8 - 23 mg/dL Final      Creatinine Creatinine   Date Value Ref Range Status   04/22/2023 0.65 (L) 0.76 - 1.27 mg/dL Final   04/21/2023 1.45 (H) 0.76 - 1.27 mg/dL Final   04/21/2023 1.01 0.76 - 1.27 mg/dL Final      Calcium Calcium   Date Value Ref Range Status   04/22/2023 8.3 (L) 8.6 - 10.5 mg/dL Final   04/21/2023 8.1 (L) 8.6 - 10.5 mg/dL Final   04/21/2023 8.8 8.6 - 10.5 mg/dL Final      PO4 No results found for: CAPO4   Albumin Albumin   Date Value Ref Range Status   04/22/2023 2.8 (L) 3.5 - 5.2 g/dL Final   04/21/2023 2.9 (L) 3.5 - 5.2 g/dL Final   04/21/2023 3.0 (L) 3.5 - 5.2 g/dL Final      Magnesium Magnesium   Date Value Ref Range Status   04/22/2023 1.9 1.6 - 2.4 mg/dL Final   04/21/2023 1.5 (L) 1.6 - 2.4 mg/dL Final   04/21/2023 1.7 1.6 - 2.4 mg/dL Final      Uric Acid No results found for: URICACID        Results Review:     I reviewed the patient's new clinical results.    atorvastatin, 40 mg, Oral, Daily  digoxin, 125 mcg, Oral, Daily  dilTIAZem CD, 120 mg, Oral, Q24H  insulin lispro, 2-7 Units, Subcutaneous, TID With Meals  metoprolol tartrate, 25 mg, Oral, Q12H  pantoprazole, 40 mg, Oral, BID AC  rivaroxaban, 20 mg,  Oral, Daily With Dinner  senna-docusate sodium, 2 tablet, Oral, BID  sodium chloride, 10 mL, Intravenous, Q12H           Medication Review: Reviewed    Assessment & Plan       ALMA - Likely ANGELO    Hypomagnesemia    Intractable headache, unspecified chronicity pattern, unspecified headache type    Essential hypertension    Type 2 diabetes mellitus with hyperglycemia, without long-term current use of insulin (HCC)    Coronary artery disease    Hyperlipidemia    Atrial fibrillation    Hematemesis with nausea      Plan: ALMA resolved.  Lytes and volume OK.  OK for D/C from a renal standpoint.      Sher Granda MD   [facility]  04/23/23  10:06 EDT

## 2023-04-24 ENCOUNTER — TRANSITIONAL CARE MANAGEMENT TELEPHONE ENCOUNTER (OUTPATIENT)
Dept: CALL CENTER | Facility: HOSPITAL | Age: 83
End: 2023-04-24
Payer: MEDICARE

## 2023-04-24 LAB
LAB AP CASE REPORT: NORMAL
PATH REPORT.FINAL DX SPEC: NORMAL
PATH REPORT.GROSS SPEC: NORMAL

## 2023-04-24 NOTE — OUTREACH NOTE
"Call Center TCM Note    Flowsheet Row Responses   [facility] patient discharged from? [facility]   Does the patient have one of the following disease processes/diagnoses(primary or secondary)? Other   TCM attempt successful? Yes   Call start time 0821   Call end time 0824   Discharge diagnosis Intractable headache   Meds reviewed with patient/caregiver? Yes   Is the patient having any side effects they believe may be caused by any medication additions or changes? No   Does the patient have all medications ordered at discharge? Yes   Is the patient taking all medications as directed (includes completed medication regime)? No   What is preventing the patient from taking all medications as directed? Other   Medication comments Pt states he is picking up medication today.   Comments States he will call and get appts made with PCP and cardiology   Does the patient have an appointment with their PCP within 7 days of discharge? No   Nursing Interventions Patient declined scheduling/rescheduling appointment at this time   Psychosocial issues? No   Did the patient receive a copy of their discharge instructions? Yes   Nursing interventions Reviewed instructions with patient   What is the patient's perception of their health status since discharge? Improving   Is the patient/caregiver able to teach back signs and symptoms related to disease process for when to call PCP? Yes   Is the patient/caregiver able to teach back signs and symptoms related to disease process for when to call 911? Yes   Is the patient/caregiver able to teach back the hierarchy of who to call/visit for symptoms/problems? PCP, Specialist, Home health nurse, Urgent Care, ED, 911 Yes   If the patient is a current smoker, are they able to teach back resources for cessation? Not a smoker   Additional teach back comments States he is doing \"ok\".     TCM call completed? Yes   Wrap up additional comments Pt states he will call and make appt with PCP and " cardiology. Will monitor bp due to med changes   Call end time 0824          Celia Grant LPN    4/24/2023, 08:26 EDT

## 2023-04-25 NOTE — PROGRESS NOTES
"Enter Query Response Below      Query Response:       hematemesis due to or related to oral anticoagulation (Xarelto)    Electronically signed by Josemanuel Jimenez MD, 23, 8:36 AM EDT.             If applicable, please update the problem list.       Patient: Cisco Frank        : 1940  Account: [id number]           Admit Date: 2023        How to Respond to this query:       a. Click New Note     b. Answer query within the yellow box.                c. Update the Problem List, if applicable.      If you have any questions about this query contact me at: [email or web address]    Dr. Jimenez    82 year old male admitted  with vomiting and headache. The patient is on Xarelto as outpatient. EGD  noted coffee-ground emesis likely secondary to gastric nodules in the presence of oral anticoagulation. \"In the presence of \" does not indicate a causative relationship.    Treatment: IV Protonix, withholding of Xarelto until     Can this be clarified as:    hematemesis due to or related to oral anticoagulation (Xarelto)  hematemesis unrelated to oral anticoagulation (Xarelto)  other, please specify___________  unable to clinically determine    By submitting this query, we are merely seeking further clarification of documentation to accurately reflect all conditions that you are monitoring, evaluating, treating or that extend the hospitalization or utilize additional resources of care. Please utilize your independent clinical judgment when addressing the question(s) above.     This query and your response, once completed, will be entered into the legal medical record.    Sincerely,  Mandy Davey RN CCDS  Clinical Documentation Integrity Program     "

## 2023-05-07 NOTE — PROGRESS NOTES
Subjective:     Encounter Date:05/08/2023      Patient ID: Cisco Frank is a 82 y.o. male.    Chief Complaint:  History of Present Illness  Cisco Frank is an 82-year-old male with a medical history to include paroxysmal atrial fibrillation, CAD, hyperlipidemia, DM II, GERD who presents to the office today for hospital follow up for which he was admitted for an intractable headache and coffee ground emesis. Cardiology consulted during admission due to afib RVR. Patient underwent CTA of head and neck which showed no acute abnormalities. Patient was also seen by GI due to concern for GI bleed and underwent EGD which showed Schroeder's esophagus and gatric nodules, patient was started on PPI and cleared to continue Xarelto for anticoagulation. Of note patient was also evaluated in hospital earlier the same month after recent fall and was found to had moderate sized pericardial effusion on echocardiogram with no evidence of cardiac tamponade.   Patient seen and examined. Labs and chart reviewed. Patient states he is still experiencing fatigue and lightheadedness.  Patient states prior to hospitalization he was able to walk about a mile a day and he recently struggles walking around the block.  Patient's most recent TSH within normal limits.  Most recent echocardiogram shows normal function. Patient has permanent atrial fibrillation and rates have been well controlled. Patient has been monitoring blood pressure at home and they have been stable with heart rates ranging in the 80s. Patient has not been taking medications as prescribed as he stopped taking diltiazem, Xarelto, Protonix, vitamin C on his own.  Provider educated patient on the importance of taking anticoagulation in the presence of A-fib as his increased stroke is high.  Patient verbally understood and will start taking Xarelto again.  We will plan to stop diltiazem today as patient is also on metoprolol and digoxin for A-fib and rates have been well  controlled since discharge from hospital. Patient currently denies chest pain, shortness of breath, numbness, tingling, nausea, vomiting, edema, syncope.     The following portions of the patient's history were reviewed and updated as appropriate: allergies, current medications, past family history, past medical history, past social history, past surgical history and problem list.     Past Medical History:   Diagnosis Date   • Atrial fibrillation    • Cancer    • Coronary artery disease    • History of bladder cancer    • Hyperlipidemia      Past Surgical History:   Procedure Laterality Date   • BLADDER SURGERY  10/26/2019    [facility] Dr. Cope   • BRONCHOSCOPY N/A 6/30/2022    Procedure: BRONCHOSCOPY with bilateral lung wash;  Surgeon: Ayush Velarde MD;  Location: [facility] END[facility]COPY;  Service: Pulmonary;  Laterality: N/A;  normal bronch   • CARDIAC CATHETERIZATION     • COLONOSCOPY     • COLONOSCOPY W/ POLYPECTOMY  02/23/2021   • ENDOSCOPY N/A 4/20/2023    Procedure: ESOPHAGOGASTRODUODENOSCOPY with gastric biopsy's;  Surgeon: Debra Lomeli MD;  Location: [facility] ENDOSCOPY;  Service: Gastroenterology;  Laterality: N/A;  gastritis, cardia nodules, barretts esophagus   • SKIN CANCER EXCISION      right temple BX   • UPPER ENDOSCOPIC ULTRASOUND W/ FNA N/A 3/12/2021    Procedure: ESOPHAGOGASTRODUODENOSCOPY WITH endoscopic mucosal resection, biopsy x 1 area, and endoscopic ULTRASOUND;  Surgeon: Abner Infante MD;  Location: [facility] ENDOSCOPY;  Service: Gastroenterology;  Laterality: N/A;  post op: hiatal hernia, duodenal polyp     There were no vitals taken for this visit.  Family History   Problem Relation Age of Onset   • Heart disease Mother    • Heart attack Father    • No Known Problems Sister    • No Known Problems Brother    • No Known Problems Maternal Aunt    • No Known Problems Maternal Uncle    • No Known Problems Paternal Aunt    • No Known Problems Paternal Uncle    • No Known Problems Maternal Grandmother     • No Known Problems Maternal Grandfather    • No Known Problems Paternal Grandmother    • No Known Problems Paternal Grandfather    • No Known Problems Other    • Anemia Neg Hx    • Arrhythmia Neg Hx    • Asthma Neg Hx    • Clotting disorder Neg Hx    • Fainting Neg Hx    • Heart failure Neg Hx    • Hyperlipidemia Neg Hx    • Hypertension Neg Hx        Current Outpatient Medications:   •  atorvastatin (LIPITOR) 40 MG tablet, Take 1 tablet by mouth every night at bedtime., Disp: 90 tablet, Rfl: 3  •  colestipol (COLESTID) 1 g tablet, Take 1 tablet by mouth 2 (Two) Times a Day., Disp: , Rfl:   •  digoxin (LANOXIN) 125 MCG tablet, Take 1 tablet by mouth Daily., Disp: 30 tablet, Rfl: 2  •  dilTIAZem CD (CARDIZEM CD) 120 MG 24 hr capsule, Take 1 capsule by mouth Daily., Disp: 30 capsule, Rfl: 2  •  glimepiride (AMARYL) 2 MG tablet, TAKE 3 TABLETS EVERY MORNING BEFORE BREAKFAST, Disp: 270 tablet, Rfl: 2  •  glucose blood (Accu-Chek Tessa Plus) test strip, Test daily and prn, Disp: 90 each, Rfl: 3  •  Jardiance 10 MG tablet tablet, Take 1 tablet by mouth Daily., Disp: , Rfl:   •  metoprolol tartrate (LOPRESSOR) 25 MG tablet, Take 1 tablet by mouth Every 12 (Twelve) Hours for 30 days., Disp: 60 tablet, Rfl: 2  •  ondansetron ODT (ZOFRAN-ODT) 4 MG disintegrating tablet, Place 1 tablet on the tongue 4 (Four) Times a Day As Needed for Nausea or Vomiting for up to 15 doses., Disp: 15 tablet, Rfl: 2  •  pantoprazole (PROTONIX) 40 MG EC tablet, Take 1 tablet by mouth 2 (Two) Times a Day Before Meals., Disp: 60 tablet, Rfl: 2  •  polyethylene glycol (MIRALAX) 17 g packet, Take 17 g by mouth Daily., Disp: 30 each, Rfl: 0  •  rivaroxaban (XARELTO) 20 MG tablet, Take 1 tablet by mouth Daily., Disp: , Rfl:   •  Tradjenta 5 MG tablet tablet, TAKE 1 TABLET BY MOUTH DAILY FOR 30 DAYS., Disp: 90 tablet, Rfl: 0  •  vitamin C (ASCORBIC ACID) 500 MG tablet, Take 1 tablet by mouth Daily., Disp: , Rfl:   No Known Allergies  Social History      Socioeconomic History   • Marital status:    Tobacco Use   • Smoking status: Former     Types: Cigarettes     Quit date: 1980     Years since quittin.3   • Smokeless tobacco: Never   Vaping Use   • Vaping Use: Never used   Substance and Sexual Activity   • Alcohol use: Not Currently   • Drug use: Never   • Sexual activity: Defer     Review of Systems   Constitutional: Positive for malaise/fatigue.   Cardiovascular: Negative for chest pain, dyspnea on exertion, leg swelling and palpitations.   Respiratory: Negative for shortness of breath.    Gastrointestinal: Negative for nausea and vomiting.   Neurological: Positive for light-headedness. Negative for dizziness, focal weakness, headaches and numbness.   All other systems reviewed and are negative.         Objective:     Vitals reviewed.   Constitutional:       Appearance: Not in distress.   Eyes:      Pupils: Pupils are equal, round, and reactive to light.   HENT:      Nose: Nose normal.    Mouth/Throat:      Pharynx: Oropharynx is clear.   Pulmonary:      Effort: Pulmonary effort is normal.      Breath sounds: Normal breath sounds.   Cardiovascular:      Normal rate. Irregularly irregular rhythm.   Pulses:     Intact distal pulses.   Edema:     Peripheral edema absent.   Abdominal:      General: Bowel sounds are normal.   Musculoskeletal: Normal range of motion.      Cervical back: Normal range of motion and neck supple. Skin:     General: Skin is warm.   Neurological:      Mental Status: Alert and oriented to person, place and time.         ECG 12 Lead    Date/Time: 2023 12:15 PM  Performed by: Comfort Romo APRN  Authorized by: Comfort Romo APRN   Comparison: compared with previous ECG from 2023  Rhythm: atrial fibrillation  Rate: normal  Other findings: T wave abnormality    Clinical impression: abnormal EKG            Lab Review:    Diagnosis Plan   1. Essential hypertension        2. Type 2 diabetes mellitus with hyperglycemia,  without long-term current use of insulin (HCC)        3. Coronary artery disease involving native coronary artery of native heart without angina pectoris        4. Mixed hyperlipidemia        5. Permanent atrial fibrillation        6. Pericardial effusion        7. Orthostatic hypotension        LAB RESULTS (LAST 7 DAYS)    CBC        BMP        CMP         BNP        TROPONIN        CoAg        Creatinine Clearance  Estimated Creatinine Clearance: 101.3 mL/min (A) (by C-G formula based on SCr of 0.65 mg/dL (L)).    ABG        Radiology  No radiology results for the last day        Plan/Recommendations:  Paroxysmal Afib  EKG today shows fibrillation with controlled ventricular rate 78 bpm  Patient is currently on digoxin 125 mcg, lopressor 25 mg, and Cardizem 120 mg   Patient is on Xarelto for anticoagulation   States he stopped taking Xarelto as he was worried it was causing symptoms of fatigue and weakness  Education provided on the importance of anticoagulation with A-fib and high risk for stroke, patient verbally understood and will start taking Xarelto again  CHADS score is 5   Patient could be a good candidate for Watchman procedure if continues to have GI problems or increase in pericardial effusion    Moderate Pericardial effusion   Recent echocardiogram showed moderate sized pericardial effusion with no evidence of cardiac tamponade  LVEF normal at 56-60%   When compared to study in July 2022 patient had small effusion  We will continue to monitor and consider Watchman procedure    Hypertension  BP today 96/67  Patient brought in a list of home readings to include 102/79, 123/85, 101/75, 116/89, 103/82 with pulse 80-89 BPM  Patient currently on lopressor and cardizem   Patient stopped taking Cardizem due to thinking it caused symptoms of fatigue, weakness  We will stop Cardizem today as heart rate has been well controlled since discharge we will plan to continue metoprolol and  digoxin    Hyperlipidemia  Patient is on Lipitor 40 mg   Most recent lipid panel WNL except slightly decreased HDL of 34    DMII   Patient is currently on oral therapy   Most recent A1c abnormal at 8.6  Managed by PCP     Schroeder's Esophagus/Gastic nodules  Recent EGD for coffee-ground emesis showed likely bleed from gastric nodules in presence of anticoagulation  GI following and cleared patient to restart Xarelto   Patient currently on Protonix       Patient's previous medical records, labs, and EKG were reviewed and discussed with the patient at today's visit.     Patient to be seen as needed or in 6 months with Dr. Garsia    Electronically signed by RIAN Salazar, 05/08/23, 12:19 PM EDT.

## 2023-05-08 ENCOUNTER — OFFICE VISIT (OUTPATIENT)
Dept: CARDIOLOGY | Facility: CLINIC | Age: 83
End: 2023-05-08
Payer: MEDICARE

## 2023-05-08 VITALS
SYSTOLIC BLOOD PRESSURE: 96 MMHG | OXYGEN SATURATION: 97 % | HEART RATE: 83 BPM | HEIGHT: 70 IN | WEIGHT: 180 LBS | BODY MASS INDEX: 25.77 KG/M2 | DIASTOLIC BLOOD PRESSURE: 67 MMHG

## 2023-05-08 DIAGNOSIS — I48.21 PERMANENT ATRIAL FIBRILLATION: Chronic | ICD-10-CM

## 2023-05-08 DIAGNOSIS — I95.1 ORTHOSTATIC HYPOTENSION: ICD-10-CM

## 2023-05-08 DIAGNOSIS — I10 ESSENTIAL HYPERTENSION: Primary | Chronic | ICD-10-CM

## 2023-05-08 DIAGNOSIS — E78.2 MIXED HYPERLIPIDEMIA: Chronic | ICD-10-CM

## 2023-05-08 DIAGNOSIS — I25.10 CORONARY ARTERY DISEASE INVOLVING NATIVE CORONARY ARTERY OF NATIVE HEART WITHOUT ANGINA PECTORIS: Chronic | ICD-10-CM

## 2023-05-08 DIAGNOSIS — I31.39 PERICARDIAL EFFUSION: ICD-10-CM

## 2023-05-08 DIAGNOSIS — E11.65 TYPE 2 DIABETES MELLITUS WITH HYPERGLYCEMIA, WITHOUT LONG-TERM CURRENT USE OF INSULIN: Chronic | ICD-10-CM

## 2023-05-08 RX ORDER — OMEPRAZOLE/SODIUM BICARBONATE 40; 1680 MG/1; MG/1
POWDER, FOR SUSPENSION ORAL
COMMUNITY

## 2023-05-10 ENCOUNTER — OFFICE VISIT (OUTPATIENT)
Dept: FAMILY MEDICINE CLINIC | Facility: CLINIC | Age: 83
End: 2023-05-10
Payer: MEDICARE

## 2023-05-10 ENCOUNTER — HOSPITAL ENCOUNTER (OUTPATIENT)
Dept: GENERAL RADIOLOGY | Facility: HOSPITAL | Age: 83
Discharge: HOME | End: 2023-05-10
Admitting: FAMILY MEDICINE
Payer: MEDICARE

## 2023-05-10 ENCOUNTER — LAB (OUTPATIENT)
Dept: FAMILY MEDICINE CLINIC | Facility: CLINIC | Age: 83
End: 2023-05-10
Payer: MEDICARE

## 2023-05-10 VITALS
RESPIRATION RATE: 16 BRPM | DIASTOLIC BLOOD PRESSURE: 70 MMHG | BODY MASS INDEX: 26.2 KG/M2 | HEIGHT: 70 IN | SYSTOLIC BLOOD PRESSURE: 90 MMHG | HEART RATE: 104 BPM | WEIGHT: 183 LBS | OXYGEN SATURATION: 97 %

## 2023-05-10 DIAGNOSIS — R79.9 ABNORMAL FINDING OF BLOOD CHEMISTRY, UNSPECIFIED: ICD-10-CM

## 2023-05-10 DIAGNOSIS — E08.65 DIABETES MELLITUS DUE TO UNDERLYING CONDITION WITH HYPERGLYCEMIA, WITHOUT LONG-TERM CURRENT USE OF INSULIN: ICD-10-CM
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
## 2023-05-10 DIAGNOSIS — R53.1 WEAKNESS: ICD-10-CM

## 2023-05-10 DIAGNOSIS — R06.00 DYSPNEA, UNSPECIFIED TYPE: ICD-10-CM

## 2023-05-10 DIAGNOSIS — I48.21 PERMANENT ATRIAL FIBRILLATION: Chronic | ICD-10-CM
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
## 2023-05-10 DIAGNOSIS — I25.10 CORONARY ARTERY DISEASE INVOLVING NATIVE CORONARY ARTERY OF NATIVE HEART WITHOUT ANGINA PECTORIS: Chronic | ICD-10-CM

## 2023-05-10 DIAGNOSIS — I42.0 DILATED CARDIOMYOPATHY: ICD-10-CM

## 2023-05-10 LAB
BASOPHILS # BLD AUTO: 0.03 10*3/MM3 (ref 0–0.2)
BASOPHILS NFR BLD AUTO: 0.4 % (ref 0–1.5)
DEPRECATED RDW RBC AUTO: 43.9 FL (ref 37–54)
EOSINOPHIL # BLD AUTO: 0.07 10*3/MM3 (ref 0–0.4)
EOSINOPHIL NFR BLD AUTO: 1 % (ref 0.3–6.2)
ERYTHROCYTE [DISTWIDTH] IN BLOOD BY AUTOMATED COUNT: 13.5 % (ref 12.3–15.4)
HCT VFR BLD AUTO: 43.6 % (ref 37.5–51)
HGB BLD-MCNC: 14.8 G/DL (ref 13–17.7)
IMM GRANULOCYTES # BLD AUTO: 0.05 10*3/MM3 (ref 0–0.05)
IMM GRANULOCYTES NFR BLD AUTO: 0.7 % (ref 0–0.5)
LYMPHOCYTES # BLD AUTO: 1.28 10*3/MM3 (ref 0.7–3.1)
LYMPHOCYTES NFR BLD AUTO: 18.6 % (ref 19.6–45.3)
MCH RBC QN AUTO: 30.3 PG (ref 26.6–33)
MCHC RBC AUTO-ENTMCNC: 33.9 G/DL (ref 31.5–35.7)
MCV RBC AUTO: 89.3 FL (ref 79–97)
MONOCYTES # BLD AUTO: 0.57 10*3/MM3 (ref 0.1–0.9)
MONOCYTES NFR BLD AUTO: 8.3 % (ref 5–12)
NEUTROPHILS NFR BLD AUTO: 4.9 10*3/MM3 (ref 1.7–7)
NEUTROPHILS NFR BLD AUTO: 71 % (ref 42.7–76)
NRBC BLD AUTO-RTO: 0 /100 WBC (ref 0–0.2)
PLATELET # BLD AUTO: 160 10*3/MM3 (ref 140–450)
PMV BLD AUTO: 10.7 FL (ref 6–12)
RBC # BLD AUTO: 4.88 10*6/MM3 (ref 4.14–5.8)
WBC NRBC COR # BLD: 6.9 10*3/MM3 (ref 3.4–10.8)

## 2023-05-10 PROCEDURE — 80053 COMPREHEN METABOLIC PANEL: CPT | Performed by: FAMILY MEDICINE

## 2023-05-10 PROCEDURE — 86140 C-REACTIVE PROTEIN: CPT | Performed by: FAMILY MEDICINE

## 2023-05-10 PROCEDURE — 83036 HEMOGLOBIN GLYCOSYLATED A1C: CPT | Performed by: FAMILY MEDICINE

## 2023-05-10 PROCEDURE — 71046 X-RAY EXAM CHEST 2 VIEWS: CPT

## 2023-05-10 PROCEDURE — 83540 ASSAY OF IRON: CPT | Performed by: FAMILY MEDICINE

## 2023-05-10 PROCEDURE — 84466 ASSAY OF TRANSFERRIN: CPT | Performed by: FAMILY MEDICINE

## 2023-05-10 PROCEDURE — 83880 ASSAY OF NATRIURETIC PEPTIDE: CPT | Performed by: FAMILY MEDICINE

## 2023-05-10 PROCEDURE — 84484 ASSAY OF TROPONIN QUANT: CPT | Performed by: FAMILY MEDICINE

## 2023-05-10 PROCEDURE — 36415 COLL VENOUS BLD VENIPUNCTURE: CPT | Performed by: FAMILY MEDICINE

## 2023-05-10 PROCEDURE — 85025 COMPLETE CBC W/AUTO DIFF WBC: CPT | Performed by: FAMILY MEDICINE

## 2023-05-10 PROCEDURE — 82728 ASSAY OF FERRITIN: CPT | Performed by: FAMILY MEDICINE

## 2023-05-10 NOTE — PROGRESS NOTES
"Chief Complaint  GI Problem (F/u)    Subjective      Cisco Frank presents to [facility]  History of Present Illness  For follow up from hospital stay for GI bleed. He complains of being really tired.  GI Problem    The patient presents today for a physical exam.    The patient complains of some rib soreness on the right posterior rib cage. He denies any new pains other than some rib pain on the back. He is not extremely short of breath, just extremely tired. He wants to sleep all the time and he denies headaches. He has not had chest pain or severe dyspnea.    The patient is complaining of severe generalized weakness. This has come on over the past week and he does not know why. He is eating and drinking fluids. Bowel function is good and we do not have a good reason for this.    The patient's blood sugars have been elevated over the past month. Some of this we thought was just due to the pain that he was experiencing and his diabetes being a little bit out of control from the injuries. Nonetheless, we will check his A1c today and see if it is out of control.    Objective   Vital Signs:  BP 90/70 (BP Location: Right arm, Patient Position: Sitting, Cuff Size: Adult)   Pulse 104   Resp 16   Ht 177.8 cm (70\")   Wt 83 kg (183 lb)   SpO2 97%   BMI 26.26 kg/m²   Estimated body mass index is 26.26 kg/m² as calculated from the following:    Height as of this encounter: 177.8 cm (70\").    Weight as of this encounter: 83 kg (183 lb).             Physical Exam  Constitutional:       Appearance: Normal appearance. He is well-developed and normal weight.   HENT:      Head: Normocephalic and atraumatic.      Right Ear: Tympanic membrane, ear canal and external ear normal.      Left Ear: Tympanic membrane, ear canal and external ear normal.      Nose: Nose normal.      Mouth/Throat:      Mouth: Mucous membranes are moist.      Pharynx: Oropharynx is clear. No oropharyngeal exudate.   Eyes: "      Extraocular Movements: Extraocular movements intact.      Conjunctiva/sclera: Conjunctivae normal.      Pupils: Pupils are equal, round, and reactive to light.   Cardiovascular:      Rate and Rhythm: Normal rate and regular rhythm.      Pulses: Normal pulses.      Heart sounds: Normal heart sounds.   Pulmonary:      Effort: Pulmonary effort is normal.      Breath sounds: Normal breath sounds.   Abdominal:      General: Bowel sounds are normal.      Palpations: Abdomen is soft.   Musculoskeletal:         General: Normal range of motion.      Cervical back: Normal range of motion and neck supple.   Skin:     General: Skin is warm and dry.   Neurological:      General: No focal deficit present.      Mental Status: He is alert and oriented to person, place, and time. Mental status is at baseline.   Psychiatric:         Mood and Affect: Mood normal.         Behavior: Behavior normal.         Thought Content: Thought content normal.         Judgment: Judgment normal.          Assessment and Plan      1. Rib soreness - Fall sequela  - The patient complains of some rib soreness on the right posterior rib cage and he denies any new pains other than some rib pain on the back. He is not extremely short of breath, just extremely tired. He wants to sleep all the time. He denies headaches. He has not had any chest pain or severe dyspnea. We are going to do some blood work today and do a chest x-ray and go from there.    2. Weakness  - The patient is complaining of severe generalized weakness. This has come on over the past week and he does not know why. He is eating and drinking fluids. Bowel function is good and we do not have a good reason for this.    3. Abnormal findings on blood chemistry  - The patient's blood sugars have been elevated over the past month. Some of this we thought was just due to the pain that he was experiencing and his diabetes being a little bit out of control from the injuries. Nonetheless, we will  check his A1c today and see if it is out of control.    4. Diabetes type 2  - The patient has diabetes and it has been a little bit out of control.    5. Dyspnea  - The patient is short of breath, but it is more of an inability to take a deep breath without having some pain. It is almost always if he gets a catch. He is not really short of breath because of breathing difficulties in the lung, but on the outside of the lung where his injuries to the ribs have occurred.    6. Dilated cardiomyopathy  - The patient will be checked for cardiomyopathy today with a BNP. He is short of breath with exertion and he is feeling weak generally. We will see what he can do today after the blood work and we will do a chest x-ray and see heart size. I will also do a BNP to see if there is any evidence of actual congestive heart failure.    7. CAD  -Patient has known coronary artery disease.  He has had previous MI.  He denies chest pain but is extremely tired.  This came on basically rather quickly 5 or 6 days ago.  We will do a BNP as mentioned above but also a troponin level.  We will wait for the test to return before making any decisions regarding his heart.    8.  Permanent A-fib  -Patient has A-fib with controlled rate.  Because of his age and the increased risk of Xarelto he may be a candidate for the Watchman procedure.  Is being considered for him and a decision will be made very soon.            Follow Up Return in about 4 weeks (around 6/7/2023), or if symptoms worsen or fail to improve, for Recheck-in office in 4 weeks.  Patient was given instructions and counseling regarding his condition or for health maintenance advice. Please see specific information pulled into the AVS if appropriate.

## 2023-05-11 ENCOUNTER — HOSPITAL ENCOUNTER (OUTPATIENT)
Facility: HOSPITAL | Age: 83
Setting detail: OBSERVATION
Discharge: HOME | End: 2023-05-12
Attending: EMERGENCY MEDICINE | Admitting: STUDENT IN AN ORGANIZED HEALTH CARE EDUCATION/TRAINING PROGRAM
Payer: MEDICARE

## 2023-05-11 ENCOUNTER — APPOINTMENT (OUTPATIENT)
Dept: NUCLEAR MEDICINE | Facility: HOSPITAL | Age: 83
End: 2023-05-11
Payer: MEDICARE

## 2023-05-11 DIAGNOSIS — R53.83 OTHER FATIGUE: Primary | ICD-10-CM

## 2023-05-11 DIAGNOSIS — I48.91 ATRIAL FIBRILLATION, UNSPECIFIED TYPE: ICD-10-CM

## 2023-05-11 DIAGNOSIS — R77.8 ELEVATED TROPONIN: ICD-10-CM

## 2023-05-11 LAB
25(OH)D3 SERPL-MCNC: 32 NG/ML (ref 30–100)
ALBUMIN SERPL-MCNC: 3.6 G/DL (ref 3.5–5.2)
ALBUMIN/GLOB SERPL: 1 G/DL
ALP SERPL-CCNC: 164 U/L (ref 39–117)
ALT SERPL W P-5'-P-CCNC: 15 U/L (ref 1–41)
ANION GAP SERPL CALCULATED.3IONS-SCNC: 11 MMOL/L (ref 5–15)
ANION GAP SERPL CALCULATED.3IONS-SCNC: 12 MMOL/L (ref 5–15)
ANION GAP SERPL CALCULATED.3IONS-SCNC: 8 MMOL/L (ref 5–15)
AST SERPL-CCNC: 14 U/L (ref 1–40)
BH CV REST NUCLEAR ISOTOPE DOSE: 10.5 MCI
BH CV STRESS BP STAGE 1: NORMAL
BH CV STRESS COMMENTS STAGE 1: NORMAL
BH CV STRESS DOSE REGADENOSON STAGE 1: 0.4
BH CV STRESS DURATION MIN STAGE 1: 0
BH CV STRESS DURATION SEC STAGE 1: 10
BH CV STRESS HR STAGE 1: 98
BH CV STRESS NUCLEAR ISOTOPE DOSE: 33 MCI
BH CV STRESS PROTOCOL 1: NORMAL
BH CV STRESS RECOVERY BP: NORMAL MMHG
BH CV STRESS RECOVERY HR: 113 BPM
BH CV STRESS STAGE 1: 1
BILIRUB SERPL-MCNC: 0.6 MG/DL (ref 0–1.2)
BUN SERPL-MCNC: 26 MG/DL (ref 8–23)
BUN SERPL-MCNC: 28 MG/DL (ref 8–23)
BUN SERPL-MCNC: 33 MG/DL (ref 8–23)
BUN/CREAT SERPL: 19.8 (ref 7–25)
BUN/CREAT SERPL: 21.1 (ref 7–25)
BUN/CREAT SERPL: 21.5 (ref 7–25)
CALCIUM SPEC-SCNC: 8.6 MG/DL (ref 8.6–10.5)
CALCIUM SPEC-SCNC: 8.7 MG/DL (ref 8.6–10.5)
CALCIUM SPEC-SCNC: 9.1 MG/DL (ref 8.6–10.5)
CHLORIDE SERPL-SCNC: 104 MMOL/L (ref 98–107)
CHLORIDE SERPL-SCNC: 105 MMOL/L (ref 98–107)
CHLORIDE SERPL-SCNC: 99 MMOL/L (ref 98–107)
CO2 SERPL-SCNC: 24 MMOL/L (ref 22–29)
CO2 SERPL-SCNC: 25 MMOL/L (ref 22–29)
CO2 SERPL-SCNC: 28 MMOL/L (ref 22–29)
CREAT SERPL-MCNC: 1.23 MG/DL (ref 0.76–1.27)
CREAT SERPL-MCNC: 1.3 MG/DL (ref 0.76–1.27)
CREAT SERPL-MCNC: 1.67 MG/DL (ref 0.76–1.27)
CRP SERPL-MCNC: <0.3 MG/DL (ref 0–0.5)
DEPRECATED RDW RBC AUTO: 45.5 FL (ref 37–54)
DIGOXIN SERPL-MCNC: 0.9 NG/ML (ref 0.6–1.2)
EGFRCR SERPLBLD CKD-EPI 2021: 41.1 ML/MIN/1.73
EGFRCR SERPLBLD CKD-EPI 2021: 55.5 ML/MIN/1.73
EGFRCR SERPLBLD CKD-EPI 2021: 59.3 ML/MIN/1.73
ERYTHROCYTE [DISTWIDTH] IN BLOOD BY AUTOMATED COUNT: 14.7 % (ref 12.3–15.4)
FERRITIN SERPL-MCNC: 81.5 NG/ML (ref 30–400)
GLOBULIN UR ELPH-MCNC: 3.7 GM/DL
GLUCOSE BLDC GLUCOMTR-MCNC: 153 MG/DL (ref 70–105)
GLUCOSE BLDC GLUCOMTR-MCNC: 158 MG/DL (ref 70–105)
GLUCOSE SERPL-MCNC: 156 MG/DL (ref 65–99)
GLUCOSE SERPL-MCNC: 161 MG/DL (ref 65–99)
GLUCOSE SERPL-MCNC: 358 MG/DL (ref 65–99)
HBA1C MFR BLD: 8.2 % (ref 4.8–5.6)
HBA1C MFR BLD: 8.3 % (ref 4.8–5.6)
HCT VFR BLD AUTO: 39.4 % (ref 37.5–51)
HGB BLD-MCNC: 13.1 G/DL (ref 13–17.7)
HOLD SPECIMEN: NORMAL
HOLD SPECIMEN: NORMAL
IRON 24H UR-MRATE: 87 MCG/DL (ref 59–158)
IRON SATN MFR SERPL: 21 % (ref 20–50)
LV EF NUC BP: 63 %
MAGNESIUM SERPL-MCNC: 1.9 MG/DL (ref 1.6–2.4)
MAXIMAL PREDICTED HEART RATE: 140 BPM
MCH RBC QN AUTO: 29.3 PG (ref 26.6–33)
MCHC RBC AUTO-ENTMCNC: 33.2 G/DL (ref 31.5–35.7)
MCV RBC AUTO: 88.2 FL (ref 79–97)
NT-PROBNP SERPL-MCNC: 1912 PG/ML (ref 0–1800)
PERCENT MAX PREDICTED HR: 92.86 %
PLATELET # BLD AUTO: 116 10*3/MM3 (ref 140–450)
PMV BLD AUTO: 7.9 FL (ref 6–12)
POTASSIUM SERPL-SCNC: 4.1 MMOL/L (ref 3.5–5.2)
POTASSIUM SERPL-SCNC: 4.4 MMOL/L (ref 3.5–5.2)
POTASSIUM SERPL-SCNC: 4.5 MMOL/L (ref 3.5–5.2)
PROT SERPL-MCNC: 7.3 G/DL (ref 6–8.5)
QT INTERVAL: 306 MS
RBC # BLD AUTO: 4.47 10*6/MM3 (ref 4.14–5.8)
SODIUM SERPL-SCNC: 135 MMOL/L (ref 136–145)
SODIUM SERPL-SCNC: 140 MMOL/L (ref 136–145)
SODIUM SERPL-SCNC: 141 MMOL/L (ref 136–145)
STRESS BASELINE BP: NORMAL MMHG
STRESS BASELINE HR: 101 BPM
STRESS PERCENT HR: 109 %
STRESS POST PEAK BP: NORMAL MMHG
STRESS POST PEAK HR: 130 BPM
STRESS TARGET HR: 119 BPM
T4 FREE SERPL-MCNC: 1.13 NG/DL (ref 0.93–1.7)
TIBC SERPL-MCNC: 408 MCG/DL (ref 298–536)
TRANSFERRIN SERPL-MCNC: 274 MG/DL (ref 200–360)
TROPONIN T SERPL HS-MCNC: 64 NG/L
TROPONIN T SERPL HS-MCNC: 68 NG/L
TSH SERPL DL<=0.05 MIU/L-ACNC: 5.43 UIU/ML (ref 0.27–4.2)
WBC NRBC COR # BLD: 5.9 10*3/MM3 (ref 3.4–10.8)

## 2023-05-11 PROCEDURE — 0 TECHNETIUM TETROFOSMIN KIT: Performed by: HOSPITALIST

## 2023-05-11 PROCEDURE — 80048 BASIC METABOLIC PNL TOTAL CA: CPT | Performed by: EMERGENCY MEDICINE

## 2023-05-11 PROCEDURE — G0378 HOSPITAL OBSERVATION PER HR: HCPCS

## 2023-05-11 PROCEDURE — 97161 PT EVAL LOW COMPLEX 20 MIN: CPT

## 2023-05-11 PROCEDURE — 84484 ASSAY OF TROPONIN QUANT: CPT | Performed by: EMERGENCY MEDICINE

## 2023-05-11 PROCEDURE — A9502 TC99M TETROFOSMIN: HCPCS | Performed by: HOSPITALIST

## 2023-05-11 PROCEDURE — 93005 ELECTROCARDIOGRAM TRACING: CPT | Performed by: EMERGENCY MEDICINE

## 2023-05-11 PROCEDURE — 80162 ASSAY OF DIGOXIN TOTAL: CPT | Performed by: EMERGENCY MEDICINE

## 2023-05-11 PROCEDURE — 99285 EMERGENCY DEPT VISIT HI MDM: CPT

## 2023-05-11 PROCEDURE — 93018 CV STRESS TEST I&R ONLY: CPT | Performed by: INTERNAL MEDICINE

## 2023-05-11 PROCEDURE — 83735 ASSAY OF MAGNESIUM: CPT

## 2023-05-11 PROCEDURE — 82948 REAGENT STRIP/BLOOD GLUCOSE: CPT

## 2023-05-11 PROCEDURE — 85027 COMPLETE CBC AUTOMATED: CPT | Performed by: NURSE PRACTITIONER

## 2023-05-11 PROCEDURE — 93017 CV STRESS TEST TRACING ONLY: CPT

## 2023-05-11 PROCEDURE — 93005 ELECTROCARDIOGRAM TRACING: CPT

## 2023-05-11 PROCEDURE — 97165 OT EVAL LOW COMPLEX 30 MIN: CPT

## 2023-05-11 PROCEDURE — 84439 ASSAY OF FREE THYROXINE: CPT | Performed by: EMERGENCY MEDICINE

## 2023-05-11 PROCEDURE — 25010000002 REGADENOSON 0.4 MG/5ML SOLUTION: Performed by: HOSPITALIST

## 2023-05-11 PROCEDURE — 82306 VITAMIN D 25 HYDROXY: CPT | Performed by: NURSE PRACTITIONER

## 2023-05-11 PROCEDURE — 78452 HT MUSCLE IMAGE SPECT MULT: CPT | Performed by: INTERNAL MEDICINE

## 2023-05-11 PROCEDURE — 80048 BASIC METABOLIC PNL TOTAL CA: CPT | Performed by: NURSE PRACTITIONER

## 2023-05-11 PROCEDURE — 84443 ASSAY THYROID STIM HORMONE: CPT | Performed by: EMERGENCY MEDICINE

## 2023-05-11 PROCEDURE — 78452 HT MUSCLE IMAGE SPECT MULT: CPT

## 2023-05-11 PROCEDURE — 83036 HEMOGLOBIN GLYCOSYLATED A1C: CPT | Performed by: HOSPITALIST

## 2023-05-11 RX ORDER — ACETAMINOPHEN 160 MG/5ML
650 SOLUTION ORAL EVERY 4 HOURS PRN
Status: DISCONTINUED | OUTPATIENT
Start: 2023-05-11 | End: 2023-05-12 | Stop reason: HOSPADM

## 2023-05-11 RX ORDER — GLIMEPIRIDE 2 MG/1
6 TABLET ORAL
COMMUNITY

## 2023-05-11 RX ORDER — DEXTROSE MONOHYDRATE 25 G/50ML
25 INJECTION, SOLUTION INTRAVENOUS
Status: DISCONTINUED | OUTPATIENT
Start: 2023-05-11 | End: 2023-05-12 | Stop reason: HOSPADM

## 2023-05-11 RX ORDER — REGADENOSON 0.08 MG/ML
0.4 INJECTION, SOLUTION INTRAVENOUS
Status: COMPLETED | OUTPATIENT
Start: 2023-05-11 | End: 2023-05-11

## 2023-05-11 RX ORDER — ATORVASTATIN CALCIUM 40 MG/1
40 TABLET, FILM COATED ORAL NIGHTLY
Status: DISCONTINUED | OUTPATIENT
Start: 2023-05-11 | End: 2023-05-12 | Stop reason: HOSPADM

## 2023-05-11 RX ORDER — SODIUM CHLORIDE 0.9 % (FLUSH) 0.9 %
10 SYRINGE (ML) INJECTION EVERY 12 HOURS SCHEDULED
Status: DISCONTINUED | OUTPATIENT
Start: 2023-05-11 | End: 2023-05-12 | Stop reason: HOSPADM

## 2023-05-11 RX ORDER — POLYETHYLENE GLYCOL 3350 17 G/17G
17 POWDER, FOR SOLUTION ORAL NIGHTLY
COMMUNITY

## 2023-05-11 RX ORDER — CHOLECALCIFEROL (VITAMIN D3) 125 MCG
5 CAPSULE ORAL ONCE
Status: COMPLETED | OUTPATIENT
Start: 2023-05-11 | End: 2023-05-11

## 2023-05-11 RX ORDER — SODIUM CHLORIDE 9 MG/ML
40 INJECTION, SOLUTION INTRAVENOUS AS NEEDED
Status: DISCONTINUED | OUTPATIENT
Start: 2023-05-11 | End: 2023-05-12 | Stop reason: HOSPADM

## 2023-05-11 RX ORDER — ONDANSETRON 4 MG/1
4 TABLET, ORALLY DISINTEGRATING ORAL 4 TIMES DAILY PRN
Status: DISCONTINUED | OUTPATIENT
Start: 2023-05-11 | End: 2023-05-12 | Stop reason: HOSPADM

## 2023-05-11 RX ORDER — ONDANSETRON 4 MG/1
4 TABLET, FILM COATED ORAL EVERY 6 HOURS PRN
Status: DISCONTINUED | OUTPATIENT
Start: 2023-05-11 | End: 2023-05-12 | Stop reason: HOSPADM

## 2023-05-11 RX ORDER — ACETAMINOPHEN 650 MG/1
650 SUPPOSITORY RECTAL EVERY 4 HOURS PRN
Status: DISCONTINUED | OUTPATIENT
Start: 2023-05-11 | End: 2023-05-12 | Stop reason: HOSPADM

## 2023-05-11 RX ORDER — IBUPROFEN 600 MG/1
1 TABLET ORAL
Status: DISCONTINUED | OUTPATIENT
Start: 2023-05-11 | End: 2023-05-12 | Stop reason: HOSPADM

## 2023-05-11 RX ORDER — ONDANSETRON 2 MG/ML
4 INJECTION INTRAMUSCULAR; INTRAVENOUS EVERY 6 HOURS PRN
Status: DISCONTINUED | OUTPATIENT
Start: 2023-05-11 | End: 2023-05-12 | Stop reason: HOSPADM

## 2023-05-11 RX ORDER — DIGOXIN 125 MCG
125 TABLET ORAL
Status: DISCONTINUED | OUTPATIENT
Start: 2023-05-12 | End: 2023-05-12 | Stop reason: HOSPADM

## 2023-05-11 RX ORDER — SODIUM CHLORIDE 0.9 % (FLUSH) 0.9 %
10 SYRINGE (ML) INJECTION AS NEEDED
Status: DISCONTINUED | OUTPATIENT
Start: 2023-05-11 | End: 2023-05-12 | Stop reason: HOSPADM

## 2023-05-11 RX ORDER — INSULIN LISPRO 100 [IU]/ML
2-7 INJECTION, SOLUTION INTRAVENOUS; SUBCUTANEOUS
Status: DISCONTINUED | OUTPATIENT
Start: 2023-05-11 | End: 2023-05-12 | Stop reason: HOSPADM

## 2023-05-11 RX ORDER — NICOTINE POLACRILEX 4 MG
15 LOZENGE BUCCAL
Status: DISCONTINUED | OUTPATIENT
Start: 2023-05-11 | End: 2023-05-12 | Stop reason: HOSPADM

## 2023-05-11 RX ORDER — NITROGLYCERIN 0.4 MG/1
0.4 TABLET SUBLINGUAL
Status: DISCONTINUED | OUTPATIENT
Start: 2023-05-11 | End: 2023-05-12 | Stop reason: HOSPADM

## 2023-05-11 RX ORDER — PANTOPRAZOLE SODIUM 40 MG/1
40 TABLET, DELAYED RELEASE ORAL
Status: DISCONTINUED | OUTPATIENT
Start: 2023-05-12 | End: 2023-05-12 | Stop reason: HOSPADM

## 2023-05-11 RX ORDER — ACETAMINOPHEN 325 MG/1
650 TABLET ORAL EVERY 4 HOURS PRN
Status: DISCONTINUED | OUTPATIENT
Start: 2023-05-11 | End: 2023-05-12 | Stop reason: HOSPADM

## 2023-05-11 RX ADMIN — ATORVASTATIN CALCIUM 40 MG: 40 TABLET, FILM COATED ORAL at 20:08

## 2023-05-11 RX ADMIN — TETROFOSMIN 1 DOSE: 1.38 INJECTION, POWDER, LYOPHILIZED, FOR SOLUTION INTRAVENOUS at 10:15

## 2023-05-11 RX ADMIN — Medication 5 MG: at 21:27

## 2023-05-11 RX ADMIN — Medication 10 ML: at 20:08

## 2023-05-11 RX ADMIN — METOPROLOL TARTRATE 25 MG: 25 TABLET, FILM COATED ORAL at 20:08

## 2023-05-11 RX ADMIN — EMPAGLIFLOZIN 10 MG: 10 TABLET, FILM COATED ORAL at 16:37

## 2023-05-11 RX ADMIN — NITROGLYCERIN 1 INCH: 20 OINTMENT TOPICAL at 04:16

## 2023-05-11 RX ADMIN — ACETAMINOPHEN 650 MG: 325 TABLET, FILM COATED ORAL at 13:19

## 2023-05-11 RX ADMIN — TETROFOSMIN 1 DOSE: 1.38 INJECTION, POWDER, LYOPHILIZED, FOR SOLUTION INTRAVENOUS at 11:25

## 2023-05-11 RX ADMIN — REGADENOSON 0.4 MG: 0.08 INJECTION, SOLUTION INTRAVENOUS at 11:25

## 2023-05-11 NOTE — H&P
[facility] Medicine Services      Patient Name: Cisco Frank  : 1940  MRN: [id number]  Primary Care Physician:  Nuno Patton MD  Date of admission: 2023      Subjective      Chief Complaint: Elevated troponin     History of Present Illness: Cisco Frank is a 82 y.o. male with a past medical history of CAD, HTN, atrial fibrillation, type 2 diabetes mellitus, and recent fall with rib fracture who presented to [facility] on 2023 complaining of generalized weakness, and elevated troponin. Patient was evaluated by PCP yesterday as a follow  Up to a recent rib fracture from a fall , poorly controlled type 2 diabetes mellitus, and feeling unwell since the fall. Labs drawn and resulted with increasing troponin levels, with reports that they were in the 30s last month, 64 yesterday and 68 today. The patient states that he received a call from Dr. Patton at 1am this morning an  advised him he needed to report to the emergency department immediately for evaluation and possible cardiac cath.   Initially he denied chest pain, but developed some epigastric pain, with associated light headedness that has since resolved. Nitro paste was ordered and placed in the emergency department.  High-sensitivity troponin 68, he is hyperglycemic at 161 which is improved from yesterday at 358, he has an acute kidney injury with creatinine of 1.30, improved from yesterday 1.67, elevated BUN at 28 improved from 33 yesterday, TSH 5.43, CBC is unremarkable, chest x-ray shows no acute cardiopulmonary processes.  EKG interpreted as atrial fibrillation heart rate of 99    Upon review of medical records patient was admitted 2022 for hematemesis, he had EGD, and was told he had gastritis.  He just followed up on 2023 with cardiology. He endorsed recently stopping his Cardizem, because he felt it was causing his fatigue.  Neurology on board with discontinuation of  Cardizem and continuing metoprolol and digoxin.    He denies any other acute distress, palpitations, shortness of breath, fever, chills, lightheadedness, syncope, abdominal pain, nausea, vomiting, diarrhea, constipation or  symptoms.    Upon evaluation patient is alert and oriented he is sitting up in bed, he appears to be anxious but in no acute distress, he is tachycardic on bedside monitor with HR of 116, irregular rhythm, with frequent PVCs. His sounds are clear to auscultation, abdomen soft round, with positive bowel sound in all four quadrants, he has no noted dependent edema.  Patient is very hard of hearing with out his hearing aides.     Initial evaluation in the emergency department  Blood pressure 123/74, heart rate 90, respiratory rate 17, oxygen saturation 94% on room air, he is afebrile with a Tmax of 97.2.  Elevated troponin 64, 68, elevated proBNP 1912 yesterday, he is hyperglycemic with a glucose of 161, he has a slight increase in creatinine 1.30 which is improved from yesterday when it was 1.67, BUN is 28, TSH is 5.43, chest x-ray is unremarkable for any acute cardiopulmonary processes.  She is interpreted as atrial fibrillation with heart rate 99 no obvious ischemia    Patient was administered Nitropaste in the emergency department.    [facility] and treatment of fatigue, weakness, and abnormal lab values.    Review of Systems   Constitutional: Positive for malaise/fatigue.   HENT: Negative.    Eyes: Negative.    Cardiovascular: Negative.    Respiratory: Negative.    Endocrine: Negative.    Hematologic/Lymphatic: Negative.    Skin: Negative.    Musculoskeletal: Negative.    Gastrointestinal: Negative.    Genitourinary: Negative.    Neurological: Positive for weakness.   Psychiatric/Behavioral: Negative.    Allergic/Immunologic: Negative.    All other systems reviewed and are negative.       Personal History     Past Medical History:    Diagnosis Date   • Atrial fibrillation    • Cancer    • Coronary artery disease    • History of bladder cancer    • Hyperlipidemia        Past Surgical History:   Procedure Laterality Date   • BLADDER SURGERY  10/26/2019    [facility] Dr. Cope   • BRONCHOSCOPY N/A 6/30/2022    Procedure: BRONCHOSCOPY with bilateral lung wash;  Surgeon: Ayush Velarde MD;  Location: [facility] ENDOSCOPY;  Service: Pulmonary;  Laterality: N/A;  normal bronch   • CARDIAC CATHETERIZATION     • COLONOSCOPY     • COLONOSCOPY W/ POLYPECTOMY  02/23/2021   • ENDOSCOPY N/A 4/20/2023    Procedure: ESOPHAGOGASTRODUODENOSCOPY with gastric biopsy's;  Surgeon: Debra Lomeli MD;  Location: [facility] ENDOSCOPY;  Service: Gastroenterology;  Laterality: N/A;  gastritis, cardia nodules, barretts esophagus   • SKIN CANCER EXCISION      right temple BX   • UPPER ENDOSCOPIC ULTRASOUND W/ FNA N/A 3/12/2021    Procedure: ESOPHAGOGASTRODUODENOSCOPY WITH endoscopic mucosal resection, biopsy x 1 area, and endoscopic ULTRASOUND;  Surgeon: Abner Infante MD;  Location: [facility] ENDOSCOPY;  Service: Gastroenterology;  Laterality: N/A;  post op: hiatal hernia, duodenal polyp       Family History: family history includes Heart attack in his father; Heart disease in his mother; No Known Problems in his brother, maternal aunt, maternal grandfather, maternal grandmother, maternal uncle, paternal aunt, paternal grandfather, paternal grandmother, paternal uncle, sister, and another family member.    Social History:  reports that he quit smoking about 43 years ago. His smoking use included cigarettes. He has never used smokeless tobacco. He reports that he does not currently use alcohol. He reports that he does not use drugs.    Home Medications:  Prior to Admission Medications     Prescriptions Last Dose Informant Patient Reported? Taking?    atorvastatin (LIPITOR) 40 MG tablet   No No    Take 1 tablet by mouth every night at bedtime.    colestipol (COLESTID) 1 g tablet    Yes No    Take 1 tablet by mouth 2 (Two) Times a Day.    digoxin (LANOXIN) 125 MCG tablet   No No    Take 1 tablet by mouth Daily.    glimepiride (AMARYL) 2 MG tablet   No No    TAKE 3 TABLETS EVERY MORNING BEFORE BREAKFAST    glucose blood (Accu-Chek Tessa Plus) test strip   No No    Test daily and prn    Jardiance 10 MG tablet tablet   Yes No    Take 1 tablet by mouth Daily.    metoprolol tartrate (LOPRESSOR) 25 MG tablet   No No    Take 1 tablet by mouth Every 12 (Twelve) Hours for 30 days.    omeprazole-sodium bicarbonate (ZEGERID)  MG pack packet   Yes No    Take  by mouth Every Morning Before Breakfast.    ondansetron ODT (ZOFRAN-ODT) 4 MG disintegrating tablet   No No    Place 1 tablet on the tongue 4 (Four) Times a Day As Needed for Nausea or Vomiting for up to 15 doses.    pantoprazole (PROTONIX) 40 MG EC tablet   No No    Take 1 tablet by mouth 2 (Two) Times a Day Before Meals.    polyethylene glycol (MIRALAX) 17 g packet   No No    Take 17 g by mouth Daily.    rivaroxaban (XARELTO) 20 MG tablet   Yes No    Take 1 tablet by mouth Daily.            Allergies:  No Known Allergies    Objective      Vitals:   Temp:  [97.2 °F (36.2 °C)] 97.2 °F (36.2 °C)  Heart Rate:  [] 88  Resp:  [17] 17  BP: (114-146)/(68-80) 127/68    Physical Exam  Vitals and nursing note reviewed.   Constitutional:       General: He is awake.      Appearance: He is obese.      Comments: Room Air   HENT:      Mouth/Throat:      Mouth: Mucous membranes are dry.   Eyes:      Conjunctiva/sclera: Conjunctivae normal.   Cardiovascular:      Rate and Rhythm: Normal rate. Rhythm irregular.   Pulmonary:      Effort: Pulmonary effort is normal.      Breath sounds: Normal breath sounds. No decreased breath sounds.   Abdominal:      General: Bowel sounds are normal.      Palpations: Abdomen is soft.   Musculoskeletal:         General: Normal range of motion.   Skin:     General: Skin is warm and dry.      Capillary Refill: Capillary  refill takes less than 2 seconds.   Neurological:      General: No focal deficit present.      Mental Status: He is alert and oriented to person, place, and time.   Psychiatric:         Mood and Affect: Mood normal.         Behavior: Behavior is cooperative.          Result Review    Result Review:  I have personally reviewed the results from the time of this admission to 5/11/2023 05:17 EDT and agree with these findings:  [x]  Laboratory  []  Microbiology  [x]  Radiology  [x]  EKG/Telemetry   []  Cardiology/Vascular   []  Pathology  [x]  Old records  []  Other:  Most notable findings include:     Assessment & Plan        Active Hospital Problems:  Active Hospital Problems    Diagnosis    • **Fatigue    • Weakness    • Acute kidney injury    • Atrial fibrillation    • Coronary artery disease    • Type 2 diabetes mellitus with hyperglycemia, without long-term current use of insulin (HCC)    • Essential hypertension      Plan:   Fatigue  Weakness  On going since recent call and rib fracture  He is not dyspneic  TSH today 5.43  Iron profile unremarkable 05/10/2023  Vitamin B12 298 on 04/03/2023  check vitamin D level   Consult PT OT     Coronary artery disease  Chronic   As needed nitro for chest pain   No obvious ischemia noted   HS troponin indeterminate 68 today and 64 yesterday, likely related to acute kidney injury  Continuous cardiac monitoring and pulse oximetry  Supplemental oxygen as needed for hypoxia/respiratory distress    Acute kidney injury  Acute  Creatinine 0.65 on April 22, 2023, 1.67 yesterday, and 1.30 today BUN 19, 33 and 28 today  Monitor I's and O's  Daily weights  Avoid nephrotoxic medications, hypovolemia, hypotension  Consider nephrology consulted if clinically appropriate during hospitalization  Patient denies excessive use of NSAIDs    Atrial fibrillation  Chronic anticoagulation on Xarelto  Recently discontinued Cardizem  Continue metoprolol, and digoxin  Chads score is 5  Continuous cardiac  monitoring and pulse oximetry  EKG read as atrial fibrillation heart rate 99    Type 2 diabetes mellitus with hyperglycemia, without long-term current use of insulin  Chronic poorly controlled recently   Hemoglobin A1c 8.30 yesterday  Accu-Cheks ACHS  Continue home jardiance, and glimepiride   Hypoglycemia treatment per protocol     Essential hypertension  Chronic   Vital signs per protocol   Blood pressure 114/72         DVT prophylaxis:  Mechanical DVT prophylaxis orders are present.    CODE STATUS:       Admission Status:  I believe this patient meets observation  status.    I discussed the patient's findings and my recommendations with patient.    This patient has been examined wearing appropriate Personal Protective Equipment . 05/11/23      Signature: Electronically signed by RIAN Chu, 05/11/23, 3:38 AM EDT.

## 2023-05-11 NOTE — ED PROVIDER NOTES
Subjective   History of Present Illness  82-year-old male presents with fatigue.  He had outpatient labs that showed elevated troponin.  He is having no chest pain or shortness of breath.  He states he fell sustained a rib fracture a little over a month ago.  He states he has just not felt well since then.  He has had no vomiting no diarrhea still urinating.  Review of Systems    Past Medical History:   Diagnosis Date   • Atrial fibrillation    • Cancer    • Coronary artery disease    • History of bladder cancer    • Hyperlipidemia        No Known Allergies    Past Surgical History:   Procedure Laterality Date   • BLADDER SURGERY  10/26/2019    [facility] Dr. C[facility]   • BRONCHOSCOPY N/A 6/30/2022    Procedure: BRONCHOSCOPY with bilateral lung wash;  Surgeon: Ayush Velarde MD;  Location: [facility] ENDOSCOPY;  Service: Pulmonary;  Laterality: N/A;  normal bronch   • CARDIAC CATHETERIZATION     • COLONOSCOPY     • COLONOSCOPY W/ POLYPECTOMY  02/23/2021   • ENDOSCOPY N/A 4/20/2023    Procedure: ESOPHAGOGASTRODUODENOSCOPY with gastric biopsy's;  Surgeon: Debra Lomeli MD;  Location: [facility] ENDOSCOPY;  Service: Gastroenterology;  Laterality: N/A;  gastritis, cardia nodules, barretts esophagus   • SKIN CANCER EXCISION      right temple BX   • UPPER ENDOSCOPIC ULTRASOUND W/ FNA N/A 3/12/2021    Procedure: ESOPHAGOGASTRODUODENOSCOPY WITH endoscopic mucosal resection, biopsy x 1 area, and endoscopic ULTRASOUND;  Surgeon: Abner Infante MD;  Location: [facility] ENDOSCOPY;  Service: Gastroenterology;  Laterality: N/A;  post op: hiatal hernia, duodenal polyp       Family History   Problem Relation Age of Onset   • Heart disease Mother    • Heart attack Father    • No Known Problems Sister    • No Known Problems Brother    • No Known Problems Maternal Aunt    • No Known Problems Maternal Uncle    • No Known Problems Paternal Aunt    • No Known Problems Paternal Uncle    • No Known Problems Maternal Grandmother    • No Known Problems  Maternal Grandfather    • No Known Problems Paternal Grandmother    • No Known Problems Paternal Grandfather    • No Known Problems Other    • Anemia Neg Hx    • Arrhythmia Neg Hx    • Asthma Neg Hx    • Clotting disorder Neg Hx    • Fainting Neg Hx    • Heart failure Neg Hx    • Hyperlipidemia Neg Hx    • Hypertension Neg Hx        Social History     Socioeconomic History   • Marital status:    Tobacco Use   • Smoking status: Former     Types: Cigarettes     Quit date: 1980     Years since quittin.3   • Smokeless tobacco: Never   Vaping Use   • Vaping Use: Never used   Substance and Sexual Activity   • Alcohol use: Not Currently   • Drug use: Never   • Sexual activity: Defer     Prior to Admission medications    Medication Sig Start Date End Date Taking? Authorizing Provider   atorvastatin (LIPITOR) 40 MG tablet Take 1 tablet by mouth every night at bedtime. 3/9/23   Kailash Garsia MD   colestipol (COLESTID) 1 g tablet Take 1 tablet by mouth 2 (Two) Times a Day.    ProviderSerenity MD   digoxin (LANOXIN) 125 MCG tablet Take 1 tablet by mouth Daily. 23   Josemanuel Jimenez MD   glimepiride (AMARYL) 2 MG tablet TAKE 3 TABLETS EVERY MORNING BEFORE BREAKFAST 22   Nuno Patton MD   glucose blood (Accu-Chek Tessa Plus) test strip Test daily and prn 23   Nuno Patton MD   Jardiance 10 MG tablet tablet Take 1 tablet by mouth Daily. 3/13/23   Serenity Hinds MD   metoprolol tartrate (LOPRESSOR) 25 MG tablet Take 1 tablet by mouth Every 12 (Twelve) Hours for 30 days. 23  Josemanuel Jimenez MD   omeprazole-sodium bicarbonate (ZEGERID)  MG pack packet Take  by mouth Every Morning Before Breakfast.    Serenity Hinds MD   ondansetron ODT (ZOFRAN-ODT) 4 MG disintegrating tablet Place 1 tablet on the tongue 4 (Four) Times a Day As Needed for Nausea or Vomiting for up to 15 doses. 23   Nuno Patton MD   pantoprazole (PROTONIX) 40 MG EC tablet Take  1 tablet by mouth 2 (Two) Times a Day Before Meals. 4/23/23   Josemanuel Jimenez MD   polyethylene glycol (MIRALAX) 17 g packet Take 17 g by mouth Daily. 4/5/23   Marty Corona MD   rivaroxaban (XARELTO) 20 MG tablet Take 1 tablet by mouth Daily.    Provider, MD Serenity           Objective   Physical Exam  82-year-old male awake alert.  Generally well-developed well-nourished.  Pupils equal round react light.  Neck supple.  Chest clear equal breath sounds.  Cardiovascular regular rate and rhythm.  Abdomen soft nontender extremities without tenderness edema.  Neurologic exam without focal findings noted.  Procedures           ED Course      Results for orders placed or performed during the hospital encounter of 05/11/23   Digoxin Level    Specimen: Blood   Result Value Ref Range    Digoxin 0.90 0.60 - 1.20 ng/mL   Basic Metabolic Panel    Specimen: Blood   Result Value Ref Range    Glucose 161 (H) 65 - 99 mg/dL    BUN 28 (H) 8 - 23 mg/dL    Creatinine 1.30 (H) 0.76 - 1.27 mg/dL    Sodium 140 136 - 145 mmol/L    Potassium 4.1 3.5 - 5.2 mmol/L    Chloride 104 98 - 107 mmol/L    CO2 25.0 22.0 - 29.0 mmol/L    Calcium 8.6 8.6 - 10.5 mg/dL    BUN/Creatinine Ratio 21.5 7.0 - 25.0    Anion Gap 11.0 5.0 - 15.0 mmol/L    eGFR 55.5 (L) >60.0 mL/min/1.73   Single High Sensitivity Troponin T    Specimen: Blood   Result Value Ref Range    HS Troponin T 68 (C) <15 ng/L   T4, Free    Specimen: Blood   Result Value Ref Range    Free T4 1.13 0.93 - 1.70 ng/dL   TSH    Specimen: Blood   Result Value Ref Range    TSH 5.430 (H) 0.270 - 4.200 uIU/mL   ECG 12 Lead Other; weakness   Result Value Ref Range    QT Interval 306 ms     XR Chest PA & Lateral    Result Date: 5/10/2023  Impression: No acute cardiopulmonary process. Electronically Signed: Davina Moran  5/10/2023 3:24 PM EDT  Workstation ID: [id number]    Medications   sodium chloride 0.9 % flush 10 mL (has no administration in time range)     /75   Pulse 85   Temp 97.2  "°F (36.2 °C)   Resp 17   Ht 177.8 cm (70\")   Wt 82.4 kg (181 lb 10.5 oz)   SpO2 97%   BMI 26.07 kg/m²                                          MDM  Chart review: Patient had upper GI on the 20th of last month.  Was found to have gastric nodules with Schroeder's esophagus.  Gastric nodule pathology showed acute inflamed gastric hyperplastic polyp negative for helical factor dysplasia or malignancy  Comorbidity: As per past history   Differential: Atrial fibrillation, renal insufficiency, poorly controlled diabetes, MI,  My EKG interpretation: Atrial fibrillation with controlled ventricular response.  Compared to previous ventricular response improved  Lab: TSH mild elevation of 5.43 however free T4 normal 1.13 digoxin therapeutic 0.9 patient metabolic panel glucose 161 with BUN 28 creatinine 1.3.  Troponin elevated at 68  My Radiology review and interpretation: Chest x-ray from [phone] shows clear lungs no evidence of pneumonia or failure or pneumothorax.  No pleural effusion.  Discussion/treatment: Patient was noted to have had a troponin of 64 yesterday.  Hemoglobin A1c had been 8.3.  He had a creatinine of 1.67 previously had been 0.65 on the 22nd and 1.45 on the 21st of last month.  Review of patient's chart shows his troponin had been 64 yesterday had been 32 and 37,  3 weeks ago.  Patient was discussed with the nurse practitioner for the hospitalist.  He will be brought in to their service for further evaluation.  We will ask Dr. Garsia to consult.  Patient was evaluated using appropriate PPE      Final diagnoses:   Other fatigue   Elevated troponin   Atrial fibrillation, unspecified type       ED Disposition  ED Disposition     ED Disposition   Decision to Admit    Condition   --    Comment   Level of Care: Telemetry [5]   Admitting Physician: MARCO A STEPHEN [[id number]]               No follow-up provider specified.       Medication List      No changes were made to your prescriptions during this visit.   "        Manjit Staley MD  05/11/23 0329       Manjit Staley MD  05/11/23 0334

## 2023-05-11 NOTE — PLAN OF CARE
Goal Outcome Evaluation:  Plan of Care Reviewed With: patient           Outcome Evaluation: Pt is an 82 y.o. male who fell 1 month ago with resultant rib fx and has not felt well since. Pt had lab draws 1 day ago with increasing troponin levels and was advised to come to ED.   PMH: CAD, HTN, Afib, DM, bladder CA. Pt lives with his wife in a [facility] with no SONYA. 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  He reports that generally he and his wife walk about 1 mile/day to stay active.  He has been doing his incentive spirometry 5 times/day since his rib fx.  At time of evaluation, he is A&O x4. He demonstrates good strength in LEs and is independent with transfers and mobility. Pt exhibits variable HR throughout evaluation but not impacted by activity. No PT needs identified at this time.

## 2023-05-11 NOTE — PLAN OF CARE
Goal Outcome Evaluation:  Plan of Care Reviewed With: patient        Progress: no change  Outcome Evaluation: Pt is an 81 y/o M admitted to [facility] 5/11/23 with c/o fatigue and elevated troponin. Pt recently admitted 4/3/23 s/p fall with rib fx. PMHx significant for DMII, A. fib, ALMA, hx bladder cancer, diabetic retinopathy, and hx falls. At baseline pt lives with spouse in [facility] with 0 SONYA. Pt is typically independent with ADLs and occassionally uses cane. Pt continues to drive and work part time at [employer] dept. 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 This date, pt A&Ox4. Pt completes bed mobility, comes to stand and ambulates with cane independently. Pt don/doff shoes sitting edge of bed independently. Pt does not demo deficits with BUE ROM/strength. Pt HR increased throughout session however did not appear to be impacted by activity. Pt appears to be limited by pain d/t recent rib fx. Pt does not demo deficits that warrant further skilled OT intervention. OT to complete orders at this time.

## 2023-05-11 NOTE — CONSULTS
[facility]   CONSULT NOTE    Referring Provider: Dr. Staley   Reason for Consultation: fatigue, elevated troponin     Patient Care Team:  Nuno Patton MD as PCP - General (Family Medicine)  Kailash Garsia MD as Consulting Physician (Cardiology)  Comfort Romo APRN as Nurse Practitioner (Cardiology)    Chief complaint fatigue       History of present illness:  Cisco Frank is a 82 y.o. male with past medical history of hyperlipidemia, hypertension, CAD, DM II, persistent atrial fibrillation, GERD presented to the ED with generalized weakness/fatigue and elevated troponin.  Patient was seen by PCP yesterday for chief complaint and had lab work drawn and was called at 1 AM this morning to report to ED for further cardiac work-up as patient's troponin were slightly elevated.  Of note patient was seen by cardiology in office on Monday and was slightly hypotensive, therefore, medications adjusted including stopping Cardizem.  Patient states he has not been feeling well since his fall about a month and a half ago.  This is now his third admission since fall in which he broke several ribs.  Patient states he is also experiencing epigastric pain and intermittent lightheadedness. Patient denies chest pain, shortness of breath, edema, syncope, dizziness.    Cardiology consulted for elevated troponins.  Patient's troponin slightly elevated at 64, 68.  Creatinine also elevated at time of troponin check at 1.67 but has improved today at 1.23.  Patient's TSH and proBNP elevated, CBC unremarkable. 12-EKG shows atrial fibrillation, and is similar when compared to previous study.  Recent echocardiogram shows normal LVEF 56 to 60% with a mildly dilated RV and moderate pericardial effusion noted.    Review of Systems   Constitutional: Positive for malaise/fatigue. Negative for chills and fever.   Cardiovascular: Negative for chest pain, dyspnea on exertion, leg swelling, near-syncope,  palpitations and syncope.   Respiratory: Negative for cough and shortness of breath.    Gastrointestinal: Positive for abdominal pain (epigasric pain ). Negative for nausea and vomiting.   Neurological: Positive for light-headedness and weakness. Negative for dizziness and headaches.       History  Past Medical History:   Diagnosis Date   • Atrial fibrillation    • Cancer    • Coronary artery disease    • History of bladder cancer    • Hyperlipidemia        Past Surgical History:   Procedure Laterality Date   • BLADDER SURGERY  10/26/2019    [facility] Dr. Cope   • BRONCHOSCOPY N/A 6/30/2022    Procedure: BRONCHOSCOPY with bilateral lung wash;  Surgeon: Ayush Velarde MD;  Location: [facility] ENDOSCOPY;  Service: Pulmonary;  Laterality: N/A;  normal bronch   • CARDIAC CATHETERIZATION     • COLONOSCOPY     • COLONOSCOPY W/ POLYPECTOMY  02/23/2021   • ENDOSCOPY N/A 4/20/2023    Procedure: ESOPHAGOGASTRODUODENOSCOPY with gastric biopsy's;  Surgeon: Debra Lomeli MD;  Location: [facility] ENDOSCOPY;  Service: Gastroenterology;  Laterality: N/A;  gastritis, cardia nodules, barretts esophagus   • SKIN CANCER EXCISION      right temple BX   • UPPER ENDOSCOPIC ULTRASOUND W/ FNA N/A 3/12/2021    Procedure: ESOPHAGOGASTRODUODENOSCOPY WITH endoscopic mucosal resection, biopsy x 1 area, and endoscopic ULTRASOUND;  Surgeon: Abner Infante MD;  Location: [facility] ENDOSCOPY;  Service: Gastroenterology;  Laterality: N/A;  post op: hiatal hernia, duodenal polyp       Family History   Problem Relation Age of Onset   • Heart disease Mother    • Heart attack Father    • No Known Problems Sister    • No Known Problems Brother    • No Known Problems Maternal Aunt    • No Known Problems Maternal Uncle    • No Known Problems Paternal Aunt    • No Known Problems Paternal Uncle    • No Known Problems Maternal Grandmother    • No Known Problems Maternal Grandfather    • No Known Problems Paternal Grandmother    • No Known Problems Paternal  "Grandfather    • No Known Problems Other    • Anemia Neg Hx    • Arrhythmia Neg Hx    • Asthma Neg Hx    • Clotting disorder Neg Hx    • Fainting Neg Hx    • Heart failure Neg Hx    • Hyperlipidemia Neg Hx    • Hypertension Neg Hx        Social History     Tobacco Use   • Smoking status: Former     Types: Cigarettes     Quit date: 1980     Years since quittin.3   • Smokeless tobacco: Never   Vaping Use   • Vaping Use: Never used   Substance Use Topics   • Alcohol use: Not Currently   • Drug use: Never        (Not in a hospital admission)        Patient has no known allergies.    Scheduled Meds:sodium chloride, 10 mL, Intravenous, Q12H      Continuous Infusions:   PRN Meds:.•  acetaminophen **OR** acetaminophen **OR** acetaminophen  •  nitroglycerin  •  ondansetron **OR** ondansetron  •  [COMPLETED] Insert Peripheral IV **AND** sodium chloride  •  sodium chloride  •  sodium chloride        VITAL SIGNS  Vitals:    23 0931 23 0946 23 1001 23 1046   BP: 117/73 130/80 126/74 130/75   BP Location:       Patient Position:       Pulse: 96 100 92 81   Resp:    16   Temp:    97.5 °F (36.4 °C)   SpO2: 93% 96% 94% 97%   Weight:       Height:           Flowsheet Rows    Flowsheet Row First Filed Value   Admission Height 177.8 cm (70\") Documented at 2023 0151   Admission Weight 82.4 kg (181 lb 10.5 oz) Documented at 2023 0151           TELEMETRY: Atrial fibrillation with controlled ventricular response    Physical Exam:  Vitals reviewed.   Constitutional:       Appearance: Well-developed.   Eyes:      Pupils: Pupils are equal, round, and reactive to light.   HENT:      Nose: Nose normal.    Mouth/Throat:      Pharynx: Oropharynx is clear.   Pulmonary:      Effort: Pulmonary effort is normal.      Breath sounds: Normal breath sounds.   Cardiovascular:      Normal rate. Irregularly irregular rhythm.   Pulses:     Intact distal pulses.   Edema:     Peripheral edema absent.   Abdominal:    "   General: Bowel sounds are normal.   Musculoskeletal: Normal range of motion.      Cervical back: Normal range of motion and neck supple. Skin:     General: Skin is warm.   Neurological:      General: No focal deficit present.      Mental Status: Alert and oriented to person, place and time.           LAB RESULTS (LAST 7 DAYS)    CBC  Results from last 7 days   Lab Units 05/11/23  0542 05/10/23  1348   WBC 10*3/mm3 5.90 6.90   RBC 10*6/mm3 4.47 4.88   HEMOGLOBIN g/dL 13.1 14.8   HEMATOCRIT % 39.4 43.6   MCV fL 88.2 89.3   PLATELETS 10*3/mm3 116* 160       BMP  Results from last 7 days   Lab Units 05/11/23  0542 05/11/23  0226 05/10/23  1348   SODIUM mmol/L 141 140 135*   POTASSIUM mmol/L 4.4 4.1 4.5   CHLORIDE mmol/L 105 104 99   CO2 mmol/L 28.0 25.0 24.0   BUN mg/dL 26* 28* 33*   CREATININE mg/dL 1.23 1.30* 1.67*   GLUCOSE mg/dL 156* 161* 358*       CMP   Results from last 7 days   Lab Units 05/11/23 0542 05/11/23  0226 05/10/23  1348   SODIUM mmol/L 141 140 135*   POTASSIUM mmol/L 4.4 4.1 4.5   CHLORIDE mmol/L 105 104 99   CO2 mmol/L 28.0 25.0 24.0   BUN mg/dL 26* 28* 33*   CREATININE mg/dL 1.23 1.30* 1.67*   GLUCOSE mg/dL 156* 161* 358*   ALBUMIN g/dL  --   --  3.6   BILIRUBIN mg/dL  --   --  0.6   ALK PHOS U/L  --   --  164*   AST (SGOT) U/L  --   --  14   ALT (SGPT) U/L  --   --  15         BNP 1912        TROPONIN  Results from last 7 days   Lab Units 05/11/23 0226   HSTROP T ng/L 68*       CoAg        Creatinine Clearance  Estimated Creatinine Clearance: 55.4 mL/min (by C-G formula based on SCr of 1.23 mg/dL).    ABG        Radiology  XR Chest PA & Lateral    Result Date: 5/10/2023  Impression: No acute cardiopulmonary process. Electronically Signed: Davina Moran  5/10/2023 3:24 PM EDT  Workstation ID: [id number]          EKG        I personally viewed and interpreted the patient's EKG/Telemetry data:    ECHOCARDIOGRAM:    Results for orders placed during the hospital encounter of 04/03/23    Adult  Transthoracic Echo Complete W/ Cont if Necessary Per Protocol    Interpretation Summary  •  Left ventricular ejection fraction appears to be 56 - 60%.  •  The right ventricular cavity is mildly dilated.  •  Estimated right ventricular systolic pressure from tricuspid regurgitation is mildly elevated (35-45 mmHg).  •  There is a moderate (1-2cm) pericardial effusion. There is no evidence of cardiac tamponade.      STRESS MYOVIEW:  Results for orders placed during the hospital encounter of 10/26/20    Stress Test With Myocardial Perfusion One Day    Interpretation Summary  · Findings consistent with a normal ECG stress test.  · Left ventricular ejection fraction is normal. (Calculated EF = 70%).  · Impressions are consistent with a low risk study.  · Inferolateral reverse redistribution without any ischemia EF 70%.       CARDIAC CATHETERIZATION:  No results found for this or any previous visit.       OTHER:         Assessment & Plan       Fatigue    Essential hypertension    Type 2 diabetes mellitus with hyperglycemia, without long-term current use of insulin (HCC)    Coronary artery disease    Atrial fibrillation    Acute kidney injury    Weakness      Patient presented with fatigue and elevated troponins  Troponin 64, 68 with elevated BNP 1912  Patient denies chest pain  Will obtain Myoview study to evaluate for ischemia  Most recent echocardiogram from 4/4/2023 shows normal LVEF of 56 to 60% with a moderate pericardial effusion and no evidence of cardiac tamponade  Patient has a history of permanent atrial fibrillation and is anticoagulated with Xarelto, [id number] score is 5  Patient is currently on digoxin and metoprolol, he was recently taken off of Cardizem  Digoxin level normal at 0.9  Hemoglobin A1c elevated at 8.3, patient is on home oral therapy managed by PCP  Patient's creatinine elevated upon admission which could be cause of slightly elevated troponin  If patient requires cardiac catheterization will  consult nephrology for clearance and preparation  TSH elevated, free T4 normal  Electrolytes within normal limits, will obtain magnesium level as during last admission patient had hypomagnesemia requiring replacement  Blood pressure and heart rate currently stable  Further cardiac care based on results of Myoview study    I discussed the patients findings and my recommendations with patient and nurse    Comfort Romo, RIAN  05/11/23  11:56 EDT

## 2023-05-11 NOTE — CASE MANAGEMENT/SOCIAL WORK
Discharge Planning Assessment   Andrew     Patient Name: Cisco Frank  MRN: [id number]  Today's Date: 5/11/2023    Admit Date: 5/11/2023    Plan: Home   Discharge Needs Assessment     Row Name 05/11/23 1551       Living Environment    People in Home spouse    Current Living Arrangements home    Potentially Unsafe Housing Conditions none    Primary Care Provided by self    Provides Primary Care For no one    Able to Return to Prior Arrangements yes       Resource/Environmental Concerns    Resource/Environmental Concerns none    Transportation Concerns none       Food Insecurity    Within the past 12 months, you worried that your food would run out before you got the money to buy more. Never true       Transition Planning    Patient/Family Anticipated Services at Transition none    Transportation Anticipated family or friend will provide       Discharge Needs Assessment    Readmission Within the Last 30 Days no previous admission in last 30 days    Equipment Currently Used at Home none    Concerns to be Addressed denies needs/concerns at this time    Anticipated Changes Related to Illness none    Equipment Needed After Discharge none               Discharge Plan     Row Name 05/11/23 1553       Plan    Plan Home    Plan Comments Met with Patient at bedside Lives at home with wife. IADL's PCP and Pharmacy verified, able to afford medications. D/C Barriers: Chest pain workup              Continued Care and Services - Admitted Since 5/11/2023    Coordination has not been started for this encounter.          Demographic Summary     Row Name 05/11/23 1554       General Information    Admission Type observation    Arrived From emergency department    Referral Source admission list    Reason for Consult discharge planning    Preferred Language English               Functional Status     Row Name 05/11/23 1554       Functional Status    Usual Activity Tolerance moderate    Current Activity Tolerance moderate        Functional Status, IADL    Medications independent    Meal Preparation independent    Housekeeping independent    Laundry independent    Shopping independent       Mental Status    General Appearance WDL WDL       Mental Status Summary    Recent Changes in Mental Status/Cognitive Functioning no changes                      Patient Forms     Row Name 05/11/23 1522       Patient Forms    Important Message from Medicare ([facility]) --  moon 5/11 per sharonda Conway, RN

## 2023-05-11 NOTE — THERAPY EVALUATION
Patient Name: Cisco Frank  : 1940    MRN: [id number]                              Today's Date: 2023       Admit Date: 2023    Visit Dx:     ICD-10-CM ICD-9-CM   1. Other fatigue  R53.83 780.79   2. Elevated troponin  R77.8 790.6   3. 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   • Esophageal polyp   • Irritant contact dermatitis due to detergent   • Bilateral pseudophakia   • 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   • Posterior vitreous detachment   • Persistent vomiting   • Gastroenteritis   • Hemoptysis   • Pericardial effusion   • History of prostate cancer   • Atrial fibrillation   • COVID-19   • Acute kidney injury   • Dehydration   • Orthostatic hypotension   • Closed fracture of one rib of right side, initial encounter   • Fall   • Minor closed head injury   • Intractable headache, unspecified chronicity pattern, unspecified headache type   • Hematemesis with nausea   • Weakness   • Fatigue     Past Medical History:   Diagnosis Date   • Atrial fibrillation    • Cancer    • Coronary artery disease    • History of bladder cancer    • Hyperlipidemia      Past Surgical History:   Procedure Laterality Date   • BLADDER SURGERY  10/26/2019    [facility] Dr. Cope   • BRONCHOSCOPY N/A 2022    Procedure: BRONCHOSCOPY with bilateral lung wash;  Surgeon: Ayush Velarde MD;  Location: [facility] ENDOSCOPY;  Service: Pulmonary;  Laterality: N/A;  normal bronch    • CARDIAC CATHETERIZATION     • COLONOSCOPY     • COLONOSCOPY W/ POLYPECTOMY  02/23/2021   • ENDOSCOPY N/A 4/20/2023    Procedure: ESOPHAGOGASTRODUODENOSCOPY with gastric biopsy's;  Surgeon: Debra Lomeli MD;  Location: [facility] ENDOSCOPY;  Service: Gastroenterology;  Laterality: N/A;  gastritis, cardia nodules, barretts esophagus   • SKIN CANCER EXCISION      right temple BX   • UPPER ENDOSCOPIC ULTRASOUND W/ FNA N/A 3/12/2021    Procedure: ESOPHAGOGASTRODUODENOSCOPY WITH endoscopic mucosal resection, biopsy x 1 area, and endoscopic ULTRASOUND;  Surgeon: Abner Infante MD;  Location: [facility] ENDOSCOPY;  Service: Gastroenterology;  Laterality: N/A;  post op: hiatal hernia, duodenal polyp      General Information     Row Name 05/11/23 1542          Physical Therapy Time and Intention    Document Type evaluation  -CL     Mode of Treatment physical therapy  -CL     Row Name 05/11/23 1542          General Information    Patient Profile Reviewed yes  -CL     Prior Level of Function independent:;driving;work;community mobility  -CL     Existing Precautions/Restrictions no known precautions/restrictions  -CL     Barriers to Rehab none identified  -CL     Row Name 05/11/23 1542          Living Environment    People in Home spouse  -CL     Name(s) of People in Home Wife: Christine  -CL     Row Name 05/11/23 1542          Home Main Entrance    Number of Stairs, Main Entrance none  -CL     Row Name 05/11/23 1542          Cognition    Orientation Status (Cognition) oriented x 4  -CL           User Key  (r) = Recorded By, (t) = Taken By, (c) = Cosigned By    Initials Name Provider Type    CL Carmen Alvarez PT Physical Therapist               Mobility     Row Name 05/11/23 1544          Bed Mobility    Bed Mobility bed mobility (all) activities  -CL     All Activities, Iron (Bed Mobility) independent  -CL     Row Name 05/11/23 1544          Sit-Stand Transfer    Sit-Stand Iron (Transfers) independent   -CL     Row Name 05/11/23 1544          Gait/Stairs (Locomotion)    [place] Level (Gait) modified independence  -CL     Assistive Device (Gait) cane, straight  -CL     Distance in Feet (Gait) 120' including 3 turns  -CL     Comment, (Gait/Stairs) Carries cane; minimal use. Pt states he keeps it with him only in case of loss of balance with turns.  No LOB noted at time of evaluation.  -CL           User Key  (r) = Recorded By, (t) = Taken By, (c) = Cosigned By    Initials Name Provider Type    Carmen Brooke PT Physical Therapist               Obj/Interventions     Row Name 05/11/23 1545          Range of Motion Comprehensive    General Range of Motion bilateral lower extremity ROM WNL  -CL     Row Name 05/11/23 1545          Strength Comprehensive (MMT)    Comment, General Manual Muscle Testing (MMT) Assessment BLEs grossly 4+/5  -CL     Row Name 05/11/23 1545          Balance    Balance Assessment sitting dynamic balance;standing dynamic balance  -CL     Dynamic Sitting Balance independent  -CL     Position, Sitting Balance sitting edge of bed  -CL     Dynamic Standing Balance independent  -CL           User Key  (r) = Recorded By, (t) = Taken By, (c) = Cosigned By    Initials Name Provider Type    Carmen Brooke, PT Physical Therapist               Goals/Plan    No documentation.                Clinical Impression     Row Name 05/11/23 1547          Pain    Pretreatment Pain Rating 0/10 - no pain  -CL     Posttreatment Pain Rating 0/10 - no pain  -CL     Row Name 05/11/23 1547          Plan of Care Review    Plan of Care Reviewed With patient  -CL     Outcome Evaluation Pt is an 82 y.o. male who fell 1 month ago with resultant rib fx and has not felt well since. Pt had lab draws 1 day ago with increasing troponin levels and was advised to come to ED.   PMH: CAD, HTN, Afib, DM, bladder CA. Pt lives with his wife in a [facility] with no SONYA. 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.  He reports that generally he and his wife walk about 1 mile/day to stay active.  He has been doing his incentive spirometry 5 times/day since his rib fx.  At time of evaluation, he is A&O x4. He demonstrates good strength in LEs and is independent with transfers and mobility. Pt exhibits variable HR throughout evaluation but not impacted by activity. No PT needs identified at this time.  -CL     Row Name 05/11/23 1547          Therapy Assessment/Plan (PT)    Criteria for Skilled Interventions Met (PT) no problems identified which require skilled intervention  -CL     Therapy Frequency (PT) evaluation only  -CL     Row Name 05/11/23 1547          Vital Signs    Pre Systolic BP Rehab 122  -CL     Pre Treatment Diastolic BP 79  -CL     Pretreatment Heart Rate (beats/min) 94  -CL     Intratreatment Heart Rate (beats/min) 145  -CL     Posttreatment Heart Rate (beats/min) 131  -CL     Pretreatment Resp Rate (breaths/min) 17  -CL     O2 Delivery Pre Treatment room air  -CL     O2 Delivery Intra Treatment room air  -CL     O2 Delivery Post Treatment room air  -CL     Row Name 05/11/23 2827          Positioning and Restraints    Pre-Treatment Position in bed  -CL     Post Treatment Position bed  -CL     In Bed call light within reach;supine  -CL           User Key  (r) = Recorded By, (t) = Taken By, (c) = Cosigned By    Initials Name Provider Type    Carmen Brooke, PT Physical Therapist               Outcome Measures     Row Name 05/11/23 7732          How much help from another person do you currently need...    Turning from your back to your side while in flat bed without using bedrails? 4  -CL     Moving from lying on back to sitting on the side of a flat bed without bedrails? 4  -CL     Moving to and from a bed to a chair (including a wheelchair)? 4  -CL     Standing up from a chair using your arms (e.g., wheelchair, bedside chair)? 4  -CL     Climbing 3-5 steps with a railing? 4  -CL     To walk in  hospital room? 4  -CL     AM-PAC 6 Clicks Score (PT) 24  -CL     Highest level of mobility 8 --> Walked 250 feet or more  -CL     Row Name 05/11/23 1557          Functional Assessment    Outcome Measure Options AM-PAC 6 Clicks Basic Mobility (PT)  -CL           User Key  (r) = Recorded By, (t) = Taken By, (c) = Cosigned By    Initials Name Provider Type    CL Carmen Alvarez PT Physical Therapist                             Physical Therapy Education     Title: PT OT SLP Therapies (Done)     Topic: Physical Therapy (Done)     Point: Mobility training (Done)     Learning Progress Summary           Patient Acceptance, E,TB, VU by CL at 5/11/2023 1557                               User Key     Initials Effective Dates Name Provider Type Discipline    CL 03/02/22 -  Carmen Alvarez PT Physical Therapist PT              PT Recommendation and Plan     Plan of Care Reviewed With: patient  Outcome Evaluation: Pt is an 82 y.o. male who fell 1 month ago with resultant rib fx and has not felt well since. Pt had lab draws 1 day ago with increasing troponin levels and was advised to come to ED.   PMH: CAD, HTN, Afib, DM, bladder CA. Pt lives with his wife in a [facility] with no SONYA. 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  He reports that generally he and his wife walk about 1 mile/day to stay active.  He has been doing his incentive spirometry 5 times/day since his rib fx.  At time of evaluation, he is A&O x4. He demonstrates good strength in LEs and is independent with transfers and mobility. Pt exhibits variable HR throughout evaluation but not impacted by activity. No PT needs identified at this time.     Time Calculation:    PT Charges     Row Name 05/11/23 1558             Time Calculation    Start Time 1339  -CL      Stop Time 1402  -CL      Time Calculation (min) 23 min  -CL      PT Received On 05/11/23  -CL         Time Calculation- PT    Total Timed Code Minutes- PT 0 minute(s)  -CL             User Key  (r) = Recorded By, (t) = Taken By, (c) = Cosigned By    Initials Name Provider Type    CL Carmen Alvarez, PT Physical Therapist              Therapy Charges for Today     Code Description Service Date Service Provider Modifiers Qty    [id number] HC PT EVAL LOW COMPLEXITY 4 5/11/2023 Carmen Alvarez, PT GP 1          PT G-Codes  Outcome Measure Options: AM-PAC 6 Clicks Basic Mobility (PT)  AM-PAC 6 Clicks Score (PT): 24  PT Discharge Summary  Anticipated Discharge Disposition (PT): home    Carmen Alvarez, PT  5/11/2023

## 2023-05-11 NOTE — PROGRESS NOTES
"DAILY PROGRESS NOTE  [facility]    Patient Identification:  Name: Cisco Frank  Age: 82 y.o.  Sex: male  :  1940  MRN: [id number]         Primary Care Physician: Nuno Patton MD    Subjective:  Interval History:Complains of some chest pain. Some nausea and vomiting.    Objective:    Scheduled Meds:sodium chloride, 10 mL, Intravenous, Q12H      Continuous Infusions:     Vital signs in last 24 hours:  Temp:  [97.2 °F (36.2 °C)-97.5 °F (36.4 °C)] 97.5 °F (36.4 °C)  Heart Rate:  [] 108  Resp:  [16-17] 17  BP: (112-148)/(68-96) 122/79    Intake/Output:    Intake/Output Summary (Last 24 hours) at 2023 1331  Last data filed at 2023 1056  Gross per 24 hour   Intake --   Output 900 ml   Net -900 ml       Exam:  /79 (BP Location: Left arm, Patient Position: Lying)   Pulse 108   Temp 97.5 °F (36.4 °C)   Resp 17   Ht 177.8 cm (70\")   Wt 81.7 kg (180 lb 1.6 oz)   SpO2 97%   BMI 25.84 kg/m²     General Appearance:    Alert, cooperative, no distress   Head:    Normocephalic, without obvious abnormality, atraumatic   Eyes:       Throat:   Lips, tongue, gums normal   Neck:   Supple, symmetrical, trachea midline, no JVD   Lungs:     Clear to auscultation bilaterally, respirations unlabored   Chest Wall:    No tenderness or deformity    Heart:    Regular rate and rhythm, S1 and S2 normal, no murmur,no  rub or gallop   Abdomen:     Soft, nontender, bowel sounds active, no masses, no organomegaly    Extremities:   Extremities normal, atraumatic, no cyanosis or edema   Pulses:      Skin:   Skin is warm and dry,  no rashes or palpable lesions   Neurologic:   no focal deficits noted      Lab Results (last 72 hours)     Procedure Component Value Units Date/Time    Magnesium [[id number]]  (Normal) Collected: 23 0542    Specimen: Blood Updated: 23 1208     Magnesium 1.9 mg/dL     Vitamin D,25-Hydroxy [[id number]]  (Normal) Collected: 23 0542    Specimen: Blood Updated: " 05/11/23 1057     25 Hydroxy, Vitamin D 32.0 ng/ml     Narrative:      Reference Range for Total Vitamin D 25(OH)     Deficiency <20.0 ng/mL   Insufficiency 21-29 ng/mL   Sufficiency  ng/mL  Toxicity >100 ng/ml      Basic Metabolic Panel [[id number]]  (Abnormal) Collected: 05/11/23 0542    Specimen: Blood Updated: 05/11/23 0615     Glucose 156 mg/dL      BUN 26 mg/dL      Creatinine 1.23 mg/dL      Sodium 141 mmol/L      Potassium 4.4 mmol/L      Chloride 105 mmol/L      CO2 28.0 mmol/L      Calcium 8.7 mg/dL      BUN/Creatinine Ratio 21.1     Anion Gap 8.0 mmol/L      eGFR 59.3 mL/min/1.73     Narrative:      GFR Normal >60  Chronic Kidney Disease <60  Kidney Failure <15    The GFR formula is only valid for adults with stable renal function between ages 18 and 70.    CBC (No Diff) [[id number]]  (Abnormal) Collected: 05/11/23 0542    Specimen: Blood Updated: 05/11/23 0600     WBC 5.90 10*3/mm3      RBC 4.47 10*6/mm3      Hemoglobin 13.1 g/dL      Hematocrit 39.4 %      MCV 88.2 fL      MCH 29.3 pg      MCHC 33.2 g/dL      RDW 14.7 %      RDW-SD 45.5 fl      MPV 7.9 fL      Platelets 116 10*3/mm3     Extra Tubes [[id number]] Collected: 05/11/23 0226    Specimen: Blood, Venous Line Updated: 05/11/23 0330    Narrative:      The following orders were created for panel order Extra Tubes.  Procedure                               Abnormality         Status                     ---------                               -----------         ------                     Green Top (Gel)[[id number]]                                  Final result               Green Top (Gel)[[id number]]                                  Final result                 Please view results for these tests on the individual orders.    Green Top (Gel) [[id number]] Collected: 05/11/23 0226    Specimen: Blood Updated: 05/11/23 0330     Extra Tube Hold for add-ons.     Comment: Auto resulted.       Green Top (Gel) [[id number]] Collected: 05/11/23 0226     Specimen: Blood Updated: 05/11/23 0330     Extra Tube Hold for add-ons.     Comment: Auto resulted.       Single High Sensitivity Troponin T [[id number]]  (Abnormal) Collected: 05/11/23 0226    Specimen: Blood Updated: 05/11/23 0319     HS Troponin T 68 ng/L     Narrative:      High Sensitive Troponin T Reference Range:  <10.0 ng/L- Negative Female for AMI  <15.0 ng/L- Negative Male for AMI  >=10 - Abnormal Female indicating possible myocardial injury.  >=15 - Abnormal Male indicating possible myocardial injury.   Clinicians would have to utilize clinical acumen, EKG, Troponin, and serial changes to determine if it is an Acute Myocardial Infarction or myocardial injury due to an underlying chronic condition.         Digoxin Level [[id number]]  (Normal) Collected: 05/11/23 0226    Specimen: Blood Updated: 05/11/23 0312     Digoxin 0.90 ng/mL     T4, Free [[id number]]  (Normal) Collected: 05/11/23 0226    Specimen: Blood Updated: 05/11/23 0312     Free T4 1.13 ng/dL     Narrative:      Results may be falsely increased if patient taking Biotin.      TSH [[id number]]  (Abnormal) Collected: 05/11/23 0226    Specimen: Blood Updated: 05/11/23 0312     TSH 5.430 uIU/mL     Basic Metabolic Panel [[id number]]  (Abnormal) Collected: 05/11/23 0226    Specimen: Blood Updated: 05/11/23 0309     Glucose 161 mg/dL      BUN 28 mg/dL      Creatinine 1.30 mg/dL      Sodium 140 mmol/L      Potassium 4.1 mmol/L      Chloride 104 mmol/L      CO2 25.0 mmol/L      Calcium 8.6 mg/dL      BUN/Creatinine Ratio 21.5     Anion Gap 11.0 mmol/L      eGFR 55.5 mL/min/1.73     Narrative:      GFR Normal >60  Chronic Kidney Disease <60  Kidney Failure <15    The GFR formula is only valid for adults with stable renal function between ages 18 and 70.        Data Review:  Results from last 7 days   Lab Units 05/11/23  0542 05/11/23  0226 05/10/23  1348   SODIUM mmol/L 141 140 135*   POTASSIUM mmol/L 4.4 4.1 4.5   CHLORIDE mmol/L 105 104 99   CO2 mmol/L 28.0  25.0 24.0   BUN mg/dL 26* 28* 33*   CREATININE mg/dL 1.23 1.30* 1.67*   GLUCOSE mg/dL 156* 161* 358*   CALCIUM mg/dL 8.7 8.6 9.1     Results from last 7 days   Lab Units 05/11/23  0542 05/10/23  1348   WBC 10*3/mm3 5.90 6.90   HEMOGLOBIN g/dL 13.1 14.8   HEMATOCRIT % 39.4 43.6   PLATELETS 10*3/mm3 116* 160     Results from last 7 days   Lab Units 05/11/23  0226   TSH uIU/mL 5.430*   FREE T4 ng/dL 1.13     Results from last 7 days   Lab Units 05/10/23  1348   HEMOGLOBIN A1C % 8.30*     Lab Results   Lab Value Date/Time    TROPONINT 68 (C) 05/11/2023 0226    TROPONINT 64 (C) 05/10/2023 1348    TROPONINT 32 (H) 04/19/2023 0605    TROPONINT 37 (H) 04/19/2023 0448    TROPONINT <0.010 07/23/2022 1043    TROPONINT <0.010 07/22/2022 2010    TROPONINT <0.010 06/09/2022 0300    TROPONINT <0.010 06/08/2022 1957    TROPONINT <0.010 06/08/2022 1647    TROPONINT <0.010 10/26/2020 1044    TROPONINT <0.010 10/26/2020 0554         Results from last 7 days   Lab Units 05/10/23  1348   ALK PHOS U/L 164*   BILIRUBIN mg/dL 0.6   ALT (SGPT) U/L 15   AST (SGOT) U/L 14     Results from last 7 days   Lab Units 05/11/23  0226   TSH uIU/mL 5.430*   FREE T4 ng/dL 1.13     Results from last 7 days   Lab Units 05/10/23  1348   HEMOGLOBIN A1C % 8.30*     No results found for: POCGLU          Assessment:  Active Hospital Problems    Diagnosis  POA   • **Fatigue [R53.83]  Yes   • Weakness [R53.1]  Yes   • Acute kidney injury [N17.9]  Yes   • Atrial fibrillation [I48.91]  Yes   • Coronary artery disease [I25.10]  Yes   • Type 2 diabetes mellitus with hyperglycemia, without long-term current use of insulin (HCC) [E11.65]  Yes   • Essential hypertension [I10]  Yes      Resolved Hospital Problems   No resolved problems to display.       Plan:  Await results of nuclear stress test.  I have ordered some of his home medications.  Cardiology consult noted.  We will get some follow-up labs.    Endy Rick MD  5/11/2023  13:31 EDT

## 2023-05-11 NOTE — THERAPY EVALUATION
Patient Name: Cisco Frank  : 1940    MRN: [id number]                              Today's Date: 2023       Admit Date: 2023    Visit Dx:     ICD-10-CM ICD-9-CM   1. Other fatigue  R53.83 780.79   2. Elevated troponin  R77.8 790.6   3. 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   • Esophageal polyp   • Irritant contact dermatitis due to detergent   • Bilateral pseudophakia   • 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   • Posterior vitreous detachment   • Persistent vomiting   • Gastroenteritis   • Hemoptysis   • Pericardial effusion   • History of prostate cancer   • Atrial fibrillation   • COVID-19   • Acute kidney injury   • Dehydration   • Orthostatic hypotension   • Closed fracture of one rib of right side, initial encounter   • Fall   • Minor closed head injury   • Intractable headache, unspecified chronicity pattern, unspecified headache type   • Hematemesis with nausea   • Weakness   • Fatigue     Past Medical History:   Diagnosis Date   • Atrial fibrillation    • Cancer    • Coronary artery disease    • History of bladder cancer    • Hyperlipidemia      Past Surgical History:   Procedure Laterality Date   • BLADDER SURGERY  10/26/2019    [facility]   • BRONCHOSCOPY N/A 2022    Procedure: BRONCHOSCOPY with bilateral lung wash;  Surgeon: Ayush Velarde MD;  Location: [facility] ENDOSCOPY;  Service: Pulmonary;  Laterality: N/A;  normal bronch    • CARDIAC CATHETERIZATION     • COLONOSCOPY     • COLONOSCOPY W/ POLYPECTOMY  02/23/2021   • ENDOSCOPY N/A 4/20/2023    Procedure: ESOPHAGOGASTRODUODENOSCOPY with gastric biopsy's;  Surgeon: Debra Lomeli MD;  Location: [facility] ENDOSCOPY;  Service: Gastroenterology;  Laterality: N/A;  gastritis, cardia nodules, barretts esophagus   • SKIN CANCER EXCISION      right temple BX   • UPPER ENDOSCOPIC ULTRASOUND W/ FNA N/A 3/12/2021    Procedure: ESOPHAGOGASTRODUODENOSCOPY WITH endoscopic mucosal resection, biopsy x 1 area, and endoscopic ULTRASOUND;  Surgeon: Abner Infante MD;  Location: [facility] ENDOSCOPY;  Service: Gastroenterology;  Laterality: N/A;  post op: hiatal hernia, duodenal polyp      General Information     Row Name 05/11/23 1612          OT Time and Intention    Document Type evaluation  -MS     Mode of Treatment occupational therapy  -MS     Row Name 05/11/23 1612          General Information    Patient Profile Reviewed yes  -MS     Prior Level of Function independent:;ADL's;driving;work;community mobility  -MS     Existing Precautions/Restrictions no known precautions/restrictions  -MS     Barriers to Rehab none identified  -MS     Row Name 05/11/23 1612          Occupational Profile    Reason for Services/Referral (Occupational Profile) Pt is an 81 y/o M admitted to [facility] 5/11/23 with c/o fatigue and elevated troponin. Pt recently admitted 4/3/23 s/p fall with rib fx. PMHx significant for DMII, A. fib, ALMA, hx bladder cancer, diabetic retinopathy, and hx falls. At baseline pt lives with spouse in [facility] with 0 SONYA. Pt is typically independent with ADLs and occassionally uses cane. 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  -MS     Successful Occupations (Occupational Profile)   -MS     Environmental Supports and Barriers (Occupational Profile) supportive family  -MS     Row Name 05/11/23 1613          Living  Environment    People in Home spouse  -MS     Name(s) of People in Home Christine  -MS     Row Name 05/11/23 1612          Home Main Entrance    Number of Stairs, Main Entrance none  -MS     Row Name 05/11/23 1612          Stairs Within Home, Primary    Number of Stairs, Within Home, Primary none  -MS     Row Name 05/11/23 1612          Cognition    Orientation Status (Cognition) oriented x 4  -MS     Row Name 05/11/23 1612          Safety Issues, Functional Mobility    Impairments Affecting Function (Mobility) pain  -MS           User Key  (r) = Recorded By, (t) = Taken By, (c) = Cosigned By    Initials Name Provider Type    Ayana Silverio OT Occupational Therapist                 Mobility/ADL's     Row Name 05/11/23 1613          Bed Mobility    Bed Mobility bed mobility (all) activities  -MS     All Activities, [place] (Bed Mobility) independent  -MS     Row Name 05/11/23 1613          Sit-Stand Transfer    Sit-Stand [place] (Transfers) independent  -MS     Row Name 05/11/23 1613          Activities of Daily Living    BADL Assessment/Intervention lower body dressing  -MS     Row Name 05/11/23 1613          Lower Body Dressing Assessment/Training    [place] Level (Lower Body Dressing) don;doff;shoes/slippers;modified independence  -MS     Position (Lower Body Dressing) edge of bed sitting  -MS           User Key  (r) = Recorded By, (t) = Taken By, (c) = Cosigned By    Initials Name Provider Type    Ayana Silverio OT Occupational Therapist               Obj/Interventions     Row Name 05/11/23 1613          Sensory Assessment (Somatosensory)    Sensory Assessment (Somatosensory) sensation intact  -MS     Row Name 05/11/23 1613          Vision Assessment/Intervention    Visual Impairment/Limitations WNL  -MS     Row Name 05/11/23 1613          Range of Motion Comprehensive    General Range of Motion bilateral upper extremity ROM WNL  -MS     Row Name 05/11/23 1613          Strength Comprehensive  (MMT)    Comment, General Manual Muscle Testing (MMT) Assessment BUE grossly 4+/5  -MS     Row Name 05/11/23 1613          Balance    Balance Assessment sitting static balance;sitting dynamic balance;standing static balance;standing dynamic balance  -MS     Static Sitting Balance independent  -MS     Dynamic Sitting Balance independent  -MS     Position, Sitting Balance sitting edge of bed  -MS     Static Standing Balance independent  -MS     Dynamic Standing Balance independent  -MS     Position/Device Used, Standing Balance cane, straight  -MS           User Key  (r) = Recorded By, (t) = Taken By, (c) = Cosigned By    Initials Name Provider Type    MS Ayana Boyce, OT Occupational Therapist               Goals/Plan    No documentation.                Clinical Impression     Row Name 05/11/23 1614          Pain Assessment    Pain Intervention(s) Repositioned  -MS     Additional Documentation Pain Scale: FACES Pre/Post-Treatment (Group)  -MS     Row Name 05/11/23 1614          Pain Scale: FACES Pre/Post-Treatment    Pain: FACES Scale, Pretreatment 2-->hurts little bit  -MS     Posttreatment Pain Rating 2-->hurts little bit  -MS     Pain Location - Side/Orientation Right  -MS     Pre/Posttreatment Pain Comment ribs  -MS     Row Name 05/11/23 1617          Plan of Care Review    Plan of Care Reviewed With patient  -MS     Progress no change  -MS     Outcome Evaluation Pt is an 81 y/o M admitted to [facility] 5/11/23 with c/o fatigue and elevated troponin. Pt recently admitted 4/3/23 s/p fall with rib fx. PMHx significant for DMII, A. fib, ALMA, hx bladder cancer, diabetic retinopathy, and hx falls. At baseline pt lives with spouse in [facility] with 0 SONYA. Pt is typically independent with ADLs and occassionally uses cane. 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 This date, pt A&Ox4. Pt completes bed mobility, comes to stand and ambulates with cane  independently. Pt don/doff shoes sitting edge of bed independently. Pt does not demo deficits with BUE ROM/strength. Pt HR increased throughout session however did not appear to be impacted by activity. Pt appears to be limited by pain d/t recent rib fx. Pt does not demo deficits that warrant further skilled OT intervention. OT to complete orders at this time.  -MS     Row Name 05/11/23 1614          Therapy Assessment/Plan (OT)    Criteria for Skilled Therapeutic Interventions Met (OT) no problems identified which require skilled intervention  -MS     Therapy Frequency (OT) evaluation only  -MS     Row Name 05/11/23 1614          Therapy Plan Review/Discharge Plan (OT)    Anticipated Discharge Disposition (OT) home with assist  -MS     Row Name 05/11/23 1614          Vital Signs    Pre Systolic BP Rehab 122  -MS     Pre Treatment Diastolic BP 79  -MS     Pretreatment Heart Rate (beats/min) 94  -MS     Intratreatment Heart Rate (beats/min) 145  -MS     Posttreatment Heart Rate (beats/min) 131  -MS     O2 Delivery Pre Treatment room air  -MS     O2 Delivery Intra Treatment room air  -MS     O2 Delivery Post Treatment room air  -MS     Pre Patient Position Supine  -MS     Intra Patient Position Standing  -MS     Post Patient Position Supine  -MS     Row Name 05/11/23 1614          Positioning and Restraints    Pre-Treatment Position in bed  -MS     Post Treatment Position bed  -MS     In Bed notified nsg;supine;call light within reach;encouraged to call for assist  -MS           User Key  (r) = Recorded By, (t) = Taken By, (c) = Cosigned By    Initials Name Provider Type    Ayana Silverio, OT Occupational Therapist               Outcome Measures     Row Name 05/11/23 1616          How much help from another is currently needed...    Putting on and taking off regular lower body clothing? 4  -MS     Bathing (including washing, rinsing, and drying) 4  -MS     Toileting (which includes using toilet bed pan or urinal) 4   -MS     Putting on and taking off regular upper body clothing 4  -MS     Taking care of personal grooming (such as brushing teeth) 4  -MS     Eating meals 4  -MS     AM-PAC 6 Clicks Score (OT) 24  -MS     Row Name 05/11/23 1557          How much help from another person do you currently need...    Turning from your back to your side while in flat bed without using bedrails? 4  -CL     Moving from lying on back to sitting on the side of a flat bed without bedrails? 4  -CL     Moving to and from a bed to a chair (including a wheelchair)? 4  -CL     Standing up from a chair using your arms (e.g., wheelchair, bedside chair)? 4  -CL     Climbing 3-5 steps with a railing? 4  -CL     To walk in hospital room? 4  -CL     AM-PAC 6 Clicks Score (PT) 24  -CL     Highest level of mobility 8 --> Walked 250 feet or more  -CL     Row Name 05/11/23 1616 05/11/23 1557       Functional Assessment    Outcome Measure Options AM-PAC 6 Clicks Daily Activity (OT)  -MS AM-PAC 6 Clicks Basic Mobility (PT)  -CL          User Key  (r) = Recorded By, (t) = Taken By, (c) = Cosigned By    Initials Name Provider Type    MS Ayana Boyce, OT Occupational Therapist    CL Carmen Alvarez, PT Physical Therapist                Occupational Therapy Education     Title: PT OT SLP Therapies (In Progress)     Topic: Occupational Therapy (In Progress)     Point: ADL training (Done)     Description:   Instruct learner(s) on proper safety adaptation and remediation techniques during self care or transfers.   Instruct in proper use of assistive devices.              Learning Progress Summary           Patient Acceptance, E,TB, VU by MS at 5/11/2023 1617                   Point: Precautions (Not Started)     Description:   Instruct learner(s) on prescribed precautions during self-care and functional transfers.              Learner Progress:  Not documented in this visit.          Point: Body mechanics (Done)     Description:   Instruct learner(s) on  proper positioning and spine alignment during self-care, functional mobility activities and/or exercises.              Learning Progress Summary           Patient Acceptance, E,TB, VU by MS at 5/11/2023 2557                               User Key     Initials Effective Dates Name Provider Type Discipline    MS 07/13/22 -  Ayana Boyce OT Occupational Therapist OT              OT Recommendation and Plan  Therapy Frequency (OT): evaluation only  Plan of Care Review  Plan of Care Reviewed With: patient  Progress: no change  Outcome Evaluation: Pt is an 81 y/o M admitted to [facility] 5/11/23 with c/o fatigue and elevated troponin. Pt recently admitted 4/3/23 s/p fall with rib fx. PMHx significant for DMII, A. fib, ALMA, hx bladder cancer, diabetic retinopathy, and hx falls. At baseline pt lives with spouse in [facility] with 0 SONYA. Pt is typically independent with ADLs and occassionally uses cane. 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 This date, pt A&Ox4. Pt completes bed mobility, comes to stand and ambulates with cane independently. Pt don/doff shoes sitting edge of bed independently. Pt does not demo deficits with BUE ROM/strength. Pt HR increased throughout session however did not appear to be impacted by activity. Pt appears to be limited by pain d/t recent rib fx. Pt does not demo deficits that warrant further skilled OT intervention. OT to complete orders at this time.     Time Calculation:              Ayana Boyce OT  5/11/2023

## 2023-05-11 NOTE — PLAN OF CARE
Patient to move to medical surgical room, report called to Laura. Myoview negative and consult called to Doctor Granda. Chest pain denied during this shift and nitropaste removed. No distress, will continue to monitor.

## 2023-05-12 ENCOUNTER — READMISSION MANAGEMENT (OUTPATIENT)
Dept: CALL CENTER | Facility: HOSPITAL | Age: 83
End: 2023-05-12
Payer: MEDICARE

## 2023-05-12 VITALS
HEIGHT: 70 IN | WEIGHT: 179.9 LBS | SYSTOLIC BLOOD PRESSURE: 100 MMHG | BODY MASS INDEX: 25.75 KG/M2 | RESPIRATION RATE: 14 BRPM | TEMPERATURE: 97.2 F | DIASTOLIC BLOOD PRESSURE: 71 MMHG | OXYGEN SATURATION: 97 % | HEART RATE: 86 BPM

## 2023-05-12 LAB
ANION GAP SERPL CALCULATED.3IONS-SCNC: 12 MMOL/L (ref 5–15)
BASOPHILS # BLD AUTO: 0 10*3/MM3 (ref 0–0.2)
BASOPHILS NFR BLD AUTO: 0.4 % (ref 0–1.5)
BUN SERPL-MCNC: 22 MG/DL (ref 8–23)
BUN/CREAT SERPL: 20.4 (ref 7–25)
CALCIUM SPEC-SCNC: 9 MG/DL (ref 8.6–10.5)
CHLORIDE SERPL-SCNC: 103 MMOL/L (ref 98–107)
CO2 SERPL-SCNC: 25 MMOL/L (ref 22–29)
CREAT SERPL-MCNC: 1.08 MG/DL (ref 0.76–1.27)
DEPRECATED RDW RBC AUTO: 47.7 FL (ref 37–54)
EGFRCR SERPLBLD CKD-EPI 2021: 69.4 ML/MIN/1.73
EOSINOPHIL # BLD AUTO: 0.1 10*3/MM3 (ref 0–0.4)
EOSINOPHIL NFR BLD AUTO: 1.7 % (ref 0.3–6.2)
ERYTHROCYTE [DISTWIDTH] IN BLOOD BY AUTOMATED COUNT: 14.5 % (ref 12.3–15.4)
GLUCOSE BLDC GLUCOMTR-MCNC: 157 MG/DL (ref 70–105)
GLUCOSE SERPL-MCNC: 145 MG/DL (ref 65–99)
HCT VFR BLD AUTO: 42.9 % (ref 37.5–51)
HGB BLD-MCNC: 14.3 G/DL (ref 13–17.7)
LYMPHOCYTES # BLD AUTO: 1.4 10*3/MM3 (ref 0.7–3.1)
LYMPHOCYTES NFR BLD AUTO: 29.2 % (ref 19.6–45.3)
MCH RBC QN AUTO: 29.3 PG (ref 26.6–33)
MCHC RBC AUTO-ENTMCNC: 33.4 G/DL (ref 31.5–35.7)
MCV RBC AUTO: 87.6 FL (ref 79–97)
MONOCYTES # BLD AUTO: 0.3 10*3/MM3 (ref 0.1–0.9)
MONOCYTES NFR BLD AUTO: 7.1 % (ref 5–12)
NEUTROPHILS NFR BLD AUTO: 3 10*3/MM3 (ref 1.7–7)
NEUTROPHILS NFR BLD AUTO: 61.6 % (ref 42.7–76)
NRBC BLD AUTO-RTO: 0.3 /100 WBC (ref 0–0.2)
PLATELET # BLD AUTO: 121 10*3/MM3 (ref 140–450)
PMV BLD AUTO: 8.6 FL (ref 6–12)
POTASSIUM SERPL-SCNC: 4.2 MMOL/L (ref 3.5–5.2)
RBC # BLD AUTO: 4.9 10*6/MM3 (ref 4.14–5.8)
SODIUM SERPL-SCNC: 140 MMOL/L (ref 136–145)
WBC NRBC COR # BLD: 4.9 10*3/MM3 (ref 3.4–10.8)

## 2023-05-12 PROCEDURE — G0378 HOSPITAL OBSERVATION PER HR: HCPCS

## 2023-05-12 PROCEDURE — 82948 REAGENT STRIP/BLOOD GLUCOSE: CPT

## 2023-05-12 PROCEDURE — 80048 BASIC METABOLIC PNL TOTAL CA: CPT | Performed by: HOSPITALIST

## 2023-05-12 PROCEDURE — 36415 COLL VENOUS BLD VENIPUNCTURE: CPT | Performed by: HOSPITALIST

## 2023-05-12 PROCEDURE — 63710000001 INSULIN LISPRO (HUMAN) PER 5 UNITS: Performed by: HOSPITALIST

## 2023-05-12 PROCEDURE — 85025 COMPLETE CBC W/AUTO DIFF WBC: CPT | Performed by: HOSPITALIST

## 2023-05-12 RX ADMIN — Medication 10 ML: at 09:28

## 2023-05-12 RX ADMIN — PANTOPRAZOLE SODIUM 40 MG: 40 TABLET, DELAYED RELEASE ORAL at 06:13

## 2023-05-12 RX ADMIN — METOPROLOL TARTRATE 25 MG: 25 TABLET, FILM COATED ORAL at 09:27

## 2023-05-12 RX ADMIN — INSULIN LISPRO 2 UNITS: 100 INJECTION, SOLUTION INTRAVENOUS; SUBCUTANEOUS at 09:28

## 2023-05-12 NOTE — CASE MANAGEMENT/SOCIAL WORK
Case Management Discharge Note      Final Note: Routine home.         Selected Continued Care - Discharged on 5/12/2023 Admission date: 5/11/2023 - Discharge disposition: Home or Self Care     Transportation Services  Private: Car    Final Discharge Disposition Code: 01 - home or self-care

## 2023-05-12 NOTE — DISCHARGE SUMMARY
PHYSICIAN DISCHARGE SUMMARY                                                                       [facility]    Patient Identification:  Name: Cisco Frank  Age: 82 y.o.  Sex: male  :  1940  MRN: [id number]  Primary Care Physician: Nuno Patton MD    Admit date: 2023  Discharge date and time:2023  Discharged Condition: good    Admission diagnosis  Fatigue [R53.83]  Elevated troponin [R77.8]  Other fatigue [R53.83]  Atrial fibrillation, unspecified type [I48.91]      Discharge Diagnoses:  Active Hospital Problems    Diagnosis  POA   • **Fatigue [R53.83]  Yes   • Weakness [R53.1]  Yes   • Acute kidney injury [N17.9]  Yes   • Atrial fibrillation [I48.91]  Yes   • Coronary artery disease [I25.10]  Yes   • Type 2 diabetes mellitus with hyperglycemia, without long-term current use of insulin (HCC) [E11.65]  Yes   • Essential hypertension [I10]  Yes      Resolved Hospital Problems   No resolved problems to display.      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1   • Acute kidney injury N17.9   • Dehydration E86.0   • Orthostatic hypotension I95.1   • Closed fracture of one rib of right side, initial encounter S22.31XA   • Fall W19.XXXA   • Minor closed head injury S09.90XA   • Intractable headache, unspecified chronicity pattern, unspecified headache type R51.9   • Hematemesis with nausea K92.0   • Weakness R53.1   • Fatigue R53.83       PMHX:   Past Medical History:   Diagnosis Date   • Atrial fibrillation    • Cancer    • Coronary artery disease    • History of bladder cancer    • Hyperlipidemia      PSHX:   Past Surgical History:   Procedure Laterality Date   • BLADDER SURGERY  10/26/2019    [facility] Dr. Cope   • BRONCHOSCOPY N/A 6/30/2022    Procedure: BRONCHOSCOPY with bilateral lung wash;  Surgeon: Ayush Velarde MD;  Location: [facility] ENDOSCOPY;  Service: Pulmonary;  Laterality: N/A;  normal bronch   • CARDIAC CATHETERIZATION     • COLONOSCOPY     • COLONOSCOPY W/ POLYPECTOMY  02/23/2021   • ENDOSCOPY N/A 4/20/2023    Procedure: ESOPHAGOGASTRODUODENOSCOPY with gastric biopsy's;  Surgeon: Debra Lomeli MD;  Location: [facility] ENDOSCOPY;  Service: Gastroenterology;  Laterality: N/A;  gastritis, cardia nodules, barretts esophagus   • SKIN CANCER EXCISION      right temple BX   • UPPER ENDOSCOPIC ULTRASOUND W/ FNA N/A 3/12/2021    Procedure: ESOPHAGOGASTRODUODENOSCOPY WITH endoscopic mucosal resection, biopsy x 1 area, and endoscopic ULTRASOUND;  Surgeon: Abner Infante MD;  Location: [facility] ENDOSCOPY;  Service: Gastroenterology;  Laterality: N/A;  post op: hiatal hernia, duodenal polyp       Hospital Course: Cisco Frank  is a 82 y.o. male with a past medical history of CAD, HTN, atrial fibrillation, type 2 diabetes mellitus, and recent fall with rib fracture who presented to [facility] on 5/11/2023 complaining of  generalized weakness, and elevated troponin. Patient was evaluated by PCP yesterday as a follow  Up to a recent rib fracture from a fall , poorly controlled type 2 diabetes mellitus, and feeling unwell since the fall. Labs drawn and resulted with increasing troponin levels, with reports that they were in the 30s last month, 64 yesterday and 68 today. The patient states that he received a call from Dr. Patton at 1am this morning an  advised him he needed to report to the emergency department immediately for evaluation and possible cardiac cath.   Initially he denied chest pain, but developed some epigastric pain, with associated light headedness that has since resolved. Nitro paste was ordered and placed in the emergency department.  High-sensitivity troponin 68, he is hyperglycemic at 161 which is improved from yesterday at 358, he has an acute kidney injury with creatinine of 1.30, improved from yesterday 1.67, elevated BUN at 28 improved from 33 yesterday, TSH 5.43, CBC is unremarkable, chest x-ray shows no acute cardiopulmonary processes.  EKG interpreted as atrial fibrillation heart rate of 99      The patient was admitted to the hospital and seen by cardiology and nephrology.  The patient was given some IV fluid for hydration and renal function improved.  The patient had nuclear stress test which was negative for ischemia.  The patient was feeling better after being in the hospital for a day or so and felt well enough to go home.  Nephrology recommended he hold off on the Jardiance for now and have that reevaluated later as outpatient whether or not to restart.  We will follow-up with his primary care in 1 week for ongoing care.    Consults:     Consults     Date and Time Order Name Status Description    5/11/2023  6:21 PM Inpatient Nephrology Consult Completed     5/11/2023  3:22 AM Cardiology (on-call MD unless specified) Completed     5/11/2023  3:22 AM Hospitalist (on-call MD unless specified)       4/22/2023 10:24 AM Inpatient Nephrology Consult Completed     4/19/2023  6:49 AM Gastroenterology (on-call MD unless specified) Completed         Results from last 7 days   Lab Units 05/12/23  0558   WBC 10*3/mm3 4.90   HEMOGLOBIN g/dL 14.3   HEMATOCRIT % 42.9   PLATELETS 10*3/mm3 121*     Results from last 7 days   Lab Units 05/12/23  0558   SODIUM mmol/L 140   POTASSIUM mmol/L 4.2   CHLORIDE mmol/L 103   CO2 mmol/L 25.0   BUN mg/dL 22   CREATININE mg/dL 1.08   GLUCOSE mg/dL 145*   CALCIUM mg/dL 9.0     Significant Diagnostic Studies:   WBC   Date Value Ref Range Status   05/12/2023 4.90 3.40 - 10.80 10*3/mm3 Final     Hemoglobin   Date Value Ref Range Status   05/12/2023 14.3 13.0 - 17.7 g/dL Final     Hematocrit   Date Value Ref Range Status   05/12/2023 42.9 37.5 - 51.0 % Final     Platelets   Date Value Ref Range Status   05/12/2023 121 (L) 140 - 450 10*3/mm3 Final     Sodium   Date Value Ref Range Status   05/12/2023 140 136 - 145 mmol/L Final     Potassium   Date Value Ref Range Status   05/12/2023 4.2 3.5 - 5.2 mmol/L Final     Chloride   Date Value Ref Range Status   05/12/2023 103 98 - 107 mmol/L Final     CO2   Date Value Ref Range Status   05/12/2023 25.0 22.0 - 29.0 mmol/L Final     BUN   Date Value Ref Range Status   05/12/2023 22 8 - 23 mg/dL Final     Creatinine   Date Value Ref Range Status   05/12/2023 1.08 0.76 - 1.27 mg/dL Final     Glucose   Date Value Ref Range Status   05/12/2023 145 (H) 65 - 99 mg/dL Final     Calcium   Date Value Ref Range Status   05/12/2023 9.0 8.6 - 10.5 mg/dL Final     Magnesium   Date Value Ref Range Status   05/11/2023 1.9 1.6 - 2.4 mg/dL Final     AST (SGOT)   Date Value Ref Range Status   05/10/2023 14 1 - 40 U/L Final     ALT (SGPT)   Date Value Ref Range Status   05/10/2023 15 1 - 41 U/L Final     Alkaline Phosphatase   Date Value Ref Range Status   05/10/2023 164 (H) 39 - 117 U/L Final     No results found for: APTT, INR  No results found for: COLORU, CLARITYU,  SPECGRAV, PHUR, PROTEINUR, GLUCOSEU, KETONESU, BLOODU, NITRITE, LEUKOCYTESUR, BILIRUBINUR, UROBILINOGEN, RBCUA, WBCUA, BACTERIA, UACOMMENT  HS Troponin T   Date Value Ref Range Status   05/11/2023 68 (C) <15 ng/L Final     No components found for: HGBA1C;2  No components found for: TSH;2  Imaging Results (All)     None        Lab Results (last 7 days)     Procedure Component Value Units Date/Time    Basic Metabolic Panel [[id number]]  (Abnormal) Collected: 05/12/23 0558    Specimen: Blood Updated: 05/12/23 0752     Glucose 145 mg/dL      BUN 22 mg/dL      Creatinine 1.08 mg/dL      Sodium 140 mmol/L      Potassium 4.2 mmol/L      Chloride 103 mmol/L      CO2 25.0 mmol/L      Calcium 9.0 mg/dL      BUN/Creatinine Ratio 20.4     Anion Gap 12.0 mmol/L      eGFR 69.4 mL/min/1.73     Narrative:      GFR Normal >60  Chronic Kidney Disease <60  Kidney Failure <15    The GFR formula is only valid for adults with stable renal function between ages 18 and 70.    CBC & Differential [[id number]]  (Abnormal) Collected: 05/12/23 0558    Specimen: Blood Updated: 05/12/23 0727    Narrative:      The following orders were created for panel order CBC & Differential.  Procedure                               Abnormality         Status                     ---------                               -----------         ------                     CBC Auto Differential[[id number]]        Abnormal            Final result                 Please view results for these tests on the individual orders.    CBC Auto Differential [[id number]]  (Abnormal) Collected: 05/12/23 0558    Specimen: Blood Updated: 05/12/23 0727     WBC 4.90 10*3/mm3      RBC 4.90 10*6/mm3      Hemoglobin 14.3 g/dL      Hematocrit 42.9 %      MCV 87.6 fL      MCH 29.3 pg      MCHC 33.4 g/dL      RDW 14.5 %      RDW-SD 47.7 fl      MPV 8.6 fL      Platelets 121 10*3/mm3      Neutrophil % 61.6 %      Lymphocyte % 29.2 %      Monocyte % 7.1 %      Eosinophil % 1.7 %      Basophil %  0.4 %      Neutrophils, Absolute 3.00 10*3/mm3      Lymphocytes, Absolute 1.40 10*3/mm3      Monocytes, Absolute 0.30 10*3/mm3      Eosinophils, Absolute 0.10 10*3/mm3      Basophils, Absolute 0.00 10*3/mm3      nRBC 0.3 /100 WBC     POC Glucose Once [[id number]]  (Abnormal) Collected: 05/12/23 0717    Specimen: Blood Updated: 05/12/23 0718     Glucose 157 mg/dL      Comment: Serial Number: [id number]:  [id number]       POC Glucose Once [[id number]]  (Abnormal) Collected: 05/11/23 1635    Specimen: Blood Updated: 05/11/23 1638     Glucose 153 mg/dL      Comment: Serial Number: [id number]:  [id number]       POC Glucose Once [[id number]]  (Abnormal) Collected: 05/11/23 1522    Specimen: Blood Updated: 05/11/23 1524     Glucose 158 mg/dL      Comment: Serial Number: [id number]:  [id number]       Hemoglobin A1c [[id number]]  (Abnormal) Collected: 05/11/23 0542    Specimen: Blood Updated: 05/11/23 1402     Hemoglobin A1C 8.20 %     Magnesium [[id number]]  (Normal) Collected: 05/11/23 0542    Specimen: Blood Updated: 05/11/23 1208     Magnesium 1.9 mg/dL     Vitamin D,25-Hydroxy [[id number]]  (Normal) Collected: 05/11/23 0542    Specimen: Blood Updated: 05/11/23 1057     25 Hydroxy, Vitamin D 32.0 ng/ml     Narrative:      Reference Range for Total Vitamin D 25(OH)     Deficiency <20.0 ng/mL   Insufficiency 21-29 ng/mL   Sufficiency  ng/mL  Toxicity >100 ng/ml      Basic Metabolic Panel [[id number]]  (Abnormal) Collected: 05/11/23 0542    Specimen: Blood Updated: 05/11/23 0615     Glucose 156 mg/dL      BUN 26 mg/dL      Creatinine 1.23 mg/dL      Sodium 141 mmol/L      Potassium 4.4 mmol/L      Chloride 105 mmol/L      CO2 28.0 mmol/L      Calcium 8.7 mg/dL      BUN/Creatinine Ratio 21.1     Anion Gap 8.0 mmol/L      eGFR 59.3 mL/min/1.73     Narrative:      GFR Normal >60  Chronic Kidney Disease <60  Kidney Failure <15    The GFR formula is only valid for adults with stable renal function between ages  18 and 70.    CBC (No Diff) [[id number]]  (Abnormal) Collected: 05/11/23 0542    Specimen: Blood Updated: 05/11/23 0600     WBC 5.90 10*3/mm3      RBC 4.47 10*6/mm3      Hemoglobin 13.1 g/dL      Hematocrit 39.4 %      MCV 88.2 fL      MCH 29.3 pg      MCHC 33.2 g/dL      RDW 14.7 %      RDW-SD 45.5 fl      MPV 7.9 fL      Platelets 116 10*3/mm3     Extra Tubes [[id number]] Collected: 05/11/23 0226    Specimen: Blood, Venous Line Updated: 05/11/23 0330    Narrative:      The following orders were created for panel order Extra Tubes.  Procedure                               Abnormality         Status                     ---------                               -----------         ------                     Green Top (Gel)[[id number]]                                  Final result               Green Top (Gel)[[id number]]                                  Final result                 Please view results for these tests on the individual orders.    Green Top (Gel) [[id number]] Collected: 05/11/23 0226    Specimen: Blood Updated: 05/11/23 0330     Extra Tube Hold for add-ons.     Comment: Auto resulted.       Green Top (Gel) [[id number]] Collected: 05/11/23 0226    Specimen: Blood Updated: 05/11/23 0330     Extra Tube Hold for add-ons.     Comment: Auto resulted.       Single High Sensitivity Troponin T [[id number]]  (Abnormal) Collected: 05/11/23 0226    Specimen: Blood Updated: 05/11/23 0319     HS Troponin T 68 ng/L     Narrative:      High Sensitive Troponin T Reference Range:  <10.0 ng/L- Negative Female for AMI  <15.0 ng/L- Negative Male for AMI  >=10 - Abnormal Female indicating possible myocardial injury.  >=15 - Abnormal Male indicating possible myocardial injury.   Clinicians would have to utilize clinical acumen, EKG, Troponin, and serial changes to determine if it is an Acute Myocardial Infarction or myocardial injury due to an underlying chronic condition.         Digoxin Level [[id number]]  (Normal) Collected:  "05/11/23 0226    Specimen: Blood Updated: 05/11/23 0312     Digoxin 0.90 ng/mL     T4, Free [[id number]]  (Normal) Collected: 05/11/23 0226    Specimen: Blood Updated: 05/11/23 0312     Free T4 1.13 ng/dL     Narrative:      Results may be falsely increased if patient taking Biotin.      TSH [[id number]]  (Abnormal) Collected: 05/11/23 0226    Specimen: Blood Updated: 05/11/23 0312     TSH 5.430 uIU/mL     Basic Metabolic Panel [[id number]]  (Abnormal) Collected: 05/11/23 0226    Specimen: Blood Updated: 05/11/23 0309     Glucose 161 mg/dL      BUN 28 mg/dL      Creatinine 1.30 mg/dL      Sodium 140 mmol/L      Potassium 4.1 mmol/L      Chloride 104 mmol/L      CO2 25.0 mmol/L      Calcium 8.6 mg/dL      BUN/Creatinine Ratio 21.5     Anion Gap 11.0 mmol/L      eGFR 55.5 mL/min/1.73     Narrative:      GFR Normal >60  Chronic Kidney Disease <60  Kidney Failure <15    The GFR formula is only valid for adults with stable renal function between ages 18 and 70.        /71 (BP Location: Left arm, Patient Position: Lying)   Pulse 86   Temp 97.2 °F (36.2 °C) (Axillary)   Resp 14   Ht 177.8 cm (70\")   Wt 81.6 kg (179 lb 14.3 oz)   SpO2 97%   BMI 25.81 kg/m²     Discharge Exam:  General Appearance:    Alert, cooperative, no distress                          Head:    Normocephalic, without obvious abnormality, atraumatic                          Eyes:                            Throat:   Lips, tongue, gums normal                          Neck:   Supple, symmetrical, trachea midline, no JVD                        Lungs:     Clear to auscultation bilaterally, respirations unlabored                Chest Wall:    No tenderness or deformity                        Heart:    Regular rate and rhythm, S1 and S2 normal, no murmur,no  Rub  or gallop                  Abdomen:     Soft, non-tender, bowel sounds active, no masses, no  organomegaly                  Extremities:   Extremities normal, atraumatic, no cyanosis or edema "                             Skin:   Skin is warm and dry,  no rashes or palpable lesions                  Neurologic:   no focal deficits noted     Disposition:  Home    Patient Instructions:     Diet and activity as tolerated  Diet Order   Procedures   • Diet: Cardiac Diets, Diabetic Diets; Healthy Heart (2-3 Na+); Consistent Carbohydrate; Texture: Regular Texture (IDDSI 7); Fluid Consistency: Thin (IDDSI 0)          Discharge Medications      Continue These Medications      Instructions Start Date   atorvastatin 40 MG tablet  Commonly known as: LIPITOR   40 mg, Oral, Every Night at Bedtime      colestipol 1 g tablet  Commonly known as: COLESTID   1 g, Oral, 2 Times Daily      digoxin 125 MCG tablet  Commonly known as: LANOXIN   125 mcg, Oral, Daily Digoxin      glimepiride 2 MG tablet  Commonly known as: AMARYL   6 mg, Oral, Every Morning Before Breakfast      glucose blood test strip  Commonly known as: Accu-Chek Tessa Plus   Test daily and prn      metoprolol tartrate 25 MG tablet  Commonly known as: LOPRESSOR   25 mg, Oral, Every 12 Hours Scheduled      MiraLax 17 g packet  Generic drug: polyethylene glycol   17 g, Oral, Nightly      omeprazole-sodium bicarbonate  MG pack packet  Commonly known as: ZEGERID   Oral, Every Morning Before Breakfast      ondansetron ODT 4 MG disintegrating tablet  Commonly known as: ZOFRAN-ODT   4 mg, Translingual, 4 Times Daily PRN      rivaroxaban 20 MG tablet  Commonly known as: XARELTO   20 mg, Oral, Daily         Stop These Medications    Jardiance 10 MG tablet tablet  Generic drug: empagliflozin          Future Appointments   Date Time Provider Department Center   9/28/2023  1:40 PM Kailash Garsia MD [facility]   4/12/2024 10:30 AM Nuno Patton MD [facility]      Follow-up Information     Nuno Patton MD Follow up in 1 week(s).    Specialty: Family Medicine  Why: Get follow-up lab in 1 week with [facility].  Contact information:  [address]  [address] [place] [address]  [phone]                       Discharge Order (From admission, onward)     Start     Ordered    05/12/23 0843  Discharge patient  Once        Expected Discharge Date: 05/12/23    Discharge Disposition: Home or Self Care    Physician of Record for Attribution - Please select from Treatment Team: ENDY RICK [[id number]]    Review needed by CMO to determine Physician of Record: No       Question Answer Comment   Physician of Record for Attribution - Please select from Treatment Team ENDY RICK    Review needed by CMO to determine Physician of Record No        05/12/23 0844                Total time spent discharging patient including evaluation,post hospitalization follow up,  medication and post hospitalization instructions and education total time exceeds 30 minutes.    Signed:  Endy Rick MD  5/12/2023  09:07 EDT

## 2023-05-12 NOTE — PLAN OF CARE
Goal Outcome Evaluation:  Plan of Care Reviewed With: patient        Progress: improving  Outcome Evaluation: Patient without complaint this shift.  Discharging home with spouse.

## 2023-05-12 NOTE — OUTREACH NOTE
Prep Survey    Flowsheet Row Responses   [facility] patient discharged from? [facility]   Is LACE score < 7 ? No   Eligibility [facility]   Date of Admission 05/11/23   Date of Discharge 05/12/23   Discharge Disposition Home or Self Care   Discharge diagnosis fatigue   Does the patient have one of the following disease processes/diagnoses(primary or secondary)? Other   Does the patient have Home health ordered? No   Is there a DME ordered? No   Prep survey completed? Yes          Lacey BILLINGS - Registered Nurse

## 2023-05-12 NOTE — PLAN OF CARE
Goal Outcome Evaluation:  Plan of Care Reviewed With: patient        Progress: no change

## 2023-05-12 NOTE — CONSULTS
RENAL/KCC:    Referring Provider: Dr. Rick  Reason for Consultation: ALMA    Subjective     Chief complaint: Weakness, R, R side pain    History of present illness:  Patient is an 81 yo WM with h/o partial R Nx who is known to me from prior admission when he was seen for ALMA from [facility].  Cr had improved to 0.6 at D/C.  He presents now after a fall with R side pain.  He had a normal CXR and underwent stress test which was interpreted as a low risk study.  Cr was 1.6 on admission and has improved to 1.2 today.  PO intake good today.  He is on Jardiance outpatient.  No diuretics.  No hematuria or dysuria.  No other complaints.    History  Past Medical History:   Diagnosis Date   • Atrial fibrillation    • Cancer    • Coronary artery disease    • History of bladder cancer    • Hyperlipidemia    ,   Past Surgical History:   Procedure Laterality Date   • BLADDER SURGERY  10/26/2019    [facility] Dr. Cope   • BRONCHOSCOPY N/A 6/30/2022    Procedure: BRONCHOSCOPY with bilateral lung wash;  Surgeon: Ayush Velarde MD;  Location: [facility] ENDOSCOPY;  Service: Pulmonary;  Laterality: N/A;  normal bronch   • CARDIAC CATHETERIZATION     • COLONOSCOPY     • COLONOSCOPY W/ POLYPECTOMY  02/23/2021   • ENDOSCOPY N/A 4/20/2023    Procedure: ESOPHAGOGASTRODUODENOSCOPY with gastric biopsy's;  Surgeon: Debra Lomeli MD;  Location: [facility] ENDOSCOPY;  Service: Gastroenterology;  Laterality: N/A;  gastritis, cardia nodules, barretts esophagus   • SKIN CANCER EXCISION      right temple BX   • UPPER ENDOSCOPIC ULTRASOUND W/ FNA N/A 3/12/2021    Procedure: ESOPHAGOGASTRODUODENOSCOPY WITH endoscopic mucosal resection, biopsy x 1 area, and endoscopic ULTRASOUND;  Surgeon: Abner Infante MD;  Location: [facility] ENDOSCOPY;  Service: Gastroenterology;  Laterality: N/A;  post op: hiatal hernia, duodenal polyp   ,   Family History   Problem Relation Age of Onset   • Heart disease Mother    • Heart attack Father    • No Known Problems Sister    • No  Known Problems Brother    • No Known Problems Maternal Aunt    • No Known Problems Maternal Uncle    • No Known Problems Paternal Aunt    • No Known Problems Paternal Uncle    • No Known Problems Maternal Grandmother    • No Known Problems Maternal Grandfather    • No Known Problems Paternal Grandmother    • No Known Problems Paternal Grandfather    • No Known Problems Other    • Anemia Neg Hx    • Arrhythmia Neg Hx    • Asthma Neg Hx    • Clotting disorder Neg Hx    • Fainting Neg Hx    • Heart failure Neg Hx    • Hyperlipidemia Neg Hx    • Hypertension Neg Hx    ,   Social History     Socioeconomic History   • Marital status:    Tobacco Use   • Smoking status: Former     Types: Cigarettes     Quit date: 1980     Years since quittin.3   • Smokeless tobacco: Never   Vaping Use   • Vaping Use: Never used   Substance and Sexual Activity   • Alcohol use: Not Currently   • Drug use: Never   • Sexual activity: Defer     E-cigarette/Vaping   • E-cigarette/Vaping Use Never User    • Passive Exposure No    • Counseling Given No      E-cigarette/Vaping Substances   • Nicotine No    • THC No    • CBD No    • Flavoring No      E-cigarette/Vaping Devices   • Disposable No    • Pre-filled or Refillable Cartridge No    • Refillable Tank No    • Pre-filled Pod No        ,   Medications Prior to Admission   Medication Sig Dispense Refill Last Dose   • atorvastatin (LIPITOR) 40 MG tablet Take 1 tablet by mouth every night at bedtime. 90 tablet 3    • colestipol (COLESTID) 1 g tablet Take 1 tablet by mouth 2 (Two) Times a Day.      • digoxin (LANOXIN) 125 MCG tablet Take 1 tablet by mouth Daily. 30 tablet 2    • glimepiride (AMARYL) 2 MG tablet Take 3 tablets by mouth Every Morning Before Breakfast.      • Jardiance 10 MG tablet tablet Take 1 tablet by mouth Daily.      • metoprolol tartrate (LOPRESSOR) 25 MG tablet Take 1 tablet by mouth Every 12 (Twelve) Hours for 30 days. 60 tablet 2    • omeprazole-sodium  bicarbonate (ZEGERID)  MG pack packet Take  by mouth Every Morning Before Breakfast.      • ondansetron ODT (ZOFRAN-ODT) 4 MG disintegrating tablet Place 1 tablet on the tongue 4 (Four) Times a Day As Needed for Nausea or Vomiting for up to 15 doses. 15 tablet 2    • polyethylene glycol (MiraLax) 17 g packet Take 17 g by mouth Every Night.      • rivaroxaban (XARELTO) 20 MG tablet Take 1 tablet by mouth Daily.      • glucose blood (Accu-Chek Tessa Plus) test strip Test daily and prn 90 each 3    , Scheduled Meds:  atorvastatin, 40 mg, Oral, Nightly  digoxin, 125 mcg, Oral, Daily  empagliflozin, 10 mg, Oral, Daily  insulin lispro, 2-7 Units, Subcutaneous, TID With Meals  metoprolol tartrate, 25 mg, Oral, Q12H  pantoprazole, 40 mg, Oral, Q AM  sodium chloride, 10 mL, Intravenous, Q12H    , Continuous Infusions:   , PRN Meds:  •  acetaminophen **OR** acetaminophen **OR** acetaminophen  •  dextrose  •  dextrose  •  glucagon (human recombinant)  •  nitroglycerin  •  ondansetron **OR** ondansetron  •  ondansetron ODT  •  [COMPLETED] Insert Peripheral IV **AND** sodium chloride  •  sodium chloride  •  sodium chloride and Allergies:  Patient has no known allergies.    Review of Systems  Pertinent items are noted in HPI    Objective     Vital Signs  Temp:  [97.2 °F (36.2 °C)-97.5 °F (36.4 °C)] 97.2 °F (36.2 °C)  Heart Rate:  [] 86  Resp:  [13-19] 14  BP: (100-133)/(71-86) 100/71    No intake/output data recorded.  I/O last 3 completed shifts:  In: 750 [P.O.:750]  Out: 2600 [Urine:2600]    Physical Exam:  GEN: Awake, NAD  ENT: PERRL, EOMI, MMM  NECK: Supple, no JVD  CHEST: CTAB, no W/R/C  CV: RRR, no M/G/R  ABD: Soft, NT, +BS  SKIN: Warm and Dry  NEURO: CN's intact      Results Review:   I reviewed the patient's new clinical results.    WBC WBC   Date Value Ref Range Status   05/12/2023 4.90 3.40 - 10.80 10*3/mm3 Final   05/11/2023 5.90 3.40 - 10.80 10*3/mm3 Final   05/10/2023 6.90 3.40 - 10.80 10*3/mm3 Final       HGB Hemoglobin   Date Value Ref Range Status   05/12/2023 14.3 13.0 - 17.7 g/dL Final   05/11/2023 13.1 13.0 - 17.7 g/dL Final   05/10/2023 14.8 13.0 - 17.7 g/dL Final      HCT Hematocrit   Date Value Ref Range Status   05/12/2023 42.9 37.5 - 51.0 % Final   05/11/2023 39.4 37.5 - 51.0 % Final   05/10/2023 43.6 37.5 - 51.0 % Final      Platlets No results found for: LABPLAT   MCV MCV   Date Value Ref Range Status   05/12/2023 87.6 79.0 - 97.0 fL Final   05/11/2023 88.2 79.0 - 97.0 fL Final   05/10/2023 89.3 79.0 - 97.0 fL Final          Sodium Sodium   Date Value Ref Range Status   05/11/2023 141 136 - 145 mmol/L Final   05/11/2023 140 136 - 145 mmol/L Final   05/10/2023 135 (L) 136 - 145 mmol/L Final      Potassium Potassium   Date Value Ref Range Status   05/11/2023 4.4 3.5 - 5.2 mmol/L Final   05/11/2023 4.1 3.5 - 5.2 mmol/L Final   05/10/2023 4.5 3.5 - 5.2 mmol/L Final      Chloride Chloride   Date Value Ref Range Status   05/11/2023 105 98 - 107 mmol/L Final   05/11/2023 104 98 - 107 mmol/L Final   05/10/2023 99 98 - 107 mmol/L Final      CO2 CO2   Date Value Ref Range Status   05/11/2023 28.0 22.0 - 29.0 mmol/L Final   05/11/2023 25.0 22.0 - 29.0 mmol/L Final   05/10/2023 24.0 22.0 - 29.0 mmol/L Final      BUN BUN   Date Value Ref Range Status   05/11/2023 26 (H) 8 - 23 mg/dL Final   05/11/2023 28 (H) 8 - 23 mg/dL Final   05/10/2023 33 (H) 8 - 23 mg/dL Final      Creatinine Creatinine   Date Value Ref Range Status   05/11/2023 1.23 0.76 - 1.27 mg/dL Final   05/11/2023 1.30 (H) 0.76 - 1.27 mg/dL Final   05/10/2023 1.67 (H) 0.76 - 1.27 mg/dL Final      Calcium Calcium   Date Value Ref Range Status   05/11/2023 8.7 8.6 - 10.5 mg/dL Final   05/11/2023 8.6 8.6 - 10.5 mg/dL Final   05/10/2023 9.1 8.6 - 10.5 mg/dL Final      PO4 No results found for: CAPO4   Albumin Albumin   Date Value Ref Range Status   05/10/2023 3.6 3.5 - 5.2 g/dL Final      Magnesium Magnesium   Date Value Ref Range Status   05/11/2023 1.9 1.6  - 2.4 mg/dL Final      Uric Acid No results found for: URICACID         atorvastatin, 40 mg, Oral, Nightly  digoxin, 125 mcg, Oral, Daily  empagliflozin, 10 mg, Oral, Daily  insulin lispro, 2-7 Units, Subcutaneous, TID With Meals  metoprolol tartrate, 25 mg, Oral, Q12H  pantoprazole, 40 mg, Oral, Q AM  sodium chloride, 10 mL, Intravenous, Q12H           Assessment & Plan       ALMA - pre-renal + Jardiance    Fatigue    Essential hypertension    Type 2 diabetes mellitus with hyperglycemia, without long-term current use of insulin (HCC)    Coronary artery disease    Atrial fibrillation    Acute kidney injury    Weakness      PLAN: Cr improved to 1.2.  Encourage PO intake and keep off Jardiance for now (can reassess outpatient).  Would be OK for D/C from a renal standpoint.  He will need repeat BMP next week with outpatient follow-up.        Sher Granda MD   [facility]  05/12/23  @NOW

## 2023-05-15 ENCOUNTER — TRANSITIONAL CARE MANAGEMENT TELEPHONE ENCOUNTER (OUTPATIENT)
Dept: CALL CENTER | Facility: HOSPITAL | Age: 83
End: 2023-05-15
Payer: MEDICARE

## 2023-05-15 NOTE — OUTREACH NOTE
Call Center TCM Note    Flowsheet Row Responses   [facility] patient discharged from? [facility]   Does the patient have one of the following disease processes/diagnoses(primary or secondary)? Other   TCM attempt successful? Yes   Call start time 1411   Call end time 1426   Discharge diagnosis fatigue   Person spoke with today (if not patient) and relationship spouse   Meds reviewed with patient/caregiver? Yes   Is the patient having any side effects they believe may be caused by any medication additions or changes? No   Does the patient have all medications ordered at discharge? N/A   Is the patient taking all medications as directed (includes completed medication regime)? Yes   Comments Needs Cardiology/Nephrology   Does the patient have an appointment with their PCP within 7 days of discharge? Yes  [5/19/2023 at 11:15 AM]   Psychosocial issues? No   Did the patient receive a copy of their discharge instructions? Yes   Nursing interventions Reviewed instructions with patient   What is the patient's perception of their health status since discharge? Improving   Is the patient/caregiver able to teach back signs and symptoms related to disease process for when to call PCP? Yes   Is the patient/caregiver able to teach back signs and symptoms related to disease process for when to call 911? Yes   Is the patient/caregiver able to teach back the hierarchy of who to call/visit for symptoms/problems? PCP, Specialist, Home health nurse, Urgent Care, ED, 911 Yes   If the patient is a current smoker, are they able to teach back resources for cessation? Not a smoker   TCM call completed? Yes   Wrap up additional comments Spouse states pt is doing better, and back to work. Spouse reports pt does feel weak still. Reviewed AVS/medications with spouse. Spouse verified PCP fu appt on 5/19/23, and advised to make a cardiologist/nephrologist fu appts.   Call end time 1426   Would this patient benefit from a Referral to [facility]  Work? No   Is the patient interested in additional calls from an ambulatory ?  NOTE:  applies to high risk patients requiring additional follow-up. No          Nevaeh Mondragon RN    5/15/2023, 14:27 EDT

## 2023-05-19 ENCOUNTER — OFFICE VISIT (OUTPATIENT)
Dept: FAMILY MEDICINE CLINIC | Facility: CLINIC | Age: 83
End: 2023-05-19
Payer: MEDICARE

## 2023-05-19 VITALS
SYSTOLIC BLOOD PRESSURE: 110 MMHG | HEIGHT: 70 IN | BODY MASS INDEX: 26.34 KG/M2 | HEART RATE: 68 BPM | OXYGEN SATURATION: 98 % | WEIGHT: 184 LBS | DIASTOLIC BLOOD PRESSURE: 68 MMHG

## 2023-05-19 DIAGNOSIS — R53.1 GENERALIZED WEAKNESS: Primary | ICD-10-CM

## 2023-05-19 DIAGNOSIS — E78.2 MIXED HYPERLIPIDEMIA: Chronic | ICD-10-CM

## 2023-05-19 DIAGNOSIS — E11.65 TYPE 2 DIABETES MELLITUS WITH HYPERGLYCEMIA, WITHOUT LONG-TERM CURRENT USE OF INSULIN: Chronic | ICD-10-CM

## 2023-05-19 DIAGNOSIS — N17.9 ACUTE KIDNEY INJURY: ICD-10-CM

## 2023-05-19 DIAGNOSIS — I48.21 PERMANENT ATRIAL FIBRILLATION: Chronic | ICD-10-CM

## 2023-05-19 NOTE — PROGRESS NOTES
Transitional Care Follow Up Visit  Subjective     Cisco Frank is a 82 y.o. male who presents for a transitional care management visit.    Within 48 business hours after discharge our office contacted him via telephone to coordinate his care and needs.      I reviewed and discussed the details of that call along with the discharge summary, hospital problems, inpatient lab results, inpatient diagnostic studies, and consultation reports with Cisco.     Current outpatient and discharge medications have been reconciled for the patient.  Reviewed by: Carlos Dunham DO        5/12/2023     7:07 PM   Date of TCM Phone Call   [facility]   Date of Admission 5/11/2023   Date of Discharge 5/12/2023   Discharge Disposition Home or Self Care     Risk for Readmission (LACE) Score: 11 (5/12/2023  6:00 AM)    History of Present Illness   Course During Hospital Stay:  Hospitalized 5/11 to 5/12 for generalized fatigue, elevated troponin, atrial fibrillation.  I reviewed his hospital notes and studies.  He had a high-sensitivity troponin at 68, and acute kidney injury with creatinine of 1.3 though this was improved from his visit with Dr. Patton prior to admission at 1.67.  He was in A-fib with a rate of 99.  He was admitted and seen by cardiology and nephrology.  He was given some IV fluid for hydration and his kidney function improved.  He had a nuclear stress test which was negative for ischemia.  Overall he felt improved on the day after admission and was discharged home.    They stopped his jardiance.  His blood sugars have been averaging in the 150s when he has been checking them.  He overall feels 70-80% improved.  He and his wife who is with him today have been walking again.  They normally walk 1 mile / day.       The following portions of the patient's history were reviewed and updated as appropriate: allergies, current medications, past family history, past medical history, past social history,  past surgical history and problem list.    Objective   GEN: In no acute distress, non toxic appearing  HEENT: Pupils equal and reactive to light, sclera clear.   NEURO: AAO to person, place, and time. CN 2-12 intact grossly.  PSYCH: Affect normal, insight fair    Assessment & Plan   Diagnoses and all orders for this visit:    1. Generalized weakness (Primary)  Overall reassuring encounter today, he seems to be improving.  Jardiance was stopped, will continue to hold and monitor blood sugars.   His blood pressure has been appropriate at home, encouraged to continue monitoring.  Continue regular physical activity.  He walks with cane.   His kidney function normalized with IV hydration, we will hold off on nephrology referral at this time.   Let us know if any changes.    2. Type 2 diabetes mellitus with hyperglycemia, without long-term current use of insulin (HCC)  See above    3. Permanent atrial fibrillation  On matoprolol, digoxin and xarelto.   Continue cane use to try and prevent falls.      4. Mixed hyperlipidemia  Continue atorvastatin 40 mg daily    5. Acute kidney injury  Resolved during admission with IVFs, encourage plenty of fluid intake.

## 2023-05-31 ENCOUNTER — TELEPHONE (OUTPATIENT)
Dept: FAMILY MEDICINE CLINIC | Facility: CLINIC | Age: 83
End: 2023-05-31

## 2023-05-31 RX ORDER — PANTOPRAZOLE SODIUM 40 MG/1
TABLET, DELAYED RELEASE ORAL
COMMUNITY
Start: 2023-05-25

## 2023-05-31 RX ORDER — DILTIAZEM HYDROCHLORIDE 120 MG/1
CAPSULE, COATED, EXTENDED RELEASE ORAL
COMMUNITY
Start: 2023-05-25

## 2023-05-31 NOTE — TELEPHONE ENCOUNTER
Patient was taken off Jardiance and Trajenta when he was recently in the hospital.  His sugars usually run 130-140.  The last four days his sugars have been 270-290.  He did eat macaroni and cheese and a hamburger over the holiday.  He is drinking Premier Protein 30 gram shakes each day.  What do you want him to do?  Should he go back on either medication?

## 2023-06-01 NOTE — TELEPHONE ENCOUNTER
Spoke with wife.  He is going to start on glimepiride 4 mg twice a day and he is going to restart Jardiance 10 mg a day.  They are going to call Friday with some blood sugar readings.  Currently he is running about 250 in the morning and 250 at night

## 2023-06-07 ENCOUNTER — LAB (OUTPATIENT)
Dept: LAB | Facility: HOSPITAL | Age: 83
End: 2023-06-07
Payer: MEDICARE

## 2023-06-07 ENCOUNTER — TRANSCRIBE ORDERS (OUTPATIENT)
Dept: ADMINISTRATIVE | Facility: HOSPITAL | Age: 83
End: 2023-06-07
Payer: MEDICARE

## 2023-06-07 DIAGNOSIS — N17.8 ACUTE RENAL FAILURE WITH PATHOLOGICAL LESION IN KIDNEY: Primary | ICD-10-CM

## 2023-06-07 DIAGNOSIS — N17.8 ACUTE RENAL FAILURE WITH PATHOLOGICAL LESION IN KIDNEY: ICD-10-CM

## 2023-06-07 LAB
ANION GAP SERPL CALCULATED.3IONS-SCNC: 10 MMOL/L (ref 5–15)
BUN SERPL-MCNC: 21 MG/DL (ref 8–23)
BUN/CREAT SERPL: 15.8 (ref 7–25)
CALCIUM SPEC-SCNC: 8.9 MG/DL (ref 8.6–10.5)
CHLORIDE SERPL-SCNC: 101 MMOL/L (ref 98–107)
CO2 SERPL-SCNC: 28 MMOL/L (ref 22–29)
CREAT SERPL-MCNC: 1.33 MG/DL (ref 0.76–1.27)
EGFRCR SERPLBLD CKD-EPI 2021: 54 ML/MIN/1.73
GLUCOSE SERPL-MCNC: 200 MG/DL (ref 65–99)
POTASSIUM SERPL-SCNC: 4.6 MMOL/L (ref 3.5–5.2)
SODIUM SERPL-SCNC: 139 MMOL/L (ref 136–145)

## 2023-06-07 PROCEDURE — 36415 COLL VENOUS BLD VENIPUNCTURE: CPT

## 2023-06-07 PROCEDURE — 80048 BASIC METABOLIC PNL TOTAL CA: CPT

## 2023-07-24 DIAGNOSIS — I48.21 PERMANENT ATRIAL FIBRILLATION: Primary | ICD-10-CM

## 2023-07-24 DIAGNOSIS — I48.21 PERMANENT ATRIAL FIBRILLATION: ICD-10-CM

## 2023-07-24 RX ORDER — DIGOXIN 125 MCG
125 TABLET ORAL
Qty: 90 TABLET | Refills: 1 | Status: SHIPPED | OUTPATIENT
Start: 2023-07-24 | End: 2023-07-24 | Stop reason: SDUPTHER

## 2023-07-24 RX ORDER — DIGOXIN 125 MCG
125 TABLET ORAL
Qty: 90 TABLET | Refills: 1 | Status: SHIPPED | OUTPATIENT
Start: 2023-07-24

## 2023-08-01 ENCOUNTER — TELEPHONE (OUTPATIENT)
Dept: FAMILY MEDICINE CLINIC | Facility: CLINIC | Age: 83
End: 2023-08-01
Payer: MEDICARE

## 2023-08-29 ENCOUNTER — OFFICE VISIT (OUTPATIENT)
Dept: CARDIOLOGY | Facility: CLINIC | Age: 83
End: 2023-08-29
Payer: MEDICARE

## 2023-08-29 VITALS
HEIGHT: 70 IN | HEART RATE: 72 BPM | DIASTOLIC BLOOD PRESSURE: 70 MMHG | BODY MASS INDEX: 25.05 KG/M2 | WEIGHT: 175 LBS | OXYGEN SATURATION: 96 % | SYSTOLIC BLOOD PRESSURE: 103 MMHG

## 2023-08-29 DIAGNOSIS — I25.10 CORONARY ARTERY DISEASE INVOLVING NATIVE CORONARY ARTERY OF NATIVE HEART WITHOUT ANGINA PECTORIS: ICD-10-CM

## 2023-08-29 DIAGNOSIS — E78.2 MIXED HYPERLIPIDEMIA: ICD-10-CM

## 2023-08-29 DIAGNOSIS — E11.65 TYPE 2 DIABETES MELLITUS WITH HYPERGLYCEMIA, WITHOUT LONG-TERM CURRENT USE OF INSULIN: ICD-10-CM

## 2023-08-29 DIAGNOSIS — I48.21 PERMANENT ATRIAL FIBRILLATION: Primary | ICD-10-CM

## 2023-08-29 DIAGNOSIS — I10 ESSENTIAL HYPERTENSION: ICD-10-CM

## 2023-08-29 PROCEDURE — 3078F DIAST BP <80 MM HG: CPT | Performed by: INTERNAL MEDICINE

## 2023-08-29 PROCEDURE — 3074F SYST BP LT 130 MM HG: CPT | Performed by: INTERNAL MEDICINE

## 2023-08-29 PROCEDURE — 1159F MED LIST DOCD IN RCRD: CPT | Performed by: INTERNAL MEDICINE

## 2023-08-29 PROCEDURE — 99214 OFFICE O/P EST MOD 30 MIN: CPT | Performed by: INTERNAL MEDICINE

## 2023-08-29 PROCEDURE — 1160F RVW MEDS BY RX/DR IN RCRD: CPT | Performed by: INTERNAL MEDICINE

## 2023-08-29 NOTE — PROGRESS NOTES
Subjective:     Encounter Date:08/29/2023      Patient ID: Cisco Frank is a 83 y.o. male.    Chief Complaint:  Hypertension  Pertinent negatives include no chest pain, headaches, malaise/fatigue, palpitations or shortness of breath.     83-year-old white male with history of coronary disease hypertension hyperlipidemia diabetes and atrial fibrillation presents to my office for a follow-up.  Patient is currently stable without any symptoms of chest pain or shortness of breath at rest or exertion radiograms any PND orthopnea.  No palpitation dizziness syncope or swelling of the feet.  Patient is taking all her medicines regularly.  Patient does not smoke.    The following portions of the patient's history were reviewed and updated as appropriate: allergies, current medications, past family history, past medical history, past social history, past surgical history, and problem list.  Past Medical History:   Diagnosis Date    Atrial fibrillation     Cancer     Coronary artery disease     History of bladder cancer     Hyperlipidemia      Past Surgical History:   Procedure Laterality Date    BLADDER SURGERY  10/26/2019    [facility] Dr. Cope    BRONCHOSCOPY N/A 6/30/2022    Procedure: BRONCHOSCOPY with bilateral lung wash;  Surgeon: Ayush Velarde MD;  Location: [facility] ENDOSCOPY;  Service: Pulmonary;  Laterality: N/A;  normal bronch    CARDIAC CATHETERIZATION      COLONOSCOPY      COLONOSCOPY W/ POLYPECTOMY  02/23/2021    ENDOSCOPY N/A 4/20/2023    Procedure: ESOPHAGOGASTRODUODENOSCOPY with gastric biopsy's;  Surgeon: Debra Lomeli MD;  Location: [facility] ENDOSCOPY;  Service: Gastroenterology;  Laterality: N/A;  gastritis, cardia nodules, barretts esophagus    SKIN CANCER EXCISION      right temple BX    UPPER ENDOSCOPIC ULTRASOUND W/ FNA N/A 3/12/2021    Procedure: ESOPHAGOGASTRODUODENOSCOPY WITH endoscopic mucosal resection, biopsy x 1 area, and endoscopic ULTRASOUND;  Surgeon: Abner Infante MD;  Location: [facility]  "ENDOSCOPY;  Service: Gastroenterology;  Laterality: N/A;  post op: hiatal hernia, duodenal polyp     /70   Pulse 72   Ht 177.8 cm (70\")   Wt 79.4 kg (175 lb)   SpO2 96%   BMI 25.11 kg/mý   Family History   Problem Relation Age of Onset    Heart disease Mother     Heart attack Father     No Known Problems Sister     No Known Problems Brother     No Known Problems Maternal Aunt     No Known Problems Maternal Uncle     No Known Problems Paternal Aunt     No Known Problems Paternal Uncle     No Known Problems Maternal Grandmother     No Known Problems Maternal Grandfather     No Known Problems Paternal Grandmother     No Known Problems Paternal Grandfather     No Known Problems Other     Anemia Neg Hx     Arrhythmia Neg Hx     Asthma Neg Hx     Clotting disorder Neg Hx     Fainting Neg Hx     Heart failure Neg Hx     Hyperlipidemia Neg Hx     Hypertension Neg Hx        Current Outpatient Medications:     atorvastatin (LIPITOR) 40 MG tablet, Take 1 tablet by mouth every night at bedtime., Disp: 90 tablet, Rfl: 3    colestipol (COLESTID) 1 g tablet, Take 1 tablet by mouth 2 (Two) Times a Day., Disp: , Rfl:     digoxin (LANOXIN) 125 MCG tablet, Take 1 tablet by mouth Daily., Disp: 90 tablet, Rfl: 1    dilTIAZem CD (CARDIZEM CD) 120 MG 24 hr capsule, , Disp: , Rfl:     glimepiride (AMARYL) 2 MG tablet, TAKE 3 TABLETS EVERY MORNING BEFORE BREAKFAST, Disp: 270 tablet, Rfl: 2    glucose blood (Accu-Chek Tessa Plus) test strip, Test daily and prn, Disp: 90 each, Rfl: 3    metoprolol tartrate (LOPRESSOR) 25 MG tablet, Take 1 tablet by mouth Every 12 (Twelve) Hours for 90 days., Disp: 60 tablet, Rfl: 2    ondansetron ODT (ZOFRAN-ODT) 4 MG disintegrating tablet, Place 1 tablet on the tongue 4 (Four) Times a Day As Needed for Nausea or Vomiting for up to 15 doses., Disp: 15 tablet, Rfl: 2    pantoprazole (PROTONIX) 40 MG EC tablet, , Disp: , Rfl:     polyethylene glycol (MIRALAX) 17 g packet, Take 17 g by mouth Every " Night., Disp: , Rfl:     rivaroxaban (XARELTO) 15 MG tablet, Take 1 tablet by mouth Daily., Disp: 90 tablet, Rfl: 3  No Known Allergies  Social History     Socioeconomic History    Marital status:    Tobacco Use    Smoking status: Former     Packs/day: 0.50     Years: 15.00     Pack years: 7.50     Types: Cigarettes     Start date: 1960     Quit date: 1980     Years since quittin.6     Passive exposure: Past    Smokeless tobacco: Never   Vaping Use    Vaping Use: Never used   Substance and Sexual Activity    Alcohol use: Not Currently    Drug use: Never    Sexual activity: Defer     Review of Systems   Constitutional: Negative for malaise/fatigue.   Cardiovascular:  Negative for chest pain, dyspnea on exertion, leg swelling and palpitations.   Respiratory:  Negative for cough and shortness of breath.    Gastrointestinal:  Negative for abdominal pain, nausea and vomiting.   Neurological:  Negative for dizziness, focal weakness, headaches, light-headedness and numbness.   All other systems reviewed and are negative.           Objective:     Constitutional:       Appearance: Well-developed.   Eyes:      General: No scleral icterus.     Conjunctiva/sclera: Conjunctivae normal.   HENT:      Head: Normocephalic and atraumatic.   Neck:      Vascular: No carotid bruit or JVD.   Pulmonary:      Effort: Pulmonary effort is normal.      Breath sounds: Normal breath sounds. No wheezing. No rales.   Cardiovascular:      Normal rate. Regular rhythm.      Murmurs: There is a systolic murmur.   Pulses:     Intact distal pulses.   Abdominal:      General: Bowel sounds are normal.      Palpations: Abdomen is soft.   Musculoskeletal:      Cervical back: Normal range of motion and neck supple. Skin:     General: Skin is warm and dry.      Findings: No rash.   Neurological:      Mental Status: Alert.     Procedures    Lab Review:         MDM    #1 coronary disease  Patient has nonobstructive disease in the past and  is currently stable on medications with normal LV systolic function  2.  Atrial fibrillation  Patient is history of atrial fibrillation is currently on metoprolol and Xarelto but I will decrease the dose to 15 mg because he has renal insufficiency and is above the age of 82  3.  Hypertension  Patient blood pressure currently stable on medications including metoprolol and diltiazem  4.  Hyperlipidemia  Patient is on Lipitor and the lipid levels are well within normal limits  5.  Diabetes  Patient is on oral medicines and followed by the primary care doctor    Patient's previous medical records, labs, and EKG were reviewed and discussed with the patient at today's visit.

## 2023-10-03 RX ORDER — EMPAGLIFLOZIN 10 MG/1
TABLET, FILM COATED ORAL
Qty: 90 TABLET | Refills: 3 | Status: SHIPPED | OUTPATIENT
Start: 2023-10-03

## 2023-10-18 RX ORDER — BLOOD SUGAR DIAGNOSTIC
STRIP MISCELLANEOUS
Qty: 300 EACH | Refills: 1 | Status: SHIPPED | OUTPATIENT
Start: 2023-10-18 | End: 2023-10-18 | Stop reason: CLARIF

## 2023-10-18 RX ORDER — UBIQUINOL 100 MG
CAPSULE ORAL
Qty: 200 EACH | Refills: 3 | Status: SHIPPED | OUTPATIENT
Start: 2023-10-18

## 2023-10-18 RX ORDER — BLOOD SUGAR DIAGNOSTIC
STRIP MISCELLANEOUS
Qty: 300 EACH | Refills: 1 | Status: SHIPPED | OUTPATIENT
Start: 2023-10-18 | End: 2023-10-18 | Stop reason: SDUPTHER

## 2023-10-31 ENCOUNTER — TELEPHONE (OUTPATIENT)
Dept: FAMILY MEDICINE CLINIC | Facility: CLINIC | Age: 83
End: 2023-10-31
Payer: MEDICARE

## 2023-10-31 NOTE — TELEPHONE ENCOUNTER
I do not see anywhere that this medication was discontinued. Can you please advise if he should continue to take?

## 2023-11-01 ENCOUNTER — APPOINTMENT (OUTPATIENT)
Dept: CT IMAGING | Facility: HOSPITAL | Age: 83
End: 2023-11-01
Payer: MEDICARE

## 2023-11-01 ENCOUNTER — HOSPITAL ENCOUNTER (EMERGENCY)
Facility: HOSPITAL | Age: 83
Discharge: HOME | End: 2023-11-01
Attending: NURSE PRACTITIONER | Admitting: EMERGENCY MEDICINE
Payer: MEDICARE

## 2023-11-01 ENCOUNTER — APPOINTMENT (OUTPATIENT)
Dept: GENERAL RADIOLOGY | Facility: HOSPITAL | Age: 83
End: 2023-11-01
Payer: MEDICARE

## 2023-11-01 VITALS
TEMPERATURE: 97.8 F | OXYGEN SATURATION: 98 % | DIASTOLIC BLOOD PRESSURE: 81 MMHG | WEIGHT: 174.82 LBS | RESPIRATION RATE: 18 BRPM | SYSTOLIC BLOOD PRESSURE: 145 MMHG | BODY MASS INDEX: 25.03 KG/M2 | HEART RATE: 83 BPM | HEIGHT: 70 IN

## 2023-11-01 DIAGNOSIS — S00.01XA ABRASION OF SCALP, INITIAL ENCOUNTER: ICD-10-CM
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
## 2023-11-01 DIAGNOSIS — E87.5 SERUM POTASSIUM ELEVATED: ICD-10-CM

## 2023-11-01 DIAGNOSIS — S22.42XA CLOSED FRACTURE OF MULTIPLE RIBS OF LEFT SIDE, INITIAL ENCOUNTER: ICD-10-CM

## 2023-11-01 LAB
ALBUMIN SERPL-MCNC: 3.8 G/DL (ref 3.5–5.2)
ALBUMIN/GLOB SERPL: 1 G/DL
ALP SERPL-CCNC: 158 U/L (ref 39–117)
ALT SERPL W P-5'-P-CCNC: 12 U/L (ref 1–41)
ANION GAP SERPL CALCULATED.3IONS-SCNC: 9 MMOL/L (ref 5–15)
AST SERPL-CCNC: 17 U/L (ref 1–40)
BASOPHILS # BLD AUTO: 0 10*3/MM3 (ref 0–0.2)
BASOPHILS NFR BLD AUTO: 0.6 % (ref 0–1.5)
BILIRUB SERPL-MCNC: 0.9 MG/DL (ref 0–1.2)
BUN SERPL-MCNC: 28 MG/DL (ref 8–23)
BUN/CREAT SERPL: 21.2 (ref 7–25)
CALCIUM SPEC-SCNC: 9.9 MG/DL (ref 8.6–10.5)
CHLORIDE SERPL-SCNC: 98 MMOL/L (ref 98–107)
CO2 SERPL-SCNC: 28 MMOL/L (ref 22–29)
CREAT SERPL-MCNC: 1.32 MG/DL (ref 0.76–1.27)
DEPRECATED RDW RBC AUTO: 47.7 FL (ref 37–54)
EGFRCR SERPLBLD CKD-EPI 2021: 54.2 ML/MIN/1.73
EOSINOPHIL # BLD AUTO: 0.1 10*3/MM3 (ref 0–0.4)
EOSINOPHIL NFR BLD AUTO: 2.2 % (ref 0.3–6.2)
ERYTHROCYTE [DISTWIDTH] IN BLOOD BY AUTOMATED COUNT: 15 % (ref 12.3–15.4)
GLOBULIN UR ELPH-MCNC: 4 GM/DL
GLUCOSE SERPL-MCNC: 373 MG/DL (ref 65–99)
HCT VFR BLD AUTO: 48.1 % (ref 37.5–51)
HGB BLD-MCNC: 15.9 G/DL (ref 13–17.7)
LYMPHOCYTES # BLD AUTO: 0.9 10*3/MM3 (ref 0.7–3.1)
LYMPHOCYTES NFR BLD AUTO: 14 % (ref 19.6–45.3)
MCH RBC QN AUTO: 29.7 PG (ref 26.6–33)
MCHC RBC AUTO-ENTMCNC: 33 G/DL (ref 31.5–35.7)
MCV RBC AUTO: 90.1 FL (ref 79–97)
MONOCYTES # BLD AUTO: 0.4 10*3/MM3 (ref 0.1–0.9)
MONOCYTES NFR BLD AUTO: 5.5 % (ref 5–12)
NEUTROPHILS NFR BLD AUTO: 5 10*3/MM3 (ref 1.7–7)
NEUTROPHILS NFR BLD AUTO: 77.7 % (ref 42.7–76)
NRBC BLD AUTO-RTO: 0.1 /100 WBC (ref 0–0.2)
PLATELET # BLD AUTO: 138 10*3/MM3 (ref 140–450)
PMV BLD AUTO: 8.1 FL (ref 6–12)
POTASSIUM SERPL-SCNC: 5.7 MMOL/L (ref 3.5–5.2)
PROT SERPL-MCNC: 7.8 G/DL (ref 6–8.5)
RBC # BLD AUTO: 5.34 10*6/MM3 (ref 4.14–5.8)
SODIUM SERPL-SCNC: 135 MMOL/L (ref 136–145)
WBC NRBC COR # BLD: 6.5 10*3/MM3 (ref 3.4–10.8)

## 2023-11-01 PROCEDURE — 71101 X-RAY EXAM UNILAT RIBS/CHEST: CPT

## 2023-11-01 PROCEDURE — 99284 EMERGENCY DEPT VISIT MOD MDM: CPT

## 2023-11-01 PROCEDURE — 80053 COMPREHEN METABOLIC PANEL: CPT | Performed by: NURSE PRACTITIONER

## 2023-11-01 PROCEDURE — 70450 CT HEAD/BRAIN W/O DYE: CPT

## 2023-11-01 PROCEDURE — 36415 COLL VENOUS BLD VENIPUNCTURE: CPT

## 2023-11-01 PROCEDURE — 85025 COMPLETE CBC W/AUTO DIFF WBC: CPT | Performed by: NURSE PRACTITIONER

## 2023-11-01 RX ORDER — SODIUM CHLORIDE 0.9 % (FLUSH) 0.9 %
10 SYRINGE (ML) INJECTION AS NEEDED
Status: DISCONTINUED | OUTPATIENT
Start: 2023-11-01 | End: 2023-11-01 | Stop reason: HOSPADM

## 2023-11-01 NOTE — DISCHARGE INSTRUCTIONS
Have potassium rechecked by PCP this week. Use incentive spirometer as directed. Cleanse twice daily with antibacterial soap and water. Apply bacitracin bandage. Continue home medication regimen. Schedule follow up with primary care for recheck. Call urgent care as needed. Go to ED for any new or worsening symptoms.

## 2023-11-01 NOTE — ED PROVIDER NOTES
Subjective   History of Present Illness  Cisco Frank is a 83 y.o. male presents status post fall with complaints of posterior scalp abrasion and left anterior lateral rib pain.  He denies dyspnea and chest pain.  He denies LOC.  Denies any perispinal tenderness.  He is on Xarelto for history of A-fib.  He reports that his tetanus has been updated within the last year.      History provided by:  Patient      Review of Systems    Past Medical History:   Diagnosis Date    Atrial fibrillation     Cancer     Coronary artery disease     History of bladder cancer     Hyperlipidemia        No Known Allergies    Past Surgical History:   Procedure Laterality Date    BLADDER SURGERY  10/26/2019    [facility] Dr. Cope    BRONCHOSCOPY N/A 6/30/2022    Procedure: BRONCHOSCOPY with bilateral lung wash;  Surgeon: Ayush Velarde MD;  Location: [facility] ENDOSCOPY;  Service: Pulmonary;  Laterality: N/A;  normal bronch    CARDIAC CATHETERIZATION      COLONOSCOPY      COLONOSCOPY W/ POLYPECTOMY  02/23/2021    ENDOSCOPY N/A 4/20/2023    Procedure: ESOPHAGOGASTRODUODENOSCOPY with gastric biopsy's;  Surgeon: Debra Lomeli MD;  Location: [facility] ENDOSCOPY;  Service: Gastroenterology;  Laterality: N/A;  gastritis, cardia nodules, barretts esophagus    SKIN CANCER EXCISION      right temple BX    UPPER ENDOSCOPIC ULTRASOUND W/ FNA N/A 3/12/2021    Procedure: ESOPHAGOGASTRODUODENOSCOPY WITH endoscopic mucosal resection, biopsy x 1 area, and endoscopic ULTRASOUND;  Surgeon: Abner Infante MD;  Location: [facility] ENDOSCOPY;  Service: Gastroenterology;  Laterality: N/A;  post op: hiatal hernia, duodenal polyp       Family History   Problem Relation Age of Onset    Heart disease Mother     Heart attack Father     No Known Problems Sister     No Known Problems Brother     No Known Problems Maternal Aunt     No Known Problems Maternal Uncle     No Known Problems Paternal Aunt     No Known Problems Paternal Uncle     No Known Problems Maternal  Grandmother     No Known Problems Maternal Grandfather     No Known Problems Paternal Grandmother     No Known Problems Paternal Grandfather     No Known Problems Other     Anemia Neg Hx     Arrhythmia Neg Hx     Asthma Neg Hx     Clotting disorder Neg Hx     Fainting Neg Hx     Heart failure Neg Hx     Hyperlipidemia Neg Hx     Hypertension Neg Hx        Social History     Socioeconomic History    Marital status:    Tobacco Use    Smoking status: Former     Packs/day: 0.50     Years: 15.00     Additional pack years: 0.00     Total pack years: 7.50     Types: Cigarettes     Start date: 1960     Quit date: 1980     Years since quittin.8     Passive exposure: Past    Smokeless tobacco: Never   Vaping Use    Vaping Use: Never used   Substance and Sexual Activity    Alcohol use: Not Currently    Drug use: Never    Sexual activity: Defer           Objective   Physical Exam  Vitals and nursing note reviewed.   Constitutional:       General: He is awake. He is not in acute distress.     Appearance: Normal appearance. He is well-developed. He is not ill-appearing or toxic-appearing.   HENT:      Head: Normocephalic. Abrasion present. No raccoon eyes or Severino's sign.        Right Ear: Tympanic membrane, ear canal and external ear normal. No drainage. No hemotympanum.      Left Ear: Tympanic membrane, ear canal and external ear normal. No drainage. No hemotympanum.      Nose: Nose normal. No rhinorrhea.      Mouth/Throat:      Lips: Pink. No lesions.      Mouth: Mucous membranes are moist.      Pharynx: Oropharynx is clear. Uvula midline.   Eyes:      General: Lids are normal.      Extraocular Movements: Extraocular movements intact.      Conjunctiva/sclera: Conjunctivae normal.      Pupils: Pupils are equal, round, and reactive to light.   Neck:      Trachea: Trachea and phonation normal.   Cardiovascular:      Rate and Rhythm: Normal rate and regular rhythm.      Pulses: Normal pulses.      Heart  sounds: Normal heart sounds, S1 normal and S2 normal. Heart sounds not distant. No murmur heard.     No friction rub. No gallop.   Pulmonary:      Effort: Pulmonary effort is normal.      Breath sounds: Normal breath sounds and air entry.   Chest:      Chest wall: Tenderness present. No crepitus.       Abdominal:      General: Abdomen is flat. Bowel sounds are normal.      Palpations: Abdomen is soft.      Tenderness: There is no abdominal tenderness.   Musculoskeletal:         General: Normal range of motion.      Cervical back: Full passive range of motion without pain, normal range of motion and neck supple. No erythema or rigidity. No spinous process tenderness. Normal range of motion.   Skin:     General: Skin is warm and dry.      Capillary Refill: Capillary refill takes less than 2 seconds.   Neurological:      Mental Status: He is alert and oriented to person, place, and time.      GCS: GCS eye subscore is 4. GCS verbal subscore is 5. GCS motor subscore is 6.      Cranial Nerves: No cranial nerve deficit.      Sensory: No sensory deficit.      Motor: No abnormal muscle tone.   Psychiatric:         Mood and Affect: Mood normal.         Behavior: Behavior normal. Behavior is cooperative.         Thought Content: Thought content normal.         Judgment: Judgment normal.         Procedures           ED Course  ED Course as of 11/01/23 0855   Wed Nov 01, 2023   0841 Awaiting CMP results. [AL]      ED Course User Index  [AL] Sandra Brito, APRN                                           Medical Decision Making  Problems Addressed:  Abrasion of scalp, initial encounter: complicated acute illness or injury  Closed fracture of multiple ribs of left side, initial encounter: complicated acute illness or injury  Fall, initial encounter: complicated acute illness or injury  Serum potassium elevated: complicated acute illness or injury    Amount and/or Complexity of Data Reviewed  Labs: ordered.  Radiology:  "ordered.    Risk  Prescription drug management.    Interpreted by radiologist as below:     CT Head Without Contrast    Result Date: 11/1/2023  Impression: 1. Generalized atrophy with chronic ischemic changes primarily in the external capsular region and periventricular deep white matter. 2. No acute intracranial findings. Electronically Signed: Alexandru Jeong MD  11/1/2023 8:17 AM EDT  Workstation ID: [id number]    XR Ribs Left With PA Chest    Result Date: 11/1/2023  Impression: 1. Acute fractures of the left anterior seventh through ninth ribs. 2. No pneumothorax. 3. Clear lungs. 4. Moderate hiatal hernia. Electronically Signed: Mynor Ravi MD  11/1/2023 8:07 AM EDT  Workstation ID: [id number]       /79 (BP Location: Left arm, Patient Position: Sitting)   Pulse 83   Temp 97.8 °F (36.6 °C) (Oral)   Resp 18   Ht 177.8 cm (70\")   Wt 79.3 kg (174 lb 13.2 oz)   SpO2 99%   BMI 25.08 kg/m²      Lab Results (last 24 hours)       Procedure Component Value Units Date/Time    CBC & Differential [[id number]]  (Abnormal) Collected: 11/01/23 0814    Specimen: Blood Updated: 11/01/23 0820    Narrative:      The following orders were created for panel order CBC & Differential.  Procedure                               Abnormality         Status                     ---------                               -----------         ------                     CBC Auto Differential[[id number]]        Abnormal            Final result                 Please view results for these tests on the individual orders.    Comprehensive Metabolic Panel [[id number]]  (Abnormal) Collected: 11/01/23 0814    Specimen: Blood Updated: 11/01/23 0847     Glucose 373 mg/dL      BUN 28 mg/dL      Creatinine 1.32 mg/dL      Sodium 135 mmol/L      Potassium 5.7 mmol/L      Comment: Slight hemolysis detected by analyzer. Results may be affected.        Chloride 98 mmol/L      CO2 28.0 mmol/L      Calcium 9.9 mg/dL      Total Protein 7.8 g/dL      Albumin 3.8 " g/dL      ALT (SGPT) 12 U/L      AST (SGOT) 17 U/L      Comment: Slight hemolysis detected by analyzer. Results may be affected.        Alkaline Phosphatase 158 U/L      Total Bilirubin 0.9 mg/dL      Globulin 4.0 gm/dL      A/G Ratio 1.0 g/dL      BUN/Creatinine Ratio 21.2     Anion Gap 9.0 mmol/L      eGFR 54.2 mL/min/1.73     Narrative:      GFR Normal >60  Chronic Kidney Disease <60  Kidney Failure <15    The GFR formula is only valid for adults with stable renal function between ages 18 and 70.    CBC Auto Differential [[id number]]  (Abnormal) Collected: 11/01/23 0814    Specimen: Blood Updated: 11/01/23 0820     WBC 6.50 10*3/mm3      RBC 5.34 10*6/mm3      Hemoglobin 15.9 g/dL      Hematocrit 48.1 %      MCV 90.1 fL      MCH 29.7 pg      MCHC 33.0 g/dL      RDW 15.0 %      RDW-SD 47.7 fl      MPV 8.1 fL      Platelets 138 10*3/mm3      Neutrophil % 77.7 %      Lymphocyte % 14.0 %      Monocyte % 5.5 %      Eosinophil % 2.2 %      Basophil % 0.6 %      Neutrophils, Absolute 5.00 10*3/mm3      Lymphocytes, Absolute 0.90 10*3/mm3      Monocytes, Absolute 0.40 10*3/mm3      Eosinophils, Absolute 0.10 10*3/mm3      Basophils, Absolute 0.00 10*3/mm3      nRBC 0.1 /100 WBC              Medications   sodium chloride 0.9 % flush 10 mL (has no administration in time range)        Patient undressed and placed in gown for exam.  Appropriate PPE worn during patient exam.  Appropriate monitoring initiated. Patient is alert and oriented x3.  No acute distress noted.  Nothing focal on exam.  There is an abrasion noted to the posterior scalp. S1-S2 heart sounds on exam.  Lungs are clear to auscultation. No edema noted to the bilateral lower extremities.  IV established and labs obtained.  Cardiac work-up initiated.  Chest x-ray obtained with the above findings.  CBC is essentially unremarkable, chronic thrombocytopenia.  CMP is similar to previous although potassium is slightly elevated at 5.7, it is noted that there is  hemolysis on the analyzer.  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  He was encouraged to have his potassium rechecked by his primary care physician.  His blood sugar is slightly elevated at 373 but he reports he just ate prior to having labs done.  CT showed no acute intracranial abnormalities.  Rib series was significant for left ribs 7 through 9 fractured.  He reports he has an incentive spirometer at home and is familiar with this as he recently had rib fractures.  Patient has had tetanus booster within the last year.  The wound was cleansed and dressed.    I reviewed chart 9/13/2023 patient was seen by urology for prostate malignancy. My radiology interpretation...Differential Diagnoses, not all-inclusive and does not constitute entirety of all causes: Rib fractures, pneumothorax, hemothorax, intracranial hemorrhage.    Disposition/Treatment: Discussed results with patient, verbalized understanding. Discussed reasons to return to the ER, patient verbalized understanding. Agreeable with plan of care. Patient was stable upon discharge.    Upon reassessment, patient is flesh tone warm and dry no acute distress noted.  Vital signs are stable.    Part of this note may be an electronic transcription/translation of spoken language to printed text using the Dragon Dictation System.       Final diagnoses:   Fall, initial encounter   Closed fracture of multiple ribs of left side, initial encounter   Abrasion of scalp, initial encounter   Serum potassium elevated       ED Disposition  ED Disposition       ED Disposition   Discharge    Condition   Stable    Comment   --               Nuno Patton MD  [address]  [facility] [address] [phone]    Schedule an appointment as soon as possible for a visit       [facility] EMERGENCY DEPARTMENT  [facility] [facility] [address] [phone]  Go to   If symptoms worsen         Medication List      No changes were made to your  prescriptions during this visit.            Sandra Brito, RIAN  11/01/23 0855       Alexandru Mcclelland MD  11/02/23 0656

## 2023-11-06 RX ORDER — AZITHROMYCIN 500 MG/1
500 TABLET, FILM COATED ORAL DAILY
Qty: 5 TABLET | Refills: 0 | Status: SHIPPED | OUTPATIENT
Start: 2023-11-06 | End: 2023-11-11

## 2023-11-07 ENCOUNTER — PATIENT OUTREACH (OUTPATIENT)
Dept: CASE MANAGEMENT | Facility: OTHER | Age: 83
End: 2023-11-07
Payer: MEDICARE

## 2023-11-07 NOTE — OUTREACH NOTE
"AMBULATORY CASE MANAGEMENT NOTE    Patient Outreach  Name and Relationship of Patient/Support Person: Frank Cisco MARTINEZ - Self and Christine Frank, Spouse    RN-ACM outreach call made to patient Cisco Frank & his spouse Christine, per patient's verbal auth during call.  Patient discharged [facility] ED on 11/1/23 with DX fall, rib fractures, abrasion, elevated potassium.   Patient did report sputum turning yellow, but reports to be using incentive spirometer and walking regularly as directed. He reports his blood sugars are \"okay\" -states today FBG was 143, states he's worried about his lungs not his blood sugards.  Patient conversed with RN-ACM for a while but did not have his hearing aids in, so he authorized RNKASIE to speak with his wife Christine.  Christine states patient will start azithromycin today.  Reports he is eating and hydrating/drinking water well. She states she doesn't think patient has had any fever or chills since ED visit.   She verb understanding that patient can splint his ribs with a pillow to assist deep breathing exercises and when coughing.  Reviewed ED AVS with Christine and disease education was provided, questions addressed.  Reviewed SDOH.  Christine denies any needs at this time.  States they will notify PCP if patient is not improving by Friday and she verb understanding to return to ED if sx worsen.  Patient declines PCP ED f/up appt. Declined RN-MARIEM or [facility] 24 hour nurse ph# - states they have PCP cell ph # to call if needed.    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 Walks with cane. Declines interest in medical alert device.  States no children, but reports to have friends/neighbors that can help if needed.  Denies insecurities re food, medications, transportation, housing.    Financial Resource Strain: Low Risk  (11/7/2023)    Overall Financial Resource Strain (CARDIA)     Difficulty of Paying Living " Expenses: Not very hard      Food Insecurity: No Food Insecurity (11/7/2023)    Hunger Vital Sign     Worried About Running Out of Food in the Last Year: Never true     Ran Out of Food in the Last Year: Never true      Transportation Needs: No Transportation Needs (11/7/2023)    PRAPARE - Transportation     Lack of Transportation (Medical): No     Lack of Transportation (Non-Medical): No      Housing Stability: Low Risk  (11/7/2023)    Housing Stability Vital Sign     Unable to Pay for Housing in the Last Year: No     Number of Places Lived in the Last Year: 1     Unstable Housing in the Last Year: No       ISABEL D  Ambulatory Case Management  11/7/2023, 15:18 EST

## 2023-11-08 ENCOUNTER — TELEPHONE (OUTPATIENT)
Dept: FAMILY MEDICINE CLINIC | Facility: CLINIC | Age: 83
End: 2023-11-08
Payer: MEDICARE

## 2023-11-08 NOTE — TELEPHONE ENCOUNTER
Called horacio Peck a message on voicemail that antibiotic called in per Dr. Patton.  Asked them to call us back with any additional questions.    Nuno Patton MD Higdon, Sarah, MA  Called in yesterday          Previous Messages       ----- Message -----  From: Laura Nair MA  Sent: 11/6/2023   2:14 PM EST  To: MD Cisco Haywood - productive cough x 2 weeks, asking for an antibiotic. He has also fallen twice recently. Christine also stated that he has been very tired and sleeping a lot.

## 2023-11-13 ENCOUNTER — HOSPITAL ENCOUNTER (EMERGENCY)
Facility: HOSPITAL | Age: 83
Discharge: HOME | End: 2023-11-13
Attending: EMERGENCY MEDICINE | Admitting: EMERGENCY MEDICINE
Payer: MEDICARE

## 2023-11-13 ENCOUNTER — APPOINTMENT (OUTPATIENT)
Dept: CT IMAGING | Facility: HOSPITAL | Age: 83
End: 2023-11-13
Payer: MEDICARE

## 2023-11-13 VITALS
HEIGHT: 70 IN | HEART RATE: 76 BPM | WEIGHT: 171.08 LBS | TEMPERATURE: 98.9 F | SYSTOLIC BLOOD PRESSURE: 112 MMHG | RESPIRATION RATE: 18 BRPM | BODY MASS INDEX: 24.49 KG/M2 | DIASTOLIC BLOOD PRESSURE: 66 MMHG | OXYGEN SATURATION: 99 %

## 2023-11-13 DIAGNOSIS — R29.6 MULTIPLE FALLS: ICD-10-CM

## 2023-11-13 DIAGNOSIS — M54.2 CERVICALGIA: Primary | ICD-10-CM

## 2023-11-13 DIAGNOSIS — S22.42XA CLOSED FRACTURE OF MULTIPLE RIBS OF LEFT SIDE, INITIAL ENCOUNTER: ICD-10-CM

## 2023-11-13 LAB
ALBUMIN SERPL-MCNC: 3.4 G/DL (ref 3.5–5.2)
ALBUMIN/GLOB SERPL: 0.9 G/DL
ALP SERPL-CCNC: 171 U/L (ref 39–117)
ALT SERPL W P-5'-P-CCNC: 11 U/L (ref 1–41)
ANION GAP SERPL CALCULATED.3IONS-SCNC: 7 MMOL/L (ref 5–15)
APTT PPP: 33.6 SECONDS (ref 61–76.5)
AST SERPL-CCNC: 11 U/L (ref 1–40)
BACTERIA UR QL AUTO: ABNORMAL /HPF
BASOPHILS # BLD AUTO: 0 10*3/MM3 (ref 0–0.2)
BASOPHILS NFR BLD AUTO: 0.7 % (ref 0–1.5)
BILIRUB SERPL-MCNC: 0.5 MG/DL (ref 0–1.2)
BILIRUB UR QL STRIP: NEGATIVE
BUN SERPL-MCNC: 25 MG/DL (ref 8–23)
BUN/CREAT SERPL: 19.1 (ref 7–25)
CALCIUM SPEC-SCNC: 9.2 MG/DL (ref 8.6–10.5)
CHLORIDE SERPL-SCNC: 100 MMOL/L (ref 98–107)
CLARITY UR: ABNORMAL
CO2 SERPL-SCNC: 28 MMOL/L (ref 22–29)
COLOR UR: YELLOW
CREAT SERPL-MCNC: 1.31 MG/DL (ref 0.76–1.27)
D-LACTATE SERPL-SCNC: 1.4 MMOL/L (ref 0.3–2)
DEPRECATED RDW RBC AUTO: 51.2 FL (ref 37–54)
EGFRCR SERPLBLD CKD-EPI 2021: 54.7 ML/MIN/1.73
EOSINOPHIL # BLD AUTO: 0.1 10*3/MM3 (ref 0–0.4)
EOSINOPHIL NFR BLD AUTO: 2.6 % (ref 0.3–6.2)
ERYTHROCYTE [DISTWIDTH] IN BLOOD BY AUTOMATED COUNT: 15.4 % (ref 12.3–15.4)
GEN 5 2HR TROPONIN T REFLEX: 45 NG/L
GLOBULIN UR ELPH-MCNC: 3.9 GM/DL
GLUCOSE SERPL-MCNC: 360 MG/DL (ref 65–99)
GLUCOSE UR STRIP-MCNC: ABNORMAL MG/DL
HCT VFR BLD AUTO: 40.7 % (ref 37.5–51)
HGB BLD-MCNC: 13.8 G/DL (ref 13–17.7)
HGB UR QL STRIP.AUTO: ABNORMAL
HOLD SPECIMEN: NORMAL
HOLD SPECIMEN: NORMAL
HYALINE CASTS UR QL AUTO: ABNORMAL /LPF
INR PPP: 1.44 (ref 0.93–1.1)
KETONES UR QL STRIP: NEGATIVE
LEUKOCYTE ESTERASE UR QL STRIP.AUTO: ABNORMAL
LYMPHOCYTES # BLD AUTO: 0.8 10*3/MM3 (ref 0.7–3.1)
LYMPHOCYTES NFR BLD AUTO: 14 % (ref 19.6–45.3)
MCH RBC QN AUTO: 30 PG (ref 26.6–33)
MCHC RBC AUTO-ENTMCNC: 33.9 G/DL (ref 31.5–35.7)
MCV RBC AUTO: 88.6 FL (ref 79–97)
MONOCYTES # BLD AUTO: 0.5 10*3/MM3 (ref 0.1–0.9)
MONOCYTES NFR BLD AUTO: 8.4 % (ref 5–12)
NEUTROPHILS NFR BLD AUTO: 4.2 10*3/MM3 (ref 1.7–7)
NEUTROPHILS NFR BLD AUTO: 74.3 % (ref 42.7–76)
NITRITE UR QL STRIP: NEGATIVE
NRBC BLD AUTO-RTO: 0.1 /100 WBC (ref 0–0.2)
NT-PROBNP SERPL-MCNC: 1539 PG/ML (ref 0–1800)
PH UR STRIP.AUTO: 5.5 [PH] (ref 5–8)
PLATELET # BLD AUTO: 139 10*3/MM3 (ref 140–450)
PMV BLD AUTO: 8.1 FL (ref 6–12)
POTASSIUM SERPL-SCNC: 5.4 MMOL/L (ref 3.5–5.2)
PROT SERPL-MCNC: 7.3 G/DL (ref 6–8.5)
PROT UR QL STRIP: ABNORMAL
PROTHROMBIN TIME: 15.3 SECONDS (ref 9.6–11.7)
RBC # BLD AUTO: 4.59 10*6/MM3 (ref 4.14–5.8)
RBC # UR STRIP: ABNORMAL /HPF
REF LAB TEST METHOD: ABNORMAL
SODIUM SERPL-SCNC: 135 MMOL/L (ref 136–145)
SP GR UR STRIP: 1.02 (ref 1–1.03)
SQUAMOUS #/AREA URNS HPF: ABNORMAL /HPF
TRANS CELLS #/AREA URNS HPF: ABNORMAL /HPF
TROPONIN T DELTA: -3 NG/L
TROPONIN T SERPL HS-MCNC: 48 NG/L
UROBILINOGEN UR QL STRIP: ABNORMAL
WBC # UR STRIP: ABNORMAL /HPF
WBC NRBC COR # BLD: 5.7 10*3/MM3 (ref 3.4–10.8)
WHOLE BLOOD HOLD COAG: NORMAL

## 2023-11-13 PROCEDURE — 87040 BLOOD CULTURE FOR BACTERIA: CPT | Performed by: EMERGENCY MEDICINE

## 2023-11-13 PROCEDURE — 84484 ASSAY OF TROPONIN QUANT: CPT | Performed by: EMERGENCY MEDICINE

## 2023-11-13 PROCEDURE — 99284 EMERGENCY DEPT VISIT MOD MDM: CPT

## 2023-11-13 PROCEDURE — 85730 THROMBOPLASTIN TIME PARTIAL: CPT | Performed by: EMERGENCY MEDICINE

## 2023-11-13 PROCEDURE — 71250 CT THORAX DX C-: CPT

## 2023-11-13 PROCEDURE — 36415 COLL VENOUS BLD VENIPUNCTURE: CPT

## 2023-11-13 PROCEDURE — 85025 COMPLETE CBC W/AUTO DIFF WBC: CPT | Performed by: EMERGENCY MEDICINE

## 2023-11-13 PROCEDURE — 85610 PROTHROMBIN TIME: CPT | Performed by: EMERGENCY MEDICINE

## 2023-11-13 PROCEDURE — 81001 URINALYSIS AUTO W/SCOPE: CPT

## 2023-11-13 PROCEDURE — 93005 ELECTROCARDIOGRAM TRACING: CPT

## 2023-11-13 PROCEDURE — 83605 ASSAY OF LACTIC ACID: CPT

## 2023-11-13 PROCEDURE — 83880 ASSAY OF NATRIURETIC PEPTIDE: CPT | Performed by: EMERGENCY MEDICINE

## 2023-11-13 PROCEDURE — 70450 CT HEAD/BRAIN W/O DYE: CPT

## 2023-11-13 PROCEDURE — 72125 CT NECK SPINE W/O DYE: CPT

## 2023-11-13 PROCEDURE — 80053 COMPREHEN METABOLIC PANEL: CPT | Performed by: EMERGENCY MEDICINE

## 2023-11-13 RX ORDER — SODIUM CHLORIDE 0.9 % (FLUSH) 0.9 %
10 SYRINGE (ML) INJECTION AS NEEDED
Status: DISCONTINUED | OUTPATIENT
Start: 2023-11-13 | End: 2023-11-13 | Stop reason: HOSPADM

## 2023-11-13 NOTE — PROGRESS NOTES
Tell Cisco that his sugars are still elevated.  He is taking Jardiance 10 mg so he can actually take 2 of those all at once once a day until he runs out.  I have called in a 25 mg pill for him he takes that once a day.  Low-carb diet and stay active.  He is also still a little dry.  Tell him to continue to drink fluids especially water.  It is working to bring the creatinine down but he still has a little ways to go.  More fluid will help.  Blood sugars still elevated so we need to keep working

## 2023-11-13 NOTE — ED NOTES
Pt c/o multiple falls d/t dizziness.  Pt also c/o productive cough and has been treated w/ abt by pcp.

## 2023-11-14 ENCOUNTER — TELEPHONE (OUTPATIENT)
Dept: FAMILY MEDICINE CLINIC | Facility: CLINIC | Age: 83
End: 2023-11-14
Payer: MEDICARE

## 2023-11-14 ENCOUNTER — PATIENT OUTREACH (OUTPATIENT)
Dept: CASE MANAGEMENT | Facility: OTHER | Age: 83
End: 2023-11-14
Payer: MEDICARE

## 2023-11-14 LAB
QT INTERVAL: 357 MS
QTC INTERVAL: 437 MS

## 2023-11-14 NOTE — OUTREACH NOTE
AMBULATORY CASE MANAGEMENT NOTE    Name and Relationship of Patient/Support Person: ALBIN ELIAS - Emergency Contact  Emergency Contact    Brief RN-ACM outreach completed on 2nd attempt w/ patient spouse Albin Elias, per ADAM (RN-ACM LVM earlier on patient cell requesting call back).   Patient seen in [facility] ED yesterday, DX cervicalgia.    Reviewed AVS & a portion of PCP 11/14/23 note w spouse. She states patient is not taking antibiotic for UTI.  States he finished azithromycin last week, coughing seems improved, has taken no other ATB since then.  States in general patient feels better this week.    RN-ACM also attempted to discuss diabetes, jardiance, diet and hydration per PCP note, however Albin states they prefer to call PCP about all of it.  Since it is after hours, she verb understanding they should call PCP tomorrow AM re urine results & diabetic medication changes, diet, hydration.    Albin states no further HRCM outreach is needed as patient doing better.   RN-ACM to outreach in future as needed.    ISABEL MARTINEZ  Ambulatory Case Management  11/14/2023, 17:01 EST

## 2023-11-14 NOTE — TELEPHONE ENCOUNTER
[facility] for patient to call me back.    ----- Message from Nuno Patton MD sent at 11/13/2023  6:10 PM EST -----  Tell Cisco that his sugars are still elevated.  He is taking Jardiance 10 mg so he can actually take 2 of those all at once once a day until he runs out.  I have called in a 25 mg pill for him he takes that once a day.  Low-carb diet and stay active.  He is also still a little dry.  Tell him to continue to drink fluids especially water.  It is working to bring the creatinine down but he still has a little ways to go.  More fluid will help.  Blood sugars still elevated so we need to keep working    Laura call and see if Cisco is on an antibiotic for urinary tract infection.?

## 2023-11-14 NOTE — ED PROVIDER NOTES
Subjective   History of Present Illness  83-year-old male presents for cough and concern for pneumonia.  Just finished 5 days of antibiotics.  Had fall 2 weeks ago.  States he knows he has some rib fractures.  Had a fall before that and he had some rib fractures on the right.  Has been using his incentive spirometer.  States he has been trying to do lots of walking with the fractures.  Walked a mile yesterday.  Has not had any more falls since the last 1 2 weeks ago.  No fevers or chills.  States at times he gets lightheaded.  Is having some pain over the back of his head since he fell.  Reviewed patient's last echocardiogram dated April 3, 2023 that showed a 1 to 2 cm pericardial effusion with no evidence of cardiac tamponade.  His aortic valve exhibited sclerosis.      Review of Systems  See HPI.  Past Medical History:   Diagnosis Date    Atrial fibrillation     Cancer     Coronary artery disease     History of bladder cancer     Hyperlipidemia        No Known Allergies    Past Surgical History:   Procedure Laterality Date    BLADDER SURGERY  10/26/2019    [facility] Dr. Cope    BRONCHOSCOPY N/A 6/30/2022    Procedure: BRONCHOSCOPY with bilateral lung wash;  Surgeon: Ayush Velarde MD;  Location: [facility] ENDOSCOPY;  Service: Pulmonary;  Laterality: N/A;  normal bronch    CARDIAC CATHETERIZATION      COLONOSCOPY      COLONOSCOPY W/ POLYPECTOMY  02/23/2021    ENDOSCOPY N/A 4/20/2023    Procedure: ESOPHAGOGASTRODUODENOSCOPY with gastric biopsy's;  Surgeon: Debra Lomeli MD;  Location: [facility] ENDOSCOPY;  Service: Gastroenterology;  Laterality: N/A;  gastritis, cardia nodules, barretts esophagus    SKIN CANCER EXCISION      right temple BX    UPPER ENDOSCOPIC ULTRASOUND W/ FNA N/A 3/12/2021    Procedure: ESOPHAGOGASTRODUODENOSCOPY WITH endoscopic mucosal resection, biopsy x 1 area, and endoscopic ULTRASOUND;  Surgeon: Abner Infante MD;  Location: [facility] ENDOSCOPY;  Service: Gastroenterology;  Laterality: N/A;  post  op: hiatal hernia, duodenal polyp       Family History   Problem Relation Age of Onset    Heart disease Mother     Heart attack Father     No Known Problems Sister     No Known Problems Brother     No Known Problems Maternal Aunt     No Known Problems Maternal Uncle     No Known Problems Paternal Aunt     No Known Problems Paternal Uncle     No Known Problems Maternal Grandmother     No Known Problems Maternal Grandfather     No Known Problems Paternal Grandmother     No Known Problems Paternal Grandfather     No Known Problems Other     Anemia Neg Hx     Arrhythmia Neg Hx     Asthma Neg Hx     Clotting disorder Neg Hx     Fainting Neg Hx     Heart failure Neg Hx     Hyperlipidemia Neg Hx     Hypertension Neg Hx        Social History     Socioeconomic History    Marital status:    Tobacco Use    Smoking status: Former     Packs/day: 0.50     Years: 15.00     Additional pack years: 0.00     Total pack years: 7.50     Types: Cigarettes     Start date: 1960     Quit date: 1980     Years since quittin.8     Passive exposure: Past    Smokeless tobacco: Never   Vaping Use    Vaping Use: Never used   Substance and Sexual Activity    Alcohol use: Not Currently    Drug use: Never    Sexual activity: Defer           Objective   Physical Exam  No acute distress.  Clear to auscultation bilaterally.  Regular rate.  No murmurs appreciated.  No abdominal tenderness to palpation.  Left-sided chest wall tenderness to palpation.  Has what appears to be an older healing wound over his scalp in the midline over posterior portion.  No erythema or evidence of infection around this old appearing wound.  Wearing bilateral hearing aids.  Diffuse tenderness to palpation over C-spine without any bony point tenderness to palpation.  No step-offs over back.  No abdominal tenderness to palpation.  Cranial nerves II through XII grossly intact.  5 out of 5 strength in all extremities.  Normal gait.  No shortness of breath  "appreciated on my exam while ambulating with patient.  Procedures           ED Course      /66   Pulse 76   Temp 98.9 °F (37.2 °C) (Oral)   Resp 18   Ht 177.8 cm (70\")   Wt 77.6 kg (171 lb 1.2 oz)   SpO2 99%   BMI 24.55 kg/m²   Labs Reviewed   URINALYSIS W/ MICROSCOPIC IF INDICATED (NO CULTURE) - Abnormal; Notable for the following components:       Result Value    Appearance, UA Turbid (*)     Glucose,  mg/dL (2+) (*)     Blood, UA Moderate (2+) (*)     Protein,  mg/dL (2+) (*)     Leuk Esterase, UA Trace (*)     All other components within normal limits   COMPREHENSIVE METABOLIC PANEL - Abnormal; Notable for the following components:    Glucose 360 (*)     BUN 25 (*)     Creatinine 1.31 (*)     Sodium 135 (*)     Potassium 5.4 (*)     Albumin 3.4 (*)     Alkaline Phosphatase 171 (*)     eGFR 54.7 (*)     All other components within normal limits    Narrative:     GFR Normal >60  Chronic Kidney Disease <60  Kidney Failure <15    The GFR formula is only valid for adults with stable renal function between ages 18 and 70.   PROTIME-INR - Abnormal; Notable for the following components:    Protime 15.3 (*)     INR 1.44 (*)     All other components within normal limits   APTT - Abnormal; Notable for the following components:    PTT 33.6 (*)     All other components within normal limits   TROPONIN - Abnormal; Notable for the following components:    HS Troponin T 48 (*)     All other components within normal limits    Narrative:     High Sensitive Troponin T Reference Range:  <14.0 ng/L- Negative Female for AMI  <22.0 ng/L- Negative Male for AMI  >=14 - Abnormal Female indicating possible myocardial injury.  >=22 - Abnormal Male indicating possible myocardial injury.   Clinicians would have to utilize clinical acumen, EKG, Troponin, and serial changes to determine if it is an Acute Myocardial Infarction or myocardial injury due to an underlying chronic condition.        CBC WITH AUTO DIFFERENTIAL " - Abnormal; Notable for the following components:    Platelets 139 (*)     Lymphocyte % 14.0 (*)     All other components within normal limits   HIGH SENSITIVITIY TROPONIN T 2HR - Abnormal; Notable for the following components:    HS Troponin T 45 (*)     All other components within normal limits    Narrative:     High Sensitive Troponin T Reference Range:  <14.0 ng/L- Negative Female for AMI  <22.0 ng/L- Negative Male for AMI  >=14 - Abnormal Female indicating possible myocardial injury.  >=22 - Abnormal Male indicating possible myocardial injury.   Clinicians would have to utilize clinical acumen, EKG, Troponin, and serial changes to determine if it is an Acute Myocardial Infarction or myocardial injury due to an underlying chronic condition.        URINALYSIS, MICROSCOPIC ONLY - Abnormal; Notable for the following components:    WBC, UA Too Numerous to Count (*)     All other components within normal limits   BNP (IN-HOUSE) - Normal    Narrative:     This assay is used as an aid in the diagnosis of individuals suspected of having heart failure. It can be used as an aid in the diagnosis of acute decompensated heart failure (ADHF) in patients presenting with signs and symptoms of ADHF to the emergency department (ED). In addition, NT-proBNP of <300 pg/mL indicates ADHF is not likely.    Age Range Result Interpretation  NT-proBNP Concentration (pg/mL:      <50             Positive            >450                   Gray                 300-450                    Negative             <300    50-75           Positive            >900                  Gray                300-900                  Negative            <300      >75             Positive            >1800                  Gray                300-1800                  Negative            <300   POC LACTATE - Normal   BLOOD CULTURE   BLOOD CULTURE   RAINBOW DRAW    Narrative:     The following orders were created for panel order [place] Draw.  Procedure                                Abnormality         Status                     ---------                               -----------         ------                     Green Top (Gel)[[id number]]                                  Final result               Lavender Top[[id number]]                                     Final result               Gold Top - SST[[id number]]                                   Final result               Light Blue Top[[id number]]                                   Final result                 Please view results for these tests on the individual orders.   POC LACTATE   GREEN TOP   LAVENDER TOP   GOLD TOP - SST   LIGHT BLUE TOP   CBC AND DIFFERENTIAL    Narrative:     The following orders were created for panel order CBC & Differential.  Procedure                               Abnormality         Status                     ---------                               -----------         ------                     CBC Auto Differential[[id number]]        Abnormal            Final result                 Please view results for these tests on the individual orders.     Medications - No data to display  CT Chest Without Contrast Diagnostic    Result Date: 11/13/2023  Impression: 1.There is a small pericardial effusion which has decreased in size since 4/3/2023. 2.There are acute or possibly subacute appearing left third through ninth anterolateral rib fractures. 3.Moderate hiatal hernia. Electronically Signed: Bill Serrano DO  11/13/2023 7:14 PM EST  Workstation ID: [id number]    CT Cervical Spine Without Contrast    Result Date: 11/13/2023  Impression: Marked multilevel degenerative change in the cervical spine. No acute osseous abnormality. Electronically Signed: Bill Serrano DO  11/13/2023 6:59 PM EST  Workstation ID: [id number]    CT Head Without Contrast    Result Date: 11/13/2023  Impression: No intracranial hemorrhage demonstrated Electronically Signed: Baljit Hickey  11/13/2023 6:53 PM EST  Workstation ID:  [id number]                                        Medical Decision Making  Problems Addressed:  Cervicalgia: complicated acute illness or injury  Closed fracture of multiple ribs of left side, initial encounter: complicated acute illness or injury  Multiple falls: complicated acute illness or injury    Amount and/or Complexity of Data Reviewed  Labs: ordered.  Radiology: ordered.    Risk  Prescription drug management.      EKG interpretation: 1718, rate 90, A-fib, normal QTc.  No acute ischemic changes.  Interpreted by me.    My interpretation of CT head is no acute intracranial hemorrhage.  See above radiology interpretation.    Pericardial effusion smaller than previous.  Multiple rib fractures.  Normal gait.  Reassuring neuro exam.  Discussed observation, but patient prefers to go home.  Walked a mile yesterday.  Advised to continue using incentive spirometer.  Will DC.    Final diagnoses:   Cervicalgia   Multiple falls   Closed fracture of multiple ribs of left side, initial encounter       ED Disposition  ED Disposition       ED Disposition   Discharge    Condition   Stable    Comment   --               Nuno Patton MD  [address]  [place] [id number] [phone]    In 3 days           Medication List        New Prescriptions      empagliflozin 25 MG tablet tablet  Commonly known as: JARDIANCE  Take 1 tablet by mouth Every Morning for 30 days.               Where to Get Your Medications        These medications were sent to [facility] [id number] - [facility] [place] - [address] - [phone]  - [phone] FX  [address] [place] [address]      Phone: [phone]   empagliflozin 25 MG tablet tablet            Peewee Schmitz MD  11/13/23 1457

## 2023-11-14 NOTE — DISCHARGE INSTRUCTIONS
Recommend over-the-counter lidocaine patches for pain.  Continue to use your incentive spirometer.

## 2023-11-15 NOTE — TELEPHONE ENCOUNTER
Spoke with Cisco's wife, he seems to have several things going on, so I placed him on the schedule to see you tomorrow at 8am.

## 2023-11-16 ENCOUNTER — OFFICE VISIT (OUTPATIENT)
Dept: FAMILY MEDICINE CLINIC | Facility: CLINIC | Age: 83
End: 2023-11-16
Payer: MEDICARE

## 2023-11-16 VITALS
BODY MASS INDEX: 24.48 KG/M2 | HEART RATE: 91 BPM | OXYGEN SATURATION: 98 % | WEIGHT: 171 LBS | SYSTOLIC BLOOD PRESSURE: 122 MMHG | HEIGHT: 70 IN | RESPIRATION RATE: 16 BRPM | DIASTOLIC BLOOD PRESSURE: 78 MMHG

## 2023-11-16 DIAGNOSIS — I48.21 PERMANENT ATRIAL FIBRILLATION: Chronic | ICD-10-CM

## 2023-11-16 DIAGNOSIS — R35.0 FREQUENT URINATION: Primary | ICD-10-CM

## 2023-11-16 DIAGNOSIS — I25.118 CORONARY ARTERY DISEASE OF NATIVE ARTERY OF NATIVE HEART WITH STABLE ANGINA PECTORIS: Chronic | ICD-10-CM

## 2023-11-16 DIAGNOSIS — E11.65 TYPE 2 DIABETES MELLITUS WITH HYPERGLYCEMIA, WITHOUT LONG-TERM CURRENT USE OF INSULIN: Chronic | ICD-10-CM

## 2023-11-16 DIAGNOSIS — N18.9 CHRONIC RENAL IMPAIRMENT, UNSPECIFIED CKD STAGE: ICD-10-CM

## 2023-11-16 PROBLEM — F32.9 REACTIVE DEPRESSION: Status: ACTIVE | Noted: 2023-11-16

## 2023-11-16 LAB
BILIRUB BLD-MCNC: NEGATIVE MG/DL
CLARITY, POC: ABNORMAL
COLOR UR: YELLOW
GLUCOSE UR STRIP-MCNC: ABNORMAL MG/DL
KETONES UR QL: NEGATIVE
LEUKOCYTE EST, POC: ABNORMAL
NITRITE UR-MCNC: NEGATIVE MG/ML
PH UR: 6 [PH] (ref 5–8)
PROT UR STRIP-MCNC: ABNORMAL MG/DL
RBC # UR STRIP: ABNORMAL /UL
SP GR UR: 1.01 (ref 1–1.03)
UROBILINOGEN UR QL: ABNORMAL

## 2023-11-16 RX ORDER — OMEPRAZOLE 40 MG/1
CAPSULE, DELAYED RELEASE ORAL
COMMUNITY
Start: 2023-10-03

## 2023-11-16 RX ORDER — PAROXETINE HYDROCHLORIDE HEMIHYDRATE 12.5 MG/1
12.5 TABLET, FILM COATED, EXTENDED RELEASE ORAL EVERY MORNING
Qty: 90 TABLET | Refills: 1 | Status: SHIPPED | OUTPATIENT
Start: 2023-11-16 | End: 2024-02-14

## 2023-11-16 NOTE — PROGRESS NOTES
"Chief Complaint  Fall, Fatigue, and Urinary Tract Infection    Subjective        Cisco Frank presents to [facility]  History of Present Illness    The patient presents today for a follow-up. An adult female accompanies him today.    The patient was seen in the emergency room on 11/13/2023 for a headache. He fell and hit his head on 11/01/2023. He notes that he was afraid that he may have a concussion. The patient has been feeling better. He notes that he is still wearing his rib belt, which has been helpful. The patient walked 1 mile on 11/13/2023. He has been drinking 2 small bottles of orange Gatorade with no sugar. The adult female conveys that he has been drinking an adequate amount of water.    The patient has been experiencing urinary incontinence for a while. He questions if he needs anything further or an antibiotic for a urinary tract infection.    The patient was feeling depressed and \"shaky\" Wednesday, 11-.  He has \"a lot going on\". He is tired of dealing with lung issues and needing to take multiple medications.  He is just frustrated, but not scared.  The patient's blood pressure and blood glucose levels have improved.  The patient has been eating salads with low calorie dressing.  The adult female voices that he has not been eating lunch and the patient has lost quite a bit of weight.  He does eat dinner.    The adult female states that he sleeps all the time. He is taking 2 glimepiride tablets in the morning and 2 at night. He gets warm and has a tremor.    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    The adult female mentioned that the patient had taken his firearm at home, and he asked her to have [employer] () come to their home and take his firearms off the premises.  The adult female did call [employer].  He " "spoke with the patient in the bedroom.  The adult female is not certain if [employer] took the firearms off the premises.    Of note, the patient and his wife have been  55 years and today is their anniversary.    Objective   Vital Signs:  /78   Pulse 91   Resp 16   Ht 177.8 cm (70\")   Wt 77.6 kg (171 lb)   SpO2 98%   BMI 24.54 kg/m²   Estimated body mass index is 24.54 kg/m² as calculated from the following:    Height as of this encounter: 177.8 cm (70\").    Weight as of this encounter: 77.6 kg (171 lb).       BMI is within normal parameters. No other follow-up for BMI required.      Physical Exam  Constitutional:       Appearance: Normal appearance. He is well-developed and normal weight.   HENT:      Head: Normocephalic and atraumatic.      Right Ear: Tympanic membrane, ear canal and external ear normal.      Left Ear: Tympanic membrane, ear canal and external ear normal.      Nose: Nose normal.      Mouth/Throat:      Mouth: Mucous membranes are moist.      Pharynx: Oropharynx is clear. No oropharyngeal exudate.   Eyes:      Extraocular Movements: Extraocular movements intact.      Conjunctiva/sclera: Conjunctivae normal.      Pupils: Pupils are equal, round, and reactive to light.   Cardiovascular:      Rate and Rhythm: Normal rate and regular rhythm.      Pulses: Normal pulses.      Heart sounds: Normal heart sounds.   Pulmonary:      Effort: Pulmonary effort is normal.      Breath sounds: Normal breath sounds.   Abdominal:      General: Bowel sounds are normal.      Palpations: Abdomen is soft.   Musculoskeletal:         General: Normal range of motion.      Cervical back: Normal range of motion and neck supple.   Skin:     General: Skin is warm and dry.   Neurological:      General: No focal deficit present.      Mental Status: He is alert and oriented to person, place, and time. Mental status is at baseline.   Psychiatric:         Mood and Affect: Mood normal.         Behavior: Behavior normal. "         Thought Content: Thought content normal.         Judgment: Judgment normal.        Result Review :    Assessment and Plan     1. Frequent urination  - iCsco Frank is an 83-year-old white male who went to the emergency room this past weekend because of generalized fatigue, weakness, frequent urination, and some breathing difficulties. He was evaluated and felt to have some hematuria and possibly urinary tract infection, but his urine culture did not produce any organisms. He was in his usual chronic atrial fibrillation, and they treated him with some fluids and sent him home. As the week has gone on, he has gotten better, although he still is very tired. 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 He comes home, sleeps, gets up and eats, and typically will go back to his couch or chair. His wife is concerned about some depression.    2. Permanent atrial fibrillation  - The patient has atrial fibrillation chronically, but it is well controlled, and no further therapy is planned as long as the rate remains controlled.    3. Coronary artery disease  - The patient has known coronary disease with some ischemic myocardium in the past, although he has not really had angina recently. It is something we must be cognizant of at all times, and he could have a recurrence, but right now he is doing well.    4. Chronic renal impairment  - The patient has chronic kidney disease stage 3 and this is stable.    5. Type 2 diabetes mellitus with hyperglycemia  - The patient has diabetes, but his last hemoglobin A1c was 8.2 percent. We are going to recheck that again in about a month. He has been too sick recently to really check this without complicating the picture because of his acute illness. He will continue his Jardiance and his glimepiride. I do want him to check his blood glucose levels at home when he is weak to make sure he is not getting hypoglycemic, although that is very  unlikely.    6. Reactive depression  - Cisco Frank is showing signs of depression. His wife states he pulled his police revolver out last night and was threatening to hurt himself because he was so fed up and tired with how his life is going. 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 I am going to put him on some Paxil, and I am going to ask the police force to offer him some counseling. It is just that he is transitioning to half-way, and he is 83-year-old, and he is tired.    Hopefully, we can get him through this downtime and let him enjoy the last few years of life he has.         Follow Up   Return in about 6 weeks (around 12/28/2023), or if symptoms worsen or fail to improve, for Recheck.  Patient was given instructions and counseling regarding his condition or for health maintenance advice. Please see specific information pulled into the AVS if appropriate.       Transcribed from ambient dictation for Nuno Patton MD by Suzy Winter.  11/16/23   10:38 EST    Patient or patient representative verbalized consent to the visit recording.  I have personally performed the services described in this document as transcribed by the above individual, and it is both accurate and complete.  Nuno Patton MD  11/19/2023  10:05 EST

## 2023-11-18 LAB
BACTERIA SPEC AEROBE CULT: NORMAL
BACTERIA SPEC AEROBE CULT: NORMAL

## 2023-12-28 PROCEDURE — 99284 EMERGENCY DEPT VISIT MOD MDM: CPT

## 2023-12-29 ENCOUNTER — HOSPITAL ENCOUNTER (OUTPATIENT)
Facility: HOSPITAL | Age: 83
Setting detail: OBSERVATION
Discharge: HOME | End: 2023-12-29
Attending: EMERGENCY MEDICINE | Admitting: EMERGENCY MEDICINE
Payer: MEDICARE

## 2023-12-29 ENCOUNTER — APPOINTMENT (OUTPATIENT)
Dept: CT IMAGING | Facility: HOSPITAL | Age: 83
End: 2023-12-29
Payer: MEDICARE

## 2023-12-29 ENCOUNTER — READMISSION MANAGEMENT (OUTPATIENT)
Dept: CALL CENTER | Facility: HOSPITAL | Age: 83
End: 2023-12-29
Payer: MEDICARE

## 2023-12-29 ENCOUNTER — ANESTHESIA EVENT (OUTPATIENT)
Dept: PERIOP | Facility: HOSPITAL | Age: 83
End: 2023-12-29
Payer: MEDICARE

## 2023-12-29 ENCOUNTER — ANESTHESIA (OUTPATIENT)
Dept: PERIOP | Facility: HOSPITAL | Age: 83
End: 2023-12-29
Payer: MEDICARE

## 2023-12-29 VITALS
HEART RATE: 94 BPM | DIASTOLIC BLOOD PRESSURE: 86 MMHG | SYSTOLIC BLOOD PRESSURE: 129 MMHG | RESPIRATION RATE: 16 BRPM | BODY MASS INDEX: 24.46 KG/M2 | HEIGHT: 70 IN | OXYGEN SATURATION: 100 % | TEMPERATURE: 96.4 F | WEIGHT: 170.86 LBS

## 2023-12-29 DIAGNOSIS — N17.9 ACUTE KIDNEY INJURY: ICD-10-CM

## 2023-12-29 DIAGNOSIS — R31.0 GROSS HEMATURIA: ICD-10-CM

## 2023-12-29 DIAGNOSIS — R31.9 HEMATURIA, UNSPECIFIED TYPE: Primary | ICD-10-CM

## 2023-12-29 LAB
ABO GROUP BLD: NORMAL
ALBUMIN SERPL-MCNC: 3.7 G/DL (ref 3.5–5.2)
ALBUMIN/GLOB SERPL: 1.1 G/DL
ALP SERPL-CCNC: 140 U/L (ref 39–117)
ALT SERPL W P-5'-P-CCNC: 12 U/L (ref 1–41)
ANION GAP SERPL CALCULATED.3IONS-SCNC: 10 MMOL/L (ref 5–15)
ANION GAP SERPL CALCULATED.3IONS-SCNC: 9 MMOL/L (ref 5–15)
AST SERPL-CCNC: 12 U/L (ref 1–40)
BACTERIA UR QL AUTO: ABNORMAL /HPF
BASOPHILS # BLD AUTO: 0 10*3/MM3 (ref 0–0.2)
BASOPHILS # BLD AUTO: 0 10*3/MM3 (ref 0–0.2)
BASOPHILS NFR BLD AUTO: 0.6 % (ref 0–1.5)
BASOPHILS NFR BLD AUTO: 0.7 % (ref 0–1.5)
BILIRUB SERPL-MCNC: 0.7 MG/DL (ref 0–1.2)
BILIRUB UR QL STRIP: NEGATIVE
BLD GP AB SCN SERPL QL: NEGATIVE
BUN SERPL-MCNC: 26 MG/DL (ref 8–23)
BUN SERPL-MCNC: 27 MG/DL (ref 8–23)
BUN/CREAT SERPL: 21.8 (ref 7–25)
BUN/CREAT SERPL: 22.6 (ref 7–25)
CALCIUM SPEC-SCNC: 8.9 MG/DL (ref 8.6–10.5)
CALCIUM SPEC-SCNC: 9 MG/DL (ref 8.6–10.5)
CHLORIDE SERPL-SCNC: 100 MMOL/L (ref 98–107)
CHLORIDE SERPL-SCNC: 101 MMOL/L (ref 98–107)
CLARITY UR: ABNORMAL
CO2 SERPL-SCNC: 27 MMOL/L (ref 22–29)
CO2 SERPL-SCNC: 28 MMOL/L (ref 22–29)
COLOR UR: ABNORMAL
CREAT SERPL-MCNC: 1.15 MG/DL (ref 0.76–1.27)
CREAT SERPL-MCNC: 1.24 MG/DL (ref 0.76–1.27)
DEPRECATED RDW RBC AUTO: 48.1 FL (ref 37–54)
DEPRECATED RDW RBC AUTO: 48.6 FL (ref 37–54)
EGFRCR SERPLBLD CKD-EPI 2021: 58.4 ML/MIN/1.73
EGFRCR SERPLBLD CKD-EPI 2021: 63.9 ML/MIN/1.73
EOSINOPHIL # BLD AUTO: 0.1 10*3/MM3 (ref 0–0.4)
EOSINOPHIL # BLD AUTO: 0.1 10*3/MM3 (ref 0–0.4)
EOSINOPHIL NFR BLD AUTO: 1.4 % (ref 0.3–6.2)
EOSINOPHIL NFR BLD AUTO: 1.4 % (ref 0.3–6.2)
ERYTHROCYTE [DISTWIDTH] IN BLOOD BY AUTOMATED COUNT: 14.6 % (ref 12.3–15.4)
ERYTHROCYTE [DISTWIDTH] IN BLOOD BY AUTOMATED COUNT: 15 % (ref 12.3–15.4)
GLOBULIN UR ELPH-MCNC: 3.5 GM/DL
GLUCOSE BLDC GLUCOMTR-MCNC: 136 MG/DL (ref 70–105)
GLUCOSE BLDC GLUCOMTR-MCNC: 137 MG/DL (ref 70–105)
GLUCOSE BLDC GLUCOMTR-MCNC: 155 MG/DL (ref 70–105)
GLUCOSE SERPL-MCNC: 170 MG/DL (ref 65–99)
GLUCOSE SERPL-MCNC: 186 MG/DL (ref 65–99)
GLUCOSE UR STRIP-MCNC: ABNORMAL MG/DL
HCT VFR BLD AUTO: 39.2 % (ref 37.5–51)
HCT VFR BLD AUTO: 39.4 % (ref 37.5–51)
HCT VFR BLD AUTO: 41.5 % (ref 37.5–51)
HGB BLD-MCNC: 13 G/DL (ref 13–17.7)
HGB BLD-MCNC: 13.3 G/DL (ref 13–17.7)
HGB BLD-MCNC: 13.6 G/DL (ref 13–17.7)
HGB UR QL STRIP.AUTO: ABNORMAL
HYALINE CASTS UR QL AUTO: ABNORMAL /LPF
KETONES UR QL STRIP: NEGATIVE
LEUKOCYTE ESTERASE UR QL STRIP.AUTO: ABNORMAL
LYMPHOCYTES # BLD AUTO: 1.1 10*3/MM3 (ref 0.7–3.1)
LYMPHOCYTES # BLD AUTO: 1.5 10*3/MM3 (ref 0.7–3.1)
LYMPHOCYTES NFR BLD AUTO: 19.5 % (ref 19.6–45.3)
LYMPHOCYTES NFR BLD AUTO: 24.1 % (ref 19.6–45.3)
MCH RBC QN AUTO: 30 PG (ref 26.6–33)
MCH RBC QN AUTO: 30.1 PG (ref 26.6–33)
MCHC RBC AUTO-ENTMCNC: 32.8 G/DL (ref 31.5–35.7)
MCHC RBC AUTO-ENTMCNC: 33.7 G/DL (ref 31.5–35.7)
MCV RBC AUTO: 89.3 FL (ref 79–97)
MCV RBC AUTO: 91.5 FL (ref 79–97)
MONOCYTES # BLD AUTO: 0.4 10*3/MM3 (ref 0.1–0.9)
MONOCYTES # BLD AUTO: 0.4 10*3/MM3 (ref 0.1–0.9)
MONOCYTES NFR BLD AUTO: 6.7 % (ref 5–12)
MONOCYTES NFR BLD AUTO: 7.3 % (ref 5–12)
NEUTROPHILS NFR BLD AUTO: 4 10*3/MM3 (ref 1.7–7)
NEUTROPHILS NFR BLD AUTO: 4.1 10*3/MM3 (ref 1.7–7)
NEUTROPHILS NFR BLD AUTO: 67.2 % (ref 42.7–76)
NEUTROPHILS NFR BLD AUTO: 71.1 % (ref 42.7–76)
NITRITE UR QL STRIP: NEGATIVE
NRBC BLD AUTO-RTO: 0 /100 WBC (ref 0–0.2)
NRBC BLD AUTO-RTO: 0.2 /100 WBC (ref 0–0.2)
PH UR STRIP.AUTO: 7 [PH] (ref 5–8)
PLATELET # BLD AUTO: 137 10*3/MM3 (ref 140–450)
PLATELET # BLD AUTO: 186 10*3/MM3 (ref 140–450)
PMV BLD AUTO: 8.7 FL (ref 6–12)
PMV BLD AUTO: 9 FL (ref 6–12)
POTASSIUM SERPL-SCNC: 4.3 MMOL/L (ref 3.5–5.2)
POTASSIUM SERPL-SCNC: 4.5 MMOL/L (ref 3.5–5.2)
PROT SERPL-MCNC: 7.2 G/DL (ref 6–8.5)
PROT UR QL STRIP: ABNORMAL
QT INTERVAL: 341 MS
QTC INTERVAL: 413 MS
RBC # BLD AUTO: 4.42 10*6/MM3 (ref 4.14–5.8)
RBC # BLD AUTO: 4.54 10*6/MM3 (ref 4.14–5.8)
RBC # UR STRIP: ABNORMAL /HPF
REF LAB TEST METHOD: ABNORMAL
RH BLD: POSITIVE
SODIUM SERPL-SCNC: 137 MMOL/L (ref 136–145)
SODIUM SERPL-SCNC: 138 MMOL/L (ref 136–145)
SP GR UR STRIP: 1.02 (ref 1–1.03)
SQUAMOUS #/AREA URNS HPF: ABNORMAL /HPF
T&S EXPIRATION DATE: NORMAL
UROBILINOGEN UR QL STRIP: ABNORMAL
WBC # UR STRIP: ABNORMAL /HPF
WBC NRBC COR # BLD AUTO: 5.6 10*3/MM3 (ref 3.4–10.8)
WBC NRBC COR # BLD AUTO: 6 10*3/MM3 (ref 3.4–10.8)

## 2023-12-29 PROCEDURE — 36415 COLL VENOUS BLD VENIPUNCTURE: CPT

## 2023-12-29 PROCEDURE — 25010000002 ONDANSETRON PER 1 MG: Performed by: UROLOGY

## 2023-12-29 PROCEDURE — 25010000002 PROPOFOL 10 MG/ML EMULSION: Performed by: NURSE ANESTHETIST, CERTIFIED REGISTERED

## 2023-12-29 PROCEDURE — A9270 NON-COVERED ITEM OR SERVICE: HCPCS | Performed by: UROLOGY

## 2023-12-29 PROCEDURE — 25810000003 LACTATED RINGERS PER 1000 ML: Performed by: NURSE ANESTHETIST, CERTIFIED REGISTERED

## 2023-12-29 PROCEDURE — P9612 CATHETERIZE FOR URINE SPEC: HCPCS

## 2023-12-29 PROCEDURE — 74176 CT ABD & PELVIS W/O CONTRAST: CPT

## 2023-12-29 PROCEDURE — 81001 URINALYSIS AUTO W/SCOPE: CPT | Performed by: NURSE PRACTITIONER

## 2023-12-29 PROCEDURE — 88305 TISSUE EXAM BY PATHOLOGIST: CPT | Performed by: UROLOGY

## 2023-12-29 PROCEDURE — G0378 HOSPITAL OBSERVATION PER HR: HCPCS

## 2023-12-29 PROCEDURE — 86850 RBC ANTIBODY SCREEN: CPT | Performed by: PHYSICIAN ASSISTANT

## 2023-12-29 PROCEDURE — 25810000003 LACTATED RINGERS PER 1000 ML: Performed by: ANESTHESIOLOGY

## 2023-12-29 PROCEDURE — 82948 REAGENT STRIP/BLOOD GLUCOSE: CPT

## 2023-12-29 PROCEDURE — 25010000002 CEFAZOLIN PER 500 MG: Performed by: UROLOGY

## 2023-12-29 PROCEDURE — 87086 URINE CULTURE/COLONY COUNT: CPT | Performed by: NURSE PRACTITIONER

## 2023-12-29 PROCEDURE — 63710000001 METOPROLOL TARTRATE 25 MG TABLET: Performed by: UROLOGY

## 2023-12-29 PROCEDURE — A9270 NON-COVERED ITEM OR SERVICE: HCPCS | Performed by: PHYSICIAN ASSISTANT

## 2023-12-29 PROCEDURE — 85018 HEMOGLOBIN: CPT | Performed by: PHYSICIAN ASSISTANT

## 2023-12-29 PROCEDURE — 63710000001 EMPAGLIFLOZIN 25 MG TABLET: Performed by: UROLOGY

## 2023-12-29 PROCEDURE — 85014 HEMATOCRIT: CPT | Performed by: PHYSICIAN ASSISTANT

## 2023-12-29 PROCEDURE — 25010000002 DEXAMETHASONE PER 1 MG: Performed by: NURSE ANESTHETIST, CERTIFIED REGISTERED

## 2023-12-29 PROCEDURE — 86900 BLOOD TYPING SEROLOGIC ABO: CPT | Performed by: PHYSICIAN ASSISTANT

## 2023-12-29 PROCEDURE — 63710000001 PAROXETINE CR 12.5 MG TABLET SUSTAINED-RELEASE 24 HOUR: Performed by: UROLOGY

## 2023-12-29 PROCEDURE — 93005 ELECTROCARDIOGRAM TRACING: CPT | Performed by: PHYSICIAN ASSISTANT

## 2023-12-29 PROCEDURE — 63710000001 ACETAMINOPHEN 325 MG TABLET: Performed by: UROLOGY

## 2023-12-29 PROCEDURE — 25810000003 LACTATED RINGERS PER 1000 ML: Performed by: UROLOGY

## 2023-12-29 PROCEDURE — 25010000002 ESMOLOL 100 MG/10ML SOLUTION: Performed by: NURSE ANESTHETIST, CERTIFIED REGISTERED

## 2023-12-29 PROCEDURE — 86901 BLOOD TYPING SEROLOGIC RH(D): CPT | Performed by: PHYSICIAN ASSISTANT

## 2023-12-29 PROCEDURE — 80053 COMPREHEN METABOLIC PANEL: CPT | Performed by: NURSE PRACTITIONER

## 2023-12-29 PROCEDURE — 25010000002 ONDANSETRON PER 1 MG: Performed by: NURSE ANESTHETIST, CERTIFIED REGISTERED

## 2023-12-29 PROCEDURE — 63710000001 HYDROCODONE-ACETAMINOPHEN 5-325 MG TABLET: Performed by: PHYSICIAN ASSISTANT

## 2023-12-29 PROCEDURE — 63710000001 PANTOPRAZOLE 40 MG TABLET DELAYED-RELEASE: Performed by: UROLOGY

## 2023-12-29 PROCEDURE — 85025 COMPLETE CBC W/AUTO DIFF WBC: CPT | Performed by: NURSE PRACTITIONER

## 2023-12-29 PROCEDURE — 25010000002 FENTANYL CITRATE (PF) 100 MCG/2ML SOLUTION: Performed by: NURSE ANESTHETIST, CERTIFIED REGISTERED

## 2023-12-29 PROCEDURE — 85025 COMPLETE CBC W/AUTO DIFF WBC: CPT | Performed by: EMERGENCY MEDICINE

## 2023-12-29 RX ORDER — SODIUM CHLORIDE 0.9 % (FLUSH) 0.9 %
10 SYRINGE (ML) INJECTION EVERY 12 HOURS SCHEDULED
Status: DISCONTINUED | OUTPATIENT
Start: 2023-12-29 | End: 2023-12-29 | Stop reason: HOSPADM

## 2023-12-29 RX ORDER — IPRATROPIUM BROMIDE AND ALBUTEROL SULFATE 2.5; .5 MG/3ML; MG/3ML
3 SOLUTION RESPIRATORY (INHALATION) ONCE AS NEEDED
Status: DISCONTINUED | OUTPATIENT
Start: 2023-12-29 | End: 2023-12-29 | Stop reason: HOSPADM

## 2023-12-29 RX ORDER — SODIUM CHLORIDE 0.9 % (FLUSH) 0.9 %
10 SYRINGE (ML) INJECTION AS NEEDED
Status: DISCONTINUED | OUTPATIENT
Start: 2023-12-29 | End: 2023-12-29 | Stop reason: HOSPADM

## 2023-12-29 RX ORDER — SODIUM CHLORIDE 9 MG/ML
40 INJECTION, SOLUTION INTRAVENOUS AS NEEDED
Status: DISCONTINUED | OUTPATIENT
Start: 2023-12-29 | End: 2023-12-29 | Stop reason: HOSPADM

## 2023-12-29 RX ORDER — PANTOPRAZOLE SODIUM 40 MG/1
40 TABLET, DELAYED RELEASE ORAL
Status: DISCONTINUED | OUTPATIENT
Start: 2023-12-29 | End: 2023-12-29 | Stop reason: HOSPADM

## 2023-12-29 RX ORDER — INSULIN LISPRO 100 [IU]/ML
2-9 INJECTION, SOLUTION INTRAVENOUS; SUBCUTANEOUS
Status: DISCONTINUED | OUTPATIENT
Start: 2023-12-29 | End: 2023-12-29 | Stop reason: HOSPADM

## 2023-12-29 RX ORDER — EPHEDRINE SULFATE 5 MG/ML
5 INJECTION INTRAVENOUS ONCE AS NEEDED
Status: DISCONTINUED | OUTPATIENT
Start: 2023-12-29 | End: 2023-12-29 | Stop reason: HOSPADM

## 2023-12-29 RX ORDER — DIGOXIN 125 MCG
125 TABLET ORAL
Status: ON HOLD | COMMUNITY
End: 2023-12-29

## 2023-12-29 RX ORDER — NICOTINE POLACRILEX 4 MG
15 LOZENGE BUCCAL
Status: DISCONTINUED | OUTPATIENT
Start: 2023-12-29 | End: 2023-12-29 | Stop reason: HOSPADM

## 2023-12-29 RX ORDER — CEPHALEXIN 500 MG/1
500 CAPSULE ORAL 3 TIMES DAILY
Qty: 15 CAPSULE | Refills: 0 | Status: SHIPPED | OUTPATIENT
Start: 2023-12-29 | End: 2024-01-03

## 2023-12-29 RX ORDER — FENTANYL CITRATE 50 UG/ML
INJECTION, SOLUTION INTRAMUSCULAR; INTRAVENOUS AS NEEDED
Status: DISCONTINUED | OUTPATIENT
Start: 2023-12-29 | End: 2023-12-29 | Stop reason: SURG

## 2023-12-29 RX ORDER — BISACODYL 5 MG/1
5 TABLET, DELAYED RELEASE ORAL DAILY PRN
Status: DISCONTINUED | OUTPATIENT
Start: 2023-12-29 | End: 2023-12-29 | Stop reason: HOSPADM

## 2023-12-29 RX ORDER — ONDANSETRON 2 MG/ML
INJECTION INTRAMUSCULAR; INTRAVENOUS AS NEEDED
Status: DISCONTINUED | OUTPATIENT
Start: 2023-12-29 | End: 2023-12-29 | Stop reason: SURG

## 2023-12-29 RX ORDER — EMPAGLIFLOZIN 25 MG/1
25 TABLET, FILM COATED ORAL EVERY MORNING
COMMUNITY
Start: 2023-12-16

## 2023-12-29 RX ORDER — GLIMEPIRIDE 2 MG/1
4 TABLET ORAL 2 TIMES DAILY
COMMUNITY

## 2023-12-29 RX ORDER — ACETAMINOPHEN 325 MG/1
650 TABLET ORAL EVERY 4 HOURS PRN
Status: DISCONTINUED | OUTPATIENT
Start: 2023-12-29 | End: 2023-12-29 | Stop reason: HOSPADM

## 2023-12-29 RX ORDER — PROPOFOL 10 MG/ML
VIAL (ML) INTRAVENOUS AS NEEDED
Status: DISCONTINUED | OUTPATIENT
Start: 2023-12-29 | End: 2023-12-29 | Stop reason: SURG

## 2023-12-29 RX ORDER — PHENYLEPHRINE HCL IN 0.9% NACL 1 MG/10 ML
SYRINGE (ML) INTRAVENOUS AS NEEDED
Status: DISCONTINUED | OUTPATIENT
Start: 2023-12-29 | End: 2023-12-29 | Stop reason: SURG

## 2023-12-29 RX ORDER — NALOXONE HCL 0.4 MG/ML
0.4 VIAL (ML) INJECTION AS NEEDED
Status: DISCONTINUED | OUTPATIENT
Start: 2023-12-29 | End: 2023-12-29 | Stop reason: HOSPADM

## 2023-12-29 RX ORDER — AMOXICILLIN 250 MG
2 CAPSULE ORAL 2 TIMES DAILY
Status: DISCONTINUED | OUTPATIENT
Start: 2023-12-29 | End: 2023-12-29 | Stop reason: HOSPADM

## 2023-12-29 RX ORDER — POLYETHYLENE GLYCOL 3350 17 G/17G
17 POWDER, FOR SOLUTION ORAL DAILY PRN
Status: DISCONTINUED | OUTPATIENT
Start: 2023-12-29 | End: 2023-12-29 | Stop reason: HOSPADM

## 2023-12-29 RX ORDER — DEXMEDETOMIDINE HYDROCHLORIDE 100 UG/ML
INJECTION, SOLUTION INTRAVENOUS AS NEEDED
Status: DISCONTINUED | OUTPATIENT
Start: 2023-12-29 | End: 2023-12-29 | Stop reason: SURG

## 2023-12-29 RX ORDER — BISACODYL 10 MG
10 SUPPOSITORY, RECTAL RECTAL DAILY PRN
Status: DISCONTINUED | OUTPATIENT
Start: 2023-12-29 | End: 2023-12-29 | Stop reason: HOSPADM

## 2023-12-29 RX ORDER — SODIUM CHLORIDE, SODIUM LACTATE, POTASSIUM CHLORIDE, CALCIUM CHLORIDE 600; 310; 30; 20 MG/100ML; MG/100ML; MG/100ML; MG/100ML
20 INJECTION, SOLUTION INTRAVENOUS CONTINUOUS
Status: DISCONTINUED | OUTPATIENT
Start: 2023-12-29 | End: 2023-12-29 | Stop reason: HOSPADM

## 2023-12-29 RX ORDER — LABETALOL HYDROCHLORIDE 5 MG/ML
5 INJECTION, SOLUTION INTRAVENOUS
Status: DISCONTINUED | OUTPATIENT
Start: 2023-12-29 | End: 2023-12-29 | Stop reason: HOSPADM

## 2023-12-29 RX ORDER — POLYETHYLENE GLYCOL 3350 17 G/17G
17 POWDER, FOR SOLUTION ORAL NIGHTLY
Status: DISCONTINUED | OUTPATIENT
Start: 2023-12-29 | End: 2023-12-29 | Stop reason: HOSPADM

## 2023-12-29 RX ORDER — DIPHENHYDRAMINE HYDROCHLORIDE 50 MG/ML
12.5 INJECTION INTRAMUSCULAR; INTRAVENOUS ONCE AS NEEDED
Status: DISCONTINUED | OUTPATIENT
Start: 2023-12-29 | End: 2023-12-29 | Stop reason: HOSPADM

## 2023-12-29 RX ORDER — DIPHENHYDRAMINE HYDROCHLORIDE 50 MG/ML
12.5 INJECTION INTRAMUSCULAR; INTRAVENOUS
Status: DISCONTINUED | OUTPATIENT
Start: 2023-12-29 | End: 2023-12-29 | Stop reason: HOSPADM

## 2023-12-29 RX ORDER — SODIUM CHLORIDE, SODIUM LACTATE, POTASSIUM CHLORIDE, CALCIUM CHLORIDE 600; 310; 30; 20 MG/100ML; MG/100ML; MG/100ML; MG/100ML
INJECTION, SOLUTION INTRAVENOUS CONTINUOUS PRN
Status: DISCONTINUED | OUTPATIENT
Start: 2023-12-29 | End: 2023-12-29 | Stop reason: SURG

## 2023-12-29 RX ORDER — OXYCODONE HYDROCHLORIDE 5 MG/1
5 TABLET ORAL ONCE AS NEEDED
Status: DISCONTINUED | OUTPATIENT
Start: 2023-12-29 | End: 2023-12-29 | Stop reason: HOSPADM

## 2023-12-29 RX ORDER — ONDANSETRON 2 MG/ML
4 INJECTION INTRAMUSCULAR; INTRAVENOUS ONCE AS NEEDED
Status: DISCONTINUED | OUTPATIENT
Start: 2023-12-29 | End: 2023-12-29 | Stop reason: HOSPADM

## 2023-12-29 RX ORDER — ESMOLOL HYDROCHLORIDE 10 MG/ML
INJECTION INTRAVENOUS AS NEEDED
Status: DISCONTINUED | OUTPATIENT
Start: 2023-12-29 | End: 2023-12-29 | Stop reason: SURG

## 2023-12-29 RX ORDER — ATORVASTATIN CALCIUM 40 MG/1
40 TABLET, FILM COATED ORAL NIGHTLY
Status: DISCONTINUED | OUTPATIENT
Start: 2023-12-29 | End: 2023-12-29 | Stop reason: HOSPADM

## 2023-12-29 RX ORDER — OXYCODONE HYDROCHLORIDE 5 MG/1
10 TABLET ORAL EVERY 4 HOURS PRN
Status: DISCONTINUED | OUTPATIENT
Start: 2023-12-29 | End: 2023-12-29 | Stop reason: HOSPADM

## 2023-12-29 RX ORDER — HYDROCODONE BITARTRATE AND ACETAMINOPHEN 5; 325 MG/1; MG/1
1 TABLET ORAL ONCE AS NEEDED
Status: COMPLETED | OUTPATIENT
Start: 2023-12-29 | End: 2023-12-29

## 2023-12-29 RX ORDER — DEXTROSE MONOHYDRATE 25 G/50ML
25 INJECTION, SOLUTION INTRAVENOUS
Status: DISCONTINUED | OUTPATIENT
Start: 2023-12-29 | End: 2023-12-29 | Stop reason: HOSPADM

## 2023-12-29 RX ORDER — PAROXETINE HYDROCHLORIDE HEMIHYDRATE 12.5 MG/1
12.5 TABLET, FILM COATED, EXTENDED RELEASE ORAL EVERY MORNING
Status: DISCONTINUED | OUTPATIENT
Start: 2023-12-29 | End: 2023-12-29 | Stop reason: HOSPADM

## 2023-12-29 RX ORDER — IBUPROFEN 600 MG/1
1 TABLET ORAL
Status: DISCONTINUED | OUTPATIENT
Start: 2023-12-29 | End: 2023-12-29 | Stop reason: HOSPADM

## 2023-12-29 RX ORDER — LIDOCAINE HYDROCHLORIDE 10 MG/ML
INJECTION, SOLUTION EPIDURAL; INFILTRATION; INTRACAUDAL; PERINEURAL AS NEEDED
Status: DISCONTINUED | OUTPATIENT
Start: 2023-12-29 | End: 2023-12-29 | Stop reason: SURG

## 2023-12-29 RX ORDER — CHOLECALCIFEROL (VITAMIN D3) 125 MCG
5 CAPSULE ORAL NIGHTLY PRN
Status: DISCONTINUED | OUTPATIENT
Start: 2023-12-29 | End: 2023-12-29 | Stop reason: HOSPADM

## 2023-12-29 RX ORDER — DEXAMETHASONE SODIUM PHOSPHATE 4 MG/ML
INJECTION, SOLUTION INTRA-ARTICULAR; INTRALESIONAL; INTRAMUSCULAR; INTRAVENOUS; SOFT TISSUE AS NEEDED
Status: DISCONTINUED | OUTPATIENT
Start: 2023-12-29 | End: 2023-12-29 | Stop reason: SURG

## 2023-12-29 RX ORDER — HYDRALAZINE HYDROCHLORIDE 20 MG/ML
5 INJECTION INTRAMUSCULAR; INTRAVENOUS
Status: DISCONTINUED | OUTPATIENT
Start: 2023-12-29 | End: 2023-12-29 | Stop reason: HOSPADM

## 2023-12-29 RX ORDER — ONDANSETRON 2 MG/ML
4 INJECTION INTRAMUSCULAR; INTRAVENOUS EVERY 6 HOURS PRN
Status: DISCONTINUED | OUTPATIENT
Start: 2023-12-29 | End: 2023-12-29 | Stop reason: HOSPADM

## 2023-12-29 RX ADMIN — PROPOFOL 140 MG: 10 INJECTION, EMULSION INTRAVENOUS at 12:00

## 2023-12-29 RX ADMIN — Medication 100 MCG: at 12:13

## 2023-12-29 RX ADMIN — HYDROCODONE BITARTRATE AND ACETAMINOPHEN 1 TABLET: 5; 325 TABLET ORAL at 14:12

## 2023-12-29 RX ADMIN — SODIUM CHLORIDE 2000 MG: 900 INJECTION INTRAVENOUS at 12:04

## 2023-12-29 RX ADMIN — ACETAMINOPHEN 650 MG: 325 TABLET, FILM COATED ORAL at 09:01

## 2023-12-29 RX ADMIN — ACETAMINOPHEN 650 MG: 325 TABLET, FILM COATED ORAL at 04:48

## 2023-12-29 RX ADMIN — SODIUM CHLORIDE, POTASSIUM CHLORIDE, SODIUM LACTATE AND CALCIUM CHLORIDE 20 ML/HR: 600; 310; 30; 20 INJECTION, SOLUTION INTRAVENOUS at 11:37

## 2023-12-29 RX ADMIN — FENTANYL CITRATE 25 MCG: 50 INJECTION, SOLUTION INTRAMUSCULAR; INTRAVENOUS at 12:12

## 2023-12-29 RX ADMIN — LIDOCAINE HYDROCHLORIDE 40 MG: 10 INJECTION, SOLUTION EPIDURAL; INFILTRATION; INTRACAUDAL; PERINEURAL at 12:00

## 2023-12-29 RX ADMIN — Medication 10 ML: at 11:07

## 2023-12-29 RX ADMIN — ONDANSETRON 4 MG: 2 INJECTION INTRAMUSCULAR; INTRAVENOUS at 12:27

## 2023-12-29 RX ADMIN — DEXMEDETOMIDINE HCL 4 MCG: 100 INJECTION INTRAVENOUS at 12:17

## 2023-12-29 RX ADMIN — PAROXETINE HYDROCHLORIDE 12.5 MG: 12.5 TABLET, FILM COATED, EXTENDED RELEASE ORAL at 08:47

## 2023-12-29 RX ADMIN — METOPROLOL TARTRATE 25 MG: 25 TABLET, FILM COATED ORAL at 08:12

## 2023-12-29 RX ADMIN — SODIUM CHLORIDE, SODIUM LACTATE, POTASSIUM CHLORIDE, AND CALCIUM CHLORIDE: .6; .31; .03; .02 INJECTION, SOLUTION INTRAVENOUS at 11:56

## 2023-12-29 RX ADMIN — DEXAMETHASONE SODIUM PHOSPHATE 8 MG: 4 INJECTION, SOLUTION INTRAMUSCULAR; INTRAVENOUS at 12:08

## 2023-12-29 RX ADMIN — PANTOPRAZOLE SODIUM 40 MG: 40 TABLET, DELAYED RELEASE ORAL at 08:12

## 2023-12-29 RX ADMIN — EMPAGLIFLOZIN 25 MG: 25 TABLET, FILM COATED ORAL at 08:21

## 2023-12-29 RX ADMIN — ESMOLOL HYDROCHLORIDE 10 MG: 100 INJECTION, SOLUTION INTRAVENOUS at 12:04

## 2023-12-29 RX ADMIN — DEXMEDETOMIDINE HCL 4 MCG: 100 INJECTION INTRAVENOUS at 12:13

## 2023-12-29 RX ADMIN — Medication 100 MCG: at 12:09

## 2023-12-29 RX ADMIN — ONDANSETRON 4 MG: 2 INJECTION INTRAMUSCULAR; INTRAVENOUS at 13:42

## 2023-12-29 RX ADMIN — Medication 100 MCG: at 12:23

## 2023-12-29 NOTE — PLAN OF CARE
Problem: Adult Inpatient Plan of Care  Goal: Plan of Care Review  12/29/2023 0454 by Carmen Tapia RN  Outcome: Ongoing, Progressing  12/29/2023 0454 by Carmen Tapia RN  Outcome: Ongoing, Progressing  Goal: Patient-Specific Goal (Individualized)  12/29/2023 0454 by Carmen Tapia RN  Outcome: Ongoing, Progressing  12/29/2023 0454 by Carmen Tapia RN  Outcome: Ongoing, Progressing  Goal: Absence of Hospital-Acquired Illness or Injury  12/29/2023 0454 by Carmen Tapia RN  Outcome: Ongoing, Progressing  12/29/2023 0454 by Carmen Tapia RN  Outcome: Ongoing, Progressing  Goal: Optimal Comfort and Wellbeing  12/29/2023 0454 by Carmen Tapia RN  Outcome: Ongoing, Progressing  12/29/2023 0454 by Carmen Tapia RN  Outcome: Ongoing, Progressing  Goal: Readiness for Transition of Care  12/29/2023 0454 by Carmen Tapia RN  Outcome: Ongoing, Progressing  12/29/2023 0454 by Carmen Tapia RN  Outcome: Ongoing, Progressing     Problem: Fall Injury Risk  Goal: Absence of Fall and Fall-Related Injury  12/29/2023 0454 by Carmen Tapia RN  Outcome: Ongoing, Progressing  12/29/2023 0454 by Carmen Tapia RN  Outcome: Ongoing, Progressing   Goal Outcome Evaluation:         Patient admitted to observation unit at this time , denies distress complains of headache medicated with PRN tylenol, patient with bright red  blood in urine noted, denies pain with urination, resting quietly in bed oriented to room and bathroom safety maintained, call light in reach

## 2023-12-29 NOTE — ANESTHESIA POSTPROCEDURE EVALUATION
Patient: Cisco Frank    Procedure Summary       Date: 12/29/23 Room / Location: [facility] OR 07 / [facility] MAIN OR    Anesthesia Start: 1156 Anesthesia Stop: 1239    Procedure: CYSTOSCOPY WITH CLOT EVACUATION , fulgeration, with bladder biopsy, Diagnosis:       Gross hematuria      (Gross hematuria [R31.0])    Surgeons: Sven Snyder MD Provider: Enrrique Grove MD    Anesthesia Type: general ASA Status: 3            Anesthesia Type: general    Vitals  Vitals Value Taken Time   BP 98/60 12/29/23 1320   Temp 97.5 °F (36.4 °C) 12/29/23 1235   Pulse 79 12/29/23 1320   Resp 15 12/29/23 1320   SpO2 98 % 12/29/23 1320           Post Anesthesia Care and Evaluation    Patient location during evaluation: PACU  Patient participation: complete - patient participated  Level of consciousness: awake  Pain scale: See nurse's notes for pain score.  Pain management: adequate    Airway patency: patent  Anesthetic complications: No anesthetic complications  PONV Status: none  Cardiovascular status: acceptable  Respiratory status: acceptable and spontaneous ventilation  Hydration status: acceptable    Comments: Patient seen and examined postoperatively; vital signs stable; SpO2 greater than or equal to 90%; cardiopulmonary status stable; nausea/vomiting adequately controlled; pain adequately controlled; no apparent anesthesia complications; patient discharged from anesthesia care when discharge criteria were met

## 2023-12-29 NOTE — ANESTHESIA PREPROCEDURE EVALUATION
Anesthesia Evaluation     Patient summary reviewed and Nursing notes reviewed   no history of anesthetic complications:   NPO Solid Status: > 8 hours  NPO Liquid Status: > 8 hours           Airway   Mallampati: II  TM distance: >3 FB  Neck ROM: full  No difficulty expected  Dental - normal exam     Pulmonary - normal exam    breath sounds clear to auscultation  (+) a smoker Former,  Cardiovascular - normal exam  Exercise tolerance: unable to assess    ECG reviewed  PT is on anticoagulation therapy  Rhythm: regular  Rate: normal    (+) hypertension, past MI , CAD, dysrhythmias Atrial Fib, pericardial effusion, hyperlipidemia    ROS comment: Neg stress 2020  Normal EF and some aortic valve sclerosis 2023 (2.28 KOTA); degree of stenosis is not mentioned  Mild to mod TR with RVSP 35-45    Neuro/Psych  (+) headaches, psychiatric history Depression  GI/Hepatic/Renal/Endo    (+) GERD, GI bleeding active bleeding, renal disease- CRI, diabetes mellitus    Musculoskeletal (-) negative ROS    Abdominal  - normal exam   Substance History - negative use     OB/GYN negative ob/gyn ROS         Other      history of cancer remission                      Anesthesia Plan    ASA 3     general     intravenous induction     Anesthetic plan, risks, benefits, and alternatives have been provided, discussed and informed consent has been obtained with: patient.    Plan discussed with CRNA.        CODE STATUS:    Level Of Support Discussed With: Patient  Code Status (Patient has no pulse and is not breathing): CPR (Attempt to Resuscitate)  Medical Interventions (Patient has pulse or is breathing): Full Support

## 2023-12-29 NOTE — ED NOTES
Pt states he has had blood in urine since 1600 yesterday. Pt states he has had 8 total episodes of bright red blood in urine.

## 2023-12-29 NOTE — OUTREACH NOTE
Prep Survey      Flowsheet Row Responses   [facility] patient discharged from? [facility]   Is LACE score < 7 ? Yes   Eligibility [facility]   Date of Admission 12/29/23   Date of Discharge 12/29/23   Discharge Disposition Home or Self Care   Discharge diagnosis Hematuria- CYSTOSCOPY WITH CLOT EVACUATION , fulgeration, with bladder biopsy,   Does the patient have one of the following disease processes/diagnoses(primary or secondary)? Other   Does the patient have Home health ordered? No   Is there a DME ordered? No   Prep survey completed? Yes            Idania MARTINEZ - Registered Nurse

## 2023-12-29 NOTE — ED PROVIDER NOTES
Subjective   Chief Complaint   Patient presents with    Blood in Urine       History of Present Illness  Patient is an 83-year-old male presents the ED with complaint of hematuria.  Started at 4 PM today, had 8 episodes of bright red blood in the urine.  He denies any abdominal pain or flank pain.  Review of Systems   Constitutional:  Negative for chills and fever.   Cardiovascular:  Negative for chest pain.   Gastrointestinal:  Negative for abdominal pain, diarrhea, nausea and vomiting.   Genitourinary:  Positive for difficulty urinating and hematuria.   Musculoskeletal:  Negative for back pain and neck pain.       Past Medical History:   Diagnosis Date    Atrial fibrillation     Cancer     Coronary artery disease     History of bladder cancer     Hyperlipidemia        No Known Allergies    Past Surgical History:   Procedure Laterality Date    BLADDER SURGERY  10/26/2019    [facility]. [facility]    BRONCHOSCOPY N/A 6/30/2022    Procedure: BRONCHOSCOPY with bilateral lung wash;  Surgeon: Ayush Velarde MD;  Location: [facility] ENDOSCOPY;  Service: Pulmonary;  Laterality: N/A;  normal bronch    CARDIAC CATHETERIZATION      COLONOSCOPY      COLONOSCOPY W/ POLYPECTOMY  02/23/2021    ENDOSCOPY N/A 4/20/2023    Procedure: ESOPHAGOGASTRODUODENOSCOPY with gastric biopsy's;  Surgeon: Debra Lomeli MD;  Location: [facility] ENDOSCOPY;  Service: Gastroenterology;  Laterality: N/A;  gastritis, cardia nodules, barretts esophagus    SKIN CANCER EXCISION      right temple BX    UPPER ENDOSCOPIC ULTRASOUND W/ FNA N/A 3/12/2021    Procedure: ESOPHAGOGASTRODUODENOSCOPY WITH endoscopic mucosal resection, biopsy x 1 area, and endoscopic ULTRASOUND;  Surgeon: Abner Infante MD;  Location: [facility] ENDOSCOPY;  Service: Gastroenterology;  Laterality: N/A;  post op: hiatal hernia, duodenal polyp       Family History   Problem Relation Age of Onset    Heart disease Mother     Heart attack Father     No Known Problems Sister     No Known Problems  Brother     No Known Problems Maternal Aunt     No Known Problems Maternal Uncle     No Known Problems Paternal Aunt     No Known Problems Paternal Uncle     No Known Problems Maternal Grandmother     No Known Problems Maternal Grandfather     No Known Problems Paternal Grandmother     No Known Problems Paternal Grandfather     No Known Problems Other     Anemia Neg Hx     Arrhythmia Neg Hx     Asthma Neg Hx     Clotting disorder Neg Hx     Fainting Neg Hx     Heart failure Neg Hx     Hyperlipidemia Neg Hx     Hypertension Neg Hx        Social History     Socioeconomic History    Marital status:    Tobacco Use    Smoking status: Former     Packs/day: 0.50     Years: 15.00     Additional pack years: 0.00     Total pack years: 7.50     Types: Cigarettes     Start date: 1960     Quit date: 1980     Years since quittin.0     Passive exposure: Past    Smokeless tobacco: Never   Vaping Use    Vaping Use: Never used   Substance and Sexual Activity    Alcohol use: Not Currently    Drug use: Never    Sexual activity: Defer           Objective   Physical Exam  Vitals and nursing note reviewed.   Constitutional:       Appearance: Normal appearance. He is not toxic-appearing.   HENT:      Head: Normocephalic and atraumatic.      Nose: Nose normal.      Mouth/Throat:      Mouth: Mucous membranes are moist.      Pharynx: Oropharynx is clear.   Eyes:      Extraocular Movements: Extraocular movements intact.      Conjunctiva/sclera: Conjunctivae normal.      Pupils: Pupils are equal, round, and reactive to light.   Cardiovascular:      Rate and Rhythm: Normal rate and regular rhythm.      Heart sounds: Normal heart sounds. No murmur heard.     No friction rub. No gallop.   Pulmonary:      Effort: Pulmonary effort is normal.      Breath sounds: Normal breath sounds.   Abdominal:      General: Bowel sounds are normal.      Palpations: Abdomen is soft.   Musculoskeletal:      Cervical back: Normal range of motion  "and neck supple.   Skin:     General: Skin is warm and dry.      Capillary Refill: Capillary refill takes less than 2 seconds.   Neurological:      Mental Status: He is alert and oriented to person, place, and time.         Procedures           ED Course      /90 (BP Location: Right arm, Patient Position: Lying)   Pulse 91   Temp 97.8 °F (36.6 °C) (Oral)   Resp 18   Ht 177.8 cm (70\")   Wt 77.5 kg (170 lb 13.7 oz)   SpO2 96%   BMI 24.52 kg/m²   Medications   sodium chloride 0.9 % flush 10 mL (has no administration in time range)   sodium chloride 0.9 % flush 10 mL (has no administration in time range)   sodium chloride 0.9 % flush 10 mL (has no administration in time range)   sodium chloride 0.9 % infusion 40 mL (has no administration in time range)   acetaminophen (TYLENOL) tablet 650 mg (has no administration in time range)   sennosides-docusate (PERICOLACE) 8.6-50 MG per tablet 2 tablet (has no administration in time range)     And   polyethylene glycol (MIRALAX) packet 17 g (has no administration in time range)     And   bisacodyl (DULCOLAX) EC tablet 5 mg (has no administration in time range)     And   bisacodyl (DULCOLAX) suppository 10 mg (has no administration in time range)   ondansetron (ZOFRAN) injection 4 mg (has no administration in time range)   melatonin tablet 5 mg (has no administration in time range)     CT Abdomen Pelvis Without Contrast    Result Date: 12/29/2023  Impression: High density layering in the urinary bladder consistent with blood products. Underlying mass is not excluded. There is trace right-sided hydronephrosis without evidence of obstructing stone or distal hydroureter. A recently passed stone would have a similar appearance. Electronically Signed: Jagdish Hinojosa MD  12/29/2023 2:56 AM EST  Workstation ID: [id number]   Lab Results (last 24 hours)       Procedure Component Value Units Date/Time    CBC & Differential [[id number]]  (Abnormal) Collected: 12/29/23 0149    " Specimen: Blood Updated: 12/29/23 0152    Narrative:      The following orders were created for panel order CBC & Differential.  Procedure                               Abnormality         Status                     ---------                               -----------         ------                     CBC Auto Differential[[id number]]        Abnormal            Final result                 Please view results for these tests on the individual orders.    CBC Auto Differential [[id number]]  (Abnormal) Collected: 12/29/23 0149    Specimen: Blood Updated: 12/29/23 0152     WBC 5.60 10*3/mm3      RBC 4.54 10*6/mm3      Hemoglobin 13.6 g/dL      Hematocrit 41.5 %      MCV 91.5 fL      MCH 30.0 pg      MCHC 32.8 g/dL      RDW 15.0 %      RDW-SD 48.1 fl      MPV 8.7 fL      Platelets 186 10*3/mm3      Neutrophil % 71.1 %      Lymphocyte % 19.5 %      Monocyte % 7.3 %      Eosinophil % 1.4 %      Basophil % 0.7 %      Neutrophils, Absolute 4.00 10*3/mm3      Lymphocytes, Absolute 1.10 10*3/mm3      Monocytes, Absolute 0.40 10*3/mm3      Eosinophils, Absolute 0.10 10*3/mm3      Basophils, Absolute 0.00 10*3/mm3      nRBC 0.0 /100 WBC     Urinalysis With Culture If Indicated - Straight Cath [[id number]]  (Abnormal) Collected: 12/29/23 0154    Specimen: Urine from Straight Cath Updated: 12/29/23 0229     Color, UA Red     Comment: Any Substance that causes an abnormal urine color can alter the accuracy of the chemical reactions.        Appearance, UA Turbid     pH, UA 7.0     Specific Gravity, UA 1.020     Glucose, UA >=1000 mg/dL (3+)     Comment: Due to the bloody appearance of the urine, macroscopic testing was performed on a centrifuged urine specimen to reduce RBC interference on the chemical testing.         Ketones, UA Negative     Bilirubin, UA Negative     Blood, UA Large (3+)     Protein, UA >=300 mg/dL (3+)     Leuk Esterase, UA --     Comment: The Leukoesterase test cannot be performed due to the centrifugation of the  specimen.          Nitrite, UA Negative     Urobilinogen, UA 0.2 E.U./dL    Narrative:      In absence of clinical symptoms, the presence of pyuria, bacteria, and/or nitrites on the urinalysis result does not correlate with infection.    Urinalysis, Microscopic Only - Straight Cath [[id number]]  (Abnormal) Collected: 12/29/23 0154    Specimen: Urine from Straight Cath Updated: 12/29/23 0233     RBC, UA Too Numerous to Count /HPF      WBC, UA 21-50 /HPF      Bacteria, UA None Seen /HPF      Squamous Epithelial Cells, UA None Seen /HPF      Hyaline Casts, UA None Seen /LPF      Methodology Manual Light Microscopy    Urine Culture - Urine, Straight Cath [[id number]] Collected: 12/29/23 0154    Specimen: Urine from Straight Cath Updated: 12/29/23 0233    Comprehensive Metabolic Panel [[id number]]  (Abnormal) Collected: 12/29/23 0248    Specimen: Blood Updated: 12/29/23 0316     Glucose 186 mg/dL      BUN 27 mg/dL      Creatinine 1.24 mg/dL      Sodium 137 mmol/L      Potassium 4.5 mmol/L      Chloride 100 mmol/L      CO2 28.0 mmol/L      Calcium 9.0 mg/dL      Total Protein 7.2 g/dL      Albumin 3.7 g/dL      ALT (SGPT) 12 U/L      AST (SGOT) 12 U/L      Alkaline Phosphatase 140 U/L      Total Bilirubin 0.7 mg/dL      Globulin 3.5 gm/dL      A/G Ratio 1.1 g/dL      BUN/Creatinine Ratio 21.8     Anion Gap 9.0 mmol/L      eGFR 58.4 mL/min/1.73     Narrative:      GFR Normal >60  Chronic Kidney Disease <60  Kidney Failure <15    The GFR formula is only valid for adults with stable renal function between ages 18 and 70.                                                 Medical Decision Making  Problems Addressed:  Hematuria, unspecified type: complicated acute illness or injury    Amount and/or Complexity of Data Reviewed  Labs: ordered.  Radiology: ordered.    Risk  Prescription drug management.  Decision regarding hospitalization.    Chart Review: Progress note 11/16/2023.    Labs: Gross hematuria noted with urine.    Imaging:  CT Abdomen Pelvis Without Contrast    Result Date: 12/29/2023  Impression: High density layering in the urinary bladder consistent with blood products. Underlying mass is not excluded. There is trace right-sided hydronephrosis without evidence of obstructing stone or distal hydroureter. A recently passed stone would have a similar appearance. Electronically Signed: Jagdish Hinojosa MD  12/29/2023 2:56 AM EST  Workstation ID: [id number]       83-year-old male presents emergency department with complaint of hematuria onset today.  He is on Xarelto.  Differential diagnoses considered for patient presentation, this list is not all inclusive of diagnoses considered: Cystitis, ureterolithiasis, malignancy.  Patient is not having any pain or discomfort at this time.  Hemoglobin hematocrit are stable.  Will place in ED opts for urology consult, repeat CBC.    Disposition: Will be placed in ED observation for repeat labs, urology consultation given gross hematuria    Note Disclaimer: At [facility], we believe that sharing information builds trust and better relationships. You are receiving this note because you recently visited [facility]. It is possible you will see health information before a provider has talked with you about it. This kind of information can be easy to misunderstand. To help you fully understand what it means for your health, we urge you to discuss this note with your provider.Note dictated utilizing Dragon Dictation. Appropriate PPE worn during patient interactions.        Final diagnoses:   Hematuria, unspecified type       ED Disposition  ED Disposition       ED Disposition   Decision to Admit    Condition   --    Comment   --               No follow-up provider specified.       Medication List        ASK your doctor about these medications      glimepiride 2 MG tablet  Commonly known as: AMARYL  Ask about: Which instructions should I use?                 Comfort Lazo, RIAN  12/29/23  [id number]

## 2023-12-29 NOTE — PLAN OF CARE
Goal Outcome Evaluation:  Plan of Care Reviewed With: patient        Progress: improving  Outcome Evaluation: patient to have cystoscope for hematuria. Pt continues to have dark red blood in urine. Pt has c/o pain to pelvic region. States pain is about a 3 at this time. Pt remains NPO for procedure. Will continue care.

## 2023-12-29 NOTE — ED NOTES
Nursing report ED to floor  Cisco Frank  83 y.o.  male    HPI:   Chief Complaint   Patient presents with    Blood in Urine       Admitting doctor:   Alexandru Mcclelland MD    Admitting diagnosis:   The encounter diagnosis was Hematuria, unspecified type.    Code status:   Current Code Status       Date Active Code Status Order ID Comments User Context       12/29/2023 0328 CPR (Attempt to Resuscitate) [id number]  Comfort Lazo APRN ED        Question Answer    Code Status (Patient has no pulse and is not breathing) CPR (Attempt to Resuscitate)    Medical Interventions (Patient has pulse or is breathing) Full Support    Level Of Support Discussed With Patient                    Allergies:   Patient has no known allergies.    Isolation:  No active isolations     Fall Risk:  Fall Risk Assessment was completed, and patient is at moderate risk for falls.   Predictive Model Details         13 (Low) Factor Value    Calculated 12/29/2023 03:48 Age 83    Risk of Fall Model Musculoskeletal Assessment WDL     Active Peripheral IV Present     Imaging order in this encounter Present     Respiratory Rate 18     Skin Assessment WDL     Financial Class Other     Magnesium not on file     Drug Use No     Tobacco Use Quit     Jm Scale not on file     Peripheral Vascular Assessment WDL     Albumin 3.7 g/dL     Chloride 100 mmol/L     Calcium 9 mg/dL     Diastolic BP 81     Clinically Relevant Sex Not Female     Total Bilirubin 0.7 mg/dL     Gastrointestinal Assessment WDL     Creatinine 1.24 mg/dL     Cardiac Assessment WDL     Potassium 4.5 mmol/L     Days after Admission 0.106     ALT 12 U/L         Weight:       12/28/23 2324   Weight: 77.1 kg (170 lb)       Intake and Output  No intake or output data in the 24 hours ending 12/29/23 0348    Diet:        Most recent vitals:   Vitals:    12/29/23 0201 12/29/23 0216 12/29/23 0330 12/29/23 0331   BP: 126/83 128/82  129/81   Pulse: 102 92  81   Resp:       Temp:        TempSrc:       SpO2: 96% 94% 97%    Weight:       Height:           Active LDAs/IV Access:   Lines, Drains & Airways       Active LDAs       Name Placement date Placement time Site Days    Peripheral IV 12/29/23 0248 Right Antecubital 12/29/23 0248  Antecubital  less than 1                    Skin Condition:   Skin Assessments (last day)       None             Labs (abnormal labs have a star):   Labs Reviewed   COMPREHENSIVE METABOLIC PANEL - Abnormal; Notable for the following components:       Result Value    Glucose 186 (*)     BUN 27 (*)     Alkaline Phosphatase 140 (*)     eGFR 58.4 (*)     All other components within normal limits    Narrative:     GFR Normal >60  Chronic Kidney Disease <60  Kidney Failure <15    The GFR formula is only valid for adults with stable renal function between ages 18 and 70.   CBC WITH AUTO DIFFERENTIAL - Abnormal; Notable for the following components:    Lymphocyte % 19.5 (*)     All other components within normal limits   URINALYSIS W/ CULTURE IF INDICATED - Abnormal; Notable for the following components:    Color, UA Red (*)     Appearance, UA Turbid (*)     Glucose, UA >=1000 mg/dL (3+) (*)     Blood, UA Large (3+) (*)     Protein, UA >=300 mg/dL (3+) (*)     All other components within normal limits    Narrative:     In absence of clinical symptoms, the presence of pyuria, bacteria, and/or nitrites on the urinalysis result does not correlate with infection.   URINALYSIS, MICROSCOPIC ONLY - Abnormal; Notable for the following components:    RBC, UA Too Numerous to Count (*)     WBC, UA 21-50 (*)     All other components within normal limits   URINE CULTURE   CBC AND DIFFERENTIAL    Narrative:     The following orders were created for panel order CBC & Differential.  Procedure                               Abnormality         Status                     ---------                               -----------         ------                     CBC Auto Differential[[id number]]         Abnormal            Final result                 Please view results for these tests on the individual orders.       LOC: Person, Place, Time, and Situation    Telemetry:  Observation Unit    Cardiac Monitoring Ordered: no    EKG:   No orders to display       Medications Given in the ED:   Medications   sodium chloride 0.9 % flush 10 mL (has no administration in time range)       Imaging results:  CT Abdomen Pelvis Without Contrast    Result Date: 2023  Impression: High density layering in the urinary bladder consistent with blood products. Underlying mass is not excluded. There is trace right-sided hydronephrosis without evidence of obstructing stone or distal hydroureter. A recently passed stone would have a similar appearance. Electronically Signed: Jagdish Hinojosa MD  2023 2:56 AM EST  Workstation ID: [id number]     Social issues:   Social History     Socioeconomic History    Marital status:    Tobacco Use    Smoking status: Former     Packs/day: 0.50     Years: 15.00     Additional pack years: 0.00     Total pack years: 7.50     Types: Cigarettes     Start date: 1960     Quit date: 1980     Years since quittin.0     Passive exposure: Past    Smokeless tobacco: Never   Vaping Use    Vaping Use: Never used   Substance and Sexual Activity    Alcohol use: Not Currently    Drug use: Never    Sexual activity: Defer       NIH Stroke Scale:  Interval: (not recorded)  1a. Level of Consciousness: (not recorded)  1b. LOC Questions: (not recorded)  1c. LOC Commands: (not recorded)  2. Best Gaze: (not recorded)  3. Visual: (not recorded)  4. Facial Palsy: (not recorded)  5a. Motor Arm, Left: (not recorded)  5b. Motor Arm, Right: (not recorded)  6a. Motor Leg, Left: (not recorded)  6b. Motor Leg, Right: (not recorded)  7. Limb Ataxia: (not recorded)  8. Sensory: (not recorded)  9. Best Language: (not recorded)  10. Dysarthria: (not recorded)  11. Extinction and Inattention (formerly Neglect): (not  recorded)    Total (NIH Stroke Scale): (not recorded)     Additional notable assessment information:     Nursing report ED to floor:  Carmen Chacon RN   12/29/23 03:48 EST

## 2023-12-29 NOTE — CASE MANAGEMENT/SOCIAL WORK
Continued Stay Note  [facility]     Patient Name: Cisco Frank  MRN: [id number]  Today's Date: 12/29/2023    Admit Date: 12/29/2023    Plan: CM assessment needed.   Discharge Plan       Row Name 12/29/23 1156       Plan    Plan CM assessment needed.    Plan Comments CM assessment needed. Patient off floor for Cysto.             Cris Marrero, RN, BSN    [address] [address]  Phone: [phone]  Fax: [phone]

## 2023-12-29 NOTE — OP NOTE
Urology Operative Note    12/29/2023    Cisco Frank  83 y.o.  1940  male  [id number]      Surgeon(s) and Role:  Sven Snyder MD - Primary     Pre-operative Diagnosis: Clot retention, radiation cystitis, history of bladder and prostate cancer    Post-operative Diagnosis: Same    Complications: None    Procedures:    Cystoscopy, clot evacuation, bladder biopsy and fulguration of minor lesions    Indications   Cisco Frank is a 83 y.o. male presented with clot retention CT showed distended bladder with clots in the bladder he was still able to void but it was grossly bloody.  He is added on for clot evacuation    During the informed consent process, the procedure was discussed in detail including the risks of bleeding, infection, and damage to surrounding structures.      Description of procedure:  The patient was properly identified in the preoperative holding area and taken to the operating room where general anesthesia was induced. The patient was prepped and draped in a sterile fashion. The patient was given antibiotics intravenously before the start of the surgery. After ensuring that all of the required equipment was ready and available a surgical timeout was performed.     Cystoscopy was performed which revealed an open urethra and prostate there was clots in the base the bladder approximately 200 cc irrigated out.  There was active bleeding from several blood vessels from the posterior bladder wall.  These areas were biopsied and then fulgurated with the Bugbee for excellent hemostasis.  No bladder tumors were seen.  Bladder was filled and emptied multiple times hemostasis was excellent so the scope was removed.  No Dill was left in place given this would further irritate his radiation cystitis and he had no trouble emptying preoperatively.    There were no apparent complications. The patient woke up in the operating room and was taken to the recovery room in stable condition.     I was present and  scrubbed for the entire procedure.     Specimens: Bladder biopsy    Estimated Blood Loss: Minimal      Plan   -Okay for discharge today  -Follow up with Dr Coep next week in the office         Sven Snyder MD  [facility]rology  [facility] [facility] [address]  [place] [address] [phone]

## 2023-12-29 NOTE — DISCHARGE SUMMARY
"[facility]    Nuno Patton MD    CHIEF COMPLAINT:     Hematuria    HISTORY OF PRESENT ILLNESS:    Obtained from admitting physician HPI on 12/29/2023:  Patient is an 83-year-old male presents the ED with complaint of hematuria.  Started at 4 PM today, had 8 episodes of bright red blood in the urine.  He denies any abdominal pain or flank pain.    12/29/23:  Patient confirms the HPI noted above including waking with a sudden onset of gross hematuria.  Patient reports that he has had multiple episodes of bloody urine with some clots present but no retention or difficulty urinating.  He does report some mild lower quadrant abdominal pain.  He denies any flank pain, nausea, vomiting or recent fevers.  A history of a \"bladder tumor that was normal\" was reported by patient.  Following his admission he continues to experience multiple episodes of gross hematuria without new or acute symptoms noted            Past Medical History:   Diagnosis Date    Atrial fibrillation     Cancer     Coronary artery disease     History of bladder cancer     Hyperlipidemia      Past Surgical History:   Procedure Laterality Date    BLADDER SURGERY  10/26/2019    [facility]    BRONCHOSCOPY N/A 6/30/2022    Procedure: BRONCHOSCOPY with bilateral lung wash;  Surgeon: Ayush Velarde MD;  Location: [facility] ENDOSCOPY;  Service: Pulmonary;  Laterality: N/A;  normal bronch    CARDIAC CATHETERIZATION      COLONOSCOPY      COLONOSCOPY W/ POLYPECTOMY  02/23/2021    ENDOSCOPY N/A 4/20/2023    Procedure: ESOPHAGOGASTRODUODENOSCOPY with gastric biopsy's;  Surgeon: Debra Lomeli MD;  Location: [facility] ENDOSCOPY;  Service: Gastroenterology;  Laterality: N/A;  gastritis, cardia nodules, barretts esophagus    SKIN CANCER EXCISION      right temple BX    UPPER ENDOSCOPIC ULTRASOUND W/ FNA N/A 3/12/2021    Procedure: ESOPHAGOGASTRODUODENOSCOPY WITH endoscopic mucosal resection, biopsy x 1 area, and endoscopic ULTRASOUND;  " Surgeon: Abner Infante MD;  Location: [facility] ENDOSCOPY;  Service: Gastroenterology;  Laterality: N/A;  post op: hiatal hernia, duodenal polyp     Family History   Problem Relation Age of Onset    Heart disease Mother     Heart attack Father     No Known Problems Sister     No Known Problems Brother     No Known Problems Maternal Aunt     No Known Problems Maternal Uncle     No Known Problems Paternal Aunt     No Known Problems Paternal Uncle     No Known Problems Maternal Grandmother     No Known Problems Maternal Grandfather     No Known Problems Paternal Grandmother     No Known Problems Paternal Grandfather     No Known Problems Other     Anemia Neg Hx     Arrhythmia Neg Hx     Asthma Neg Hx     Clotting disorder Neg Hx     Fainting Neg Hx     Heart failure Neg Hx     Hyperlipidemia Neg Hx     Hypertension Neg Hx      Social History     Tobacco Use    Smoking status: Former     Packs/day: 0.50     Years: 15.00     Additional pack years: 0.00     Total pack years: 7.50     Types: Cigarettes     Start date: 1960     Quit date: 1980     Years since quittin.0     Passive exposure: Past    Smokeless tobacco: Never   Vaping Use    Vaping Use: Never used   Substance Use Topics    Alcohol use: Not Currently    Drug use: Never     Medications Prior to Admission   Medication Sig Dispense Refill Last Dose    rivaroxaban (XARELTO) 15 MG tablet Take 1 tablet by mouth Daily. 90 tablet 3 2023    atorvastatin (LIPITOR) 40 MG tablet Take 1 tablet by mouth every night at bedtime. 90 tablet 3     glimepiride (AMARYL) 2 MG tablet Take 2 tablets by mouth 2 (Two) Times a Day.       Jardiance 25 MG tablet tablet Take 1 tablet by mouth Every Morning.       metoprolol tartrate (LOPRESSOR) 25 MG tablet TAKE ONE TABLET BY MOUTH EVERY 12 HOURS 60 tablet 2     omeprazole (priLOSEC) 40 MG capsule Take 1 capsule by mouth Daily.       ondansetron ODT (ZOFRAN-ODT) 4 MG disintegrating tablet Place 1 tablet on the tongue 4  (Four) Times a Day As Needed for Nausea or Vomiting for up to 15 doses. 15 tablet 2     PARoxetine CR (PAXIL-CR) 12.5 MG 24 hr tablet Take 1 tablet by mouth Every Morning for 90 days. 90 tablet 1     polyethylene glycol (MIRALAX) 17 g packet Take 17 g by mouth Every Night.        Allergies:  Patient has no known allergies.    Immunization History   Administered Date(s) Administered    COVID-19 (PFIZER) Purple Cap Monovalent 12/29/2020, 01/19/2021, 09/09/2021    Fluzone High Dose =>65 Years ([employer] ONLY) 10/10/2015, 09/16/2016, 10/14/2017, 09/22/2018, 11/05/2019, 10/05/2020    Fluzone High-Dose 65+yrs 10/08/2021    Hepatitis A 04/23/2018, 12/20/2018    Pneumococcal Conjugate 13-Valent (PCV13) 11/05/2020    Pneumococcal Polysaccharide (PPSV23) 01/10/2013, 02/01/2021           REVIEW OF SYSTEMS:    Review of Systems   Constitutional: Negative.   HENT: Negative.     Eyes: Negative.    Cardiovascular: Negative.    Respiratory: Negative.     Skin: Negative.    Musculoskeletal: Negative.    Gastrointestinal: Negative.    Genitourinary:  Positive for hematuria and pelvic pain.   Neurological: Negative.    Psychiatric/Behavioral: Negative.           Vital Signs  Temp:  [96.4 °F (35.8 °C)-97.8 °F (36.6 °C)] 96.4 °F (35.8 °C)  Heart Rate:  [] 94  Resp:  [13-22] 16  BP: ()/(52-92) 129/86          Physical Exam:  Physical Exam  Vitals reviewed.   Constitutional:       General: He is not in acute distress.     Appearance: Normal appearance. He is normal weight. He is not ill-appearing, toxic-appearing or diaphoretic.   HENT:      Head: Normocephalic.      Right Ear: External ear normal.      Left Ear: External ear normal.      Nose: Nose normal.      Mouth/Throat:      Mouth: Mucous membranes are moist.   Eyes:      Extraocular Movements: Extraocular movements intact.   Cardiovascular:      Rate and Rhythm: Normal rate and regular rhythm.      Pulses: Normal pulses.      Heart sounds: Normal heart sounds.    Pulmonary:      Effort: Pulmonary effort is normal.      Breath sounds: Normal breath sounds.   Abdominal:      General: Bowel sounds are normal.      Palpations: Abdomen is soft.      Tenderness: There is abdominal tenderness.   Musculoskeletal:         General: Normal range of motion.      Cervical back: Normal range of motion.      Right lower leg: No edema.      Left lower leg: No edema.   Skin:     General: Skin is warm and dry.      Capillary Refill: Capillary refill takes less than 2 seconds.   Neurological:      General: No focal deficit present.      Mental Status: He is alert and oriented to person, place, and time.   Psychiatric:         Mood and Affect: Mood normal.         Behavior: Behavior normal.         Thought Content: Thought content normal.         Judgment: Judgment normal.           Emotional Behavior:   Normal   Debilities:  None  Results Review:    I reviewed the patient's new clinical results.  Lab Results (most recent)       Procedure Component Value Units Date/Time    POC Glucose Once [[id number]]  (Abnormal) Collected: 12/29/23 0845    Specimen: Blood Updated: 12/29/23 0847     Glucose 137 mg/dL      Comment: Serial Number: [id number]:  [id number]       POC Glucose Once [[id number]]  (Abnormal) Collected: 12/29/23 0748    Specimen: Blood Updated: 12/29/23 0750     Glucose 155 mg/dL      Comment: Serial Number: [id number]:  [id number]       Basic Metabolic Panel [[id number]]  (Abnormal) Collected: 12/29/23 0523    Specimen: Blood from Arm, Right Updated: 12/29/23 0614     Glucose 170 mg/dL      BUN 26 mg/dL      Creatinine 1.15 mg/dL      Sodium 138 mmol/L      Potassium 4.3 mmol/L      Chloride 101 mmol/L      CO2 27.0 mmol/L      Calcium 8.9 mg/dL      BUN/Creatinine Ratio 22.6     Anion Gap 10.0 mmol/L      eGFR 63.9 mL/min/1.73     Narrative:      GFR Normal >60  Chronic Kidney Disease <60  Kidney Failure <15    The GFR formula is only valid for adults with stable renal  function between ages 18 and 70.    CBC Auto Differential [[id number]]  (Abnormal) Collected: 12/29/23 0523    Specimen: Blood from Arm, Right Updated: 12/29/23 0546     WBC 6.00 10*3/mm3      RBC 4.42 10*6/mm3      Hemoglobin 13.3 g/dL      Hematocrit 39.4 %      MCV 89.3 fL      MCH 30.1 pg      MCHC 33.7 g/dL      RDW 14.6 %      RDW-SD 48.6 fl      MPV 9.0 fL      Platelets 137 10*3/mm3      Neutrophil % 67.2 %      Lymphocyte % 24.1 %      Monocyte % 6.7 %      Eosinophil % 1.4 %      Basophil % 0.6 %      Neutrophils, Absolute 4.10 10*3/mm3      Lymphocytes, Absolute 1.50 10*3/mm3      Monocytes, Absolute 0.40 10*3/mm3      Eosinophils, Absolute 0.10 10*3/mm3      Basophils, Absolute 0.00 10*3/mm3      nRBC 0.2 /100 WBC     Comprehensive Metabolic Panel [[id number]]  (Abnormal) Collected: 12/29/23 0248    Specimen: Blood Updated: 12/29/23 0316     Glucose 186 mg/dL      BUN 27 mg/dL      Creatinine 1.24 mg/dL      Sodium 137 mmol/L      Potassium 4.5 mmol/L      Chloride 100 mmol/L      CO2 28.0 mmol/L      Calcium 9.0 mg/dL      Total Protein 7.2 g/dL      Albumin 3.7 g/dL      ALT (SGPT) 12 U/L      AST (SGOT) 12 U/L      Alkaline Phosphatase 140 U/L      Total Bilirubin 0.7 mg/dL      Globulin 3.5 gm/dL      A/G Ratio 1.1 g/dL      BUN/Creatinine Ratio 21.8     Anion Gap 9.0 mmol/L      eGFR 58.4 mL/min/1.73     Narrative:      GFR Normal >60  Chronic Kidney Disease <60  Kidney Failure <15    The GFR formula is only valid for adults with stable renal function between ages 18 and 70.    Urinalysis, Microscopic Only - Straight Cath [[id number]]  (Abnormal) Collected: 12/29/23 0154    Specimen: Urine from Straight Cath Updated: 12/29/23 0233     RBC, UA Too Numerous to Count /HPF      WBC, UA 21-50 /HPF      Bacteria, UA None Seen /HPF      Squamous Epithelial Cells, UA None Seen /HPF      Hyaline Casts, UA None Seen /LPF      Methodology Manual Light Microscopy    Urine Culture - Urine, Straight Cath [[id number]]  Collected: 12/29/23 0154    Specimen: Urine from Straight Cath Updated: 12/29/23 0233    Urinalysis With Culture If Indicated - Straight Cath [[id number]]  (Abnormal) Collected: 12/29/23 0154    Specimen: Urine from Straight Cath Updated: 12/29/23 0229     Color, UA Red     Comment: Any Substance that causes an abnormal urine color can alter the accuracy of the chemical reactions.        Appearance, UA Turbid     pH, UA 7.0     Specific Gravity, UA 1.020     Glucose, UA >=1000 mg/dL (3+)     Comment: Due to the bloody appearance of the urine, macroscopic testing was performed on a centrifuged urine specimen to reduce RBC interference on the chemical testing.         Ketones, UA Negative     Bilirubin, UA Negative     Blood, UA Large (3+)     Protein, UA >=300 mg/dL (3+)     Leuk Esterase, UA --     Comment: The Leukoesterase test cannot be performed due to the centrifugation of the specimen.          Nitrite, UA Negative     Urobilinogen, UA 0.2 E.U./dL    Narrative:      In absence of clinical symptoms, the presence of pyuria, bacteria, and/or nitrites on the urinalysis result does not correlate with infection.    CBC & Differential [[id number]]  (Abnormal) Collected: 12/29/23 0149    Specimen: Blood Updated: 12/29/23 0152    Narrative:      The following orders were created for panel order CBC & Differential.  Procedure                               Abnormality         Status                     ---------                               -----------         ------                     CBC Auto Differential[[id number]]        Abnormal            Final result                 Please view results for these tests on the individual orders.    CBC Auto Differential [[id number]]  (Abnormal) Collected: 12/29/23 0149    Specimen: Blood Updated: 12/29/23 0152     WBC 5.60 10*3/mm3      RBC 4.54 10*6/mm3      Hemoglobin 13.6 g/dL      Hematocrit 41.5 %      MCV 91.5 fL      MCH 30.0 pg      MCHC 32.8 g/dL      RDW 15.0 %      RDW-SD  48.1 fl      MPV 8.7 fL      Platelets 186 10*3/mm3      Neutrophil % 71.1 %      Lymphocyte % 19.5 %      Monocyte % 7.3 %      Eosinophil % 1.4 %      Basophil % 0.7 %      Neutrophils, Absolute 4.00 10*3/mm3      Lymphocytes, Absolute 1.10 10*3/mm3      Monocytes, Absolute 0.40 10*3/mm3      Eosinophils, Absolute 0.10 10*3/mm3      Basophils, Absolute 0.00 10*3/mm3      nRBC 0.0 /100 WBC             Imaging Results (Most Recent)       Procedure Component Value Units Date/Time    CT Abdomen Pelvis Without Contrast [[id number]] Collected: 12/29/23 0252     Updated: 12/29/23 0258    Narrative:      CT ABDOMEN PELVIS WO CONTRAST    Date of Exam: 12/29/2023 2:20 AM EST    Indication: hematuria.    Comparison: CT abdomen pelvis dated April 19, 2023    Technique: Axial CT images were obtained of the abdomen and pelvis without the administration of contrast. Sagittal and coronal reconstructions were performed.  Automated exposure control and iterative reconstruction methods were used.      Findings:  There is a trace left pleural effusion. There is a small pericardial effusion. There is moderate coronary artery calcific atherosclerosis.    There are clips from cholecystectomy. The unenhanced liver, right adrenal gland, pancreas and spleen are normal. There is a left adrenal limb fat-containing adenoma. There is a stable exophytic cyst from the anterior interpolar left kidney. There is no   evidence of left-sided hydroureteronephrosis or nephrolithiasis.    There is minimal right-sided hydronephrosis with no definite hydroureter.    There is diverticulosis of the sigmoid colon without evidence of diverticulitis. Calcified into mesenteric lesion from the small bowel in the right upper quadrant is stable and likely a small infarcted area of fat. There is no evidence of small bowel   inflammatory change or small bowel obstruction.    There is mild atherosclerosis of the abdominal aorta and branch vascular structures. There  are mild degenerative changes of the spine which are normal for patient this age.    There is high density layering in the urinary bladder. This is consistent with bladder hemorrhage. Underlying bladder mass is not excluded.      Impression:      Impression:  High density layering in the urinary bladder consistent with blood products. Underlying mass is not excluded. There is trace right-sided hydronephrosis without evidence of obstructing stone or distal hydroureter. A recently passed stone would have a   similar appearance.            Electronically Signed: Jagdish Hinojosa MD    12/29/2023 2:56 AM EST    Workstation ID: [id number]          reviewed    ECG/EMG Results (most recent)       Procedure Component Value Units Date/Time    ECG 12 Lead Tachycardia [[id number]] Collected: 12/29/23 0737     Updated: 12/29/23 0738     QT Interval 341 ms      QTC Interval 413 ms     Narrative:      HEART RATE= 88  bpm  RR Interval= 681  ms  MD Interval=   ms  P Horizontal Axis=   deg  P Front Axis=   deg  QRSD Interval= 84  ms  QT Interval= 341  ms  QTcB= 413  ms  QRS Axis= -4  deg  T Wave Axis= 31  deg  - ABNORMAL ECG -  Atrial flutter with predominant 3:1 AV block  Low voltage, precordial leads  Electronically Signed By:   Date and Time of Study: 2023-12-29 07:37:15          reviewed        Results for orders placed during the hospital encounter of 04/03/23    Adult Transthoracic Echo Complete W/ Cont if Necessary Per Protocol    Interpretation Summary    Left ventricular ejection fraction appears to be 56 - 60%.    The right ventricular cavity is mildly dilated.    Estimated right ventricular systolic pressure from tricuspid regurgitation is mildly elevated (35-45 mmHg).    There is a moderate (1-2cm) pericardial effusion. There is no evidence of cardiac tamponade.      Microbiology Results (last 10 days)       ** No results found for the last 240 hours. **            Assessment & Plan     Hematuria       Hematuria with a history  of bladder cancer  -Hemoglobin: 13.6, 13.3  -UA showed no bacteria with 21-50 WBCs, RBCs too numerous to count, 3+ protein, 3+ blood and 3+ glucose with a specific gravity of 1.020  -CT of abdomen and pelvis showed high density layering in the urinary bladder consistent with blood products.  Underlying mass is not excluded and there is trace right-sided hydronephrosis without evidence of obstructing stone or distal hydroureter with a recently passed stone possibly having a similar appearance  -Hold Xarelto  -Urology consulted, cystoscopy with clot evacuation was performed on 12/29/2023 with biopsies taken from the posterior bladder wall and hemostasis achieved with minimal blood loss and no complications.  Follow-up with urologist in 1 week    History of CAD/atrial fibrillation  -Hold Xarelto due to hematuria noted above  -EKG which was reported with a flutter with a QTc of 413 ms  -Continue metoprolol and statin    Diabetes mellitus  -Well controlled   Lab Results   Component Value Date    GLUCOSE 170 (H) 12/29/2023    GLUCOSE 186 (H) 12/29/2023    GLUCOSE 360 (H) 11/13/2023    GLUCOSE 373 (H) 11/01/2023   -Hold glimepiride  -Continue Jardiance and add correctional insulin  -Diabetic diet  -Monitor AC and HS    Hyperlipidemia  -Statin    GERD  -PPI    Depression  -Paxil    I discussed the patients findings and my recommendations with patient and nursing staff.     Discharge Diagnosis:      Hematuria      Hospital Course  Patient is a 83 y.o. male presented with hematuria with an HPI noted above.  Hemoglobin stable at 13.6, 13.3 with UA showing WBCs, RBCs, protein and glucose with a specific gravity 1.020.  CT of abdomen and pelvis was obtained which showed high density layering in the urinary bladder consistent with blood products as well as trace right-sided hydronephrosis without evidence of obstructing stone or distal hydroureter.  His Xarelto was held on admission and EKG was obtained.  Urology was consulted  who evaluated patient and performed cystoscopy with clot evacuation on 12/29/2023 with good hemostasis achieved and biopsies taken with minimal blood loss and no complications noted.  Urologist recommended outpatient follow-up on results in 1 week.  Following return from procedure patient noted some pelvic pain did still appear to be somewhat drowsy and was tachycardic and mildly hypotensive with systolic blood pressure in the 90s.  Blood pressure improved in a fairly short order and he was given Norco x 1 with improvement in pain and heart rate.  He subsequently urinated approximately 250 mL of yellow urine and reports feeling significantly improved from the previous day.  Hemoglobin was also reassessed and found to be 13.0..  At this time patient is felt to be in good condition for discharge with close follow-up with his PCP as well as urology on an outpatient basis.  His full testing/results and plan were discussed with patient along with concerning/alarm symptoms for which to call 911/return to the ED. patient is instructed to hold his anticoagulation until 1/1/2024.  All questions were answered he verbalizes his understanding and agreement.    Past Medical History:     Past Medical History:   Diagnosis Date    Atrial fibrillation     Cancer     Coronary artery disease     History of bladder cancer     Hyperlipidemia        Past Surgical History:     Past Surgical History:   Procedure Laterality Date    BLADDER SURGERY  10/26/2019    [facility]    BRONCHOSCOPY N/A 6/30/2022    Procedure: BRONCHOSCOPY with bilateral lung wash;  Surgeon: Ayush Velarde MD;  [facility];  Service: Pulmonary;  Laterality: N/A;  normal bronch    CARDIAC CATHETERIZATION      COLONOSCOPY      COLONOSCOPY W/ POLYPECTOMY  02/23/2021    ENDOSCOPY N/A 4/20/2023    Procedure: ESOPHAGOGASTRODUODENOSCOPY with gastric biopsy's;  Surgeon: Debra Lomeli MD;  [facility];  Service: Gastroenterology;   Laterality: N/A;  gastritis, cardia nodules, barretts esophagus    SKIN CANCER EXCISION      right temple BX    UPPER ENDOSCOPIC ULTRASOUND W/ FNA N/A 3/12/2021    Procedure: ESOPHAGOGASTRODUODENOSCOPY WITH endoscopic mucosal resection, biopsy x 1 area, and endoscopic ULTRASOUND;  Surgeon: Abner Infante MD;  Location: [facility] ENDOSCOPY;  Service: Gastroenterology;  Laterality: N/A;  post op: hiatal hernia, duodenal polyp       Social History:   Social History     Socioeconomic History    Marital status:    Tobacco Use    Smoking status: Former     Packs/day: 0.50     Years: 15.00     Additional pack years: 0.00     Total pack years: 7.50     Types: Cigarettes     Start date: 1960     Quit date: 1980     Years since quittin.0     Passive exposure: Past    Smokeless tobacco: Never   Vaping Use    Vaping Use: Never used   Substance and Sexual Activity    Alcohol use: Not Currently    Drug use: Never    Sexual activity: Defer       Procedures Performed    Procedure(s):  CYSTOSCOPY WITH CLOT EVACUATION , fulgeration, with bladder biopsy,  -------------------       Consults:   Consults       Date and Time Order Name Status Description    2023  4:34 AM Inpatient Urology Consult Completed             Condition on Discharge:     Stable    Discharge Disposition      Discharge Medications     Discharge Medications        New Medications        Instructions Start Date   cephalexin 500 MG capsule  Commonly known as: KEFLEX   500 mg, Oral, 3 Times Daily             ASK your doctor about these medications        Instructions Start Date   atorvastatin 40 MG tablet  Commonly known as: LIPITOR   40 mg, Oral, Every Night at Bedtime      glimepiride 2 MG tablet  Commonly known as: AMARYL  Ask about: Which instructions should I use?   4 mg, Oral, 2 Times Daily      Jardiance 25 MG tablet tablet  Generic drug: empagliflozin   25 mg, Oral, Every Morning      metoprolol tartrate 25 MG tablet  Commonly known as:  LOPRESSOR   25 mg, Oral, Every 12 Hours      omeprazole 40 MG capsule  Commonly known as: priLOSEC   Take 1 capsule by mouth Daily.      ondansetron ODT 4 MG disintegrating tablet  Commonly known as: ZOFRAN-ODT   4 mg, Translingual, 4 Times Daily PRN      PARoxetine CR 12.5 MG 24 hr tablet  Commonly known as: PAXIL-CR   12.5 mg, Oral, Every Morning      polyethylene glycol 17 g packet  Commonly known as: MIRALAX   17 g, Oral, Nightly      rivaroxaban 15 MG tablet  Commonly known as: XARELTO   15 mg, Oral, Daily               Discharge Diet:     Activity at Discharge:     Follow-up Appointments  Future Appointments   Date Time Provider Department Center   2/16/2024  9:00 AM Nuno Patton MD [facility]   3/7/2024  2:00 PM Kailash Garsia MD [facility]   4/12/2024 10:30 AM Nuno Patton MD [facility]         Test Results Pending at Discharge  Pending Labs       Order Current Status    Tissue Pathology Exam In process    Type & Screen In process    Urine Culture - Urine, Straight Cath In process             Risk for Readmission (LACE) Score: 9 (12/29/2023  6:00 AM)      Greater than 30 minutes spent in discharge activities for this patient    Pipe Pacheco PA-C  12/29/23  15:19 EST

## 2023-12-29 NOTE — CONSULTS
[facility] CONSULT      Patient Identification:  NAME:  Cisco Frank  Age:  83 y.o.   Sex:  male   :  1940   MRN:  [id number]       Chief complaint/Reason for consult: Gross hematuria    History of present illness:  83 y.o. male known to Dr. Cope history of bladder cancer as well as prostate cancer status post radiation in .  Presented with gross hematuria CT scan showing clot in the bladder.  Has been able to void but has been dark and bloody.  Hemoglobin stable      Past medical history:  Past Medical History:   Diagnosis Date    Atrial fibrillation     Cancer     Coronary artery disease     History of bladder cancer     Hyperlipidemia        Past surgical history:  Past Surgical History:   Procedure Laterality Date    BLADDER SURGERY  10/26/2019    [facility] Dr. Cope    BRONCHOSCOPY N/A 2022    Procedure: BRONCHOSCOPY with bilateral lung wash;  Surgeon: Ayush Velarde MD;  Location: [facility] ENDOSCOPY;  Service: Pulmonary;  Laterality: N/A;  normal bronch    CARDIAC CATHETERIZATION      COLONOSCOPY      COLONOSCOPY W/ POLYPECTOMY  2021    ENDOSCOPY N/A 2023    Procedure: ESOPHAGOGASTRODUODENOSCOPY with gastric biopsy's;  Surgeon: Debra Lomeli MD;  Location: [facility] ENDOSCOPY;  Service: Gastroenterology;  Laterality: N/A;  gastritis, cardia nodules, barretts esophagus    SKIN CANCER EXCISION      right temple BX    UPPER ENDOSCOPIC ULTRASOUND W/ FNA N/A 3/12/2021    Procedure: ESOPHAGOGASTRODUODENOSCOPY WITH endoscopic mucosal resection, biopsy x 1 area, and endoscopic ULTRASOUND;  Surgeon: Abner Infante MD;  Location: [facility] ENDOSCOPY;  Service: Gastroenterology;  Laterality: N/A;  post op: hiatal hernia, duodenal polyp       Allergies:  Patient has no known allergies.    Home medications:  Medications Prior to Admission   Medication Sig Dispense Refill Last Dose    rivaroxaban (XARELTO) 15 MG tablet Take 1 tablet by mouth Daily. 90 tablet 3 2023    atorvastatin  (LIPITOR) 40 MG tablet Take 1 tablet by mouth every night at bedtime. 90 tablet 3     glimepiride (AMARYL) 2 MG tablet Take 2 tablets by mouth 2 (Two) Times a Day.       Jardiance 25 MG tablet tablet Take 1 tablet by mouth Every Morning.       metoprolol tartrate (LOPRESSOR) 25 MG tablet TAKE ONE TABLET BY MOUTH EVERY 12 HOURS 60 tablet 2     omeprazole (priLOSEC) 40 MG capsule Take 1 capsule by mouth Daily.       ondansetron ODT (ZOFRAN-ODT) 4 MG disintegrating tablet Place 1 tablet on the tongue 4 (Four) Times a Day As Needed for Nausea or Vomiting for up to 15 doses. 15 tablet 2     PARoxetine CR (PAXIL-CR) 12.5 MG 24 hr tablet Take 1 tablet by mouth Every Morning for 90 days. 90 tablet 1     polyethylene glycol (MIRALAX) 17 g packet Take 17 g by mouth Every Night.           Hospital medications:  [MAR Hold] atorvastatin, 40 mg, Oral, Nightly  ceFAZolin, 2,000 mg, Intravenous, Once  [MAR Hold] empagliflozin, 25 mg, Oral, QAM  [MAR Hold] insulin lispro, 2-9 Units, Subcutaneous, 4x Daily AC & at Bedtime  metoprolol tartrate, 25 mg, Oral, Q12H  [MAR Hold] pantoprazole, 40 mg, Oral, Q AM  [MAR Hold] PARoxetine CR, 12.5 mg, Oral, QAM  [MAR Hold] polyethylene glycol, 17 g, Oral, Nightly  senna-docusate sodium, 2 tablet, Oral, BID  sodium chloride, 10 mL, Intravenous, Q12H      lactated ringers, 20 mL/hr, Last Rate: 20 mL/hr (12/29/23 1137)        acetaminophen    senna-docusate sodium **AND** polyethylene glycol **AND** bisacodyl **AND** bisacodyl    [MAR Hold] dextrose    [MAR Hold] dextrose    [MAR Hold] glucagon (human recombinant)    melatonin    ondansetron    [COMPLETED] Insert Peripheral IV **AND** [MAR Hold] sodium chloride    sodium chloride    sodium chloride    Family history:  Family History   Problem Relation Age of Onset    Heart disease Mother     Heart attack Father     No Known Problems Sister     No Known Problems Brother     No Known Problems Maternal Aunt     No Known Problems Maternal Uncle     No  Known Problems Paternal Aunt     No Known Problems Paternal Uncle     No Known Problems Maternal Grandmother     No Known Problems Maternal Grandfather     No Known Problems Paternal Grandmother     No Known Problems Paternal Grandfather     No Known Problems Other     Anemia Neg Hx     Arrhythmia Neg Hx     Asthma Neg Hx     Clotting disorder Neg Hx     Fainting Neg Hx     Heart failure Neg Hx     Hyperlipidemia Neg Hx     Hypertension Neg Hx        Social history:  Social History     Tobacco Use    Smoking status: Former     Packs/day: 0.50     Years: 15.00     Additional pack years: 0.00     Total pack years: 7.50     Types: Cigarettes     Start date: 1960     Quit date: 1980     Years since quittin.0     Passive exposure: Past    Smokeless tobacco: Never   Vaping Use    Vaping Use: Never used   Substance Use Topics    Alcohol use: Not Currently    Drug use: Never       Objective:  TMax 24 hours:   Temp (24hrs), Av.6 °F (36.4 °C), Min:97.5 °F (36.4 °C), Max:97.8 °F (36.6 °C)      Vitals Ranges:   Temp:  [97.5 °F (36.4 °C)-97.8 °F (36.6 °C)] 97.6 °F (36.4 °C)  Heart Rate:  [] 90  Resp:  [18-22] 22  BP: (126-155)/(78-92) 139/92    Intake/Output Last 3 shifts:  I/O last 3 completed shifts:  In: -   Out: 925 [Urine:925]     Physical Exam:    General Appearance:    Alert, cooperative, NAD   Lungs:     Respirations unlabored, no audible wheezing    Heart:    No cyanosis   Abdomen:     Soft, ND    :    No suprapubic distention              Results review:   I reviewed the patient's new clinical results.    Data review:  Lab Results (last 24 hours)       Procedure Component Value Units Date/Time    POC Glucose Once [[id number]]  (Abnormal) Collected: 23    Specimen: Blood Updated: 23     Glucose 137 mg/dL      Comment: Serial Number: [id number]:  [id number]       POC Glucose Once [[id number]]  (Abnormal) Collected: 23 0748    Specimen: Blood Updated: 23 3385      Glucose 155 mg/dL      Comment: Serial Number: [id number]:  [id number]       Basic Metabolic Panel [[id number]]  (Abnormal) Collected: 12/29/23 0523    Specimen: Blood from Arm, Right Updated: 12/29/23 0614     Glucose 170 mg/dL      BUN 26 mg/dL      Creatinine 1.15 mg/dL      Sodium 138 mmol/L      Potassium 4.3 mmol/L      Chloride 101 mmol/L      CO2 27.0 mmol/L      Calcium 8.9 mg/dL      BUN/Creatinine Ratio 22.6     Anion Gap 10.0 mmol/L      eGFR 63.9 mL/min/1.73     Narrative:      GFR Normal >60  Chronic Kidney Disease <60  Kidney Failure <15    The GFR formula is only valid for adults with stable renal function between ages 18 and 70.    CBC Auto Differential [[id number]]  (Abnormal) Collected: 12/29/23 0523    Specimen: Blood from Arm, Right Updated: 12/29/23 0546     WBC 6.00 10*3/mm3      RBC 4.42 10*6/mm3      Hemoglobin 13.3 g/dL      Hematocrit 39.4 %      MCV 89.3 fL      MCH 30.1 pg      MCHC 33.7 g/dL      RDW 14.6 %      RDW-SD 48.6 fl      MPV 9.0 fL      Platelets 137 10*3/mm3      Neutrophil % 67.2 %      Lymphocyte % 24.1 %      Monocyte % 6.7 %      Eosinophil % 1.4 %      Basophil % 0.6 %      Neutrophils, Absolute 4.10 10*3/mm3      Lymphocytes, Absolute 1.50 10*3/mm3      Monocytes, Absolute 0.40 10*3/mm3      Eosinophils, Absolute 0.10 10*3/mm3      Basophils, Absolute 0.00 10*3/mm3      nRBC 0.2 /100 WBC     Comprehensive Metabolic Panel [[id number]]  (Abnormal) Collected: 12/29/23 0248    Specimen: Blood Updated: 12/29/23 0316     Glucose 186 mg/dL      BUN 27 mg/dL      Creatinine 1.24 mg/dL      Sodium 137 mmol/L      Potassium 4.5 mmol/L      Chloride 100 mmol/L      CO2 28.0 mmol/L      Calcium 9.0 mg/dL      Total Protein 7.2 g/dL      Albumin 3.7 g/dL      ALT (SGPT) 12 U/L      AST (SGOT) 12 U/L      Alkaline Phosphatase 140 U/L      Total Bilirubin 0.7 mg/dL      Globulin 3.5 gm/dL      A/G Ratio 1.1 g/dL      BUN/Creatinine Ratio 21.8     Anion Gap 9.0 mmol/L      eGFR  58.4 mL/min/1.73     Narrative:      GFR Normal >60  Chronic Kidney Disease <60  Kidney Failure <15    The GFR formula is only valid for adults with stable renal function between ages 18 and 70.    Urinalysis, Microscopic Only - Straight Cath [[id number]]  (Abnormal) Collected: 12/29/23 0154    Specimen: Urine from Straight Cath Updated: 12/29/23 0233     RBC, UA Too Numerous to Count /HPF      WBC, UA 21-50 /HPF      Bacteria, UA None Seen /HPF      Squamous Epithelial Cells, UA None Seen /HPF      Hyaline Casts, UA None Seen /LPF      Methodology Manual Light Microscopy    Urine Culture - Urine, Straight Cath [[id number]] Collected: 12/29/23 0154    Specimen: Urine from Straight Cath Updated: 12/29/23 0233    Urinalysis With Culture If Indicated - Straight Cath [[id number]]  (Abnormal) Collected: 12/29/23 0154    Specimen: Urine from Straight Cath Updated: 12/29/23 0229     Color, UA Red     Comment: Any Substance that causes an abnormal urine color can alter the accuracy of the chemical reactions.        Appearance, UA Turbid     pH, UA 7.0     Specific Gravity, UA 1.020     Glucose, UA >=1000 mg/dL (3+)     Comment: Due to the bloody appearance of the urine, macroscopic testing was performed on a centrifuged urine specimen to reduce RBC interference on the chemical testing.         Ketones, UA Negative     Bilirubin, UA Negative     Blood, UA Large (3+)     Protein, UA >=300 mg/dL (3+)     Leuk Esterase, UA --     Comment: The Leukoesterase test cannot be performed due to the centrifugation of the specimen.          Nitrite, UA Negative     Urobilinogen, UA 0.2 E.U./dL    Narrative:      In absence of clinical symptoms, the presence of pyuria, bacteria, and/or nitrites on the urinalysis result does not correlate with infection.    CBC & Differential [[id number]]  (Abnormal) Collected: 12/29/23 0149    Specimen: Blood Updated: 12/29/23 0152    Narrative:      The following orders were created for panel order CBC &  Differential.  Procedure                               Abnormality         Status                     ---------                               -----------         ------                     CBC Auto Differential[[id number]]        Abnormal            Final result                 Please view results for these tests on the individual orders.    CBC Auto Differential [[id number]]  (Abnormal) Collected: 12/29/23 0149    Specimen: Blood Updated: 12/29/23 0152     WBC 5.60 10*3/mm3      RBC 4.54 10*6/mm3      Hemoglobin 13.6 g/dL      Hematocrit 41.5 %      MCV 91.5 fL      MCH 30.0 pg      MCHC 32.8 g/dL      RDW 15.0 %      RDW-SD 48.1 fl      MPV 8.7 fL      Platelets 186 10*3/mm3      Neutrophil % 71.1 %      Lymphocyte % 19.5 %      Monocyte % 7.3 %      Eosinophil % 1.4 %      Basophil % 0.7 %      Neutrophils, Absolute 4.00 10*3/mm3      Lymphocytes, Absolute 1.10 10*3/mm3      Monocytes, Absolute 0.40 10*3/mm3      Eosinophils, Absolute 0.10 10*3/mm3      Basophils, Absolute 0.00 10*3/mm3      nRBC 0.0 /100 WBC              Imaging:  Imaging Results (Last 24 Hours)       Procedure Component Value Units Date/Time    CT Abdomen Pelvis Without Contrast [[id number]] Collected: 12/29/23 0252     Updated: 12/29/23 0258    Narrative:      CT ABDOMEN PELVIS WO CONTRAST    Date of Exam: 12/29/2023 2:20 AM EST    Indication: hematuria.    Comparison: CT abdomen pelvis dated April 19, 2023    Technique: Axial CT images were obtained of the abdomen and pelvis without the administration of contrast. Sagittal and coronal reconstructions were performed.  Automated exposure control and iterative reconstruction methods were used.      Findings:  There is a trace left pleural effusion. There is a small pericardial effusion. There is moderate coronary artery calcific atherosclerosis.    There are clips from cholecystectomy. The unenhanced liver, right adrenal gland, pancreas and spleen are normal. There is a left adrenal limb  fat-containing adenoma. There is a stable exophytic cyst from the anterior interpolar left kidney. There is no   evidence of left-sided hydroureteronephrosis or nephrolithiasis.    There is minimal right-sided hydronephrosis with no definite hydroureter.    There is diverticulosis of the sigmoid colon without evidence of diverticulitis. Calcified into mesenteric lesion from the small bowel in the right upper quadrant is stable and likely a small infarcted area of fat. There is no evidence of small bowel   inflammatory change or small bowel obstruction.    There is mild atherosclerosis of the abdominal aorta and branch vascular structures. There are mild degenerative changes of the spine which are normal for patient this age.    There is high density layering in the urinary bladder. This is consistent with bladder hemorrhage. Underlying bladder mass is not excluded.      Impression:      Impression:  High density layering in the urinary bladder consistent with blood products. Underlying mass is not excluded. There is trace right-sided hydronephrosis without evidence of obstructing stone or distal hydroureter. A recently passed stone would have a   similar appearance.            Electronically Signed: Jagdish Hinojosa MD    12/29/2023 2:56 AM EST    Workstation ID: [id number]               Assessment:       Hematuria      Gross hematuria  History of prostate cancer status post radiation 2005  History of bladder cancer with negative cystoscopy 3 months ago    Plan:     CT images reviewed with clot burden in the bladder, needs OR for clot evacuation and fulguration  All risks, benefits and alternatives to surgery were discussed with the patient preoperatively including but not limited to need for multiple procedures, infection, sepsis, bleeding, risk of anesthesia and damage to other organs. The details of the procedure have been fully reviewed with the patient and informed consent has been obtained.      Sven Snyder,  MD  [facility]  [address], [address]  [place] [address]  Office: [phone]  Available via [employer] Secure Chat  12/29/23  12:00 EST

## 2023-12-29 NOTE — ANESTHESIA PROCEDURE NOTES
Airway  Urgency: elective    Date/Time: 12/29/2023 12:01 PM  End Time:12/29/2023 12:03 PM  Airway not difficult    General Information and Staff    Patient location during procedure: OR  CRNA/CAA: Marisa Neri CRNA    Indications and Patient Condition  Indications for airway management: airway protection    Preoxygenated: yes  MILS maintained throughout  Mask difficulty assessment: 1 - vent by mask    Final Airway Details  Final airway type: supraglottic airway      Successful airway: I-gel  Size 4     Number of attempts at approach: 1  Assessment: lips, teeth, and gum same as pre-op and atraumatic intubation

## 2023-12-30 LAB — BACTERIA SPEC AEROBE CULT: NO GROWTH

## 2024-01-01 ENCOUNTER — APPOINTMENT (OUTPATIENT)
Dept: GENERAL RADIOLOGY | Facility: HOSPITAL | Age: 84
End: 2024-01-01
Payer: MEDICARE

## 2024-01-01 ENCOUNTER — HOSPITAL ENCOUNTER (EMERGENCY)
Facility: HOSPITAL | Age: 84
Discharge: HOME | End: 2024-01-01
Attending: EMERGENCY MEDICINE | Admitting: EMERGENCY MEDICINE
Payer: MEDICARE

## 2024-01-01 VITALS
HEART RATE: 96 BPM | HEIGHT: 70 IN | BODY MASS INDEX: 24.52 KG/M2 | TEMPERATURE: 98.5 F | DIASTOLIC BLOOD PRESSURE: 84 MMHG | OXYGEN SATURATION: 94 % | WEIGHT: 171.3 LBS | RESPIRATION RATE: 20 BRPM | SYSTOLIC BLOOD PRESSURE: 124 MMHG

## 2024-01-01 DIAGNOSIS — S32.020A COMPRESSION FRACTURE OF L2 VERTEBRA, INITIAL ENCOUNTER: Primary | ICD-10-CM

## 2024-01-01 PROCEDURE — 72100 X-RAY EXAM L-S SPINE 2/3 VWS: CPT

## 2024-01-01 PROCEDURE — 99282 EMERGENCY DEPT VISIT SF MDM: CPT

## 2024-01-01 RX ORDER — TRAMADOL HYDROCHLORIDE 50 MG/1
50 TABLET ORAL EVERY 6 HOURS PRN
Qty: 12 TABLET | Refills: 0 | Status: SHIPPED | OUTPATIENT
Start: 2024-01-01

## 2024-01-01 NOTE — ED PROVIDER NOTES
Subjective   History of Present Illness  Patient is an 83-year-old male complaining of low back pain after he felt a popping sensation when he tried to lift his wife.  He denies numbness tingling bowel or bladder abnormality.      Review of Systems    Past Medical History:   Diagnosis Date    Atrial fibrillation     Cancer     Coronary artery disease     History of bladder cancer     Hyperlipidemia        No Known Allergies    Past Surgical History:   Procedure Laterality Date    BLADDER SURGERY  10/26/2019    [facility] Dr. Cope    BRONCHOSCOPY N/A 6/30/2022    Procedure: BRONCHOSCOPY with bilateral lung wash;  Surgeon: Ayush Velarde MD;  Location: [facility] ENDOSCOPY;  Service: Pulmonary;  Laterality: N/A;  normal bronch    CARDIAC CATHETERIZATION      COLONOSCOPY      COLONOSCOPY W/ POLYPECTOMY  02/23/2021    ENDOSCOPY N/A 4/20/2023    Procedure: ESOPHAGOGASTRODUODENOSCOPY with gastric biopsy's;  Surgeon: Debra Lomeli MD;  Location: [facility] ENDOSCOPY;  Service: Gastroenterology;  Laterality: N/A;  gastritis, cardia nodules, barretts esophagus    SKIN CANCER EXCISION      right temple BX    UPPER ENDOSCOPIC ULTRASOUND W/ FNA N/A 3/12/2021    Procedure: ESOPHAGOGASTRODUODENOSCOPY WITH endoscopic mucosal resection, biopsy x 1 area, and endoscopic ULTRASOUND;  Surgeon: Abner Infante MD;  Location: [facility] ENDOSCOPY;  Service: Gastroenterology;  Laterality: N/A;  post op: hiatal hernia, duodenal polyp       Family History   Problem Relation Age of Onset    Heart disease Mother     Heart attack Father     No Known Problems Sister     No Known Problems Brother     No Known Problems Maternal Aunt     No Known Problems Maternal Uncle     No Known Problems Paternal Aunt     No Known Problems Paternal Uncle     No Known Problems Maternal Grandmother     No Known Problems Maternal Grandfather     No Known Problems Paternal Grandmother     No Known Problems Paternal Grandfather     No Known Problems Other     Anemia Neg Hx      Arrhythmia Neg Hx     Asthma Neg Hx     Clotting disorder Neg Hx     Fainting Neg Hx     Heart failure Neg Hx     Hyperlipidemia Neg Hx     Hypertension Neg Hx        Social History     Socioeconomic History    Marital status:    Tobacco Use    Smoking status: Former     Packs/day: 0.50     Years: 15.00     Additional pack years: 0.00     Total pack years: 7.50     Types: Cigarettes     Start date: 1960     Quit date: 1980     Years since quittin.0     Passive exposure: Past    Smokeless tobacco: Never   Vaping Use    Vaping Use: Never used   Substance and Sexual Activity    Alcohol use: Not Currently    Drug use: Never    Sexual activity: Defer           Objective   Physical Exam  Neurologic exam is nonfocal.  Back exam shows mild diffuse low back pain to palpation.  Patient has decreased range of motion due to pain.  Is negative straight leg raises with symmetrical DTRs  Procedures           ED Course      XR Spine Lumbar 2 or 3 View    Result Date: 2024  Impression: 1. New L2 superior endplate compression fracture with approximately 33% height loss. This is new from 2023. No evidence of retropulsion. 2. Transitional lumbosacral spinal anatomy with lumbarization of S1 and full disc at S1-S2. Grade 1 anterolisthesis of L5 on S1. Careful anatomic correlation is recommended prior to any future intervention. Electronically Signed: Jaren Marcelino  2024 11:14 AM EST  Workstation ID: [id number]                                          Medical Decision Making  My interpretation of the patient's x-ray of his lumbar spine shows compression fracture of L2.  This is corroborated by the radiologist.  Patient has nonfocal neurologic exam with no bowel or bladder abnormality.  Will be discharged with a prescription for Ultram.  Will follow with the on-call neurosurgeon for outpatient evaluation.    Amount and/or Complexity of Data Reviewed  Radiology: ordered and independent interpretation  performed.    Risk  Prescription drug management.        Final diagnoses:   Compression fracture of L2 vertebra, initial encounter       ED Disposition  ED Disposition       ED Disposition   Discharge    Condition   Stable    Comment   --               No follow-up provider specified.       Medication List        New Prescriptions      traMADol 50 MG tablet  Commonly known as: ULTRAM  Take 1 tablet by mouth Every 6 (Six) Hours As Needed for Severe Pain.               Where to Get Your Medications        These medications were sent to [id number] - [place], [place] - [address] - [phone]  - [phone]  [address] [place] [address]      Phone: [phone]   traMADol 50 MG tablet            Jagdish Leonard MD  01/01/24 1195

## 2024-01-02 ENCOUNTER — TRANSITIONAL CARE MANAGEMENT TELEPHONE ENCOUNTER (OUTPATIENT)
Dept: CALL CENTER | Facility: HOSPITAL | Age: 84
End: 2024-01-02
Payer: MEDICARE

## 2024-01-02 LAB
LAB AP CASE REPORT: NORMAL
PATH REPORT.FINAL DX SPEC: NORMAL
PATH REPORT.GROSS SPEC: NORMAL

## 2024-01-02 NOTE — CASE MANAGEMENT/SOCIAL WORK
Case Management Discharge Note      Final Note: Routine home         Selected Continued Care - Discharged on 12/29/2023 Admission date: 12/29/2023 - Discharge disposition: Home or Self Care       Transportation Services  Private: Car    Final Discharge Disposition Code: 01 - home or self-care

## 2024-01-02 NOTE — OUTREACH NOTE
Call Center TCM Note      Flowsheet Row Responses   [facility] [place] patient discharged from? [facility]   Does the patient have one of the following disease processes/diagnoses(primary or secondary)? Other   TCM attempt successful? Yes   Call start time 1429   Call end time 1435   Discharge diagnosis Hematuria- CYSTOSCOPY WITH CLOT EVACUATION , fulgeration, with bladder biopsy,   Person spoke with today (if not patient) and relationship Spouse/Christine   Meds reviewed with patient/caregiver? Yes   Is the patient having any side effects they believe may be caused by any medication additions or changes? No   Does the patient have all medications ordered at discharge? Yes   Is the patient taking all medications as directed (includes completed medication regime)? Yes   Medication comments antibiotic   Comments No TCM appts. listed with PCP 1/2/24 to 2/1/24.   Does the patient have an appointment with their PCP within 7-14 days of discharge? No   Nursing Interventions Routed TCM call to PCP office   Has home health visited the patient within 72 hours of discharge? N/A   Psychosocial issues? No   Did the patient receive a copy of their discharge instructions? Yes   Nursing interventions Reviewed instructions with patient   What is the patient's perception of their health status since discharge? Same   Is the patient/caregiver able to teach back signs and symptoms related to disease process for when to call PCP? Yes   Is the patient/caregiver able to teach back signs and symptoms related to disease process for when to call 911? Yes   Is the patient/caregiver able to teach back the hierarchy of who to call/visit for symptoms/problems? PCP, Specialist, Home health nurse, Urgent Care, ED, 911 Yes   If the patient is a current smoker, are they able to teach back resources for cessation? Not a smoker   TCM call completed? Yes   Wrap up additional comments Spouse reports pt. same, seen in ED yesterday. Encouraged fluids and rest per  AVS. Advised any worsening symptoms return to the ED, any questions contact PCP/urology or neurosurgeon, spouse v/u.   Call end time 5038   Would this patient benefit from a Referral to [facility] Social Work? No   Is the patient interested in additional calls from an ambulatory ? No            Aurora Bowens RN    1/2/2024, 14:41 EST

## 2024-01-03 NOTE — PROGRESS NOTES
Spoke with patient's wife Christine to offer an appt with PMJ for patient and his wife declined scheduling an appt at this time.  She said they have too much going on right now.

## 2024-01-04 LAB
QT INTERVAL: 341 MS
QTC INTERVAL: 413 MS

## 2024-01-05 NOTE — PROGRESS NOTES
Subjective     Chief Complaint   Patient presents with    Back Pain     Comp Fracture L2           HPI: Cisco Frank is a 83 y.o. male with A-fib and coronary artery disease who presents today for evaluation of L2 compression fracture.  Symptoms started on January 1 when patient was helping his wife up from the ground.  He reports feeling a pop in his low back and has had pain since.  Pain goes across his low back but does not radiate down either leg.  He has no numbness and tingling.  Denies lower extremity weakness and bowel/bladder dysfunction.  Today he reports his pain is already moderately improved since it started.        PMH:  Past Medical History:   Diagnosis Date    Atrial fibrillation     Cancer     Coronary artery disease     History of bladder cancer     Hyperlipidemia          Current Outpatient Medications:     atorvastatin (LIPITOR) 40 MG tablet, Take 1 tablet by mouth every night at bedtime., Disp: 90 tablet, Rfl: 3    glimepiride (AMARYL) 2 MG tablet, Take 2 tablets by mouth 2 (Two) Times a Day., Disp: , Rfl:     Jardiance 25 MG tablet tablet, Take 1 tablet by mouth Every Morning., Disp: , Rfl:     metoprolol tartrate (LOPRESSOR) 25 MG tablet, TAKE ONE TABLET BY MOUTH EVERY 12 HOURS, Disp: 60 tablet, Rfl: 2    omeprazole (priLOSEC) 40 MG capsule, Take 1 capsule by mouth Daily., Disp: , Rfl:     ondansetron ODT (ZOFRAN-ODT) 4 MG disintegrating tablet, Place 1 tablet on the tongue 4 (Four) Times a Day As Needed for Nausea or Vomiting for up to 15 doses., Disp: 15 tablet, Rfl: 2    PARoxetine CR (PAXIL-CR) 12.5 MG 24 hr tablet, Take 1 tablet by mouth Every Morning for 90 days., Disp: 90 tablet, Rfl: 1    polyethylene glycol (MIRALAX) 17 g packet, Take 17 g by mouth Every Night., Disp: , Rfl:     rivaroxaban (XARELTO) 15 MG tablet, Take 1 tablet by mouth Daily., Disp: 90 tablet, Rfl: 3    traMADol (ULTRAM) 50 MG tablet, Take 1 tablet by mouth Every 6 (Six) Hours As Needed for Severe Pain., Disp: 12  tablet, Rfl: 0     No Known Allergies     Past Surgical History:   Procedure Laterality Date    BLADDER SURGERY  10/26/2019    [facility] Dr. Cope    BRONCHOSCOPY N/A 6/30/2022    Procedure: BRONCHOSCOPY with bilateral lung wash;  Surgeon: Ayush Velarde MD;  Location: [facility] ENDOSCOPY;  Service: Pulmonary;  Laterality: N/A;  normal bronch    CARDIAC CATHETERIZATION      COLONOSCOPY      COLONOSCOPY W/ POLYPECTOMY  02/23/2021    CYSTOSCOPY WITH CLOT EVACUATION N/A 12/29/2023    Procedure: CYSTOSCOPY WITH CLOT EVACUATION , fulgeration, with bladder biopsy,;  Surgeon: Sven Snyder MD;  Location: [facility] MAIN OR;  Service: Urology;  Laterality: N/A;    ENDOSCOPY N/A 4/20/2023    Procedure: ESOPHAGOGASTRODUODENOSCOPY with gastric biopsy's;  Surgeon: Debra Lomeli MD;  Location: [facility] ENDOSCOPY;  Service: Gastroenterology;  Laterality: N/A;  gastritis, cardia nodules, barretts esophagus    SKIN CANCER EXCISION      right temple BX    UPPER ENDOSCOPIC ULTRASOUND W/ FNA N/A 3/12/2021    Procedure: ESOPHAGOGASTRODUODENOSCOPY WITH endoscopic mucosal resection, biopsy x 1 area, and endoscopic ULTRASOUND;  Surgeon: Abner Infante MD;  Location: [facility] ENDOSCOPY;  Service: Gastroenterology;  Laterality: N/A;  post op: hiatal hernia, duodenal polyp        Family History   Problem Relation Age of Onset    Heart disease Mother     Heart attack Father     No Known Problems Sister     No Known Problems Brother     No Known Problems Maternal Aunt     No Known Problems Maternal Uncle     No Known Problems Paternal Aunt     No Known Problems Paternal Uncle     No Known Problems Maternal Grandmother     No Known Problems Maternal Grandfather     No Known Problems Paternal Grandmother     No Known Problems Paternal Grandfather     No Known Problems Other     Anemia Neg Hx     Arrhythmia Neg Hx     Asthma Neg Hx     Clotting disorder Neg Hx     Fainting Neg Hx     Heart failure Neg Hx     Hyperlipidemia Neg Hx     Hypertension Neg Hx   "        Social Hx:  Social History     Tobacco Use   Smoking Status Former    Packs/day: 0.50    Years: 15.00    Additional pack years: 0.00    Total pack years: 7.50    Types: Cigarettes    Start date: 1960    Quit date: 1980    Years since quittin.0    Passive exposure: Past   Smokeless Tobacco Never      Alcohol Use: Not At Risk (2023)    AUDIT-C     Frequency of Alcohol Consumption: Never     Average Number of Drinks: Patient does not drink     Frequency of Binge Drinking: Never      Social History     Substance and Sexual Activity   Drug Use Never          Review of Systems   Constitutional:  Positive for activity change.   HENT: Negative.     Eyes: Negative.    Respiratory: Negative.     Cardiovascular: Negative.    Gastrointestinal: Negative.    Endocrine: Negative.    Genitourinary: Negative.    Musculoskeletal:  Positive for arthralgias, back pain, joint swelling and myalgias.   Skin: Negative.    Allergic/Immunologic: Negative.    Neurological: Negative.    Hematological: Negative.    Psychiatric/Behavioral: Negative.           Objective     /76   Pulse 85   Resp 18   Ht 177.8 cm (70\")   Wt 75.8 kg (167 lb)   SpO2 98%   BMI 23.96 kg/m²    Body mass index is 23.96 kg/m².      Physical Exam  Vitals reviewed.   Constitutional:       General: He is not in acute distress.     Appearance: Normal appearance. He is well-developed and well-groomed.   HENT:      Head: Normocephalic and atraumatic.   Eyes:      Extraocular Movements: Extraocular movements intact.      Pupils: Pupils are equal, round, and reactive to light.   Cardiovascular:      Rate and Rhythm: Normal rate and regular rhythm.      Pulses: Normal pulses.   Pulmonary:      Effort: Pulmonary effort is normal. No respiratory distress.   Musculoskeletal:         General: No swelling. Normal range of motion.      Lumbar back: Tenderness and bony tenderness present.   Skin:     General: Skin is warm and dry.      Findings: No " bruising or rash.   Neurological:      General: No focal deficit present.      Mental Status: He is alert and oriented to person, place, and time.      Sensory: Sensation is intact.      Motor: Motor function is intact.      Gait: Gait is intact.      Deep Tendon Reflexes:      Reflex Scores:       Patellar reflexes are 2+ on the right side and 2+ on the left side.       Achilles reflexes are 2+ on the right side and 2+ on the left side.     Comments:             Neurological Exam  Mental Status  Alert. Oriented to person, place, and time.    Cranial Nerves  CN III, IV, VI: Extraocular movements intact bilaterally. Pupils equal round and reactive to light bilaterally.    Motor  Normal muscle bulk throughout. No fasciculations present. Normal muscle tone. Strength is 5/5 in all four extremities except as noted.                                             Right                     Left   Iliopsoas                               5                          5   Quadriceps                           5                          5   Gastrocnemius                     5                           5   Anterior tibialis                      5                          5    Sensory  Normal sensation.Light touch is normal in upper and lower extremities. Pinprick is normal in upper and lower extremities.     Reflexes  Deep tendon reflexes are 2+ and symmetric except as noted.                                            Right                      Left  Patellar                                2+                         2+  Achilles                                2+                         2+    Gait   Normal gait.          Results Review  I personally reviewed and interpreted the images from the following studies:          XR Spine Lumbar 2 or 3 View    Result Date: 1/1/2024  XR SPINE LUMBAR 2 OR 3 VW Date of Exam: 1/1/2024 10:50 AM EST Indication: Back pain Comparison: CT abdomen/pelvis dated 12/29/2023 Findings: There is transitional  lumbosacral spinal anatomy with lumbarization of S1 with fully formed disc. There is grade 1 anterolisthesis of L5 on S1. There is new central height loss at L2 measuring approximately 33% when compared to the prior exam from 12/29/2023. Findings reflect an acute compression fracture. There is no retropulsion or apparent involvement of the posterior vertebral body wall. The disc heights are preserved. There is anterior endplate osteophyte formation.     Impression: Impression: 1. New L2 superior endplate compression fracture with approximately 33% height loss. This is new from 12/29/2023. No evidence of retropulsion. 2. Transitional lumbosacral spinal anatomy with lumbarization of S1 and full disc at S1-S2. Grade 1 anterolisthesis of L5 on S1. Careful anatomic correlation is recommended prior to any future intervention. Electronically Signed: Jaren Marcelino  1/1/2024 11:14 AM EST  Workstation ID: [id number]             Assessment & Plan     MDM: Cisco Frank is a 83 y.o. male who presents with acute low back pain for the past week that started after helping his wife up from the ground.  He has a mild L2 compression fracture on imaging.  He has no neurologic deficits on exam.  His symptoms have already moderately improved since they started a week ago.    I discussed the natural history of compression fractures with the patient and that they usually heal on their own and patients experience relief of their symptoms over the course of 6 to 8 weeks.  Given he is already experiencing some relief anticipate he will do well and recommend he continue with conservative management of his symptoms including ice, heat, and OTC analgesics as needed.  If patient experiences worsening of his pain or inadequate relief after 6 to 8 weeks he may return to me for further evaluation.  Otherwise he may follow-up on an as-needed basis.  Patient is agreeable to this plan.       Diagnosis Plan   1. Closed compression fracture of L2  vertebra, initial encounter            Return if symptoms worsen or fail to improve.      Cisco Frank  reports that he quit smoking about 44 years ago. His smoking use included cigarettes. He started smoking about 64 years ago. He has a 7.50 pack-year smoking history. He has been exposed to tobacco smoke. He has never used smokeless tobacco.       BMI is within normal parameters. No other follow-up for BMI required.         This patient was examined wearing appropriate personal protective equipment.            Fredy Emerson PA-C    01/09/24  15:54 EST      Part of this note may be an electronic transcription/translation of spoken language to printed text using the Dragon Dictation System.

## 2024-01-08 ENCOUNTER — OFFICE VISIT (OUTPATIENT)
Dept: NEUROSURGERY | Facility: CLINIC | Age: 84
End: 2024-01-08
Payer: MEDICARE

## 2024-01-08 VITALS
DIASTOLIC BLOOD PRESSURE: 76 MMHG | RESPIRATION RATE: 18 BRPM | WEIGHT: 167 LBS | HEIGHT: 70 IN | OXYGEN SATURATION: 98 % | BODY MASS INDEX: 23.91 KG/M2 | HEART RATE: 85 BPM | SYSTOLIC BLOOD PRESSURE: 120 MMHG

## 2024-01-08 DIAGNOSIS — S32.020A CLOSED COMPRESSION FRACTURE OF L2 VERTEBRA, INITIAL ENCOUNTER: Primary | ICD-10-CM

## 2024-01-09 RX ORDER — EMPAGLIFLOZIN 25 MG/1
25 TABLET, FILM COATED ORAL EVERY MORNING
Qty: 30 TABLET | Refills: 4 | Status: SHIPPED | OUTPATIENT
Start: 2024-01-09

## 2024-02-12 ENCOUNTER — TELEPHONE (OUTPATIENT)
Dept: CARDIOLOGY | Facility: CLINIC | Age: 84
End: 2024-02-12
Payer: MEDICARE

## 2024-02-12 NOTE — TELEPHONE ENCOUNTER
"    “Please be informed that patient has passed. Patient has been marked  in the system. The date of death is: 2024\".    Caller: ALBIN ELIAS    Relationship: Emergency Contact    Best call back number: [phone]    Did the patient have surgery within 30 days of their passing (Y/N): N    "

## (undated) DEVICE — SOL IRRG H2O BG 3000ML STRL

## (undated) DEVICE — KT SURG TURNOVER 050

## (undated) DEVICE — PK ENDO GI 50

## (undated) DEVICE — BITEBLOCK ENDO W/STRAP 60F A/ LF DISP

## (undated) DEVICE — ERBE NESSY®PLATE 170 SPLIT; 168CM²; CABLE 3M: Brand: ERBE

## (undated) DEVICE — PAD, GROUNDING, UNIVERSAL, SPLIT, 9': Brand: MEDLINE

## (undated) DEVICE — SNAR POLYP CAPTIVATOR OVL 13MM 240CM

## (undated) DEVICE — GLV SURG SIGNATURE ESSENTIAL PF LTX SZ7

## (undated) DEVICE — SOLUTION,WATER,IRRIGATION,1000ML,STERILE: Brand: MEDLINE

## (undated) DEVICE — TRAP WIDEEYE POLYP

## (undated) DEVICE — TUBING, SUCTION, 1/4" X 12', STRAIGHT: Brand: MEDLINE

## (undated) DEVICE — SINGLE-USE BIOPSY FORCEPS: Brand: RADIAL JAW 4

## (undated) DEVICE — PK CYSTO 50

## (undated) DEVICE — PREP SPRY PVPI 10P 2OZ

## (undated) DEVICE — BAPTIST FLOYD BRONCHOSCOPY: Brand: MEDLINE INDUSTRIES, INC.

## (undated) DEVICE — SINGLE USE LIGATING DEVICE: Brand: SINGLE USE LIGATING DEVICE

## (undated) DEVICE — SNAR POLYP CAPTIVATOR2 RND STFF 2.4 25MM 240CM

## (undated) DEVICE — SYRINGE,TOOMEY,IRRIGATION,70CC,STERILE: Brand: MEDLINE

## (undated) DEVICE — PAPR PRNT PK SONY W RIBN UPC55